# Patient Record
Sex: FEMALE | Race: WHITE | NOT HISPANIC OR LATINO
[De-identification: names, ages, dates, MRNs, and addresses within clinical notes are randomized per-mention and may not be internally consistent; named-entity substitution may affect disease eponyms.]

---

## 2017-04-27 ENCOUNTER — APPOINTMENT (OUTPATIENT)
Dept: FAMILY MEDICINE | Facility: CLINIC | Age: 62
End: 2017-04-27

## 2017-04-27 VITALS
BODY MASS INDEX: 31.82 KG/M2 | HEART RATE: 81 BPM | SYSTOLIC BLOOD PRESSURE: 96 MMHG | WEIGHT: 191 LBS | DIASTOLIC BLOOD PRESSURE: 65 MMHG | HEIGHT: 65 IN | TEMPERATURE: 97.2 F

## 2017-04-29 LAB
ALBUMIN SERPL ELPH-MCNC: 4.2 G/DL
ALP BLD-CCNC: 96 U/L
ALT SERPL-CCNC: 20 U/L
ANION GAP SERPL CALC-SCNC: 12 MMOL/L
AST SERPL-CCNC: 16 U/L
BASOPHILS # BLD AUTO: 0.06 K/UL
BASOPHILS NFR BLD AUTO: 0.7 %
BILIRUB SERPL-MCNC: 0.3 MG/DL
BUN SERPL-MCNC: 18 MG/DL
CALCIUM SERPL-MCNC: 9.5 MG/DL
CHLORIDE SERPL-SCNC: 104 MMOL/L
CO2 SERPL-SCNC: 23 MMOL/L
CREAT SERPL-MCNC: 0.88 MG/DL
EOSINOPHIL # BLD AUTO: 0.21 K/UL
EOSINOPHIL NFR BLD AUTO: 2.4 %
GLUCOSE SERPL-MCNC: 101 MG/DL
HCT VFR BLD CALC: 43.4 %
HGB BLD-MCNC: 13.7 G/DL
IMM GRANULOCYTES NFR BLD AUTO: 0.2 %
LYMPHOCYTES # BLD AUTO: 2.26 K/UL
LYMPHOCYTES NFR BLD AUTO: 26.2 %
MAN DIFF?: NORMAL
MCHC RBC-ENTMCNC: 30.9 PG
MCHC RBC-ENTMCNC: 31.6 GM/DL
MCV RBC AUTO: 98 FL
MONOCYTES # BLD AUTO: 0.61 K/UL
MONOCYTES NFR BLD AUTO: 7.1 %
NEUTROPHILS # BLD AUTO: 5.47 K/UL
NEUTROPHILS NFR BLD AUTO: 63.4 %
PLATELET # BLD AUTO: 302 K/UL
POTASSIUM SERPL-SCNC: 4.2 MMOL/L
PROT SERPL-MCNC: 6.5 G/DL
RBC # BLD: 4.43 M/UL
RBC # FLD: 13 %
SODIUM SERPL-SCNC: 139 MMOL/L
TSH SERPL-ACNC: 6.84 UIU/ML
WBC # FLD AUTO: 8.63 K/UL
WRIGHT STN STL: NORMAL

## 2017-05-04 LAB
BACTERIA STL CULT: NORMAL
C DIFF TOX GENS STL QL NAA+PROBE: NORMAL
CDIFF BY PCR: DETECTED
G LAMBLIA AG STL QL: NORMAL
H PYLORI AG STL QL: NOT DETECTED

## 2017-06-19 ENCOUNTER — RX RENEWAL (OUTPATIENT)
Age: 62
End: 2017-06-19

## 2017-07-03 ENCOUNTER — EMERGENCY (EMERGENCY)
Facility: HOSPITAL | Age: 62
LOS: 1 days | Discharge: HOME | End: 2017-07-03
Attending: EMERGENCY MEDICINE
Payer: COMMERCIAL

## 2017-07-03 ENCOUNTER — APPOINTMENT (OUTPATIENT)
Dept: FAMILY MEDICINE | Facility: CLINIC | Age: 62
End: 2017-07-03

## 2017-07-03 VITALS
BODY MASS INDEX: 31.82 KG/M2 | HEART RATE: 78 BPM | WEIGHT: 191 LBS | SYSTOLIC BLOOD PRESSURE: 105 MMHG | HEIGHT: 65 IN | DIASTOLIC BLOOD PRESSURE: 56 MMHG | OXYGEN SATURATION: 91 %

## 2017-07-03 VITALS
RESPIRATION RATE: 20 BRPM | TEMPERATURE: 99 F | WEIGHT: 190.04 LBS | DIASTOLIC BLOOD PRESSURE: 99 MMHG | OXYGEN SATURATION: 99 % | SYSTOLIC BLOOD PRESSURE: 140 MMHG | HEART RATE: 78 BPM

## 2017-07-03 PROCEDURE — 72100 X-RAY EXAM L-S SPINE 2/3 VWS: CPT | Mod: 26

## 2017-07-03 PROCEDURE — 73030 X-RAY EXAM OF SHOULDER: CPT

## 2017-07-03 PROCEDURE — 99284 EMERGENCY DEPT VISIT MOD MDM: CPT

## 2017-07-03 PROCEDURE — 73030 X-RAY EXAM OF SHOULDER: CPT | Mod: 26,RT

## 2017-07-03 PROCEDURE — 72125 CT NECK SPINE W/O DYE: CPT | Mod: 26

## 2017-07-03 PROCEDURE — 99284 EMERGENCY DEPT VISIT MOD MDM: CPT | Mod: 25

## 2017-07-03 PROCEDURE — 72125 CT NECK SPINE W/O DYE: CPT

## 2017-07-03 PROCEDURE — 70450 CT HEAD/BRAIN W/O DYE: CPT | Mod: 26

## 2017-07-03 PROCEDURE — 72100 X-RAY EXAM L-S SPINE 2/3 VWS: CPT

## 2017-07-03 PROCEDURE — 70450 CT HEAD/BRAIN W/O DYE: CPT

## 2017-07-03 RX ORDER — ONDANSETRON 8 MG/1
4 TABLET, FILM COATED ORAL ONCE
Qty: 0 | Refills: 0 | Status: COMPLETED | OUTPATIENT
Start: 2017-07-03 | End: 2017-07-03

## 2017-07-03 RX ADMIN — ONDANSETRON 4 MILLIGRAM(S): 8 TABLET, FILM COATED ORAL at 18:50

## 2017-07-03 NOTE — ED STATDOCS - PMH
ADHD (attention deficit hyperactivity disorder)    HLD (hyperlipidemia)    Hypothyroid    Myocardial infarct  2012  Rheumatoid arthritis    Stented coronary artery  2012

## 2017-07-03 NOTE — ED STATDOCS - MEDICAL DECISION MAKING DETAILS
63 y/o F with post concussive syndrome, will get CT to r/o intracranial bleed, give pain control, XR of R shoulder and XR of lumbar spine
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
## 2017-07-03 NOTE — ED STATDOCS - ATTENDING CONTRIBUTION TO CARE
Attending Contribution to Care: I (Rina Carr D.O.) performed the initial face to face bedside interview with this patient regarding history of present illness, review of symptoms and past medical, social and family history.  I completed an independent physical examination.  I was the initial provider who evaluated this patient.  The history, review of symptoms and examination was documented by the scribe in my presence and I attest to the accuracy of the documentation.  I have signed out the follow up of any pending tests (i.e. labs, radiological studies) to the PA.  I have discussed the patient’s plan of care and disposition with the PA.
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
## 2017-07-12 RX ORDER — TOPIRAMATE 25 MG/1
25 TABLET, FILM COATED ORAL
Qty: 60 | Refills: 0 | Status: ACTIVE | COMMUNITY
Start: 2017-03-20

## 2017-10-05 ENCOUNTER — APPOINTMENT (OUTPATIENT)
Dept: FAMILY MEDICINE | Facility: CLINIC | Age: 62
End: 2017-10-05
Payer: COMMERCIAL

## 2017-10-05 ENCOUNTER — RESULT CHARGE (OUTPATIENT)
Age: 62
End: 2017-10-05

## 2017-10-05 VITALS
RESPIRATION RATE: 15 BRPM | BODY MASS INDEX: 30.82 KG/M2 | WEIGHT: 185 LBS | OXYGEN SATURATION: 98 % | SYSTOLIC BLOOD PRESSURE: 92 MMHG | HEIGHT: 65 IN | DIASTOLIC BLOOD PRESSURE: 56 MMHG | HEART RATE: 71 BPM

## 2017-10-05 DIAGNOSIS — R51 HEADACHE: ICD-10-CM

## 2017-10-05 PROCEDURE — 99214 OFFICE O/P EST MOD 30 MIN: CPT

## 2017-10-09 LAB
BILIRUB UR QL STRIP: NEGATIVE
CLARITY UR: CLEAR
COLLECTION METHOD: NORMAL
GLUCOSE UR-MCNC: NEGATIVE
HCG UR QL: 0.2 EU/DL
HGB UR QL STRIP.AUTO: NEGATIVE
KETONES UR-MCNC: NEGATIVE
LEUKOCYTE ESTERASE UR QL STRIP: NEGATIVE
NITRITE UR QL STRIP: NEGATIVE
PH UR STRIP: 6
PROT UR STRIP-MCNC: NEGATIVE
SP GR UR STRIP: 1.01

## 2017-10-10 PROBLEM — R51 HEADACHE: Status: ACTIVE | Noted: 2017-07-03

## 2018-02-26 ENCOUNTER — NON-APPOINTMENT (OUTPATIENT)
Age: 63
End: 2018-02-26

## 2018-02-26 ENCOUNTER — APPOINTMENT (OUTPATIENT)
Dept: FAMILY MEDICINE | Facility: CLINIC | Age: 63
End: 2018-02-26
Payer: COMMERCIAL

## 2018-02-26 VITALS
TEMPERATURE: 98 F | SYSTOLIC BLOOD PRESSURE: 140 MMHG | DIASTOLIC BLOOD PRESSURE: 70 MMHG | HEIGHT: 65 IN | OXYGEN SATURATION: 98 % | RESPIRATION RATE: 12 BRPM | BODY MASS INDEX: 30.82 KG/M2 | HEART RATE: 96 BPM | WEIGHT: 185 LBS

## 2018-02-26 PROCEDURE — 93000 ELECTROCARDIOGRAM COMPLETE: CPT

## 2018-02-26 PROCEDURE — 99215 OFFICE O/P EST HI 40 MIN: CPT | Mod: 25

## 2018-02-26 RX ORDER — PREGABALIN 100 MG/1
100 CAPSULE ORAL
Qty: 60 | Refills: 0 | Status: DISCONTINUED | COMMUNITY
Start: 2016-11-15 | End: 2018-02-26

## 2018-07-18 ENCOUNTER — MEDICATION RENEWAL (OUTPATIENT)
Age: 63
End: 2018-07-18

## 2018-10-08 ENCOUNTER — APPOINTMENT (OUTPATIENT)
Dept: FAMILY MEDICINE | Facility: CLINIC | Age: 63
End: 2018-10-08

## 2018-10-08 ENCOUNTER — APPOINTMENT (OUTPATIENT)
Dept: FAMILY MEDICINE | Facility: CLINIC | Age: 63
End: 2018-10-08
Payer: MEDICARE

## 2018-10-08 VITALS
HEART RATE: 106 BPM | HEIGHT: 65 IN | SYSTOLIC BLOOD PRESSURE: 140 MMHG | WEIGHT: 183 LBS | BODY MASS INDEX: 30.49 KG/M2 | DIASTOLIC BLOOD PRESSURE: 82 MMHG | OXYGEN SATURATION: 98 %

## 2018-10-08 PROCEDURE — 86580 TB INTRADERMAL TEST: CPT

## 2019-03-07 ENCOUNTER — APPOINTMENT (OUTPATIENT)
Dept: FAMILY MEDICINE | Facility: CLINIC | Age: 64
End: 2019-03-07

## 2019-03-08 ENCOUNTER — APPOINTMENT (OUTPATIENT)
Dept: FAMILY MEDICINE | Facility: CLINIC | Age: 64
End: 2019-03-08
Payer: MEDICARE

## 2019-03-08 VITALS
RESPIRATION RATE: 16 BRPM | TEMPERATURE: 98.3 F | BODY MASS INDEX: 31.32 KG/M2 | SYSTOLIC BLOOD PRESSURE: 108 MMHG | OXYGEN SATURATION: 98 % | HEIGHT: 65 IN | DIASTOLIC BLOOD PRESSURE: 64 MMHG | WEIGHT: 188 LBS | HEART RATE: 82 BPM

## 2019-03-08 DIAGNOSIS — Z01.818 ENCOUNTER FOR OTHER PREPROCEDURAL EXAMINATION: ICD-10-CM

## 2019-03-08 DIAGNOSIS — F43.21 ADJUSTMENT DISORDER WITH DEPRESSED MOOD: ICD-10-CM

## 2019-03-08 DIAGNOSIS — Z11.59 ENCOUNTER FOR SCREENING FOR OTHER VIRAL DISEASES: ICD-10-CM

## 2019-03-08 DIAGNOSIS — Z11.1 ENCOUNTER FOR SCREENING FOR RESPIRATORY TUBERCULOSIS: ICD-10-CM

## 2019-03-08 DIAGNOSIS — Z63.4 DISAPPEARANCE AND DEATH OF FAMILY MEMBER: ICD-10-CM

## 2019-03-08 DIAGNOSIS — Z87.898 PERSONAL HISTORY OF OTHER SPECIFIED CONDITIONS: ICD-10-CM

## 2019-03-08 DIAGNOSIS — K59.1 FUNCTIONAL DIARRHEA: ICD-10-CM

## 2019-03-08 DIAGNOSIS — Z87.2 PERSONAL HISTORY OF DISEASES OF THE SKIN AND SUBCUTANEOUS TISSUE: ICD-10-CM

## 2019-03-08 DIAGNOSIS — Z87.820 PERSONAL HISTORY OF TRAUMATIC BRAIN INJURY: ICD-10-CM

## 2019-03-08 DIAGNOSIS — Z23 ENCOUNTER FOR IMMUNIZATION: ICD-10-CM
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
## 2019-03-08 DIAGNOSIS — Z78.9 OTHER SPECIFIED HEALTH STATUS: ICD-10-CM

## 2019-03-08 DIAGNOSIS — M25.511 PAIN IN RIGHT SHOULDER: ICD-10-CM

## 2019-03-08 PROCEDURE — G0009: CPT

## 2019-03-08 PROCEDURE — 82044 UR ALBUMIN SEMIQUANTITATIVE: CPT | Mod: QW

## 2019-03-08 PROCEDURE — 81003 URINALYSIS AUTO W/O SCOPE: CPT | Mod: QW

## 2019-03-08 PROCEDURE — 99213 OFFICE O/P EST LOW 20 MIN: CPT | Mod: 25

## 2019-03-08 PROCEDURE — 99397 PER PM REEVAL EST PAT 65+ YR: CPT | Mod: 25

## 2019-03-08 PROCEDURE — 90670 PCV13 VACCINE IM: CPT

## 2019-03-08 SDOH — SOCIAL STABILITY - SOCIAL INSECURITY: DISSAPEARANCE AND DEATH OF FAMILY MEMBER: Z63.4

## 2019-03-11 LAB
ALBUMIN SERPL ELPH-MCNC: 4.4 G/DL
ALBUMIN: 10
ALP BLD-CCNC: 101 U/L
ALT SERPL-CCNC: 23 U/L
ANION GAP SERPL CALC-SCNC: 15 MMOL/L
AST SERPL-CCNC: 25 U/L
BASOPHILS # BLD AUTO: 0.07 K/UL
BASOPHILS NFR BLD AUTO: 1.1 %
BILIRUB SERPL-MCNC: 0.2 MG/DL
BILIRUB UR QL STRIP: NORMAL
BUN SERPL-MCNC: 20 MG/DL
CALCIUM SERPL-MCNC: 9.9 MG/DL
CHLORIDE SERPL-SCNC: 104 MMOL/L
CHOLEST SERPL-MCNC: 195 MG/DL
CHOLEST/HDLC SERPL: 3.6 RATIO
CLARITY UR: CLEAR
CO2 SERPL-SCNC: 21 MMOL/L
COLLECTION METHOD: NORMAL
CREAT SERPL-MCNC: 0.79 MG/DL
CREATININE: 50
EOSINOPHIL # BLD AUTO: 0.16 K/UL
EOSINOPHIL NFR BLD AUTO: 2.4 %
GLUCOSE SERPL-MCNC: 94 MG/DL
GLUCOSE UR-MCNC: NORMAL
HBA1C MFR BLD HPLC: 5.9 %
HCG UR QL: 0.2 EU/DL
HCT VFR BLD CALC: 39.6 %
HDLC SERPL-MCNC: 54 MG/DL
HGB BLD-MCNC: 12.8 G/DL
HGB UR QL STRIP.AUTO: NORMAL
IMM GRANULOCYTES NFR BLD AUTO: 0.3 %
KETONES UR-MCNC: NORMAL
LDLC SERPL CALC-MCNC: 96 MG/DL
LEUKOCYTE ESTERASE UR QL STRIP: NORMAL
LYMPHOCYTES # BLD AUTO: 2 K/UL
LYMPHOCYTES NFR BLD AUTO: 30.1 %
MAN DIFF?: NORMAL
MCHC RBC-ENTMCNC: 31.9 PG
MCHC RBC-ENTMCNC: 32.3 GM/DL
MCV RBC AUTO: 98.8 FL
MICROALBUMIN/CREAT UR TEST STR-RTO: <30
MONOCYTES # BLD AUTO: 0.57 K/UL
MONOCYTES NFR BLD AUTO: 8.6 %
NEUTROPHILS # BLD AUTO: 3.82 K/UL
NEUTROPHILS NFR BLD AUTO: 57.5 %
NITRITE UR QL STRIP: NORMAL
PH UR STRIP: 7
PLATELET # BLD AUTO: 284 K/UL
POTASSIUM SERPL-SCNC: 4.3 MMOL/L
PROT SERPL-MCNC: 7.1 G/DL
PROT UR STRIP-MCNC: NORMAL
RBC # BLD: 4.01 M/UL
RBC # FLD: 12.1 %
SODIUM SERPL-SCNC: 140 MMOL/L
SP GR UR STRIP: 1.01
TRIGL SERPL-MCNC: 226 MG/DL
TSH SERPL-ACNC: 0.41 UIU/ML
WBC # FLD AUTO: 6.64 K/UL

## 2019-03-13 PROBLEM — Z87.820 HISTORY OF CONCUSSION: Status: RESOLVED | Noted: 2017-07-03 | Resolved: 2019-03-13

## 2019-03-13 PROBLEM — M25.511 RIGHT SHOULDER PAIN: Status: RESOLVED | Noted: 2018-02-26 | Resolved: 2019-03-13

## 2019-03-13 PROBLEM — K59.1 FUNCTIONAL DIARRHEA: Status: RESOLVED | Noted: 2017-04-27 | Resolved: 2019-03-13

## 2019-03-13 PROBLEM — Z87.898 HISTORY OF DYSURIA: Status: RESOLVED | Noted: 2017-10-05 | Resolved: 2019-03-13
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
## 2019-03-13 PROBLEM — Z11.1 SCREENING FOR TUBERCULOSIS: Status: RESOLVED | Noted: 2017-10-05 | Resolved: 2019-03-13

## 2019-03-13 PROBLEM — Z11.59 SCREENING FOR MEASLES: Status: RESOLVED | Noted: 2017-10-05 | Resolved: 2019-03-13

## 2019-03-13 PROBLEM — Z78.9 UNKNOWN VARICELLA VACCINATION STATUS: Status: RESOLVED | Noted: 2017-10-05 | Resolved: 2019-03-13

## 2019-03-13 PROBLEM — Z01.818 PREOP EXAMINATION: Status: RESOLVED | Noted: 2018-02-26 | Resolved: 2019-03-13

## 2019-03-13 PROBLEM — Z11.59 SCREENING FOR RUBELLA: Status: RESOLVED | Noted: 2017-10-05 | Resolved: 2019-03-13

## 2019-03-13 PROBLEM — F43.21 GRIEF REACTION: Status: RESOLVED | Noted: 2018-02-26 | Resolved: 2019-03-13

## 2019-03-13 NOTE — HISTORY OF PRESENT ILLNESS
[FreeTextEntry1] : CPE \par Last CPE was October 2017 IN [place] \par Follows with specialists Neurology ( MS, vertigo, ADD,  Headaches) and cardiology ( CAD)  and ORTHO for MSK complaints. \par \par Last seen for MCL 2/26/18 for RIGHT SHOULDER Arthroscopy . Did well after.  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  Enjoys it !

## 2019-03-13 NOTE — PHYSICAL EXAM
[General Appearance - Alert] : alert [General Appearance - In No Acute Distress] : in no acute distress [Outer Ear] : the ears and nose were normal in appearance [Oropharynx] : the oropharynx was normal [Neck Appearance] : the appearance of the neck was normal [Neck Cervical Mass (___cm)] : no neck mass was observed [Jugular Venous Distention Increased] : there was no jugular-venous distention [Thyroid Diffuse Enlargement] : the thyroid was not enlarged [Thyroid Nodule] : there were no palpable thyroid nodules [Auscultation Breath Sounds / Voice Sounds] : lungs were clear to auscultation bilaterally [Heart Rate And Rhythm] : heart rate was normal and rhythm regular [Heart Sounds] : normal S1 and S2 [Heart Sounds Gallop] : no gallops [Murmurs] : no murmurs [Heart Sounds Pericardial Friction Rub] : no pericardial rub [Bowel Sounds] : normal bowel sounds [Abdomen Soft] : soft [Abdomen Tenderness] : non-tender [] : no hepato-splenomegaly [Abdomen Mass (___ Cm)] : no abdominal mass palpated [No Acute Distress] : no acute distress [Normal Sclera/Conjunctiva] : normal sclera/conjunctiva [PERRL] : pupils equal round and reactive to light [Normal TMs] : both tympanic membranes were normal [Supple] : supple [Thyroid Normal, No Nodules] : the thyroid was normal and there were no nodules present [No Respiratory Distress] : no respiratory distress  [Clear to Auscultation] : lungs were clear to auscultation bilaterally [Normal Rate] : normal rate  [Regular Rhythm] : with a regular rhythm [No Edema] : there was no peripheral edema [Soft] : abdomen soft [Non Tender] : non-tender [No Masses] : no abdominal mass palpated [Normal Supraclavicular Nodes] : no supraclavicular lymphadenopathy [Normal Posterior Cervical Nodes] : no posterior cervical lymphadenopathy [Normal Anterior Cervical Nodes] : no anterior cervical lymphadenopathy [No Spinal Tenderness] : no spinal tenderness [No Joint Swelling] : no joint swelling [No Rash] : no rash [Deep Tendon Reflexes (DTR)] : deep tendon reflexes were 2+ and symmetric [Speech Grossly Normal] : speech grossly normal [Alert and Oriented x3] : oriented to person, place, and time [de-identified] : calm  well groomed  engaging  [de-identified] : MO   [de-identified] : obese  [de-identified] : warm and dry  [de-identified] : no tremors

## 2019-03-13 NOTE — HEALTH RISK ASSESSMENT
[No falls in past year] : Patient reported no falls in the past year [1] : 2) Feeling down, depressed, or hopeless for several days (1) [Good] : ~his/her~  mood as  good [Graduate School] : graduate school [Feels Safe at Home] : Feels safe at home [Fully functional (bathing, dressing, toileting, transferring, walking, feeding)] : Fully functional (bathing, dressing, toileting, transferring, walking, feeding) [Fully functional (using the telephone, shopping, preparing meals, housekeeping, doing laundry, using] : Fully functional and needs no help or supervision to perform IADLs (using the telephone, shopping, preparing meals, housekeeping, doing laundry, using transportation, managing medications and managing finances) [Patient reported mammogram was normal] : Patient reported mammogram was normal [] : No [de-identified] : DENIES [de-identified] : when I can I walk  [de-identified] : "could be better "  [FreeTextEntry1] :  passed recently  [[id number]] : 2 [MammogramDate] : 2012  [MammogramComments] : Bi Rads 2  [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

## 2019-03-13 NOTE — ASSESSMENT
[FreeTextEntry1] : Complicated 64 year old WF with multiple chronic medical conditions and specialists .\par \par See HPI and PLAN\par \par Lab req given for fasting lipid panel.  Will advise.\par \par Best wishes offered! \par \par Fu in 4 months.

## 2019-03-13 NOTE — COUNSELING
[Weight management counseling provided] : Weight management [Healthy eating counseling provided] : healthy eating [Fall prevention counseling provided] : fall prevention  [Target Wt Loss Goal ___] : Target weight loss goal [unfilled] lbs [Adequate lighting] : Adequate lighting [No throw rugs] : No throw rugs

## 2019-03-26 ENCOUNTER — MEDICATION RENEWAL (OUTPATIENT)
Age: 64
End: 2019-03-26

## 2019-05-31 ENCOUNTER — APPOINTMENT (OUTPATIENT)
Dept: RHEUMATOLOGY | Facility: CLINIC | Age: 64
End: 2019-05-31
Payer: MEDICARE

## 2019-05-31 VITALS
SYSTOLIC BLOOD PRESSURE: 120 MMHG | HEIGHT: 65 IN | HEART RATE: 94 BPM | OXYGEN SATURATION: 99 % | DIASTOLIC BLOOD PRESSURE: 80 MMHG | BODY MASS INDEX: 31.32 KG/M2 | WEIGHT: 188 LBS

## 2019-05-31 DIAGNOSIS — M17.10 UNILATERAL PRIMARY OSTEOARTHRITIS, UNSPECIFIED KNEE: ICD-10-CM

## 2019-05-31 PROCEDURE — 96372 THER/PROPH/DIAG INJ SC/IM: CPT

## 2019-05-31 PROCEDURE — 99204 OFFICE O/P NEW MOD 45 MIN: CPT | Mod: 25

## 2019-05-31 RX ORDER — METHYLPRED ACET/NACL,ISO-OS/PF 80 MG/ML
80 VIAL (ML) INJECTION
Qty: 1 | Refills: 0 | Status: COMPLETED | OUTPATIENT
Start: 2019-05-31

## 2019-05-31 RX ADMIN — METHYLPREDNISOLONE ACETATE MG/ML: 80 INJECTION, SUSPENSION INTRA-ARTICULAR; INTRALESIONAL; INTRAMUSCULAR; SOFT TISSUE at 00:00

## 2019-05-31 NOTE — PROCEDURE
[Today's Date:] : Date: [unfilled] [Patient] : the patient [Risks] : risks [Benefits] : benefits [Alternatives] : alternatives [Consent Obtained] : written consent was obtained prior to the procedure and is detailed in the patient's record [Therapeutic] : therapeutic [#1 Site: ______] : #1 site identified in the [unfilled] [Ethyl Chloride] : ethyl chloride [Betadine] : betadine solution [Alcohol] : alcohol [Chlorhexidine] : chlorhexidine [25 gauge 1.5 inch] : A 25 gauge 1.5 inch needle was used [Depomedrol ___ mg] : Depomedrol [unfilled] mg [Tolerated Well] : the patient tolerated the procedure well [No Complications] : there were no complications [Instructions Given] : handouts/patient instructions were given to patient [Patient Instructed to Call] : patient was instructed to call if redness at site, a decrease in range of motion or an increase in pain is noted after procedure.

## 2019-05-31 NOTE — REVIEW OF SYSTEMS
[Feeling Tired] : feeling tired [Arthralgias] : arthralgias [Joint Pain] : joint pain [Negative] : Heme/Lymph

## 2019-05-31 NOTE — ASSESSMENT
[FreeTextEntry1] : 64-year-old  female presents for further management of rheumatoid arthritis.\par \par Rheumatoid arthritis-\par -she is well known to me for several years\par -She was diagnosed prior to seeing me\par -She is seronegative nonerosive\par -She has failed multiple medications in the past including Diamox due to elevated liver enzymes. She has failed TNF inhibitors due to lack of efficacy. She has also tried and failed orencia.\par -The last medication she was using was actemra injection and they worked well for her\par -She would like to resume them\par Risks and benefits were d/w patient including but not limited to immunosuppression, reactivation of TB, lymphoma, malignancies, lupus like symptoms, exacerbation of psoriasis, and infections.\par - to hold meds while on abx\par -Labs to be done today\par -She is to have baseline x-rays\par \par Polyarthritis-\par -intramuscular Depo-Medrol 80 mg x1 today\par -To use NSAIDs on a p.r.n. basis\par \par Osteoarthritis-\par -status post bilateral knee replacements\par \par Carpal tunnel syndrome-\par -to use wrist splints\par \par Followup 2 months. She is aware to call if her symptoms worsen

## 2019-05-31 NOTE — PHYSICAL EXAM
[General Appearance - Alert] : alert [General Appearance - Well Nourished] : well nourished [General Appearance - Well Developed] : well developed [Sclera] : the sclera and conjunctiva were normal [Oropharynx] : the oropharynx was normal [Neck Appearance] : the appearance of the neck was normal [Respiration, Rhythm And Depth] : normal respiratory rhythm and effort [Auscultation Breath Sounds / Voice Sounds] : lungs were clear to auscultation bilaterally [Heart Sounds] : normal S1 and S2 [Full Pulse] : the pedal pulses are present [Edema] : there was no peripheral edema [Abdomen Tenderness] : non-tender [Cervical Lymph Nodes Enlarged Anterior Bilaterally] : anterior cervical [Supraclavicular Lymph Nodes Enlarged Bilaterally] : supraclavicular [No Spinal Tenderness] : no spinal tenderness [Abnormal Walk] : normal gait [Nail Clubbing] : no clubbing  or cyanosis of the fingernails [Motor Tone] : muscle strength and tone were normal [FreeTextEntry1] : FROM neck, shoulders, elbows, wrists, hands, hips, knees, ankles and feet, including the small joints of the hands and feet without any evidence of inflammatory arthritis b/l TKR, hands with POm with swelling, POM shoulders, ROM 90 degrees,  [] : no rash [Deep Tendon Reflexes (DTR)] : deep tendon reflexes were 2+ and symmetric [Motor Exam] : the motor exam was normal [Oriented To Time, Place, And Person] : oriented to person, place, and time [Impaired Insight] : insight and judgment were intact [Affect] : the affect was normal

## 2019-05-31 NOTE — HISTORY OF PRESENT ILLNESS
[FreeTextEntry1] : 64-year-old  female well known to me for several years returns for further management of rheumatoid arthritis. She was diagnosed with seronegative rheumatoid arthritis prior to seeing me. She was under my care for several years and tried several medications with minimal relief. She was last seen Dr. Sanchez. She was on subcutaneous injection of actemra\par She last used the injections in October, she states that she did well with them. She ran out of them and stopped using them. She was on methotrexate but it caused elevated liver enzymes. Prior to this she has used Enbrel, Remicade, orencia, Humira with minimal relief. She thinks that she has tried other infusions as well.\par \par She has had bilateral knee replacements. She has had shoulder surgery as well.\par \par She has a good appetite and denies weight loss. She denies fevers or chills or night sweats. She rates her current pain as a 10 out of 10. Most of the pain is in her hands.\par \par She is otherwise up-to-date on her age-appropriate screenings.

## 2019-06-04 LAB
25(OH)D3 SERPL-MCNC: 21.4 NG/ML
ALBUMIN SERPL ELPH-MCNC: 4.8 G/DL
ALP BLD-CCNC: 118 U/L
ALT SERPL-CCNC: 14 U/L
ANION GAP SERPL CALC-SCNC: 14 MMOL/L
AST SERPL-CCNC: 18 U/L
BASOPHILS # BLD AUTO: 0.07 K/UL
BASOPHILS NFR BLD AUTO: 0.9 %
BILIRUB SERPL-MCNC: 0.3 MG/DL
BUN SERPL-MCNC: 22 MG/DL
CALCIUM SERPL-MCNC: 10 MG/DL
CHLORIDE SERPL-SCNC: 107 MMOL/L
CO2 SERPL-SCNC: 22 MMOL/L
CREAT SERPL-MCNC: 0.78 MG/DL
CRP SERPL-MCNC: 0.53 MG/DL
ENA SS-A AB SER IA-ACNC: <0.2 AL
ENA SS-B AB SER IA-ACNC: <0.2 AL
EOSINOPHIL # BLD AUTO: 0.22 K/UL
EOSINOPHIL NFR BLD AUTO: 2.9 %
ERYTHROCYTE [SEDIMENTATION RATE] IN BLOOD BY WESTERGREN METHOD: 40 MM/HR
GLUCOSE SERPL-MCNC: 101 MG/DL
HAV IGM SER QL: NONREACTIVE
HBV CORE IGM SER QL: NONREACTIVE
HBV SURFACE AG SER QL: NONREACTIVE
HCT VFR BLD CALC: 47 %
HCV AB SER QL: NONREACTIVE
HCV S/CO RATIO: 0.05 S/CO
HGB BLD-MCNC: 14.7 G/DL
IMM GRANULOCYTES NFR BLD AUTO: 0.4 %
LYMPHOCYTES # BLD AUTO: 1.62 K/UL
LYMPHOCYTES NFR BLD AUTO: 21.3 %
MAN DIFF?: NORMAL
MCHC RBC-ENTMCNC: 31 PG
MCHC RBC-ENTMCNC: 31.3 GM/DL
MCV RBC AUTO: 99.2 FL
MONOCYTES # BLD AUTO: 0.57 K/UL
MONOCYTES NFR BLD AUTO: 7.5 %
NEUTROPHILS # BLD AUTO: 5.1 K/UL
NEUTROPHILS NFR BLD AUTO: 67 %
PLATELET # BLD AUTO: 358 K/UL
POTASSIUM SERPL-SCNC: 4.2 MMOL/L
PROT SERPL-MCNC: 7.6 G/DL
RBC # BLD: 4.74 M/UL
RBC # FLD: 12.3 %
SODIUM SERPL-SCNC: 143 MMOL/L
WBC # FLD AUTO: 7.61 K/UL

## 2019-06-05 LAB
M TB IFN-G BLD-IMP: NEGATIVE
QUANTIFERON TB PLUS MITOGEN MINUS NIL: 4.81 IU/ML
QUANTIFERON TB PLUS NIL: 0.01 IU/ML
QUANTIFERON TB PLUS TB1 MINUS NIL: 0.04 IU/ML
QUANTIFERON TB PLUS TB2 MINUS NIL: 0.01 IU/ML

## 2019-06-06 LAB
ANA PAT FLD IF-IMP: ABNORMAL
ANA SER IF-ACNC: ABNORMAL
CCP AB SER IA-ACNC: <8 UNITS
RF+CCP IGG SER-IMP: NEGATIVE

## 2019-06-18 ENCOUNTER — APPOINTMENT (OUTPATIENT)
Dept: FAMILY MEDICINE | Facility: CLINIC | Age: 64
End: 2019-06-18

## 2019-06-20 ENCOUNTER — APPOINTMENT (OUTPATIENT)
Dept: FAMILY MEDICINE | Facility: CLINIC | Age: 64
End: 2019-06-20
Payer: MEDICARE

## 2019-06-20 VITALS
BODY MASS INDEX: 30.99 KG/M2 | SYSTOLIC BLOOD PRESSURE: 154 MMHG | HEIGHT: 65 IN | HEART RATE: 113 BPM | DIASTOLIC BLOOD PRESSURE: 80 MMHG | WEIGHT: 186 LBS | OXYGEN SATURATION: 95 %

## 2019-06-20 DIAGNOSIS — N63.0 UNSPECIFIED LUMP IN UNSPECIFIED BREAST: ICD-10-CM

## 2019-06-20 PROCEDURE — 99214 OFFICE O/P EST MOD 30 MIN: CPT

## 2019-06-20 RX ORDER — ZOLPIDEM TARTRATE 12.5 MG/1
12.5 TABLET, EXTENDED RELEASE ORAL
Qty: 30 | Refills: 0 | Status: DISCONTINUED | COMMUNITY
Start: 2017-02-24 | End: 2019-06-20

## 2019-06-24 ENCOUNTER — APPOINTMENT (OUTPATIENT)
Dept: RHEUMATOLOGY | Facility: CLINIC | Age: 64
End: 2019-06-24
Payer: MEDICARE

## 2019-06-27 PROBLEM — N63.0 LUMP OR MASS IN BREAST: Status: ACTIVE | Noted: 2019-06-18

## 2019-06-27 NOTE — PHYSICAL EXAM
[Well Developed] : well developed [Normal Sclera/Conjunctiva] : normal sclera/conjunctiva [Supple] : supple [No Respiratory Distress] : no respiratory distress  [Clear to Auscultation] : lungs were clear to auscultation bilaterally [Regular Rhythm] : with a regular rhythm [Normal Rate] : normal rate  [No Spinal Tenderness] : no spinal tenderness [No Rash] : no rash [No Focal Deficits] : no focal deficits [Speech Grossly Normal] : speech grossly normal [Alert and Oriented x3] : oriented to person, place, and time [de-identified] : ++ anxiety  [de-identified] : a bit fibrous ; no discrete palpable lesion on exam    CHRONIC NT lump between breast is raise and pink in color.  [de-identified] : MO  [de-identified] : a bit antalgic

## 2019-06-27 NOTE — ASSESSMENT
[FreeTextEntry1] : \par Mammo and US reviewed for no suggestion of malignancy .  Area of concern described as inflamed derma structure and advised possible abscess or or cyst. \par Lucrecia is not complaining of pain or fever. \par In setting of loose stool and polypharmacy , will defer ABXS for now.\par Referred to Breast surgeon. \par \par +/- Genetic testing discussed. \par \par She agrees with plan.

## 2019-06-27 NOTE — REVIEW OF SYSTEMS
[Fatigue] : fatigue [Night Sweats] : night sweats [Joint Pain] : joint pain [Joint Stiffness] : joint stiffness [Negative] : Respiratory [Fever] : no fever [Suicidal] : not suicidal [FreeTextEntry2] : chronic [FreeTextEntry9] : chronic [de-identified] : as in hpi  [de-identified] : as in hpi

## 2019-06-27 NOTE — HISTORY OF PRESENT ILLNESS
[FreeTextEntry1] : FU on recent Mammo and Breast US \par She has declined  surveillance in recent years.\par She felt lump in RIGHT  axillae region two days ago. \par Sister diagnosed with breast cancer recently and has one sister with past history of same [de-identified] : Complains of 4 months of loose stools  no recent abx.\par Has Fu with GI planned   Needs referral   Needs colonoscopy \par \par follows  with neurology regularly for mood issues, LBP, migraines, insomnia.\par follows  with rheum. for RA

## 2019-07-01 ENCOUNTER — APPOINTMENT (OUTPATIENT)
Dept: RHEUMATOLOGY | Facility: CLINIC | Age: 64
End: 2019-07-01
Payer: MEDICARE

## 2019-07-01 VITALS
HEIGHT: 65 IN | DIASTOLIC BLOOD PRESSURE: 79 MMHG | WEIGHT: 186 LBS | BODY MASS INDEX: 30.99 KG/M2 | HEART RATE: 105 BPM | SYSTOLIC BLOOD PRESSURE: 128 MMHG | OXYGEN SATURATION: 98 %

## 2019-07-01 PROCEDURE — 96372 THER/PROPH/DIAG INJ SC/IM: CPT

## 2019-07-01 RX ORDER — METHYLPRED ACET/NACL,ISO-OS/PF 80 MG/ML
80 VIAL (ML) INJECTION
Qty: 1 | Refills: 0 | Status: COMPLETED | OUTPATIENT
Start: 2019-07-01

## 2019-07-01 RX ADMIN — METHYLPREDNISOLONE ACETATE MG/ML: 80 INJECTION, SUSPENSION INTRA-ARTICULAR; INTRALESIONAL; INTRAMUSCULAR; SOFT TISSUE at 00:00

## 2019-07-01 NOTE — PROCEDURE
[Today's Date:] : Date: [unfilled] [Patient] : the patient [Risks] : risks [Benefits] : benefits [Alternatives] : alternatives [Consent Obtained] : written consent was obtained prior to the procedure and is detailed in the patient's record [Therapeutic] : therapeutic [#1 Site: ______] : #1 site identified in the [unfilled] [Ethyl Chloride] : ethyl chloride [Betadine] : betadine solution [Alcohol] : alcohol [25 gauge 1.5 inch] : A 25 gauge 1.5 inch needle was used [Depomedrol ___ mg] : Depomedrol [unfilled] mg

## 2019-07-01 NOTE — ASSESSMENT
[FreeTextEntry1] : 64-year-old  female presents for further management of rheumatoid arthritis.\par \par Rheumatoid arthritis-\par -she is well known to me for several years\par -She was diagnosed prior to seeing me\par -She is seronegative nonerosive\par -She has failed multiple medications in the past including Diamox due to elevated liver enzymes. She has failed TNF inhibitors due to lack of efficacy. She has also tried and failed orencia.\par -The last medication she was using was actemra injection and they worked well for her\par -She would like to resume them\par Risks and benefits were d/w patient including but not limited to immunosuppression, reactivation of TB, lymphoma, malignancies, lupus like symptoms, exacerbation of psoriasis, and infections.\par \par Polyarthritis-\par -intramuscular Depo-Medrol 80 mg x1 today\par -To use NSAIDs on a p.r.n. basis\par \par Followup 2 months. She is aware to call if her symptoms worsen

## 2019-07-02 ENCOUNTER — OTHER (OUTPATIENT)
Age: 64
End: 2019-07-02

## 2019-07-09 RX ORDER — ERGOCALCIFEROL 1.25 MG/1
1.25 MG CAPSULE, LIQUID FILLED ORAL
Qty: 4 | Refills: 0 | Status: DISCONTINUED | COMMUNITY
Start: 2016-10-14 | End: 2019-07-09

## 2019-07-19 LAB
BACTERIA STL CULT: NORMAL
C DIFF TOX GENS STL QL NAA+PROBE: NORMAL
CDIFF BY PCR: NOT DETECTED
G LAMBLIA AG STL QL: NORMAL
H PYLORI AG STL QL: NOT DETECTED
WRIGHT STN STL: POSITIVE

## 2019-07-26 ENCOUNTER — RESULT REVIEW (OUTPATIENT)
Age: 64
End: 2019-07-26

## 2019-07-26 ENCOUNTER — OTHER (OUTPATIENT)
Age: 64
End: 2019-07-26

## 2019-07-26 ENCOUNTER — APPOINTMENT (OUTPATIENT)
Dept: DERMATOLOGY | Facility: CLINIC | Age: 64
End: 2019-07-26
Payer: MEDICARE

## 2019-07-26 PROCEDURE — 11102 TANGNTL BX SKIN SINGLE LES: CPT | Mod: 59

## 2019-07-26 PROCEDURE — 99203 OFFICE O/P NEW LOW 30 MIN: CPT | Mod: 25

## 2019-07-26 PROCEDURE — 11301 SHAVE SKIN LESION 0.6-1.0 CM: CPT

## 2019-07-26 PROCEDURE — 67810 INCAL BX EYELID SKN LID MRGN: CPT | Mod: 59

## 2019-08-02 ENCOUNTER — APPOINTMENT (OUTPATIENT)
Dept: RHEUMATOLOGY | Facility: CLINIC | Age: 64
End: 2019-08-02

## 2019-08-06 ENCOUNTER — APPOINTMENT (OUTPATIENT)
Dept: MRI IMAGING | Facility: CLINIC | Age: 64
End: 2019-08-06

## 2019-08-06 ENCOUNTER — OUTPATIENT (OUTPATIENT)
Dept: OUTPATIENT SERVICES | Facility: HOSPITAL | Age: 64
LOS: 1 days | End: 2019-08-06
Payer: MEDICARE

## 2019-08-06 DIAGNOSIS — Z00.8 ENCOUNTER FOR OTHER GENERAL EXAMINATION: ICD-10-CM

## 2019-08-06 PROCEDURE — 72141 MRI NECK SPINE W/O DYE: CPT | Mod: 26

## 2019-08-13 ENCOUNTER — APPOINTMENT (OUTPATIENT)
Dept: SURGERY | Facility: CLINIC | Age: 64
End: 2019-08-13

## 2019-08-20 ENCOUNTER — APPOINTMENT (OUTPATIENT)
Dept: GASTROENTEROLOGY | Facility: CLINIC | Age: 64
End: 2019-08-20
Payer: MEDICARE

## 2019-08-20 VITALS
SYSTOLIC BLOOD PRESSURE: 118 MMHG | WEIGHT: 189 LBS | OXYGEN SATURATION: 98 % | DIASTOLIC BLOOD PRESSURE: 70 MMHG | HEART RATE: 82 BPM | HEIGHT: 65 IN | RESPIRATION RATE: 16 BRPM | BODY MASS INDEX: 31.49 KG/M2

## 2019-08-20 PROCEDURE — 99205 OFFICE O/P NEW HI 60 MIN: CPT

## 2019-08-20 NOTE — PHYSICAL EXAM
[General Appearance - In No Acute Distress] : in no acute distress [General Appearance - Alert] : alert [PERRL With Normal Accommodation] : pupils were equal in size, round, and reactive to light [Sclera] : the sclera and conjunctiva were normal [Extraocular Movements] : extraocular movements were intact [Outer Ear] : the ears and nose were normal in appearance [Oropharynx] : the oropharynx was normal [Neck Appearance] : the appearance of the neck was normal [Thyroid Diffuse Enlargement] : the thyroid was not enlarged [Jugular Venous Distention Increased] : there was no jugular-venous distention [Neck Cervical Mass (___cm)] : no neck mass was observed [Thyroid Nodule] : there were no palpable thyroid nodules [Auscultation Breath Sounds / Voice Sounds] : lungs were clear to auscultation bilaterally [Heart Sounds] : normal S1 and S2 [Heart Rate And Rhythm] : heart rate was normal and rhythm regular [Heart Sounds Gallop] : no gallops [Murmurs] : no murmurs [Edema] : there was no peripheral edema [Heart Sounds Pericardial Friction Rub] : no pericardial rub [Bowel Sounds] : normal bowel sounds [Abdomen Soft] : soft [Abdomen Tenderness] : non-tender [Abdomen Mass (___ Cm)] : no abdominal mass palpated [] : no hepato-splenomegaly [Cervical Lymph Nodes Enlarged Posterior Bilaterally] : posterior cervical [Supraclavicular Lymph Nodes Enlarged Bilaterally] : supraclavicular [Cervical Lymph Nodes Enlarged Anterior Bilaterally] : anterior cervical [Nail Clubbing] : no clubbing  or cyanosis of the fingernails [Skin Color & Pigmentation] : normal skin color and pigmentation [No Focal Deficits] : no focal deficits [Oriented To Time, Place, And Person] : oriented to person, place, and time [Skin Turgor] : normal skin turgor [Impaired Insight] : insight and judgment were intact [Affect] : the affect was normal

## 2019-08-20 NOTE — ASSESSMENT
[FreeTextEntry1] : Chronic diarrhea: Likely polypharmacy related.  Will send stool studies and schedule colonoscopy.  Had negative C. diff in July.\par \par GERD, regurgitation: Will schedule EGD for further evaluation.

## 2019-08-20 NOTE — HISTORY OF PRESENT ILLNESS
[de-identified] : Patient presents 40 minutes late for evaluation of chronic diarrhea.  Reports 6 months of diarrheal stools, about 4-5 episodes per day, having nocturnal symptoms 2-3 times per week.  No abdominal cramping or rectal bleeding.  No changes in weight or appetite.  Last colonoscopy was 5 years ago.  Also complains of GERD with food regurgitation despite taking pantoprazole daily.  For neither symptom can she discern any exacerbating or alleviating factors.

## 2019-08-20 NOTE — CONSULT LETTER
[Dear  ___] : Dear  [unfilled], [Consult Letter:] : I had the pleasure of evaluating your patient, [unfilled]. [Please see my note below.] : Please see my note below. [Consult Closing:] : Thank you very much for allowing me to participate in the care of this patient.  If you have any questions, please do not hesitate to contact me. [FreeTextEntry3] : Very truly yours,\par \par RONNY Whitmore MD\par [facility]\par Gastroenterology at [place]\par [address], [address]\[place] [address]\par Tel [phone]\par Fax [phone]

## 2019-08-22 LAB
TTG IGA SER IA-ACNC: <1.2 U/ML
TTG IGA SER-ACNC: NEGATIVE

## 2019-08-26 LAB — GI PCR PANEL, STOOL: NORMAL

## 2019-08-27 ENCOUNTER — RX RENEWAL (OUTPATIENT)
Age: 64
End: 2019-08-27

## 2019-08-27 LAB — CALPROTECTIN FECAL: <16 UG/G

## 2019-08-30 ENCOUNTER — RX RENEWAL (OUTPATIENT)
Age: 64
End: 2019-08-30

## 2019-08-30 ENCOUNTER — OTHER (OUTPATIENT)
Age: 64
End: 2019-08-30

## 2019-09-03 ENCOUNTER — OTHER (OUTPATIENT)
Age: 64
End: 2019-09-03

## 2019-09-10 ENCOUNTER — APPOINTMENT (OUTPATIENT)
Age: 64
End: 2019-09-10
Payer: MEDICARE

## 2019-09-10 PROCEDURE — 99204 OFFICE O/P NEW MOD 45 MIN: CPT

## 2019-09-10 NOTE — HISTORY OF PRESENT ILLNESS
[FreeTextEntry1] : 09/10/2019: The patient is a 64-year-old right-hand-dominant female presents to the office today with paresthesias to bilateral hands for greater than 20 years. She also complains of numbness, tingling, pain, and difficulty sleeping because of her symptoms. She states that her symptoms are worse on the left than the right. She has tried wrist braces and cortisone injections. Wrist bracing at night keeps her symptoms from becoming worse. Cortisone injections were not successful. She had an EMG in 2014 which showed bilateral severe carpal tunnel syndrome. She had a recent EMG this July that showed the same. She is here for surgical options.

## 2019-09-10 NOTE — PHYSICAL EXAM
[Normal] : Oriented to person, place, and time, insight and judgement were intact and the affect was normal [de-identified] : Physical exam shows the patient to be alert and oriented x3, capable of ambulation. The patient is well-developed and well-nourished in no apparent respiratory distress. Majority of the skin is intact bilaterally in the upper extremities without lymphadenopathy at the elbows.\par \par There is a negative Spurling test. There is no sensitivity or Tinel's over the axilla bilaterally in the area of the brachial plexus.\par \par The elbows have a symmetric and full range of motion with no pain upon forced flexion or extension bilaterally. There is no tenderness over the medial or lateral epicondyles bilaterally. The biceps and triceps are intact bilaterally with 5 over 5 strength. There is no joint effusion or instability of the medial and lateral collateral ligament complex bilaterally. There is no sensitivity of the median ulnar or radial nerves with provocative tests bilaterally.\par \par The wrists have a symmetric range of motion bilaterally. There is no tenderness over the scaphoid, scapholunate or lunotriquetral ligaments bilaterally. There is a negative  test bilaterally. There is negative tenderness over the radial ulnar joint or TFCC and no evidence of instability bilaterally. No tenderness over the pisotriquetral or hamate hook or CMC joint bilaterally. There is 5 over 5 strength of the wrists bilaterally.\par \par There is a negative Finkelstein test bilaterally and no tenderness over the A1 pulleys. There is no tenderness or instability of the MP PIP or DIP joints in the digits bilaterally with no evidence of digital malrotation.\par \par There is no sensitivity or masses around the ulnar nerve at the level of the wrist bilaterally.\par \par \par There is 2+ pulses over the radial arteries bilaterally with good capillary refill.\par \par There is a positive Tinel's and a positive Phalen's test at 10 seconds bilaterally. There is also mild thenar atrophy which is worse on the left than the right but good opposition is present. The remaining intrinsic musculature has good strength bilaterally.\par \par Sensation is diminished in the median nerve distribution on the left side with 2 point discrimination diminished on the left and greater than 8 mm. Ulnar nerve sensation is grossly intact\par

## 2019-09-10 NOTE — CONSULT LETTER
[Consult Letter:] : I had the pleasure of evaluating your patient, [unfilled]. [Please see my note below.] : Please see my note below. [Consult Closing:] : Thank you very much for allowing me to participate in the care of this patient.  If you have any questions, please do not hesitate to contact me. [Sincerely,] : Sincerely, [FreeTextEntry3] : Dr. Mady Neville\par Orthopaedic Surgery\par Hand & Upper Extremity.\par

## 2019-09-10 NOTE — ASSESSMENT
[FreeTextEntry1] : Patient with bilateral carpal tunnel which is severe in nature. The nature of this condition was described at length with the patient and she verbalized understanding. Conservative versus surgical intervention was described at length with the patient including all risks and benefits as well as recovery time. The patient verbalized understanding and wishes to proceed with a left carpal tunnel release. She will be booked for the next available surgical date.

## 2019-09-11 LAB
CRYPTOSP AG SPEC QL: NORMAL
DEPRECATED O AND P PREP STL: NORMAL

## 2019-09-20 ENCOUNTER — APPOINTMENT (OUTPATIENT)
Age: 64
End: 2019-09-20
Payer: MEDICARE

## 2019-09-20 DIAGNOSIS — G56.03 CARPAL TUNNEL SYNDROM,BILATERAL UPPER LIMBS: ICD-10-CM

## 2019-09-20 PROCEDURE — 96372 THER/PROPH/DIAG INJ SC/IM: CPT | Mod: 59

## 2019-09-20 PROCEDURE — 99213 OFFICE O/P EST LOW 20 MIN: CPT | Mod: 25

## 2019-09-20 NOTE — PHYSICAL EXAM
[Normal] : Oriented to person, place, and time, insight and judgement were intact and the affect was normal [de-identified] : Physical exam shows the patient to be alert and oriented x3, capable of ambulation. The patient is well-developed and well-nourished in no apparent respiratory distress. Majority of the skin is intact bilaterally in the upper extremities without lymphadenopathy at the elbows.\par \par There is a negative Spurling test. There is no sensitivity or Tinel's over the axilla bilaterally in the area of the brachial plexus.\par \par The elbows have a symmetric and full range of motion with no pain upon forced flexion or extension bilaterally. There is no tenderness over the medial or lateral epicondyles bilaterally. The biceps and triceps are intact bilaterally with 5 over 5 strength. There is no joint effusion or instability of the medial and lateral collateral ligament complex bilaterally. There is no sensitivity of the median ulnar or radial nerves with provocative tests bilaterally.\par \par The wrists have a symmetric range of motion bilaterally. There is no tenderness over the scaphoid, scapholunate or lunotriquetral ligaments bilaterally. There is a negative  test bilaterally. There is negative tenderness over the radial ulnar joint or TFCC and no evidence of instability bilaterally. No tenderness over the pisotriquetral or hamate hook or CMC joint bilaterally. There is 5 over 5 strength of the wrists bilaterally.\par \par There is a negative Finkelstein test bilaterally and no tenderness over the A1 pulleys. There is no tenderness or instability of the MP PIP or DIP joints in the digits bilaterally with no evidence of digital malrotation.\par \par There is no sensitivity or masses around the ulnar nerve at the level of the wrist bilaterally.\par \par \par There is 2+ pulses over the radial arteries bilaterally with good capillary refill.\par \par There is a positive Tinel's and a positive Phalen's test at 10 seconds bilaterally. There is also mild thenar atrophy which is worse on the left than the right but good opposition is present. The remaining intrinsic musculature has good strength bilaterally.\par \par Sensation is diminished in the median nerve distribution on the left side with 2 point discrimination diminished on the left and greater than 8 mm. Ulnar nerve sensation is grossly intact\par

## 2019-09-20 NOTE — PROCEDURE
[FreeTextEntry1] : intramuscular ketorolac injection:\par Skin prep with alcohol, 30 mg/1 cc of ketorolac injected to the left deltoid muscle with a 25-gauge needle, sterile technique utilized, Band-Aid applied

## 2019-09-20 NOTE — REVIEW OF SYSTEMS
[Joint Pain] : joint pain [Negative] : Allergic/Immunologic [FreeTextEntry9] : Bilateral hand paresthesias

## 2019-09-20 NOTE — ASSESSMENT
[FreeTextEntry1] : the patient is a 64-year-old female with an acute exacerbation of bilateral carpal tunnel syndrome. She received an intramuscular ketorolac injection today which she tolerated well.  injection will be followed by an oral course of ketorolac.  She will continue activity as tolerated and will follow up for her carpal tunnel surgery.

## 2019-09-20 NOTE — HISTORY OF PRESENT ILLNESS
[FreeTextEntry1] : the patient is a 64-year-old female with a history of bilateral carpal tunnel syndrome as well as rheumatoid arthritis. She is currently booked for a left carpal tunnel release in October. Over the past several days she has had an exacerbation of her right-sided symptoms including increasing burning paresthesias and swelling in the hand. She denies recent trauma or inciting event.

## 2019-09-27 ENCOUNTER — RX RENEWAL (OUTPATIENT)
Age: 64
End: 2019-09-27

## 2019-10-07 ENCOUNTER — RX RENEWAL (OUTPATIENT)
Age: 64
End: 2019-10-07

## 2019-10-09 RX ORDER — SODIUM CHLORIDE 9 MG/ML
1000 INJECTION, SOLUTION INTRAVENOUS
Refills: 0 | Status: DISCONTINUED | OUTPATIENT
Start: 2019-10-10 | End: 2019-10-10

## 2019-10-09 RX ORDER — OXYCODONE HYDROCHLORIDE 5 MG/1
10 TABLET ORAL ONCE
Refills: 0 | Status: DISCONTINUED | OUTPATIENT
Start: 2019-10-10 | End: 2019-10-10

## 2019-10-09 RX ORDER — ONDANSETRON 8 MG/1
4 TABLET, FILM COATED ORAL ONCE
Refills: 0 | Status: DISCONTINUED | OUTPATIENT
Start: 2019-10-10 | End: 2019-10-10

## 2019-10-09 RX ORDER — FENTANYL CITRATE 50 UG/ML
50 INJECTION INTRAVENOUS
Refills: 0 | Status: DISCONTINUED | OUTPATIENT
Start: 2019-10-10 | End: 2019-10-10

## 2019-10-09 NOTE — ASU PATIENT PROFILE, ADULT - PSH
H/O cardiac catheterization    H/O laminectomy    H/O spinal fusion    H/O total knee replacement, right    S/p bilateral carpal tunnel release    S/P left knee arthroscopy

## 2019-10-10 ENCOUNTER — OUTPATIENT (OUTPATIENT)
Dept: INPATIENT UNIT | Facility: HOSPITAL | Age: 64
LOS: 1 days | Discharge: HOME | End: 2019-10-10
Payer: MEDICARE

## 2019-10-10 ENCOUNTER — APPOINTMENT (OUTPATIENT)
Dept: ORTHOPEDIC SURGERY | Facility: HOSPITAL | Age: 64
End: 2019-10-10

## 2019-10-10 VITALS
RESPIRATION RATE: 16 BRPM | HEIGHT: 65 IN | SYSTOLIC BLOOD PRESSURE: 101 MMHG | WEIGHT: 184.97 LBS | TEMPERATURE: 98 F | OXYGEN SATURATION: 97 % | HEART RATE: 73 BPM | DIASTOLIC BLOOD PRESSURE: 63 MMHG

## 2019-10-10 VITALS
OXYGEN SATURATION: 97 % | RESPIRATION RATE: 15 BRPM | TEMPERATURE: 98 F | HEART RATE: 83 BPM | SYSTOLIC BLOOD PRESSURE: 86 MMHG | DIASTOLIC BLOOD PRESSURE: 50 MMHG

## 2019-10-10 DIAGNOSIS — G56.02 CARPAL TUNNEL SYNDROME, LEFT UPPER LIMB: ICD-10-CM

## 2019-10-10 DIAGNOSIS — M15.9 POLYOSTEOARTHRITIS, UNSPECIFIED: ICD-10-CM

## 2019-10-10 DIAGNOSIS — G43.009 MIGRAINE WITHOUT AURA, NOT INTRACTABLE, WITHOUT STATUS MIGRAINOSUS: ICD-10-CM

## 2019-10-10 DIAGNOSIS — I25.2 OLD MYOCARDIAL INFARCTION: ICD-10-CM

## 2019-10-10 DIAGNOSIS — Z98.890 OTHER SPECIFIED POSTPROCEDURAL STATES: Chronic | ICD-10-CM

## 2019-10-10 DIAGNOSIS — Z96.651 PRESENCE OF RIGHT ARTIFICIAL KNEE JOINT: Chronic | ICD-10-CM

## 2019-10-10 DIAGNOSIS — E66.9 OBESITY, UNSPECIFIED: ICD-10-CM

## 2019-10-10 DIAGNOSIS — G47.00 INSOMNIA, UNSPECIFIED: ICD-10-CM

## 2019-10-10 DIAGNOSIS — G56.03 CARPAL TUNNEL SYNDROME, BILATERAL UPPER LIMBS: ICD-10-CM

## 2019-10-10 DIAGNOSIS — I25.10 ATHEROSCLEROTIC HEART DISEASE OF NATIVE CORONARY ARTERY WITHOUT ANGINA PECTORIS: ICD-10-CM

## 2019-10-10 DIAGNOSIS — I65.23 OCCLUSION AND STENOSIS OF BILATERAL CAROTID ARTERIES: ICD-10-CM

## 2019-10-10 DIAGNOSIS — Z95.5 PRESENCE OF CORONARY ANGIOPLASTY IMPLANT AND GRAFT: ICD-10-CM

## 2019-10-10 DIAGNOSIS — Z79.82 LONG TERM (CURRENT) USE OF ASPIRIN: ICD-10-CM

## 2019-10-10 DIAGNOSIS — Z88.1 ALLERGY STATUS TO OTHER ANTIBIOTIC AGENTS STATUS: ICD-10-CM

## 2019-10-10 DIAGNOSIS — Z88.8 ALLERGY STATUS TO OTHER DRUGS, MEDICAMENTS AND BIOLOGICAL SUBSTANCES STATUS: ICD-10-CM

## 2019-10-10 DIAGNOSIS — E78.5 HYPERLIPIDEMIA, UNSPECIFIED: ICD-10-CM

## 2019-10-10 DIAGNOSIS — K21.9 GASTRO-ESOPHAGEAL REFLUX DISEASE WITHOUT ESOPHAGITIS: ICD-10-CM

## 2019-10-10 DIAGNOSIS — F32.9 MAJOR DEPRESSIVE DISORDER, SINGLE EPISODE, UNSPECIFIED: ICD-10-CM

## 2019-10-10 DIAGNOSIS — M06.9 RHEUMATOID ARTHRITIS, UNSPECIFIED: ICD-10-CM

## 2019-10-10 DIAGNOSIS — G47.33 OBSTRUCTIVE SLEEP APNEA (ADULT) (PEDIATRIC): ICD-10-CM

## 2019-10-10 DIAGNOSIS — E03.9 HYPOTHYROIDISM, UNSPECIFIED: ICD-10-CM

## 2019-10-10 DIAGNOSIS — Z88.0 ALLERGY STATUS TO PENICILLIN: ICD-10-CM

## 2019-10-10 DIAGNOSIS — Z98.1 ARTHRODESIS STATUS: Chronic | ICD-10-CM

## 2019-10-10 DIAGNOSIS — F90.9 ATTENTION-DEFICIT HYPERACTIVITY DISORDER, UNSPECIFIED TYPE: ICD-10-CM

## 2019-10-10 PROCEDURE — 64721 CARPAL TUNNEL SURGERY: CPT | Mod: LT

## 2019-10-10 RX ORDER — SODIUM CHLORIDE 9 MG/ML
1000 INJECTION, SOLUTION INTRAVENOUS
Refills: 0 | Status: DISCONTINUED | OUTPATIENT
Start: 2019-10-10 | End: 2019-10-10

## 2019-10-10 RX ORDER — OXYCODONE HYDROCHLORIDE 5 MG/1
10 TABLET ORAL ONCE
Refills: 0 | Status: DISCONTINUED | OUTPATIENT
Start: 2019-10-10 | End: 2019-10-10

## 2019-10-10 RX ADMIN — SODIUM CHLORIDE 75 MILLILITER(S): 9 INJECTION, SOLUTION INTRAVENOUS at 13:45

## 2019-10-10 RX ADMIN — OXYCODONE HYDROCHLORIDE 10 MILLIGRAM(S): 5 TABLET ORAL at 11:37

## 2019-10-10 RX ADMIN — OXYCODONE HYDROCHLORIDE 10 MILLIGRAM(S): 5 TABLET ORAL at 11:38

## 2019-10-10 NOTE — ASU DISCHARGE PLAN (ADULT/PEDIATRIC) - NURSING INSTRUCTIONS
CALL the DOCTOR:    -Fever greater than  101F  - Signs  of infection such as : increase pain,swelling,redness,or a bad  smell coming from the wound.      If you have  not urinated 8 hours after surgery or have any difficulty urinating.     A responsible adult should be with you for the rest of the day and night for your safety and to help you if you needed.    Review attached Written Discharge Instruction Fact sheet.

## 2019-10-10 NOTE — ASU DISCHARGE PLAN (ADULT/PEDIATRIC) - CARE PROVIDER_API CALL
Mady Neville)  [facility]  [address] [place] [address]  Phone: [phone]  Fax: [phone]  Follow Up Time:

## 2019-10-10 NOTE — BRIEF OPERATIVE NOTE - TYPE OF ANESTHESIA
We try to complete routine lab tests for physical exams and the monitoring of chronic medical problems before visits so they are available for review at your office visit.    Results ordered at a visit will be available online within a day of your visit.  If you prefer to receive results by mail, you can expect results in about 2 weeks.    I will call you with significantly abnormal results.    I may ask you to book a follow up visit to review some results with me in the office.      May I ask, if you receive a survey in the mail about our visit today, please take the time to complete and return it.  It will help me and my staff continue to improve our performance and provide you with excellent care.       General

## 2019-10-10 NOTE — ASU DISCHARGE PLAN (ADULT/PEDIATRIC) - ASU DC SPECIAL INSTRUCTIONSFT
1. Keep bandage on for 5 days. Then you can remove and get it wet. Otherwise cover hand with plastic bag for showering.    2. If you have a splint on, keep it on until you see Dr. Neville in the office. Do not get the splint wet at all.    3. Elevate hand as much as possible and wiggle fingers as much as you can, as often as you think of it (wiggle 10 times every commercial  if you are watching TV, or reading a book after every 5 pages read). The exception is if you have a splint you will not be able to wiggle the affected digit. Try to wiggle the free ones though.    4. Walk plenty, no sitting around.    5. See Dr. Neville in the office in about 7-10 days. Call to schedule. You will have you wound checked then, any sutures will be removed, and your splint (if you have one on) will be changed.

## 2019-10-10 NOTE — ASU DISCHARGE PLAN (ADULT/PEDIATRIC) - CALL YOUR DOCTOR IF YOU HAVE ANY OF THE FOLLOWING:
Numbness, tingling, color or temperature change to extremity/Bleeding that does not stop/Swelling that gets worse/Pain not relieved by Medications/Wound/Surgical Site with redness, or foul smelling discharge or pus

## 2019-10-18 ENCOUNTER — APPOINTMENT (OUTPATIENT)
Dept: RHEUMATOLOGY | Facility: CLINIC | Age: 64
End: 2019-10-18
Payer: MEDICARE

## 2019-10-18 VITALS
HEIGHT: 65 IN | OXYGEN SATURATION: 98 % | HEART RATE: 89 BPM | SYSTOLIC BLOOD PRESSURE: 138 MMHG | WEIGHT: 194 LBS | BODY MASS INDEX: 32.32 KG/M2 | DIASTOLIC BLOOD PRESSURE: 79 MMHG

## 2019-10-18 DIAGNOSIS — M19.012 PRIMARY OSTEOARTHRITIS, LEFT SHOULDER: ICD-10-CM

## 2019-10-18 DIAGNOSIS — G56.02 CARPAL TUNNEL SYNDROME, LEFT UPPER LIMB: ICD-10-CM

## 2019-10-18 PROCEDURE — 99214 OFFICE O/P EST MOD 30 MIN: CPT | Mod: 25

## 2019-10-18 PROCEDURE — 36415 COLL VENOUS BLD VENIPUNCTURE: CPT

## 2019-10-18 PROCEDURE — 20610 DRAIN/INJ JOINT/BURSA W/O US: CPT

## 2019-10-18 RX ORDER — METHYLPRED ACET/NACL,ISO-OS/PF 80 MG/ML
80 VIAL (ML) INJECTION
Qty: 1 | Refills: 0 | Status: COMPLETED | OUTPATIENT
Start: 2019-10-18

## 2019-10-18 RX ORDER — ZOLPIDEM TARTRATE 10 MG/1
10 TABLET ORAL
Qty: 30 | Refills: 3 | Status: ACTIVE | COMMUNITY
Start: 2019-05-31 | End: 1900-01-01

## 2019-10-18 RX ADMIN — METHYLPREDNISOLONE ACETATE MG/ML: 80 INJECTION, SUSPENSION INTRA-ARTICULAR; INTRALESIONAL; INTRAMUSCULAR; SOFT TISSUE at 00:00

## 2019-10-18 NOTE — PROCEDURE
[Today's Date:] : Date: [unfilled] [Risks] : risks [Patient] : the patient [Benefits] : benefits [Therapeutic] : therapeutic [Consent Obtained] : written consent was obtained prior to the procedure and is detailed in the patient's record [Alternatives] : alternatives [Ethyl Chloride] : ethyl chloride [#1 Site: ______] : #1 site identified in the [unfilled] [Alcohol] : alcohol [Betadine] : betadine solution [Chlorhexidine] : chlorhexidine [Depomedrol ___ mg] : Depomedrol [unfilled] mg [25 gauge 1.5 inch] : A 25 gauge 1.5 inch needle was used [Instructions Given] : handouts/patient instructions were given to patient [Tolerated Well] : the patient tolerated the procedure well [No Complications] : there were no complications [Patient Instructed to Call] : patient was instructed to call if redness at site, a decrease in range of motion or an increase in pain is noted after procedure.

## 2019-10-22 LAB
25(OH)D3 SERPL-MCNC: 36.2 NG/ML
ALBUMIN SERPL ELPH-MCNC: 4.6 G/DL
ALP BLD-CCNC: 91 U/L
ALT SERPL-CCNC: 18 U/L
ANION GAP SERPL CALC-SCNC: 13 MMOL/L
AST SERPL-CCNC: 19 U/L
BASOPHILS # BLD AUTO: 0.06 K/UL
BASOPHILS NFR BLD AUTO: 1.1 %
BILIRUB SERPL-MCNC: 0.2 MG/DL
BUN SERPL-MCNC: 19 MG/DL
CALCIUM SERPL-MCNC: 9.4 MG/DL
CHLORIDE SERPL-SCNC: 107 MMOL/L
CO2 SERPL-SCNC: 21 MMOL/L
CREAT SERPL-MCNC: 0.77 MG/DL
CRP SERPL-MCNC: <0.1 MG/DL
EOSINOPHIL # BLD AUTO: 0.23 K/UL
EOSINOPHIL NFR BLD AUTO: 4.4 %
ERYTHROCYTE [SEDIMENTATION RATE] IN BLOOD BY WESTERGREN METHOD: 7 MM/HR
GLUCOSE SERPL-MCNC: 103 MG/DL
HCT VFR BLD CALC: 44.6 %
HGB BLD-MCNC: 14.1 G/DL
IMM GRANULOCYTES NFR BLD AUTO: 0.4 %
LYMPHOCYTES # BLD AUTO: 1.57 K/UL
LYMPHOCYTES NFR BLD AUTO: 29.9 %
MAN DIFF?: NORMAL
MCHC RBC-ENTMCNC: 31.6 GM/DL
MCHC RBC-ENTMCNC: 31.8 PG
MCV RBC AUTO: 100.7 FL
MONOCYTES # BLD AUTO: 0.47 K/UL
MONOCYTES NFR BLD AUTO: 9 %
NEUTROPHILS # BLD AUTO: 2.9 K/UL
NEUTROPHILS NFR BLD AUTO: 55.2 %
PLATELET # BLD AUTO: 228 K/UL
POTASSIUM SERPL-SCNC: 4.4 MMOL/L
PROT SERPL-MCNC: 6.6 G/DL
RBC # BLD: 4.43 M/UL
RBC # FLD: 13 %
SODIUM SERPL-SCNC: 141 MMOL/L
WBC # FLD AUTO: 5.25 K/UL

## 2019-10-25 ENCOUNTER — APPOINTMENT (OUTPATIENT)
Dept: ORTHOPEDIC SURGERY | Facility: CLINIC | Age: 64
End: 2019-10-25
Payer: MEDICARE

## 2019-10-25 PROCEDURE — 99024 POSTOP FOLLOW-UP VISIT: CPT

## 2019-10-25 NOTE — HISTORY OF PRESENT ILLNESS
[de-identified] : DOS: 10/10/2019\par Procedure: Left Carpal Tunnel Release [de-identified] : 10/25/2019: The patient returns to the office 2 weeks after a left carpal tunnel release. Overall she is doing well with improvement in her preoperative symptoms. Her only complaint is severe pruritus at the incision site. She denies any fevers or chills. She has been working on range of motion of the wrist and digits and is pleased with her progress. [de-identified] : Left hand exam\par \par Skin is intact with a well healing incision over the volar aspect of the hand base. There are no signs of infection. Patient with full flexion and extension of the wrist and excellent range of motion of all digits. There is no swelling. Sensation is grossly intact and capillary refill is brisk. [de-identified] : Patient is 2 weeks removed from a left carpal tunnel release. [de-identified] : Overall the patient is healing well. She will work on range of motion exercises and keep the incision clean and dry at all times. She may return to activities as tolerated. She is interested in having her right carpal tunnel surgically released in the near future. She will call back up to schedule that surgery.

## 2019-11-06 ENCOUNTER — MEDICATION RENEWAL (OUTPATIENT)
Age: 64
End: 2019-11-06

## 2019-11-13 ENCOUNTER — RX RENEWAL (OUTPATIENT)
Age: 64
End: 2019-11-13

## 2019-11-14 ENCOUNTER — APPOINTMENT (OUTPATIENT)
Dept: GASTROENTEROLOGY | Facility: GI CENTER | Age: 64
End: 2019-11-14
Payer: MEDICARE

## 2019-11-14 ENCOUNTER — OUTPATIENT (OUTPATIENT)
Dept: OUTPATIENT SERVICES | Facility: HOSPITAL | Age: 64
LOS: 1 days | End: 2019-11-14
Payer: MEDICARE

## 2019-11-14 ENCOUNTER — RESULT REVIEW (OUTPATIENT)
Age: 64
End: 2019-11-14

## 2019-11-14 DIAGNOSIS — K52.9 NONINFECTIVE GASTROENTERITIS AND COLITIS, UNSPECIFIED: ICD-10-CM

## 2019-11-14 DIAGNOSIS — Z98.1 ARTHRODESIS STATUS: Chronic | ICD-10-CM

## 2019-11-14 DIAGNOSIS — Z98.890 OTHER SPECIFIED POSTPROCEDURAL STATES: Chronic | ICD-10-CM

## 2019-11-14 DIAGNOSIS — Z96.651 PRESENCE OF RIGHT ARTIFICIAL KNEE JOINT: Chronic | ICD-10-CM

## 2019-11-14 DIAGNOSIS — D12.3 BENIGN NEOPLASM OF TRANSVERSE COLON: ICD-10-CM

## 2019-11-14 DIAGNOSIS — Z12.11 ENCOUNTER FOR SCREENING FOR MALIGNANT NEOPLASM OF COLON: ICD-10-CM

## 2019-11-14 DIAGNOSIS — K62.89 OTHER SPECIFIED DISEASES OF ANUS AND RECTUM: ICD-10-CM

## 2019-11-14 DIAGNOSIS — K64.4 RESIDUAL HEMORRHOIDAL SKIN TAGS: ICD-10-CM

## 2019-11-14 PROCEDURE — 45385 COLONOSCOPY W/LESION REMOVAL: CPT

## 2019-11-14 PROCEDURE — 45385 COLONOSCOPY W/LESION REMOVAL: CPT | Mod: PT

## 2019-11-14 PROCEDURE — 88305 TISSUE EXAM BY PATHOLOGIST: CPT | Mod: 26

## 2019-11-14 PROCEDURE — 88305 TISSUE EXAM BY PATHOLOGIST: CPT

## 2019-11-14 PROCEDURE — 45380 COLONOSCOPY AND BIOPSY: CPT | Mod: 59

## 2019-11-14 PROCEDURE — 45380 COLONOSCOPY AND BIOPSY: CPT | Mod: PT,XS

## 2019-11-14 RX ORDER — POLYETHYLENE GLYOCOL 3350, SODIUM CHLORIDE, SODIUM BICARBONATE AND POTASSIUM CHLORIDE 420; 11.2; 5.72; 1.48 G/4L; G/4L; G/4L; G/4L
420 POWDER, FOR SOLUTION NASOGASTRIC; ORAL
Qty: 1 | Refills: 0 | Status: COMPLETED | COMMUNITY
Start: 2019-08-20 | End: 2019-11-14

## 2019-11-14 NOTE — PROCEDURE
[With Biopsy] : with biopsy [With Snare Polypectomy] : snare polypectomy [Chronic Diarrhea] : chronic diarrhea [Procedure Explained] : The procedure was explained [Allergies Reviewed] : allergies reviewed. [Risks] : Risks [Alternatives] : alternatives [Bleeding] : bleeding risk [Benefits] : benefits [Infection] : risk of infection [Patient] : the patient [Consent Obtained] : written consent was obtained prior to the procedure and is detailed in the patient's record [Bowel Prep Kit] : the patient took the appropriate bowel preparation kit as directed [Automated Blood Pressure Cuff] : automated blood pressure cuff [Cardiac Monitor] : cardiac monitor [Approved Diet Followed] : the patient avoided solid foods and adhered to the approved diet list for 24 hours prior to the procedure [Pulse Oximeter] : pulse oximeter [3] : 3 [Propofol ___ mg IV] : Propofol [unfilled] ~Umg intravenously [Sedation Clearance] : the patient was cleared for moderate sedation [Prep Qualtiy: ___] : Prep Quality:  [unfilled] [Withdrawal Time: ___] : Withdrawal Time:  [unfilled] [Performed By: ___] : Performed by:  BURKE [Left Lateral Decubitus] : The patient was positioned in the left lateral decubitus position [External Hemorrhoids] : external hemorrhoids [Cecum (Landmarks)] : and guided to the cecum which was identified by the anatomic landmarks of the appendiceal orifice and ileocecal valve [Terminal Ileum via Ileocecal Valve] : and the terminal ileum was examined by entering the ileocecal valve [Minimal Difficulty] : with minimal difficulty [Multiple Passes Needed] : after multiple passes [Insufflated] : insufflated [Retroflex View] : a retroflex view of the rectum was performed [Polyps] : polyps [Cold Snare Polypectomy] : cold snare polypectomy [Normal] : Normal [Hemorrhoids] : hemorrhoids [Biopsy] : biopsy [Tolerated Well] : the patient tolerated the procedure well [Sent to Pathology] : was sent to pathology for analysis [Vital Signs Stable] : the vital signs were stable [No Complications] : There were no complications [Patient Rotated Into Alternating Positions] : the patient was not rotated [Abnormal Rectum] : a normal rectum

## 2019-11-14 NOTE — PROCEDURE
[With Biopsy] : with biopsy [With Snare Polypectomy] : snare polypectomy [Chronic Diarrhea] : chronic diarrhea [Procedure Explained] : The procedure was explained [Risks] : Risks [Allergies Reviewed] : allergies reviewed. [Bleeding] : bleeding risk [Alternatives] : alternatives [Benefits] : benefits [Patient] : the patient [Infection] : risk of infection [Consent Obtained] : written consent was obtained prior to the procedure and is detailed in the patient's record [Bowel Prep Kit] : the patient took the appropriate bowel preparation kit as directed [Automated Blood Pressure Cuff] : automated blood pressure cuff [Approved Diet Followed] : the patient avoided solid foods and adhered to the approved diet list for 24 hours prior to the procedure [Cardiac Monitor] : cardiac monitor [Pulse Oximeter] : pulse oximeter [Propofol ___ mg IV] : Propofol [unfilled] ~Umg intravenously [3] : 3 [Sedation Clearance] : the patient was cleared for moderate sedation [Prep Qualtiy: ___] : Prep Quality:  [unfilled] [Withdrawal Time: ___] : Withdrawal Time:  [unfilled] [Performed By: ___] : Performed by:  BURKE [Left Lateral Decubitus] : The patient was positioned in the left lateral decubitus position [External Hemorrhoids] : external hemorrhoids [Cecum (Landmarks)] : and guided to the cecum which was identified by the anatomic landmarks of the appendiceal orifice and ileocecal valve [Terminal Ileum via Ileocecal Valve] : and the terminal ileum was examined by entering the ileocecal valve [Minimal Difficulty] : with minimal difficulty [Multiple Passes Needed] : after multiple passes [Insufflated] : insufflated [Retroflex View] : a retroflex view of the rectum was performed [Polyps] : polyps [Cold Snare Polypectomy] : cold snare polypectomy [Normal] : Normal [Hemorrhoids] : hemorrhoids [Biopsy] : biopsy [Tolerated Well] : the patient tolerated the procedure well [Sent to Pathology] : was sent to pathology for analysis [Vital Signs Stable] : the vital signs were stable [No Complications] : There were no complications [Patient Rotated Into Alternating Positions] : the patient was not rotated [Abnormal Rectum] : a normal rectum

## 2019-11-14 NOTE — PHYSICAL EXAM
[General Appearance - Alert] : alert [General Appearance - In No Acute Distress] : in no acute distress [Sclera] : the sclera and conjunctiva were normal [PERRL With Normal Accommodation] : pupils were equal in size, round, and reactive to light [Extraocular Movements] : extraocular movements were intact [Outer Ear] : the ears and nose were normal in appearance [Oropharynx] : the oropharynx was normal [Neck Appearance] : the appearance of the neck was normal [Neck Cervical Mass (___cm)] : no neck mass was observed [Thyroid Diffuse Enlargement] : the thyroid was not enlarged [Thyroid Nodule] : there were no palpable thyroid nodules [Jugular Venous Distention Increased] : there was no jugular-venous distention [Auscultation Breath Sounds / Voice Sounds] : lungs were clear to auscultation bilaterally [Heart Rate And Rhythm] : heart rate was normal and rhythm regular [Heart Sounds Gallop] : no gallops [Heart Sounds] : normal S1 and S2 [Murmurs] : no murmurs [Heart Sounds Pericardial Friction Rub] : no pericardial rub [Edema] : there was no peripheral edema [Abdomen Soft] : soft [Bowel Sounds] : normal bowel sounds [] : no hepato-splenomegaly [Abdomen Tenderness] : non-tender [Abdomen Mass (___ Cm)] : no abdominal mass palpated [Cervical Lymph Nodes Enlarged Posterior Bilaterally] : posterior cervical [Cervical Lymph Nodes Enlarged Anterior Bilaterally] : anterior cervical [Skin Color & Pigmentation] : normal skin color and pigmentation [Nail Clubbing] : no clubbing  or cyanosis of the fingernails [Supraclavicular Lymph Nodes Enlarged Bilaterally] : supraclavicular [Skin Turgor] : normal skin turgor [No Focal Deficits] : no focal deficits [Oriented To Time, Place, And Person] : oriented to person, place, and time [Impaired Insight] : insight and judgment were intact [Affect] : the affect was normal

## 2019-11-14 NOTE — PHYSICAL EXAM
[General Appearance - Alert] : alert [Sclera] : the sclera and conjunctiva were normal [General Appearance - In No Acute Distress] : in no acute distress [PERRL With Normal Accommodation] : pupils were equal in size, round, and reactive to light [Extraocular Movements] : extraocular movements were intact [Oropharynx] : the oropharynx was normal [Outer Ear] : the ears and nose were normal in appearance [Neck Cervical Mass (___cm)] : no neck mass was observed [Neck Appearance] : the appearance of the neck was normal [Jugular Venous Distention Increased] : there was no jugular-venous distention [Thyroid Diffuse Enlargement] : the thyroid was not enlarged [Thyroid Nodule] : there were no palpable thyroid nodules [Auscultation Breath Sounds / Voice Sounds] : lungs were clear to auscultation bilaterally [Heart Rate And Rhythm] : heart rate was normal and rhythm regular [Heart Sounds Gallop] : no gallops [Heart Sounds] : normal S1 and S2 [Murmurs] : no murmurs [Heart Sounds Pericardial Friction Rub] : no pericardial rub [Edema] : there was no peripheral edema [Bowel Sounds] : normal bowel sounds [Abdomen Soft] : soft [Abdomen Tenderness] : non-tender [] : no hepato-splenomegaly [Abdomen Mass (___ Cm)] : no abdominal mass palpated [Cervical Lymph Nodes Enlarged Posterior Bilaterally] : posterior cervical [Cervical Lymph Nodes Enlarged Anterior Bilaterally] : anterior cervical [Skin Color & Pigmentation] : normal skin color and pigmentation [Supraclavicular Lymph Nodes Enlarged Bilaterally] : supraclavicular [Nail Clubbing] : no clubbing  or cyanosis of the fingernails [No Focal Deficits] : no focal deficits [Skin Turgor] : normal skin turgor [Oriented To Time, Place, And Person] : oriented to person, place, and time [Impaired Insight] : insight and judgment were intact [Affect] : the affect was normal

## 2019-11-14 NOTE — ASSESSMENT
[FreeTextEntry1] : This is a 64-year-old woman presented for a diagnostic colonoscopy for history of chronic diarrhea.  The exam was notable for external hemorrhoids and digital rectal examination and on retroflex view with hypertrophied anal papillae.  There was a 4 mm sessile polyp in the transverse colon that was resected via cold snare polypectomy.  The terminal ileum was grossly normal in appearance.  Random biopsies were taken from all sections of the colon to rule out microscopic colitis.\par \par Followup pathology\par Repeat colonoscopy in five years if polyp is adenomatous, otherwise 10 years

## 2019-11-18 LAB — SURGICAL PATHOLOGY STUDY: SIGNIFICANT CHANGE UP

## 2019-11-20 PROBLEM — R11.10 REGURGITATION OF FOOD: Status: ACTIVE | Noted: 2019-08-20

## 2019-11-20 PROBLEM — K21.9 CHRONIC GERD: Status: ACTIVE | Noted: 2019-08-20

## 2019-11-20 NOTE — PHYSICAL EXAM
[General Appearance - Alert] : alert [General Appearance - In No Acute Distress] : in no acute distress [PERRL With Normal Accommodation] : pupils were equal in size, round, and reactive to light [Sclera] : the sclera and conjunctiva were normal [Extraocular Movements] : extraocular movements were intact [Oropharynx] : the oropharynx was normal [Outer Ear] : the ears and nose were normal in appearance [Neck Appearance] : the appearance of the neck was normal [Neck Cervical Mass (___cm)] : no neck mass was observed [Thyroid Diffuse Enlargement] : the thyroid was not enlarged [Thyroid Nodule] : there were no palpable thyroid nodules [Jugular Venous Distention Increased] : there was no jugular-venous distention [Auscultation Breath Sounds / Voice Sounds] : lungs were clear to auscultation bilaterally [Heart Sounds] : normal S1 and S2 [Heart Rate And Rhythm] : heart rate was normal and rhythm regular [Heart Sounds Gallop] : no gallops [Murmurs] : no murmurs [Edema] : there was no peripheral edema [Heart Sounds Pericardial Friction Rub] : no pericardial rub [Abdomen Tenderness] : non-tender [Bowel Sounds] : normal bowel sounds [Abdomen Soft] : soft [Abdomen Mass (___ Cm)] : no abdominal mass palpated [] : no hepato-splenomegaly [Cervical Lymph Nodes Enlarged Posterior Bilaterally] : posterior cervical [Nail Clubbing] : no clubbing  or cyanosis of the fingernails [Cervical Lymph Nodes Enlarged Anterior Bilaterally] : anterior cervical [Supraclavicular Lymph Nodes Enlarged Bilaterally] : supraclavicular [Skin Turgor] : normal skin turgor [No Focal Deficits] : no focal deficits [Skin Color & Pigmentation] : normal skin color and pigmentation [Affect] : the affect was normal [Impaired Insight] : insight and judgment were intact [Oriented To Time, Place, And Person] : oriented to person, place, and time

## 2019-11-20 NOTE — PHYSICAL EXAM
[General Appearance - Alert] : alert [Sclera] : the sclera and conjunctiva were normal [General Appearance - In No Acute Distress] : in no acute distress [PERRL With Normal Accommodation] : pupils were equal in size, round, and reactive to light [Extraocular Movements] : extraocular movements were intact [Outer Ear] : the ears and nose were normal in appearance [Oropharynx] : the oropharynx was normal [Neck Cervical Mass (___cm)] : no neck mass was observed [Neck Appearance] : the appearance of the neck was normal [Thyroid Nodule] : there were no palpable thyroid nodules [Thyroid Diffuse Enlargement] : the thyroid was not enlarged [Jugular Venous Distention Increased] : there was no jugular-venous distention [Auscultation Breath Sounds / Voice Sounds] : lungs were clear to auscultation bilaterally [Heart Rate And Rhythm] : heart rate was normal and rhythm regular [Heart Sounds] : normal S1 and S2 [Heart Sounds Gallop] : no gallops [Murmurs] : no murmurs [Edema] : there was no peripheral edema [Heart Sounds Pericardial Friction Rub] : no pericardial rub [Bowel Sounds] : normal bowel sounds [Abdomen Soft] : soft [Abdomen Tenderness] : non-tender [] : no hepato-splenomegaly [Cervical Lymph Nodes Enlarged Posterior Bilaterally] : posterior cervical [Abdomen Mass (___ Cm)] : no abdominal mass palpated [Nail Clubbing] : no clubbing  or cyanosis of the fingernails [Supraclavicular Lymph Nodes Enlarged Bilaterally] : supraclavicular [Cervical Lymph Nodes Enlarged Anterior Bilaterally] : anterior cervical [No Focal Deficits] : no focal deficits [Skin Turgor] : normal skin turgor [Skin Color & Pigmentation] : normal skin color and pigmentation [Impaired Insight] : insight and judgment were intact [Oriented To Time, Place, And Person] : oriented to person, place, and time [Affect] : the affect was normal

## 2019-11-21 ENCOUNTER — APPOINTMENT (OUTPATIENT)
Dept: GASTROENTEROLOGY | Facility: GI CENTER | Age: 64
End: 2019-11-21
Payer: MEDICARE

## 2019-11-21 ENCOUNTER — RESULT REVIEW (OUTPATIENT)
Age: 64
End: 2019-11-21

## 2019-11-21 ENCOUNTER — OUTPATIENT (OUTPATIENT)
Dept: OUTPATIENT SERVICES | Facility: HOSPITAL | Age: 64
LOS: 1 days | End: 2019-11-21
Payer: MEDICARE

## 2019-11-21 DIAGNOSIS — K21.9 GASTRO-ESOPHAGEAL REFLUX DISEASE W/OUT ESOPHAGITIS: ICD-10-CM

## 2019-11-21 DIAGNOSIS — D12.6 BENIGN NEOPLASM OF COLON, UNSPECIFIED: ICD-10-CM

## 2019-11-21 DIAGNOSIS — K22.9 DISEASE OF ESOPHAGUS, UNSPECIFIED: ICD-10-CM

## 2019-11-21 DIAGNOSIS — K31.7 POLYP OF STOMACH AND DUODENUM: ICD-10-CM

## 2019-11-21 DIAGNOSIS — Z98.890 OTHER SPECIFIED POSTPROCEDURAL STATES: Chronic | ICD-10-CM

## 2019-11-21 DIAGNOSIS — K44.9 DIAPHRAGMATIC HERNIA W/OUT OBSTRUCTION OR GANGRENE: ICD-10-CM

## 2019-11-21 DIAGNOSIS — Z98.1 ARTHRODESIS STATUS: Chronic | ICD-10-CM

## 2019-11-21 DIAGNOSIS — K21.9 GASTRO-ESOPHAGEAL REFLUX DISEASE WITHOUT ESOPHAGITIS: ICD-10-CM

## 2019-11-21 DIAGNOSIS — R11.10 VOMITING, UNSPECIFIED: ICD-10-CM

## 2019-11-21 DIAGNOSIS — K29.70 GASTRITIS, UNSPECIFIED, W/OUT BLEEDING: ICD-10-CM

## 2019-11-21 DIAGNOSIS — Z96.651 PRESENCE OF RIGHT ARTIFICIAL KNEE JOINT: Chronic | ICD-10-CM

## 2019-11-21 PROCEDURE — 88342 IMHCHEM/IMCYTCHM 1ST ANTB: CPT

## 2019-11-21 PROCEDURE — 88342 IMHCHEM/IMCYTCHM 1ST ANTB: CPT | Mod: 26

## 2019-11-21 PROCEDURE — 88305 TISSUE EXAM BY PATHOLOGIST: CPT | Mod: 26

## 2019-11-21 PROCEDURE — 43239 EGD BIOPSY SINGLE/MULTIPLE: CPT

## 2019-11-21 PROCEDURE — 88305 TISSUE EXAM BY PATHOLOGIST: CPT

## 2019-11-21 NOTE — PROCEDURE
[With Biopsy] : with biopsy [GERD] : GERD [Nausea] : nausea [Procedure Explained] : The procedure was explained [Allergies Reviewed] : allergies reviewed. [Risks] : Risks [Benefits] : benefits [Alternatives] : alternatives [Patient] : the patient [Automated Blood Pressure Cuff] : automated blood pressure cuff [Cardiac Monitor] : cardiac monitor [Pulse Oximeter] : pulse oximeter [3] : 3 [Sedation Clearance] : the patient was cleared for moderate sedation [Performed By: ___] : Performed by:  BURKE [Versed ___ mg IV] : Versed [unfilled] ~Umg intravenously [Propofol ___ mg IV] : Propofol [unfilled] ~Umg intravenously [Hiatal Hernia] : hiatal hernia [Polyps] : polyps [Erythema] : erythema [Normal] : Normal [Sent to Pathology] : was sent to pathology for analysis [Tolerated Well] : the patient tolerated the procedure well [de-identified] : Olympus [id number]  [de-identified] : ketamine 20 mg IV [de-identified] : Irregular Z-line

## 2019-11-21 NOTE — PROCEDURE
[With Biopsy] : with biopsy [GERD] : GERD [Nausea] : nausea [Procedure Explained] : The procedure was explained [Allergies Reviewed] : allergies reviewed. [Risks] : Risks [Benefits] : benefits [Alternatives] : alternatives [Patient] : the patient [Automated Blood Pressure Cuff] : automated blood pressure cuff [Cardiac Monitor] : cardiac monitor [Pulse Oximeter] : pulse oximeter [3] : 3 [Sedation Clearance] : the patient was cleared for moderate sedation [Performed By: ___] : Performed by:  BURKE [Versed ___ mg IV] : Versed [unfilled] ~Umg intravenously [Propofol ___ mg IV] : Propofol [unfilled] ~Umg intravenously [Hiatal Hernia] : hiatal hernia [Polyps] : polyps [Erythema] : erythema [Normal] : Normal [Sent to Pathology] : was sent to pathology for analysis [Tolerated Well] : the patient tolerated the procedure well [de-identified] : Olympus [id number]  [de-identified] : ketamine 20 mg IV [de-identified] : Irregular Z-line

## 2019-11-21 NOTE — ASSESSMENT
[FreeTextEntry1] : There have been no significant changes in the patient's medical history since my initial evaluation.  The patient is medically optimized to undergo scheduled procedure(s).

## 2019-11-25 ENCOUNTER — MEDICATION RENEWAL (OUTPATIENT)
Age: 64
End: 2019-11-25

## 2019-11-26 LAB — SURGICAL PATHOLOGY STUDY: SIGNIFICANT CHANGE UP

## 2019-12-04 ENCOUNTER — APPOINTMENT (OUTPATIENT)
Dept: GASTROENTEROLOGY | Facility: CLINIC | Age: 64
End: 2019-12-04
Payer: MEDICARE

## 2019-12-04 VITALS
RESPIRATION RATE: 16 BRPM | HEIGHT: 65 IN | HEART RATE: 88 BPM | BODY MASS INDEX: 32.82 KG/M2 | WEIGHT: 197 LBS | OXYGEN SATURATION: 99 % | SYSTOLIC BLOOD PRESSURE: 128 MMHG | DIASTOLIC BLOOD PRESSURE: 80 MMHG

## 2019-12-04 PROCEDURE — 99214 OFFICE O/P EST MOD 30 MIN: CPT

## 2019-12-04 NOTE — ASSESSMENT
[FreeTextEntry1] : Will complete workup for chronic diarrhea as documented below - to rule out neuroendocrine tumor.  Advised using a fiber supplement, as she is currently resistant to taking any more medications.  She will follow up with me in 6 weeks.

## 2019-12-04 NOTE — PHYSICAL EXAM
[General Appearance - Alert] : alert [General Appearance - In No Acute Distress] : in no acute distress [Sclera] : the sclera and conjunctiva were normal [PERRL With Normal Accommodation] : pupils were equal in size, round, and reactive to light [Extraocular Movements] : extraocular movements were intact [Outer Ear] : the ears and nose were normal in appearance [Oropharynx] : the oropharynx was normal [Neck Appearance] : the appearance of the neck was normal [Jugular Venous Distention Increased] : there was no jugular-venous distention [Neck Cervical Mass (___cm)] : no neck mass was observed [Thyroid Diffuse Enlargement] : the thyroid was not enlarged [Thyroid Nodule] : there were no palpable thyroid nodules [Auscultation Breath Sounds / Voice Sounds] : lungs were clear to auscultation bilaterally [Heart Rate And Rhythm] : heart rate was normal and rhythm regular [Heart Sounds] : normal S1 and S2 [Heart Sounds Gallop] : no gallops [Murmurs] : no murmurs [Heart Sounds Pericardial Friction Rub] : no pericardial rub [Edema] : there was no peripheral edema [Bowel Sounds] : normal bowel sounds [Abdomen Soft] : soft [Abdomen Tenderness] : non-tender [] : no hepato-splenomegaly [Abdomen Mass (___ Cm)] : no abdominal mass palpated [Cervical Lymph Nodes Enlarged Posterior Bilaterally] : posterior cervical [Supraclavicular Lymph Nodes Enlarged Bilaterally] : supraclavicular [Cervical Lymph Nodes Enlarged Anterior Bilaterally] : anterior cervical [Nail Clubbing] : no clubbing  or cyanosis of the fingernails [Skin Turgor] : normal skin turgor [Skin Color & Pigmentation] : normal skin color and pigmentation [Oriented To Time, Place, And Person] : oriented to person, place, and time [No Focal Deficits] : no focal deficits [Affect] : the affect was normal [Impaired Insight] : insight and judgment were intact

## 2019-12-04 NOTE — HISTORY OF PRESENT ILLNESS
[de-identified] : Patient continues to experience diarrhea, about 2-3 episodes daily.  Workup, including colonoscopy with random biopsy has thus far been unrevealing.  No weight loss.  No rectal bleeding.  No significant abdominal discomfort.  Takes loperamide as needed, but it makes her feel "awful" after taking it.  Bought a fiber supplement, which she has yet to try.

## 2019-12-05 ENCOUNTER — OUTPATIENT (OUTPATIENT)
Dept: INPATIENT UNIT | Facility: HOSPITAL | Age: 64
LOS: 1 days | Discharge: HOME | End: 2019-12-05
Payer: MEDICARE

## 2019-12-05 ENCOUNTER — APPOINTMENT (OUTPATIENT)
Dept: ORTHOPEDIC SURGERY | Facility: HOSPITAL | Age: 64
End: 2019-12-05

## 2019-12-05 VITALS
TEMPERATURE: 97 F | SYSTOLIC BLOOD PRESSURE: 107 MMHG | OXYGEN SATURATION: 99 % | HEART RATE: 85 BPM | RESPIRATION RATE: 16 BRPM | DIASTOLIC BLOOD PRESSURE: 69 MMHG

## 2019-12-05 VITALS
HEART RATE: 80 BPM | RESPIRATION RATE: 16 BRPM | SYSTOLIC BLOOD PRESSURE: 123 MMHG | WEIGHT: 198.42 LBS | TEMPERATURE: 98 F | OXYGEN SATURATION: 98 % | HEIGHT: 65 IN | DIASTOLIC BLOOD PRESSURE: 72 MMHG

## 2019-12-05 DIAGNOSIS — Z98.890 OTHER SPECIFIED POSTPROCEDURAL STATES: Chronic | ICD-10-CM

## 2019-12-05 DIAGNOSIS — G56.03 CARPAL TUNNEL SYNDROME, BILATERAL UPPER LIMBS: ICD-10-CM

## 2019-12-05 DIAGNOSIS — Z98.1 ARTHRODESIS STATUS: Chronic | ICD-10-CM

## 2019-12-05 DIAGNOSIS — Z96.651 PRESENCE OF RIGHT ARTIFICIAL KNEE JOINT: Chronic | ICD-10-CM

## 2019-12-05 LAB
ALBUMIN SERPL ELPH-MCNC: 4.3 G/DL
ALP BLD-CCNC: 91 U/L
ALT SERPL-CCNC: 26 U/L
ANION GAP SERPL CALC-SCNC: 11 MMOL/L
AST SERPL-CCNC: 23 U/L
BASOPHILS # BLD AUTO: 0.04 K/UL
BASOPHILS NFR BLD AUTO: 1 %
BILIRUB SERPL-MCNC: 0.2 MG/DL
BUN SERPL-MCNC: 21 MG/DL
CALCIT SERPL-MCNC: <1 PG/ML
CALCIUM SERPL-MCNC: 9.3 MG/DL
CHLORIDE SERPL-SCNC: 106 MMOL/L
CO2 SERPL-SCNC: 24 MMOL/L
CREAT SERPL-MCNC: 0.76 MG/DL
EOSINOPHIL # BLD AUTO: 0.19 K/UL
EOSINOPHIL NFR BLD AUTO: 4.6 %
GLUCOSE SERPL-MCNC: 100 MG/DL
HCT VFR BLD CALC: 41 %
HGB BLD-MCNC: 12.9 G/DL
IMM GRANULOCYTES NFR BLD AUTO: 0.5 %
LYMPHOCYTES # BLD AUTO: 1.35 K/UL
LYMPHOCYTES NFR BLD AUTO: 32.5 %
MAN DIFF?: NORMAL
MCHC RBC-ENTMCNC: 31.2 PG
MCHC RBC-ENTMCNC: 31.5 GM/DL
MCV RBC AUTO: 99.3 FL
MONOCYTES # BLD AUTO: 0.39 K/UL
MONOCYTES NFR BLD AUTO: 9.4 %
NEUTROPHILS # BLD AUTO: 2.16 K/UL
NEUTROPHILS NFR BLD AUTO: 52 %
PLATELET # BLD AUTO: 209 K/UL
POTASSIUM SERPL-SCNC: 4.4 MMOL/L
PROT SERPL-MCNC: 6 G/DL
RBC # BLD: 4.13 M/UL
RBC # FLD: 12.8 %
SODIUM SERPL-SCNC: 140 MMOL/L
WBC # FLD AUTO: 4.15 K/UL

## 2019-12-05 PROCEDURE — 64721 CARPAL TUNNEL SURGERY: CPT | Mod: 79,RT

## 2019-12-05 RX ORDER — APREPITANT 80 MG/1
40 CAPSULE ORAL ONCE
Refills: 0 | Status: COMPLETED | OUTPATIENT
Start: 2019-12-05 | End: 2019-12-05

## 2019-12-05 RX ORDER — TOPIRAMATE 25 MG
1 TABLET ORAL
Qty: 0 | Refills: 0 | DISCHARGE

## 2019-12-05 RX ORDER — ACETAMINOPHEN 500 MG
975 TABLET ORAL ONCE
Refills: 0 | Status: COMPLETED | OUTPATIENT
Start: 2019-12-05 | End: 2019-12-05

## 2019-12-05 RX ORDER — FAMOTIDINE 10 MG/ML
20 INJECTION INTRAVENOUS ONCE
Refills: 0 | Status: COMPLETED | OUTPATIENT
Start: 2019-12-05 | End: 2019-12-05

## 2019-12-05 RX ADMIN — APREPITANT 40 MILLIGRAM(S): 80 CAPSULE ORAL at 13:39

## 2019-12-05 RX ADMIN — Medication 975 MILLIGRAM(S): at 13:39

## 2019-12-05 RX ADMIN — FAMOTIDINE 20 MILLIGRAM(S): 10 INJECTION INTRAVENOUS at 13:39

## 2019-12-05 NOTE — ASU DISCHARGE PLAN (ADULT/PEDIATRIC) - CARE PROVIDER_API CALL
Mady Neville)  [facility]  [address] [place] [address]  Phone: [phone]  Fax: [phone]  Follow Up Time:

## 2019-12-05 NOTE — ASU PATIENT PROFILE, ADULT - PMH
ADHD (attention deficit hyperactivity disorder)    HLD (hyperlipidemia)    Hypothyroid    Migraine    Myocardial infarct  2012  Rheumatoid arthritis    Stented coronary artery  2012

## 2019-12-05 NOTE — ASU DISCHARGE PLAN (ADULT/PEDIATRIC) - CALL YOUR DOCTOR IF YOU HAVE ANY OF THE FOLLOWING:
Wound/Surgical Site with redness, or foul smelling discharge or pus/Pain not relieved by Medications/Bleeding that does not stop/Swelling that gets worse/Numbness, tingling, color or temperature change to extremity/Fever greater than (need to indicate Fahrenheit or Celsius)

## 2019-12-06 ENCOUNTER — OTHER (OUTPATIENT)
Age: 64
End: 2019-12-06

## 2019-12-09 DIAGNOSIS — Z88.8 ALLERGY STATUS TO OTHER DRUGS, MEDICAMENTS AND BIOLOGICAL SUBSTANCES: ICD-10-CM

## 2019-12-09 DIAGNOSIS — E66.9 OBESITY, UNSPECIFIED: ICD-10-CM

## 2019-12-09 DIAGNOSIS — Z79.82 LONG TERM (CURRENT) USE OF ASPIRIN: ICD-10-CM

## 2019-12-09 DIAGNOSIS — Z88.0 ALLERGY STATUS TO PENICILLIN: ICD-10-CM

## 2019-12-09 DIAGNOSIS — M06.9 RHEUMATOID ARTHRITIS, UNSPECIFIED: ICD-10-CM

## 2019-12-09 DIAGNOSIS — G47.33 OBSTRUCTIVE SLEEP APNEA (ADULT) (PEDIATRIC): ICD-10-CM

## 2019-12-09 DIAGNOSIS — F90.9 ATTENTION-DEFICIT HYPERACTIVITY DISORDER, UNSPECIFIED TYPE: ICD-10-CM

## 2019-12-09 DIAGNOSIS — I25.10 ATHEROSCLEROTIC HEART DISEASE OF NATIVE CORONARY ARTERY WITHOUT ANGINA PECTORIS: ICD-10-CM

## 2019-12-09 DIAGNOSIS — G43.009 MIGRAINE WITHOUT AURA, NOT INTRACTABLE, WITHOUT STATUS MIGRAINOSUS: ICD-10-CM

## 2019-12-09 DIAGNOSIS — Z79.891 LONG TERM (CURRENT) USE OF OPIATE ANALGESIC: ICD-10-CM

## 2019-12-09 DIAGNOSIS — Z88.1 ALLERGY STATUS TO OTHER ANTIBIOTIC AGENTS STATUS: ICD-10-CM

## 2019-12-09 DIAGNOSIS — E03.9 HYPOTHYROIDISM, UNSPECIFIED: ICD-10-CM

## 2019-12-09 DIAGNOSIS — K21.9 GASTRO-ESOPHAGEAL REFLUX DISEASE WITHOUT ESOPHAGITIS: ICD-10-CM

## 2019-12-09 DIAGNOSIS — Z87.891 PERSONAL HISTORY OF NICOTINE DEPENDENCE: ICD-10-CM

## 2019-12-09 DIAGNOSIS — I10 ESSENTIAL (PRIMARY) HYPERTENSION: ICD-10-CM

## 2019-12-09 DIAGNOSIS — F32.9 MAJOR DEPRESSIVE DISORDER, SINGLE EPISODE, UNSPECIFIED: ICD-10-CM

## 2019-12-09 DIAGNOSIS — G56.01 CARPAL TUNNEL SYNDROME, RIGHT UPPER LIMB: ICD-10-CM

## 2019-12-09 DIAGNOSIS — Z96.653 PRESENCE OF ARTIFICIAL KNEE JOINT, BILATERAL: ICD-10-CM

## 2019-12-09 DIAGNOSIS — I25.2 OLD MYOCARDIAL INFARCTION: ICD-10-CM

## 2019-12-09 DIAGNOSIS — E78.00 PURE HYPERCHOLESTEROLEMIA, UNSPECIFIED: ICD-10-CM

## 2019-12-09 DIAGNOSIS — E11.9 TYPE 2 DIABETES MELLITUS WITHOUT COMPLICATIONS: ICD-10-CM

## 2019-12-13 ENCOUNTER — APPOINTMENT (OUTPATIENT)
Dept: ORTHOPEDIC SURGERY | Facility: CLINIC | Age: 64
End: 2019-12-13
Payer: MEDICARE

## 2019-12-13 DIAGNOSIS — M24.812 OTHER SPECIFIC JOINT DERANGEMENTS OF LEFT SHOULDER, NOT ELSEWHERE CLASSIFIED: ICD-10-CM

## 2019-12-13 PROCEDURE — 73030 X-RAY EXAM OF SHOULDER: CPT | Mod: LT

## 2019-12-13 PROCEDURE — 99024 POSTOP FOLLOW-UP VISIT: CPT

## 2019-12-13 NOTE — HISTORY OF PRESENT ILLNESS
[de-identified] : DOS: 12/05/2019\par Procedure: Right Carpal Tunnel Release [de-identified] : 12/13/2019: Patient returns to the office today for her first postop visit from a right carpal tunnel release. Overall her symptoms from preop have improved but she still has a shocking type sensation that is very intermittent in nature. She states that she has been working on range of motion exercises. She denies any fevers or chills. She also has a new complaint of left shoulder pain. Left shoulder pain has been going on for many months. It is sharp in nature and located deep inside the shoulder. It is worse with overhead movements and better with rest. She has not tried any over-the-counter medications to alleviate her symptoms. [de-identified] : Skin is intact with a well healing incision over the volar aspect of the wrist. There are no signs of infection. Range of motion of the wrist and digits is intact. Sensation is grossly intact and capillary refill is brisk.\par \par Left Shoulder Exam\par \par Inspection: No malalignment, No defects\par Skin: No masses, No lesions\par Neck: Negative Spurlings, full ROM without pain\par ROM: LEFT Active FF to 170°, abduction to 90°, ER to 40°, IR to belt line \par Painful arc ROM: Overhead\par Tenderness: No bicipital tenderness, no tenderness to the greater tuberosity/RTC insertion, no anterior shoulder/lesser tuberosity tenderness\par Strength: 5/5 ER, 5/5 IR in adduction, 5/5 supraspinatus testing\par AC Joint: No ttp, no pain with cross arm testing\par Biceps: Speed negative, Yergusons negative\par Impingement test: Positive Wolfe\par Stability: Negative apprehension, negative anterior/posterior load and shift\par Vasc: 2+ radial pulse\par Neuro: AIN, PIN, Ulnar nerve in tact to motor\par Sensation: In tact to light touch throughout\par \par  [de-identified] : 3 x-ray views of the left shoulder were obtained. [de-identified] : s/p Right Carpal Tunnel Release [de-identified] : Patient is feeling well after a right carpal tunnel release. She is currently 8 days postop. She will continue with light activity through the weekend and begin weaning herself into activity as tolerated at the beginning of next week. She will keep the wound clean and dry at all times. In regards to her left shoulder pain I recommend a short course of an anti-inflammatory. I will send the patient for an MRI to rule out rotator cuff tendinopathy. I also recommend an ultrasound-guided entry articular injection of cortisone. The patient will follow back up in the office after the MRI to review the results. The patient is in agreement with this plan and appreciative of her care.

## 2019-12-16 LAB
CGA SERPL-MCNC: 980 NG/ML
GASTRIN SERPL-MCNC: 270 PG/ML
VIP SERPL-MCNC: <50 PG/ML

## 2019-12-17 PROBLEM — G43.909 MIGRAINE, UNSPECIFIED, NOT INTRACTABLE, WITHOUT STATUS MIGRAINOSUS: Chronic | Status: ACTIVE | Noted: 2019-12-05

## 2019-12-18 LAB — SOMATOSTAT PLAS-MCNC: 29 PG/ML

## 2019-12-23 ENCOUNTER — FORM ENCOUNTER (OUTPATIENT)
Age: 64
End: 2019-12-23

## 2019-12-24 ENCOUNTER — APPOINTMENT (OUTPATIENT)
Dept: MRI IMAGING | Facility: CLINIC | Age: 64
End: 2019-12-24
Payer: MEDICARE

## 2019-12-24 ENCOUNTER — OUTPATIENT (OUTPATIENT)
Dept: OUTPATIENT SERVICES | Facility: HOSPITAL | Age: 64
LOS: 1 days | End: 2019-12-24

## 2019-12-24 DIAGNOSIS — Z98.890 OTHER SPECIFIED POSTPROCEDURAL STATES: Chronic | ICD-10-CM

## 2019-12-24 DIAGNOSIS — K52.9 NONINFECTIVE GASTROENTERITIS AND COLITIS, UNSPECIFIED: ICD-10-CM

## 2019-12-24 DIAGNOSIS — Z96.651 PRESENCE OF RIGHT ARTIFICIAL KNEE JOINT: Chronic | ICD-10-CM

## 2019-12-24 DIAGNOSIS — Z98.1 ARTHRODESIS STATUS: Chronic | ICD-10-CM

## 2019-12-24 PROCEDURE — 73221 MRI JOINT UPR EXTREM W/O DYE: CPT | Mod: 26,LT

## 2019-12-24 PROCEDURE — 74183 MRI ABD W/O CNTR FLWD CNTR: CPT | Mod: 26

## 2019-12-26 ENCOUNTER — CLINICAL ADVICE (OUTPATIENT)
Age: 64
End: 2019-12-26

## 2019-12-29 ENCOUNTER — FORM ENCOUNTER (OUTPATIENT)
Age: 64
End: 2019-12-29

## 2019-12-30 ENCOUNTER — APPOINTMENT (OUTPATIENT)
Dept: ULTRASOUND IMAGING | Facility: CLINIC | Age: 64
End: 2019-12-30
Payer: MEDICARE

## 2019-12-30 ENCOUNTER — OUTPATIENT (OUTPATIENT)
Dept: OUTPATIENT SERVICES | Facility: HOSPITAL | Age: 64
LOS: 1 days | End: 2019-12-30

## 2019-12-30 DIAGNOSIS — Z98.890 OTHER SPECIFIED POSTPROCEDURAL STATES: Chronic | ICD-10-CM

## 2019-12-30 DIAGNOSIS — Z96.651 PRESENCE OF RIGHT ARTIFICIAL KNEE JOINT: Chronic | ICD-10-CM

## 2019-12-30 DIAGNOSIS — Z98.1 ARTHRODESIS STATUS: Chronic | ICD-10-CM

## 2019-12-30 DIAGNOSIS — M24.812 OTHER SPECIFIC JOINT DERANGEMENTS OF LEFT SHOULDER, NOT ELSEWHERE CLASSIFIED: ICD-10-CM

## 2019-12-30 PROCEDURE — 20611 DRAIN/INJ JOINT/BURSA W/US: CPT | Mod: LT

## 2019-12-31 ENCOUNTER — CLINICAL ADVICE (OUTPATIENT)
Age: 64
End: 2019-12-31

## 2020-01-28 ENCOUNTER — APPOINTMENT (OUTPATIENT)
Dept: ORTHOPEDIC SURGERY | Facility: CLINIC | Age: 65
End: 2020-01-28

## 2020-02-07 ENCOUNTER — APPOINTMENT (OUTPATIENT)
Dept: GASTROENTEROLOGY | Facility: CLINIC | Age: 65
End: 2020-02-07
Payer: MEDICARE

## 2020-02-07 ENCOUNTER — APPOINTMENT (OUTPATIENT)
Dept: ORTHOPEDIC SURGERY | Facility: CLINIC | Age: 65
End: 2020-02-07
Payer: MEDICARE

## 2020-02-07 VITALS
HEART RATE: 78 BPM | DIASTOLIC BLOOD PRESSURE: 57 MMHG | SYSTOLIC BLOOD PRESSURE: 103 MMHG | WEIGHT: 197 LBS | HEIGHT: 65 IN | BODY MASS INDEX: 32.82 KG/M2

## 2020-02-07 VITALS
SYSTOLIC BLOOD PRESSURE: 107 MMHG | DIASTOLIC BLOOD PRESSURE: 63 MMHG | HEIGHT: 65 IN | HEART RATE: 77 BPM | BODY MASS INDEX: 32.82 KG/M2 | WEIGHT: 197 LBS

## 2020-02-07 DIAGNOSIS — E16.4 INCREASED SECRETION OF GASTRIN: ICD-10-CM

## 2020-02-07 DIAGNOSIS — Z98.890 OTHER SPECIFIED POSTPROCEDURAL STATES: ICD-10-CM

## 2020-02-07 PROCEDURE — 99024 POSTOP FOLLOW-UP VISIT: CPT

## 2020-02-07 PROCEDURE — 99213 OFFICE O/P EST LOW 20 MIN: CPT

## 2020-02-07 NOTE — HISTORY OF PRESENT ILLNESS
[de-identified] : Patient returns for follow up for chronic diarrhea.  Has been taking loperamide daily and will have 3-5 loose BMs daily if she does not take it.  Has not given fiber supplement much of a chance.  Denies any associated abdominal pain and her weight has been stable.  Workup has largely been unremarkable.

## 2020-02-07 NOTE — PHYSICAL EXAM
[General Appearance - Alert] : alert [Sclera] : the sclera and conjunctiva were normal [General Appearance - In No Acute Distress] : in no acute distress [PERRL With Normal Accommodation] : pupils were equal in size, round, and reactive to light [Extraocular Movements] : extraocular movements were intact [Outer Ear] : the ears and nose were normal in appearance [Oropharynx] : the oropharynx was normal [Neck Appearance] : the appearance of the neck was normal [Neck Cervical Mass (___cm)] : no neck mass was observed [Jugular Venous Distention Increased] : there was no jugular-venous distention [Thyroid Diffuse Enlargement] : the thyroid was not enlarged [Thyroid Nodule] : there were no palpable thyroid nodules [Auscultation Breath Sounds / Voice Sounds] : lungs were clear to auscultation bilaterally [Heart Rate And Rhythm] : heart rate was normal and rhythm regular [Heart Sounds] : normal S1 and S2 [Heart Sounds Gallop] : no gallops [Murmurs] : no murmurs [Heart Sounds Pericardial Friction Rub] : no pericardial rub [Edema] : there was no peripheral edema [Bowel Sounds] : normal bowel sounds [Abdomen Soft] : soft [Abdomen Tenderness] : non-tender [] : no hepato-splenomegaly [Abdomen Mass (___ Cm)] : no abdominal mass palpated [Cervical Lymph Nodes Enlarged Anterior Bilaterally] : anterior cervical [Cervical Lymph Nodes Enlarged Posterior Bilaterally] : posterior cervical [Supraclavicular Lymph Nodes Enlarged Bilaterally] : supraclavicular [Nail Clubbing] : no clubbing  or cyanosis of the fingernails [Skin Color & Pigmentation] : normal skin color and pigmentation [Skin Turgor] : normal skin turgor [No Focal Deficits] : no focal deficits [Oriented To Time, Place, And Person] : oriented to person, place, and time [Impaired Insight] : insight and judgment were intact [Affect] : the affect was normal

## 2020-02-07 NOTE — ASSESSMENT
[FreeTextEntry1] : Ongoing chronic diarrhea or unknown etiology: Reviewed labs and MRI with patient, which have thus far been unremarkable.  Colonoscopy was unrevealing.  Still awaiting SIBO test (patient has not yet done) and fecal elastase and 24h urine 5-HIAA.  Advised continued use of loperamide and the addition of a nonfermenting fiber supplement.  I will call her with the results of the outstanding tests.

## 2020-02-10 ENCOUNTER — APPOINTMENT (OUTPATIENT)
Dept: RHEUMATOLOGY | Facility: CLINIC | Age: 65
End: 2020-02-10

## 2020-02-10 PROBLEM — Z98.890 S/P CARPAL TUNNEL RELEASE: Status: ACTIVE | Noted: 2019-10-25

## 2020-02-10 LAB
5OH-INDOLEACETATE 24H UR-MRATE: 5.4 MG/24 H
5OH-INDOLEACETATE UR-MCNC: 0.99 G/24 H
PANCREATIC ELASTASE, FECAL: 326
SPECIMEN VOL 24H UR: 2475 ML

## 2020-02-10 NOTE — HISTORY OF PRESENT ILLNESS
[Healed] : healed [___ Weeks Post Op] : [unfilled] weeks post op [Erythema] : erythematous [Swelling] : not swollen [de-identified] : a 65-year-old female 2 months status post right carpal tunnel release. She continues to have radiating pain in the right upper extremity as well as paresthesias in the entire hand. [Vascular Intact] : ~T peripheral vascular exam normal [de-identified] : 65-year-old female with right upper extremity pain and paresthesias following right carpal tunnel release. The etiology of her symptoms is unclear, I recommendsteroid taper as well as a course of hand therapy.She will followup in 3-4 weeks for reevaluation.

## 2020-03-02 ENCOUNTER — RX RENEWAL (OUTPATIENT)
Age: 65
End: 2020-03-02

## 2020-03-03 DIAGNOSIS — F32.9 MAJOR DEPRESSIVE DISORDER, SINGLE EPISODE, UNSPECIFIED: ICD-10-CM

## 2020-03-03 DIAGNOSIS — I25.10 ATHEROSCLEROTIC HEART DISEASE OF NATIVE CORONARY ARTERY W/OUT ANGINA PECTORIS: ICD-10-CM

## 2020-03-19 ENCOUNTER — APPOINTMENT (OUTPATIENT)
Dept: FAMILY MEDICINE | Facility: CLINIC | Age: 65
End: 2020-03-19

## 2020-03-27 ENCOUNTER — APPOINTMENT (OUTPATIENT)
Dept: RHEUMATOLOGY | Facility: CLINIC | Age: 65
End: 2020-03-27
Payer: MEDICARE

## 2020-03-27 DIAGNOSIS — R79.89 OTHER SPECIFIED ABNORMAL FINDINGS OF BLOOD CHEMISTRY: ICD-10-CM

## 2020-03-27 PROCEDURE — G2012 BRIEF CHECK IN BY MD/QHP: CPT

## 2020-03-27 RX ORDER — PREDNISONE 10 MG/1
10 TABLET ORAL
Qty: 20 | Refills: 0 | Status: DISCONTINUED | COMMUNITY
Start: 2020-02-07 | End: 2020-03-27

## 2020-03-27 RX ORDER — MELOXICAM 15 MG/1
15 TABLET ORAL
Qty: 15 | Refills: 0 | Status: DISCONTINUED | COMMUNITY
Start: 2019-12-13 | End: 2020-03-27

## 2020-03-27 RX ORDER — PREDNISONE 5 MG/1
5 TABLET ORAL
Qty: 60 | Refills: 0 | Status: DISCONTINUED | COMMUNITY
Start: 2019-10-18 | End: 2020-03-27

## 2020-03-27 RX ORDER — KETOROLAC TROMETHAMINE 10 MG/1
10 TABLET, FILM COATED ORAL 3 TIMES DAILY
Qty: 15 | Refills: 0 | Status: DISCONTINUED | COMMUNITY
Start: 2019-09-20 | End: 2020-03-27

## 2020-03-27 NOTE — HISTORY OF PRESENT ILLNESS
[FreeTextEntry1] : 64-year-old  female well known to me for several years returns for further management of rheumatoid arthritis. She was diagnosed with seronegative rheumatoid arthritis prior to seeing me. She was under my care for several years and tried several medications with minimal relief. She was last seen Dr. Sanchez. She was on subcutaneous injection of actemra\par She was on methotrexate but it caused elevated liver enzymes. Prior to this she has used Enbrel, Remicade, orencia, Humira with minimal relief. She thinks that she has tried other infusions as well.\par \par She has had bilateral knee replacements. She has had shoulder surgery as well.\par \par She has a good appetite and denies weight loss. She denies fevers or chills or night sweats. She rates her current pain as a 7 out of 10. Most of the pain is in her hands. She c/o pain in her left shoulder. She is s/p CTS left side, and is also seeing GI for ch diarrhea\par \par She is otherwise up-to-date on her age-appropriate screenings.

## 2020-03-27 NOTE — PHYSICAL EXAM
[General Appearance - Alert] : alert [General Appearance - Well Nourished] : well nourished [General Appearance - Well Developed] : well developed [Oropharynx] : the oropharynx was normal [Sclera] : the sclera and conjunctiva were normal [Neck Appearance] : the appearance of the neck was normal [Auscultation Breath Sounds / Voice Sounds] : lungs were clear to auscultation bilaterally [Respiration, Rhythm And Depth] : normal respiratory rhythm and effort [Heart Sounds] : normal S1 and S2 [Full Pulse] : the pedal pulses are present [Edema] : there was no peripheral edema [Cervical Lymph Nodes Enlarged Anterior Bilaterally] : anterior cervical [Abdomen Tenderness] : non-tender [No Spinal Tenderness] : no spinal tenderness [Supraclavicular Lymph Nodes Enlarged Bilaterally] : supraclavicular [Motor Tone] : muscle strength and tone were normal [Abnormal Walk] : normal gait [Nail Clubbing] : no clubbing  or cyanosis of the fingernails [] : no rash [Deep Tendon Reflexes (DTR)] : deep tendon reflexes were 2+ and symmetric [Oriented To Time, Place, And Person] : oriented to person, place, and time [Motor Exam] : the motor exam was normal [Impaired Insight] : insight and judgment were intact [Affect] : the affect was normal [FreeTextEntry1] : FROM neck, shoulders, elbows, wrists, hands, hips, knees, ankles and feet, including the small joints of the hands and feet without any evidence of inflammatory arthritis b/l TKR, hands with POm with swelling, POM shoulders, ROM 90 degrees, left shoulder ROM under 45 degrees

## 2020-03-27 NOTE — ASSESSMENT
[FreeTextEntry1] : 64-year-old  female presents for further management of rheumatoid arthritis.\par \par Rheumatoid arthritis-\par -she is well known to me for several years\par -She was diagnosed prior to seeing me\par -She is seronegative nonerosive\par -She has failed multiple medications in the past including DMARDS due to elevated liver enzymes. She has failed TNF inhibitors due to lack of efficacy. She has also tried and failed orencia.\par -The last medication she was using was actemra injection and they worked well for her\par -to continue with weekly dose\par -Labs to be done today\par - to hold meds while on abx\par \par Left shoulder osteoarthritis-\par _ I aspirated and injected the left shoulder with depomedrol 80 mg x 1\par - if pain persists will obtain MRI\par \par Osteoarthritis-\par -status post bilateral knee replacements\par \par Carpal tunnel syndrome-\par -to use wrist splints\par - s/p surgery left side\par \par Followup 4 months. She is aware to call if her symptoms worsen

## 2020-04-05 ENCOUNTER — RX RENEWAL (OUTPATIENT)
Age: 65
End: 2020-04-05

## 2020-04-24 ENCOUNTER — APPOINTMENT (OUTPATIENT)
Dept: ORTHOPEDIC SURGERY | Facility: CLINIC | Age: 65
End: 2020-04-24

## 2020-06-02 DIAGNOSIS — R59.0 LOCALIZED ENLARGED LYMPH NODES: ICD-10-CM

## 2020-06-13 ENCOUNTER — OUTPATIENT (OUTPATIENT)
Dept: OUTPATIENT SERVICES | Facility: HOSPITAL | Age: 65
LOS: 1 days | End: 2020-06-13

## 2020-06-13 ENCOUNTER — APPOINTMENT (OUTPATIENT)
Dept: MRI IMAGING | Facility: CLINIC | Age: 65
End: 2020-06-13
Payer: MEDICARE

## 2020-06-13 ENCOUNTER — RESULT REVIEW (OUTPATIENT)
Age: 65
End: 2020-06-13

## 2020-06-13 DIAGNOSIS — Z98.1 ARTHRODESIS STATUS: Chronic | ICD-10-CM

## 2020-06-13 DIAGNOSIS — Z98.890 OTHER SPECIFIED POSTPROCEDURAL STATES: Chronic | ICD-10-CM

## 2020-06-13 DIAGNOSIS — R59.0 LOCALIZED ENLARGED LYMPH NODES: ICD-10-CM

## 2020-06-13 DIAGNOSIS — Z96.651 PRESENCE OF RIGHT ARTIFICIAL KNEE JOINT: Chronic | ICD-10-CM

## 2020-06-13 PROCEDURE — 74182 MRI ABDOMEN W/CONTRAST: CPT | Mod: 26

## 2020-06-26 ENCOUNTER — APPOINTMENT (OUTPATIENT)
Dept: GASTROENTEROLOGY | Facility: CLINIC | Age: 65
End: 2020-06-26
Payer: MEDICARE

## 2020-06-26 VITALS
BODY MASS INDEX: 32.95 KG/M2 | SYSTOLIC BLOOD PRESSURE: 128 MMHG | TEMPERATURE: 96.1 F | DIASTOLIC BLOOD PRESSURE: 80 MMHG | WEIGHT: 198 LBS

## 2020-06-26 DIAGNOSIS — K58.0 IRRITABLE BOWEL SYNDROME WITH DIARRHEA: ICD-10-CM

## 2020-06-26 DIAGNOSIS — K76.0 FATTY (CHANGE OF) LIVER, NOT ELSEWHERE CLASSIFIED: ICD-10-CM

## 2020-06-26 DIAGNOSIS — K52.9 NONINFECTIVE GASTROENTERITIS AND COLITIS, UNSPECIFIED: ICD-10-CM

## 2020-06-26 DIAGNOSIS — Z78.9 OTHER SPECIFIED HEALTH STATUS: ICD-10-CM

## 2020-06-26 PROCEDURE — 99214 OFFICE O/P EST MOD 30 MIN: CPT

## 2020-06-26 NOTE — PHYSICAL EXAM
[General Appearance - Alert] : alert [Sclera] : the sclera and conjunctiva were normal [PERRL With Normal Accommodation] : pupils were equal in size, round, and reactive to light [General Appearance - In No Acute Distress] : in no acute distress [Extraocular Movements] : extraocular movements were intact [Neck Appearance] : the appearance of the neck was normal [Outer Ear] : the ears and nose were normal in appearance [Hearing Threshold Finger Rub Not [place]] : hearing was normal [Respiration, Rhythm And Depth] : normal respiratory rhythm and effort [Edema] : there was no peripheral edema [Nail Clubbing] : no clubbing  or cyanosis of the fingernails [No Focal Deficits] : no focal deficits [Skin Color & Pigmentation] : normal skin color and pigmentation [Skin Turgor] : normal skin turgor [Affect] : the affect was normal [Impaired Insight] : insight and judgment were intact [Oriented To Time, Place, And Person] : oriented to person, place, and time

## 2020-06-26 NOTE — HISTORY OF PRESENT ILLNESS
[de-identified] : Patient continuing to have 8-10 diarrheal stools daily if she does not take loperamide.  Tried fiber but it made her gassy.  Drinking 2-3 glasses wine/night.

## 2020-06-26 NOTE — ASSESSMENT
[FreeTextEntry1] : IBS-D: Trial of rifaximin, loperamide as needed.  Advised eliminating alcohol consumption due to likely NAFLD.

## 2020-08-03 ENCOUNTER — RX RENEWAL (OUTPATIENT)
Age: 65
End: 2020-08-03

## 2020-08-10 ENCOUNTER — APPOINTMENT (OUTPATIENT)
Dept: FAMILY MEDICINE | Facility: CLINIC | Age: 65
End: 2020-08-10

## 2020-08-10 NOTE — ASU DISCHARGE PLAN (ADULT/PEDIATRIC) - B. DRINK ALCOHOL, BEER, OR WINE
Message noted. Thanks.
Physician on call answering page. Patient in [place] and states that his tramadol and and alprazolam prescriptions were stolen by . He is requesting early refill of these medications. Prescription sent to [email or web address].
Statement Selected

## 2020-08-12 LAB
25(OH)D3 SERPL-MCNC: 46.9 NG/ML
ALBUMIN SERPL ELPH-MCNC: 4.8 G/DL
ALP BLD-CCNC: 116 U/L
ALT SERPL-CCNC: 37 U/L
ANION GAP SERPL CALC-SCNC: 12 MMOL/L
AST SERPL-CCNC: 33 U/L
BASOPHILS # BLD AUTO: 0.08 K/UL
BASOPHILS NFR BLD AUTO: 1.8 %
BILIRUB SERPL-MCNC: 0.4 MG/DL
BUN SERPL-MCNC: 22 MG/DL
CALCIUM SERPL-MCNC: 9.4 MG/DL
CHLORIDE SERPL-SCNC: 108 MMOL/L
CO2 SERPL-SCNC: 24 MMOL/L
CREAT SERPL-MCNC: 1.03 MG/DL
CRP SERPL-MCNC: <0.1 MG/DL
EOSINOPHIL # BLD AUTO: 0.24 K/UL
EOSINOPHIL NFR BLD AUTO: 5.5 %
ERYTHROCYTE [SEDIMENTATION RATE] IN BLOOD BY WESTERGREN METHOD: 8 MM/HR
GLUCOSE SERPL-MCNC: 130 MG/DL
HAV IGM SER QL: NONREACTIVE
HBV CORE IGM SER QL: NONREACTIVE
HBV SURFACE AG SER QL: NONREACTIVE
HCT VFR BLD CALC: 44.1 %
HCV AB SER QL: NONREACTIVE
HCV S/CO RATIO: 0.06 S/CO
HGB BLD-MCNC: 14.1 G/DL
IMM GRANULOCYTES NFR BLD AUTO: 0.2 %
LYMPHOCYTES # BLD AUTO: 1.73 K/UL
LYMPHOCYTES NFR BLD AUTO: 39.9 %
MAN DIFF?: NORMAL
MCHC RBC-ENTMCNC: 32 GM/DL
MCHC RBC-ENTMCNC: 32.3 PG
MCV RBC AUTO: 101.1 FL
MONOCYTES # BLD AUTO: 0.35 K/UL
MONOCYTES NFR BLD AUTO: 8.1 %
NEUTROPHILS # BLD AUTO: 1.93 K/UL
NEUTROPHILS NFR BLD AUTO: 44.5 %
PLATELET # BLD AUTO: 204 K/UL
POTASSIUM SERPL-SCNC: 4.2 MMOL/L
PROT SERPL-MCNC: 6.5 G/DL
RBC # BLD: 4.36 M/UL
RBC # FLD: 12.5 %
SODIUM SERPL-SCNC: 143 MMOL/L
WBC # FLD AUTO: 4.34 K/UL

## 2020-08-13 LAB
M TB IFN-G BLD-IMP: NEGATIVE
QUANTIFERON TB PLUS MITOGEN MINUS NIL: >10 IU/ML
QUANTIFERON TB PLUS NIL: 0.07 IU/ML
QUANTIFERON TB PLUS TB1 MINUS NIL: 0.04 IU/ML
QUANTIFERON TB PLUS TB2 MINUS NIL: 0.07 IU/ML

## 2020-08-17 NOTE — ASU PATIENT PROFILE, ADULT - BLOOD AVOIDANCE/RESTRICTIONS, PROFILE
PLEASE CONTINUE TAKING ALL PRESCRIPTION MEDICATIONS UP TO THE DAY OF SURGERY UNLESS OTHERWISE DIRECTED BELOW. DISCONTINUE all vitamins and supplements 7 days prior to surgery. DISCONTINUE Non-Steriodal Anti-Inflammatory (NSAIDS) such as Advil and Aleve 5 days prior to surgery. Home Medications to take  the day of surgery    Metoprolol (Lopressor)             Home Medications   to Hold   Follow surgeon's instructions for aspirin 325 mg        Comments    Bring home log of BP checks with you to hospital on day of surgery. *Visitor policy of 1 visitor per patient discussed. Please do not bring home medications with you on the day of surgery unless otherwise directed by your nurse. If you are instructed to bring home medications, please give them to your nurse as they will be administered by the nursing staff. If you have any questions, please call  [facility] [phone]. A copy of this note was provided to the patient for reference. none

## 2020-08-18 LAB
ALBUMIN SERPL ELPH-MCNC: 4.7 G/DL
ALP BLD-CCNC: 114 U/L
ALT SERPL-CCNC: 38 U/L
ANION GAP SERPL CALC-SCNC: 11 MMOL/L
APPEARANCE: CLEAR
AST SERPL-CCNC: 35 U/L
BASOPHILS # BLD AUTO: 0.06 K/UL
BASOPHILS NFR BLD AUTO: 1.3 %
BILIRUB SERPL-MCNC: 0.4 MG/DL
BILIRUBIN URINE: NEGATIVE
BLOOD URINE: NEGATIVE
BUN SERPL-MCNC: 21 MG/DL
CALCIUM SERPL-MCNC: 9.6 MG/DL
CHLORIDE SERPL-SCNC: 109 MMOL/L
CHOLEST SERPL-MCNC: 224 MG/DL
CHOLEST/HDLC SERPL: 4.2 RATIO
CO2 SERPL-SCNC: 24 MMOL/L
COLOR: NORMAL
CREAT SERPL-MCNC: 0.87 MG/DL
EOSINOPHIL # BLD AUTO: 0.23 K/UL
EOSINOPHIL NFR BLD AUTO: 5.2 %
ESTIMATED AVERAGE GLUCOSE: 123 MG/DL
GLUCOSE QUALITATIVE U: NEGATIVE
GLUCOSE SERPL-MCNC: 131 MG/DL
HBA1C MFR BLD HPLC: 5.9 %
HCT VFR BLD CALC: 44.4 %
HDLC SERPL-MCNC: 54 MG/DL
HGB BLD-MCNC: 14.1 G/DL
IMM GRANULOCYTES NFR BLD AUTO: 0.2 %
KETONES URINE: NEGATIVE
LDLC SERPL CALC-MCNC: 112 MG/DL
LEUKOCYTE ESTERASE URINE: NEGATIVE
LYMPHOCYTES # BLD AUTO: 1.87 K/UL
LYMPHOCYTES NFR BLD AUTO: 42 %
MAN DIFF?: NORMAL
MCHC RBC-ENTMCNC: 31.8 GM/DL
MCHC RBC-ENTMCNC: 32 PG
MCV RBC AUTO: 100.9 FL
MONOCYTES # BLD AUTO: 0.38 K/UL
MONOCYTES NFR BLD AUTO: 8.5 %
NEUTROPHILS # BLD AUTO: 1.9 K/UL
NEUTROPHILS NFR BLD AUTO: 42.8 %
NITRITE URINE: NEGATIVE
PH URINE: 5.5
PLATELET # BLD AUTO: 210 K/UL
POTASSIUM SERPL-SCNC: 4.2 MMOL/L
PROT SERPL-MCNC: 6.6 G/DL
PROTEIN URINE: NEGATIVE
RBC # BLD: 4.4 M/UL
RBC # FLD: 12.5 %
SODIUM SERPL-SCNC: 144 MMOL/L
SPECIFIC GRAVITY URINE: 1.02
TRIGL SERPL-MCNC: 293 MG/DL
TSH SERPL-ACNC: 2.38 UIU/ML
UROBILINOGEN URINE: NORMAL
WBC # FLD AUTO: 4.45 K/UL

## 2020-09-03 ENCOUNTER — APPOINTMENT (OUTPATIENT)
Dept: FAMILY MEDICINE | Facility: CLINIC | Age: 65
End: 2020-09-03
Payer: MEDICARE

## 2020-09-03 VITALS
TEMPERATURE: 97.9 F | HEIGHT: 65 IN | HEART RATE: 102 BPM | WEIGHT: 196 LBS | DIASTOLIC BLOOD PRESSURE: 70 MMHG | BODY MASS INDEX: 32.65 KG/M2 | SYSTOLIC BLOOD PRESSURE: 110 MMHG

## 2020-09-03 DIAGNOSIS — Z23 ENCOUNTER FOR IMMUNIZATION: ICD-10-CM

## 2020-09-03 PROCEDURE — 99214 OFFICE O/P EST MOD 30 MIN: CPT

## 2020-09-08 RX ORDER — OXYCODONE 10 MG/1
10 TABLET ORAL
Refills: 0 | Status: ACTIVE | COMMUNITY
Start: 2020-09-08

## 2020-09-08 RX ORDER — RIFAXIMIN 550 MG/1
550 TABLET ORAL
Qty: 42 | Refills: 0 | Status: DISCONTINUED | COMMUNITY
Start: 2020-06-26 | End: 2020-09-08

## 2020-09-08 NOTE — REVIEW OF SYSTEMS
[Fatigue] : fatigue [Night Sweats] : night sweats [Joint Pain] : joint pain [Joint Stiffness] : joint stiffness [Negative] : ENT [Fever] : no fever [FreeTextEntry2] : chronic [Suicidal] : not suicidal [de-identified] : as in hpi  [FreeTextEntry9] : chronic [de-identified] : as in hpi

## 2020-09-08 NOTE — ASSESSMENT
[FreeTextEntry1] : DW Patient: \par \par \par \par IN review: June 2019 \par Mammo and US reviewed for no suggestion of malignancy .  Area of concern described as inflamed derma structure and advised possible abscess or or cyst. \par Lucrecia is not complaining of pain or fever. \par In setting of loose stool and polypharmacy , will defer ABXS for now.\par Referred to Breast surgeon. \par \par +/- Genetic testing discussed. \par \par She agrees with plan.

## 2020-09-08 NOTE — PHYSICAL EXAM
[Well Developed] : well developed [Normal Sclera/Conjunctiva] : normal sclera/conjunctiva [Supple] : supple [No Respiratory Distress] : no respiratory distress  [Clear to Auscultation] : lungs were clear to auscultation bilaterally [Normal Rate] : normal rate  [Regular Rhythm] : with a regular rhythm [No Spinal Tenderness] : no spinal tenderness [No Rash] : no rash [Speech Grossly Normal] : speech grossly normal [No Focal Deficits] : no focal deficits [Alert and Oriented x3] : oriented to person, place, and time [de-identified] : ++ anxiety  [de-identified] : a bit fibrous ; no discrete palpable lesion on exam    CHRONIC NT lump between breast is raise and pink in color.  [de-identified] : MO  [de-identified] : a bit antalgic

## 2020-09-08 NOTE — HISTORY OF PRESENT ILLNESS
[FreeTextEntry1] : FU at my request; last seen in JUNE 20129 and encounter reviewed.\par Continues regular FU with NEurology for CPS of  neck and back /chronic headaches and ADD.\par Rheumatology for RA and takes Actemra.\par GI for chronic diarrhea with no clear etiology. Endorses that  Xifaxan helped "some". Frequency decreased to 2-3 daily. \par \par MAMMO AND US  JUNE 2019 :  no suspicious  findings. \par \par RECENT LABS 8/11/20 reviewed and unremarkable. \par \par Denies any recent ER visits or hospitalizations. \par \par Remained isolated during COVID ( lives alone)  Family supportive and some local.  [de-identified] : \par In review: June 2019 \par FU on recent Mammo and Breast US \par She has declined  surveillance in recent years.\par She felt lump in RIGHT  axillae region two days ago. \par Sister diagnosed with breast cancer recently and has one sister with past history of same\par \par Complains of 4 months of loose stools  no recent abx.\par Has Fu with GI planned   Needs referral   Needs colonoscopy \par \par follows  with neurology regularly for mood issues, LBP, migraines, insomnia.\par follows  with rheum. for RA

## 2020-09-16 ENCOUNTER — APPOINTMENT (OUTPATIENT)
Dept: RHEUMATOLOGY | Facility: CLINIC | Age: 65
End: 2020-09-16
Payer: MEDICARE

## 2020-09-16 VITALS
TEMPERATURE: 97.9 F | BODY MASS INDEX: 32.45 KG/M2 | SYSTOLIC BLOOD PRESSURE: 102 MMHG | WEIGHT: 195 LBS | OXYGEN SATURATION: 95 % | DIASTOLIC BLOOD PRESSURE: 68 MMHG | HEART RATE: 82 BPM

## 2020-09-16 PROCEDURE — 96372 THER/PROPH/DIAG INJ SC/IM: CPT

## 2020-09-16 PROCEDURE — 99214 OFFICE O/P EST MOD 30 MIN: CPT | Mod: 25

## 2020-09-16 NOTE — HISTORY OF PRESENT ILLNESS
[FreeTextEntry1] : 65-year-old  female well known to me for several years returns for further management of rheumatoid arthritis. She was diagnosed with seronegative rheumatoid arthritis prior to seeing me. She was under my care for several years and tried several medications with minimal relief. She was last seen Dr. Sanchez. She was on subcutaneous injection of actemra\par She was on methotrexate but it caused elevated liver enzymes. Prior to this she has used Enbrel, Remicade, orencia, Humira with minimal relief. She thinks that she has tried other infusions as well.\par \par She has had bilateral knee replacements. She has had shoulder surgery as well. She has had CTS surgery \par \par She has a good appetite and denies weight loss. She denies fevers or chills or night sweats. She rates her current pain as a 5 out of 10. Most of the pain is in her hands. She c/o pain in her left shoulder. She is also seeing GI for ch diarrhea\par \par She is otherwise up-to-date on her age-appropriate screenings.

## 2020-09-16 NOTE — ASSESSMENT
[FreeTextEntry1] : 64-year-old  female presents for further management of rheumatoid arthritis.\par \par Rheumatoid arthritis-\par -she is well known to me for several years\par -She was diagnosed prior to seeing me\par -She is seronegative nonerosive\par -She has failed multiple medications in the past including DMARDS due to elevated liver enzymes. She has failed TNF inhibitors due to lack of efficacy. She has also tried and failed orencia.\par -The last medication she was using was actemra injection and they worked well for her\par -to continue with weekly dose\par -Labs to be done today\par - to hold meds while on abx\par -Intramuscular Depo-Medrol 80 mg x1 today\par -At this time there is no indication to change medication\par \par Osteoarthritis-\par -status post bilateral knee replacements\par \par Carpal tunnel syndrome-\par improved with surgery\par \par Immunosuppression-\par -She is aware to hold medication while on antibiotics\par -Recommend flu shot\par \par Followup 6 months. She is aware to call if her symptoms worsen

## 2020-09-16 NOTE — PHYSICAL EXAM
[General Appearance - Alert] : alert [General Appearance - Well Nourished] : well nourished [General Appearance - Well Developed] : well developed [Sclera] : the sclera and conjunctiva were normal [Oropharynx] : the oropharynx was normal [Neck Appearance] : the appearance of the neck was normal [Respiration, Rhythm And Depth] : normal respiratory rhythm and effort [Auscultation Breath Sounds / Voice Sounds] : lungs were clear to auscultation bilaterally [Heart Sounds] : normal S1 and S2 [Full Pulse] : the pedal pulses are present [Edema] : there was no peripheral edema [Abdomen Tenderness] : non-tender [Cervical Lymph Nodes Enlarged Anterior Bilaterally] : anterior cervical [Supraclavicular Lymph Nodes Enlarged Bilaterally] : supraclavicular [No Spinal Tenderness] : no spinal tenderness [Abnormal Walk] : normal gait [Nail Clubbing] : no clubbing  or cyanosis of the fingernails [Motor Tone] : muscle strength and tone were normal [FreeTextEntry1] : FROM neck, shoulders, elbows, wrists, hands, hips, knees, ankles and feet, including the small joints of the hands and feet without any evidence of inflammatory arthritis b/l TKR, hands with POm with swelling, POM shoulders, ROM 90 degrees, left shoulder ROM under 45 degrees [] : no rash [Deep Tendon Reflexes (DTR)] : deep tendon reflexes were 2+ and symmetric [Motor Exam] : the motor exam was normal [Oriented To Time, Place, And Person] : oriented to person, place, and time [Impaired Insight] : insight and judgment were intact [Affect] : the affect was normal

## 2020-10-12 ENCOUNTER — APPOINTMENT (OUTPATIENT)
Dept: FAMILY MEDICINE | Facility: CLINIC | Age: 65
End: 2020-10-12
Payer: MEDICARE

## 2020-10-12 VITALS
BODY MASS INDEX: 32.99 KG/M2 | HEIGHT: 65 IN | SYSTOLIC BLOOD PRESSURE: 100 MMHG | TEMPERATURE: 97.3 F | HEART RATE: 80 BPM | WEIGHT: 198 LBS | DIASTOLIC BLOOD PRESSURE: 72 MMHG

## 2020-10-12 DIAGNOSIS — R53.83 OTHER FATIGUE: ICD-10-CM

## 2020-10-12 PROCEDURE — G0008: CPT

## 2020-10-12 PROCEDURE — 96372 THER/PROPH/DIAG INJ SC/IM: CPT

## 2020-10-12 PROCEDURE — 99214 OFFICE O/P EST MOD 30 MIN: CPT | Mod: 25

## 2020-10-12 PROCEDURE — 90686 IIV4 VACC NO PRSV 0.5 ML IM: CPT

## 2020-10-12 RX ORDER — CYANOCOBALAMIN 1000 UG/ML
1000 INJECTION INTRAMUSCULAR; SUBCUTANEOUS
Qty: 0 | Refills: 0 | Status: COMPLETED | OUTPATIENT
Start: 2020-10-12

## 2020-10-12 RX ADMIN — CYANOCOBALAMIN 0 MCG/ML: 1000 INJECTION, SOLUTION INTRAMUSCULAR at 00:00

## 2020-10-13 ENCOUNTER — TRANSCRIPTION ENCOUNTER (OUTPATIENT)
Age: 65
End: 2020-10-13

## 2020-10-15 PROBLEM — R53.83 FATIGUE, UNSPECIFIED TYPE: Status: ACTIVE | Noted: 2020-10-12

## 2020-10-15 NOTE — PHYSICAL EXAM
[Well Developed] : well developed [Normal Sclera/Conjunctiva] : normal sclera/conjunctiva [Supple] : supple [No Respiratory Distress] : no respiratory distress  [Clear to Auscultation] : lungs were clear to auscultation bilaterally [Normal Rate] : normal rate  [No Spinal Tenderness] : no spinal tenderness [Regular Rhythm] : with a regular rhythm [Speech Grossly Normal] : speech grossly normal [No Rash] : no rash [No Focal Deficits] : no focal deficits [Alert and Oriented x3] : oriented to person, place, and time [de-identified] : ++ anxiety  [de-identified] : a bit fibrous ; no discrete palpable lesion on exam    CHRONIC NT lump between breast is raise and pink in color.  [de-identified] : MO  [de-identified] : a bit antalgic

## 2020-10-15 NOTE — ASSESSMENT
[FreeTextEntry1] : DW Patient:  FU with Pulmonogy in consideration of sleep study as cause of tiredness. \par B 12 given \par Flu vac given/ \par \par IN review: June 2019 \par Mammo and US reviewed for no suggestion of malignancy .  Area of concern described as inflamed derma structure and advised possible abscess or or cyst. \par Lucrecia is not complaining of pain or fever. \par In setting of loose stool and polypharmacy , will defer ABXS for now.\par Referred to Breast surgeon. \par \par +/- Genetic testing discussed. \par \par She agrees with plan.

## 2020-10-15 NOTE — REVIEW OF SYSTEMS
[Fatigue] : fatigue [Night Sweats] : night sweats [Joint Stiffness] : joint stiffness [Joint Pain] : joint pain [Negative] : Respiratory [Fever] : no fever [Suicidal] : not suicidal [FreeTextEntry2] : chronic [de-identified] : as in hpi  [FreeTextEntry9] : chronic [de-identified] : as in hpi

## 2020-10-15 NOTE — HISTORY OF PRESENT ILLNESS
[FreeTextEntry1] : pt in office for a follow up flu shot and B12 shot.  [de-identified] : CC:  Fatigue reports sleep well at night but wants to nap.\par         Months of symptoms\par         Recent labs reviewed for Nml. CBC NML CMP NM  TSH  \par         Requesting B 12 injection as it has helped in the past.\par         When queried does endorse hx of ZAHIDA in the past; no appliances         \par \par GI/bowel habits more normal since Xifaxan

## 2020-10-28 ENCOUNTER — APPOINTMENT (OUTPATIENT)
Dept: ORTHOPEDIC SURGERY | Facility: CLINIC | Age: 65
End: 2020-10-28
Payer: MEDICARE

## 2020-10-28 ENCOUNTER — APPOINTMENT (OUTPATIENT)
Dept: RHEUMATOLOGY | Facility: CLINIC | Age: 65
End: 2020-10-28
Payer: MEDICARE

## 2020-10-28 VITALS
OXYGEN SATURATION: 98 % | WEIGHT: 198 LBS | BODY MASS INDEX: 32.99 KG/M2 | SYSTOLIC BLOOD PRESSURE: 102 MMHG | DIASTOLIC BLOOD PRESSURE: 62 MMHG | HEIGHT: 65 IN | HEART RATE: 70 BPM | TEMPERATURE: 97.9 F

## 2020-10-28 DIAGNOSIS — G89.29 OTHER CHRONIC PAIN: ICD-10-CM

## 2020-10-28 PROCEDURE — 99214 OFFICE O/P EST MOD 30 MIN: CPT

## 2020-10-28 PROCEDURE — 99072 ADDL SUPL MATRL&STAF TM PHE: CPT

## 2020-10-28 PROCEDURE — 96372 THER/PROPH/DIAG INJ SC/IM: CPT

## 2020-10-28 PROCEDURE — 73630 X-RAY EXAM OF FOOT: CPT | Mod: 50

## 2020-10-28 NOTE — PHYSICAL EXAM
[de-identified] : General: Alert and oriented x3. In no acute distress. Pleasant in nature with a normal affect. No apparent respiratory distress.\par \par Bilateral Foot Exam\par Skin: +medial posterior incision from previous tarsal tunnel operation. Clean, dry, intact\par Inspection: No obvious malalignment, no masses, no swelling, no effusion\par Pulses: 2+ DP/PT pulses\par ROM: FOOT Full ROM of digits, ANKLE 10 degrees of dorsiflexion, 40 degrees of plantarflexion, 10 degrees of subtalar motion.\par Painful ROM: None\par Tenderness: +medial tenderness. No tenderness over the medial malleolus, No tenderness over the lateral malleolus, no CFL/ATFL/PTFL pain, no deltoid ligament pain. No heel pain. No Achilles tenderness. No 5th metatarsal pain. No pain to the LisFranc joint. No ttp over the posterior tibial tendon.\par Stability: Negative anterior/posterior drawer.\par Strength: 5/5 ADD/ABD/TA/GS/EHL/FHL/EDL\par Neuro: Sensation in tact to light touch throughout\par Additional tests: Negative Mortons test, negative tarsal tunnel tinels, negative single heel rise. \par \par BL Standing Exam\par +pes planus [de-identified] : 3V of the bilateral feet were ordered obtained and reviewed by me today, 10/28/2020 , revealed: Hallux valgus, hallux rigidus.

## 2020-10-28 NOTE — DISCUSSION/SUMMARY
[de-identified] : Today I had a lengthy discussion with the patient regarding their bilateral foot pain. I have addressed all the patient's concerns surrounding the pathology of their condition. I recommend that the patient utilize meloxicam with food once per day as instructed. A prescription for the meloxicam was ordered for the patient in the office today. I recommend that the patient utilize a short CAM boot. The patient was fitted with a CAM boot in the office today. The patient was educated about the boot wear pattern and utilization, as well as the timeframe to come out of the boot. She was also given full instructions for using the boot. At this time I would like to obtain advanced imaging of the patient's right foot and right ankle.  An MRI was ordered so I can find out more about the etiology of the patient's condition. The patient should follow up with the office after obtaining the MRI. The patient understood and verbally agreed to the treatment plan. All of their questions were answered and they were satisfied with the visit. The patient should call the office if they have any questions or experience worsening symptoms.

## 2020-10-28 NOTE — HISTORY OF PRESENT ILLNESS
[FreeTextEntry1] : 65 year old female presenting with bilateral foot pain. The right foot is noted to be worse compared to the left. The patient’s pain is noted to be a 6-8/10. The patient's pain began 5 years ago. The patient notes she had recent pain while stretching when walking her dog. The patient notes she was diagnosed with torn cartilage on the bottom of her feet by a podiatrist. The patient describes their pain as radiating up the ankle. The patient notes she had her sesamoid removed on the left foot. The patient sees a rheumatologist for RA. The patient notes she had previous tarsal tunnel surgery on the bilateral feet. She is currently taking prednisone with no improvement. She notes she rarely takes oxycodone. No other complaints at this time.

## 2020-10-28 NOTE — CONSULT LETTER
[Dear  ___] : Dear  [unfilled], [Consult Letter:] : I had the pleasure of evaluating your patient, [unfilled]. [Please see my note below.] : Please see my note below. [Consult Closing:] : Thank you very much for allowing me to participate in the care of this patient.  If you have any questions, please do not hesitate to contact me. [Sincerely,] : Sincerely, [FreeTextEntry3] : Juan Jose Brambila, DO\par Foot and Ankle Surgery\par

## 2020-10-28 NOTE — ADDENDUM
[FreeTextEntry1] : I, Aj Olmstead, acted solely as a scribe for Dr. Juan Jose Brambila on this date 10/28/2020 .\par All medical record entries made by the Scribe were at my, Dr. Juan Jose Brambila, direction and personally dictated by me on 10/28/2020 . I have reviewed the chart and agree that the record accurately reflects my personal performance of the history, physical exam, assessment and plan. I have also personally directed, reviewed, and agreed with the chart.

## 2020-10-29 NOTE — ASSESSMENT
[FreeTextEntry1] : 64-year-old  female presents for further management of rheumatoid arthritis.\par \par Rheumatoid arthritis-\par -she is well known to me for several years\par -She was diagnosed prior to seeing me\par -She is seronegative nonerosive\par -She has failed multiple medications in the past including DMARDS due to elevated liver enzymes. She has failed TNF inhibitors due to lack of efficacy. She has also tried and failed orencia.\par -The last medication she was using was actemra injection and they worked well for her\par -to continue with weekly dose\par - to hold meds while on abx\par -Intramuscular Depo-Medrol 80 mg x1 today\par -At this time there is no indication to change medication\par

## 2020-11-01 ENCOUNTER — RESULT REVIEW (OUTPATIENT)
Age: 65
End: 2020-11-01

## 2020-11-01 ENCOUNTER — APPOINTMENT (OUTPATIENT)
Dept: MRI IMAGING | Facility: CLINIC | Age: 65
End: 2020-11-01
Payer: MEDICARE

## 2020-11-01 ENCOUNTER — OUTPATIENT (OUTPATIENT)
Dept: OUTPATIENT SERVICES | Facility: HOSPITAL | Age: 65
LOS: 1 days | End: 2020-11-01
Payer: MEDICARE

## 2020-11-01 DIAGNOSIS — Z98.890 OTHER SPECIFIED POSTPROCEDURAL STATES: Chronic | ICD-10-CM

## 2020-11-01 DIAGNOSIS — Z96.651 PRESENCE OF RIGHT ARTIFICIAL KNEE JOINT: Chronic | ICD-10-CM

## 2020-11-01 DIAGNOSIS — Z98.1 ARTHRODESIS STATUS: Chronic | ICD-10-CM

## 2020-11-01 DIAGNOSIS — M25.571 PAIN IN RIGHT ANKLE AND JOINTS OF RIGHT FOOT: ICD-10-CM

## 2020-11-01 PROCEDURE — 73718 MRI LOWER EXTREMITY W/O DYE: CPT

## 2020-11-01 PROCEDURE — 73721 MRI JNT OF LWR EXTRE W/O DYE: CPT

## 2020-11-01 PROCEDURE — 73721 MRI JNT OF LWR EXTRE W/O DYE: CPT | Mod: 26,RT

## 2020-11-01 PROCEDURE — 73718 MRI LOWER EXTREMITY W/O DYE: CPT | Mod: 26,RT

## 2020-11-02 ENCOUNTER — APPOINTMENT (OUTPATIENT)
Dept: FAMILY MEDICINE | Facility: CLINIC | Age: 65
End: 2020-11-02
Payer: MEDICARE

## 2020-11-02 VITALS
SYSTOLIC BLOOD PRESSURE: 122 MMHG | BODY MASS INDEX: 32.65 KG/M2 | DIASTOLIC BLOOD PRESSURE: 78 MMHG | HEIGHT: 65 IN | WEIGHT: 196 LBS | HEART RATE: 72 BPM

## 2020-11-02 PROCEDURE — 99072 ADDL SUPL MATRL&STAF TM PHE: CPT

## 2020-11-02 PROCEDURE — 96372 THER/PROPH/DIAG INJ SC/IM: CPT

## 2020-11-02 PROCEDURE — 99213 OFFICE O/P EST LOW 20 MIN: CPT | Mod: 25

## 2020-11-02 RX ORDER — CYANOCOBALAMIN 1000 UG/ML
1000 INJECTION INTRAMUSCULAR; SUBCUTANEOUS
Qty: 0 | Refills: 0 | Status: COMPLETED | OUTPATIENT
Start: 2020-11-02

## 2020-11-02 RX ADMIN — CYANOCOBALAMIN 0 MCG/ML: 1000 INJECTION, SOLUTION INTRAMUSCULAR at 00:00

## 2020-11-04 ENCOUNTER — APPOINTMENT (OUTPATIENT)
Dept: ORTHOPEDIC SURGERY | Facility: CLINIC | Age: 65
End: 2020-11-04
Payer: MEDICARE

## 2020-11-04 PROCEDURE — 99072 ADDL SUPL MATRL&STAF TM PHE: CPT

## 2020-11-04 PROCEDURE — 99214 OFFICE O/P EST MOD 30 MIN: CPT

## 2020-11-04 NOTE — ADDENDUM
[FreeTextEntry1] : I, Aj Olmstead, acted solely as a scribe for Dr. Juan Jose Brambila on this date 11/04/2020 .\par All medical record entries made by the Scribe were at my, Dr. Juan Jose Brambila, direction and personally dictated by me on 11/04/2020 . I have reviewed the chart and agree that the record accurately reflects my personal performance of the history, physical exam, assessment and plan. I have also personally directed, reviewed, and agreed with the chart.

## 2020-11-04 NOTE — DISCUSSION/SUMMARY
[de-identified] : Today I had a lengthy discussion with the patient regarding their right foot pain. I have addressed all the patient's concerns surrounding the pathology of their condition. The MRI results were reviewed with the patient today. I advised the patient to utilize ice and heat for the bilateral feet. At this time I would like to obtain an EMG so I can find out more about the etiology of the patient's condition. The EMG was ordered for the patient today. I also recommend the patient see a physiatrist for further evaluation. The patient was referred to Dr. Morales. I would like to see the patient back in the office after she sees Dr. Morales to reassess their condition. The patient can follow up via telehealth as well. The patient understood and verbally agreed to the treatment plan. All of their questions were answered and they were satisfied with the visit. The patient should call the office if they have any questions or experience worsening symptoms.

## 2020-11-04 NOTE — HISTORY OF PRESENT ILLNESS
[FreeTextEntry1] : 65 year old female presenting with right foot pain. The patient’s pain is noted to be a 5/10. The pain and swelling are noted to be the same compared to the previous visit. The patient presents wearing a CAM boot. The patient notes a burning pain that radiates up the leg, and c/o achy pain in the arch of the foot. She presents today to review her MRI results. She is currently taking Mobic, ibuprofen, and oxycodone. No other complaints at this time.

## 2020-11-04 NOTE — PHYSICAL EXAM
[de-identified] : General: Alert and oriented x3. In no acute distress. Pleasant in nature with a normal affect. No apparent respiratory distress.\par \par Bilateral Foot Exam\par Skin: +medial posterior incision from previous tarsal tunnel operation. Clean, dry, intact\par Inspection: No obvious malalignment, no masses, no swelling, no effusion\par Pulses: 2+ DP/PT pulses\par ROM: FOOT Full ROM of digits, ANKLE 10 degrees of dorsiflexion, 40 degrees of plantarflexion, 10 degrees of subtalar motion.\par Painful ROM: None\par Tenderness: +medial tenderness. No tenderness over the medial malleolus, No tenderness over the lateral malleolus, no CFL/ATFL/PTFL pain, no deltoid ligament pain. No heel pain. No Achilles tenderness. No 5th metatarsal pain. No pain to the LisFranc joint. No ttp over the posterior tibial tendon.\par Stability: Negative anterior/posterior drawer.\par Strength: 5/5 ADD/ABD/TA/GS/EHL/FHL/EDL\par Neuro: Sensation in tact to light touch throughout\par Additional tests: Negative Mortons test, negative tarsal tunnel tinels, negative single heel rise. \par \par BL Standing Exam\par +pes planus [de-identified] : An MRI was obtained of the right foot and right ankle from an outside facility on 11/1/2020 and reviewed by me today 11/04/2020  in the office. Revealed: ANKLE:\par \par Evidence of prior tarsal tunnel release. Fat planes about the posterior tibialis nerve appear preserved without evidence of mass effect or tethering. Nonspecific increased intrinsic signal within the posterior tibialis nerve at and distal to the region of the tarsal tunnel release. No associated muscle signal abnormality. Correlate for underlying neuropathy.\par \par Mild tenosynovitis involving the medial flexor and peroneal tendons.\par \par FOREFOOT:\par \par Moderate hallux valgus with severe first metatarsophalangeal and hallux sesamoid joint arthrosis. Moderate arthrosis at the second toe distal interphalangeal joint and second tarsometatarsal articulation.

## 2020-11-07 NOTE — HISTORY OF PRESENT ILLNESS
[FreeTextEntry1] : pt in office for a follow up flu shot and B12 shot.  [de-identified] : In review: Oct. 12:\par \par CC:  Fatigue reports sleep well at night but wants to nap.\par         Months of symptoms\par         Recent labs reviewed for Nml. CBC NML CMP NM  TSH  \par         Requesting B 12 injection as it has helped in the past.\par         When queried does endorse hx of ZAHIDA in the past; no appliances         \par \par GI/bowel habits more normal since Xifaxan

## 2020-11-07 NOTE — PHYSICAL EXAM
[Well Developed] : well developed [Normal Sclera/Conjunctiva] : normal sclera/conjunctiva [Supple] : supple [No Respiratory Distress] : no respiratory distress  [Clear to Auscultation] : lungs were clear to auscultation bilaterally [Normal Rate] : normal rate  [Regular Rhythm] : with a regular rhythm [No Spinal Tenderness] : no spinal tenderness [No Rash] : no rash [No Focal Deficits] : no focal deficits [Speech Grossly Normal] : speech grossly normal [Alert and Oriented x3] : oriented to person, place, and time [de-identified] : ++ anxiety  [de-identified] : MO  [de-identified] : a bit fibrous ; no discrete palpable lesion on exam    CHRONIC NT lump between breast is raise and pink in color.  [de-identified] : a bit antalgic

## 2020-11-07 NOTE — ASSESSMENT
[FreeTextEntry1] : advised B 12 weekly x 4  then bi weekly x 2 and then monthly.\par \par Given today. \par \par In review: OCt. 12 \par DW Patient:  FU with Pulmonogy in consideration of sleep study as cause of tiredness. \par B 12 given \par Flu vac given/ \par \par IN review: June 2019 \par Mammo and US reviewed for no suggestion of malignancy .  Area of concern described as inflamed derma structure and advised possible abscess or or cyst. \par Lucrecia is not complaining of pain or fever. \par In setting of loose stool and polypharmacy , will defer ABXS for now.\par Referred to Breast surgeon. \par \par +/- Genetic testing discussed. \par \par She agrees with plan.

## 2020-11-07 NOTE — REVIEW OF SYSTEMS
[Fever] : no fever [Fatigue] : fatigue [Night Sweats] : night sweats [Joint Pain] : joint pain [Joint Stiffness] : joint stiffness [Suicidal] : not suicidal [Negative] : Respiratory [FreeTextEntry2] : chronic [FreeTextEntry9] : chronic [de-identified] : as in hpi  [de-identified] : as in hpi

## 2020-11-09 ENCOUNTER — APPOINTMENT (OUTPATIENT)
Dept: FAMILY MEDICINE | Facility: CLINIC | Age: 65
End: 2020-11-09

## 2020-11-09 ENCOUNTER — APPOINTMENT (OUTPATIENT)
Dept: FAMILY MEDICINE | Facility: CLINIC | Age: 65
End: 2020-11-09
Payer: MEDICARE

## 2020-11-09 PROCEDURE — 96372 THER/PROPH/DIAG INJ SC/IM: CPT

## 2020-11-09 PROCEDURE — 99072 ADDL SUPL MATRL&STAF TM PHE: CPT

## 2020-11-09 RX ORDER — CYANOCOBALAMIN 1000 UG/ML
1000 INJECTION INTRAMUSCULAR; SUBCUTANEOUS
Qty: 0 | Refills: 0 | Status: COMPLETED | OUTPATIENT
Start: 2020-11-09

## 2020-11-09 RX ADMIN — CYANOCOBALAMIN 0 MCG/ML: 1000 INJECTION, SOLUTION INTRAMUSCULAR at 00:00

## 2020-11-13 ENCOUNTER — TRANSCRIPTION ENCOUNTER (OUTPATIENT)
Age: 65
End: 2020-11-13

## 2020-11-16 ENCOUNTER — APPOINTMENT (OUTPATIENT)
Dept: FAMILY MEDICINE | Facility: CLINIC | Age: 65
End: 2020-11-16
Payer: MEDICARE

## 2020-11-16 VITALS
WEIGHT: 197 LBS | TEMPERATURE: 97.5 F | HEIGHT: 65 IN | BODY MASS INDEX: 32.82 KG/M2 | HEART RATE: 92 BPM | DIASTOLIC BLOOD PRESSURE: 90 MMHG | SYSTOLIC BLOOD PRESSURE: 110 MMHG

## 2020-11-16 DIAGNOSIS — R30.0 DYSURIA: ICD-10-CM

## 2020-11-16 LAB
BILIRUB UR QL STRIP: ABNORMAL
GLUCOSE UR-MCNC: NEGATIVE
HCG UR QL: 0.2 EU/DL
HGB UR QL STRIP.AUTO: ABNORMAL
KETONES UR-MCNC: NEGATIVE
LEUKOCYTE ESTERASE UR QL STRIP: ABNORMAL
NITRITE UR QL STRIP: NEGATIVE
PH UR STRIP: 5.5
PROT UR STRIP-MCNC: ABNORMAL
SP GR UR STRIP: 1.03

## 2020-11-16 PROCEDURE — 81003 URINALYSIS AUTO W/O SCOPE: CPT | Mod: QW

## 2020-11-16 PROCEDURE — 99214 OFFICE O/P EST MOD 30 MIN: CPT | Mod: 25

## 2020-11-17 ENCOUNTER — APPOINTMENT (OUTPATIENT)
Dept: FAMILY MEDICINE | Facility: CLINIC | Age: 65
End: 2020-11-17
Payer: MEDICARE

## 2020-11-17 ENCOUNTER — MED ADMIN CHARGE (OUTPATIENT)
Age: 65
End: 2020-11-17

## 2020-11-17 PROCEDURE — 96372 THER/PROPH/DIAG INJ SC/IM: CPT

## 2020-11-17 RX ORDER — CYANOCOBALAMIN 1000 UG/ML
1000 INJECTION INTRAMUSCULAR; SUBCUTANEOUS
Qty: 0 | Refills: 0 | Status: COMPLETED | OUTPATIENT
Start: 2020-11-17

## 2020-11-17 RX ORDER — OXYCODONE HYDROCHLORIDE 30 MG/1
TABLET ORAL
Refills: 0 | Status: DISCONTINUED | COMMUNITY
End: 2020-11-17

## 2020-11-17 RX ADMIN — CYANOCOBALAMINE 0 MCG/ML: 1000 INJECTION INTRAMUSCULAR; SUBCUTANEOUS at 00:00

## 2020-11-17 RX ADMIN — CYANOCOBALAMIN 0 MCG/ML: 1000 INJECTION, SOLUTION INTRAMUSCULAR at 00:00

## 2020-11-18 NOTE — REVIEW OF SYSTEMS
[Diarrhea] : diarrhea [Anxiety] : anxiety [Negative] : Respiratory [FreeTextEntry7] : 2-3 per day  chronic

## 2020-11-18 NOTE — PHYSICAL EXAM
[No Acute Distress] : no acute distress [Normal Sclera/Conjunctiva] : normal sclera/conjunctiva [No JVD] : no jugular venous distention [No Respiratory Distress] : no respiratory distress  [No Accessory Muscle Use] : no accessory muscle use [Regular Rhythm] : with a regular rhythm [Soft] : abdomen soft [Non Tender] : non-tender [Non-distended] : non-distended [No CVA Tenderness] : no CVA  tenderness [Alert and Oriented x3] : oriented to person, place, and time

## 2020-11-18 NOTE — HISTORY OF PRESENT ILLNESS
[FreeTextEntry8] : pt in office to check blood in urine. \par \par Reports she had first noted blood 3 days go associated with some urinary hesitancy and pelvic pressure.\par Increased fluids over with "pinkish urine now"\par No flank pain  No fever.

## 2020-11-30 ENCOUNTER — APPOINTMENT (OUTPATIENT)
Dept: FAMILY MEDICINE | Facility: CLINIC | Age: 65
End: 2020-11-30

## 2020-11-30 ENCOUNTER — MED ADMIN CHARGE (OUTPATIENT)
Age: 65
End: 2020-11-30

## 2020-12-01 ENCOUNTER — APPOINTMENT (OUTPATIENT)
Dept: FAMILY MEDICINE | Facility: CLINIC | Age: 65
End: 2020-12-01
Payer: MEDICARE

## 2020-12-01 PROCEDURE — 99072 ADDL SUPL MATRL&STAF TM PHE: CPT

## 2020-12-01 PROCEDURE — 96372 THER/PROPH/DIAG INJ SC/IM: CPT

## 2020-12-01 RX ORDER — CYANOCOBALAMIN 1000 UG/ML
1000 INJECTION INTRAMUSCULAR; SUBCUTANEOUS
Qty: 0 | Refills: 0 | Status: COMPLETED | OUTPATIENT
Start: 2020-11-30

## 2020-12-02 RX ORDER — CYANOCOBALAMIN 1000 UG/ML
1000 INJECTION INTRAMUSCULAR; SUBCUTANEOUS
Qty: 0 | Refills: 0 | Status: COMPLETED | OUTPATIENT
Start: 2020-12-01

## 2020-12-03 ENCOUNTER — APPOINTMENT (OUTPATIENT)
Dept: ORTHOPEDIC SURGERY | Facility: CLINIC | Age: 65
End: 2020-12-03

## 2020-12-03 ENCOUNTER — APPOINTMENT (OUTPATIENT)
Dept: PHYSICAL MEDICINE AND REHAB | Facility: CLINIC | Age: 65
End: 2020-12-03

## 2020-12-07 ENCOUNTER — APPOINTMENT (OUTPATIENT)
Dept: ORTHOPEDIC SURGERY | Facility: CLINIC | Age: 65
End: 2020-12-07
Payer: MEDICARE

## 2020-12-07 PROCEDURE — 99072 ADDL SUPL MATRL&STAF TM PHE: CPT

## 2020-12-07 PROCEDURE — 99214 OFFICE O/P EST MOD 30 MIN: CPT

## 2020-12-07 NOTE — DISCUSSION/SUMMARY
[de-identified] : Today I had a lengthy discussion with the patient regarding their right foot pain. I have addressed all the patient's concerns surrounding the pathology of their condition. The EMG results were reviewed with the patient today. A discussion was had about utilizing custom orthotics. A discussion was had about possible surgical intervention, but do not recommend surgery at this time. I recommend the patient undergo a course of physical therapy for the right foot and ankle 2-3 times a week for a total of 6-8 weeks. A prescription was given for the physical therapy today. At this time I would like to obtain advanced imaging of the patient's right ankle.  An Ultrasound was ordered so I can find out more about the etiology of the patient's condition. The patient should follow up with the office after obtaining the ultrasound, and can follow up via phone call. The patient understood and verbally agreed to the treatment plan. All of their questions were answered and they were satisfied with the visit. The patient should call the office if they have any questions or experience worsening symptoms.

## 2020-12-07 NOTE — PHYSICAL EXAM
[de-identified] : General: Alert and oriented x3. In no acute distress. Pleasant in nature with a normal affect. No apparent respiratory distress.\par \par Bilateral Foot Exam\par Skin: +medial posterior incision from previous tarsal tunnel operation. Clean, dry, intact\par Inspection: +plantar fibroma. No obvious malalignment, no swelling, no effusion\par Pulses: 2+ DP/PT pulses\par ROM: FOOT Full ROM of digits, ANKLE 10 degrees of dorsiflexion, 40 degrees of plantarflexion, 10 degrees of subtalar motion.\par Painful ROM: None\par Tenderness: +medial tenderness. No tenderness over the medial malleolus, No tenderness over the lateral malleolus, no CFL/ATFL/PTFL pain, no deltoid ligament pain. No heel pain. No Achilles tenderness. No 5th metatarsal pain. No pain to the LisFranc joint. +ttp over the posterior tibial tendon.\par Stability: Negative anterior/posterior drawer.\par Strength: 5/5 ADD/ABD/TA/GS/EHL/FHL/EDL\par Neuro: Sensation in tact to light touch throughout\par Additional tests: Negative Mortons test, negative tarsal tunnel tinels, negative single heel rise. \par \par BL Standing Exam\par +pes planus  [de-identified] : An EMG was obtained from an outside facility on 12/2/2020 and reviewed by me today 12/07/2020  in the office. 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 Also H reflex studies are unresponsive. No EMG changes noted, clinical correlation is advised.

## 2020-12-07 NOTE — HISTORY OF PRESENT ILLNESS
[FreeTextEntry1] : 65 year old female presenting with right foot pain. The patient notes having pain in the arch of the foot and medial aspect of the foot that radiates up the leg. She presents today to review her EMG results. She is currently taking Mobic. She has been utilizing Voltaren gel with no relief. She notes she has utilized orthotics in the past. The patient is on medication for rheumatoid arthritis as well. No other complaints at this time.

## 2020-12-07 NOTE — ADDENDUM
[FreeTextEntry1] : I, Aj Olmstead, acted solely as a scribe for Dr. Juan Jose Brambila on this date 12/07/2020 .\par All medical record entries made by the Scribe were at my, Dr. Juan Jose Brambila, direction and personally dictated by me on 12/07/2020 . I have reviewed the chart and agree that the record accurately reflects my personal performance of the history, physical exam, assessment and plan. I have also personally directed, reviewed, and agreed with the chart.

## 2020-12-28 ENCOUNTER — APPOINTMENT (OUTPATIENT)
Dept: FAMILY MEDICINE | Facility: CLINIC | Age: 65
End: 2020-12-28
Payer: MEDICARE

## 2020-12-28 PROCEDURE — 99072 ADDL SUPL MATRL&STAF TM PHE: CPT

## 2020-12-28 PROCEDURE — 96372 THER/PROPH/DIAG INJ SC/IM: CPT

## 2020-12-29 ENCOUNTER — OUTPATIENT (OUTPATIENT)
Dept: OUTPATIENT SERVICES | Facility: HOSPITAL | Age: 65
LOS: 1 days | End: 2020-12-29

## 2020-12-29 ENCOUNTER — APPOINTMENT (OUTPATIENT)
Dept: ULTRASOUND IMAGING | Facility: CLINIC | Age: 65
End: 2020-12-29
Payer: MEDICARE

## 2020-12-29 DIAGNOSIS — Z98.890 OTHER SPECIFIED POSTPROCEDURAL STATES: Chronic | ICD-10-CM

## 2020-12-29 DIAGNOSIS — Z96.651 PRESENCE OF RIGHT ARTIFICIAL KNEE JOINT: Chronic | ICD-10-CM

## 2020-12-29 DIAGNOSIS — Z98.1 ARTHRODESIS STATUS: Chronic | ICD-10-CM

## 2020-12-29 DIAGNOSIS — M25.571 PAIN IN RIGHT ANKLE AND JOINTS OF RIGHT FOOT: ICD-10-CM

## 2020-12-29 PROCEDURE — 76882 US LMTD JT/FCL EVL NVASC XTR: CPT | Mod: 26,RT

## 2020-12-31 RX ORDER — CYANOCOBALAMIN 1000 UG/ML
1000 INJECTION INTRAMUSCULAR; SUBCUTANEOUS
Qty: 0 | Refills: 0 | Status: COMPLETED | OUTPATIENT
Start: 2020-12-31

## 2021-01-22 ENCOUNTER — APPOINTMENT (OUTPATIENT)
Dept: ORTHOPEDIC SURGERY | Facility: CLINIC | Age: 66
End: 2021-01-22
Payer: MEDICARE

## 2021-01-22 PROCEDURE — 99214 OFFICE O/P EST MOD 30 MIN: CPT

## 2021-01-22 PROCEDURE — 99072 ADDL SUPL MATRL&STAF TM PHE: CPT

## 2021-01-22 NOTE — HISTORY OF PRESENT ILLNESS
[FreeTextEntry1] : 66 year old female presenting with right foot/ankle pain. The patient notes the pain is the same compared to the previous visit. She presents today to review her ultrasound results. The patient notes she is in constant pain. The patient notes she previously utilized orthotics.  Patient has rheumatoid arthritis. The patient notes she has history of surgery on the left foot and had her sesamoid removed. No other complaints at this time.

## 2021-01-22 NOTE — ADDENDUM
[FreeTextEntry1] : I, Aj Olmstead, acted solely as a scribe for Dr. Juan Jose Brambila on this date 01/22/2021 .\par All medical record entries made by the Scribe were at my, Dr. Juan Jose Brambila, direction and personally dictated by me on 01/22/2021 . I have reviewed the chart and agree that the record accurately reflects my personal performance of the history, physical exam, assessment and plan. I have also personally directed, reviewed, and agreed with the chart.

## 2021-01-22 NOTE — DISCUSSION/SUMMARY
[de-identified] : Today I had a lengthy discussion with the patient regarding their bilateral foot pain. I have addressed all the patient's concerns surrounding the pathology of their condition. I recommend the patient undergo a course of physical therapy for the bilateral feet 2-3 times a week for a total of 6-8 weeks. A prescription was given for the physical therapy today. A discussion was had about utilizing custom orthotics. The patient was educated about custom orthotics in the office today. The ultrasound results were reviewed with the patient today. A discussion was had about a possible US guided IR cortisone injection for the right tarsal tunnel. The patient would like to move forward with the procedure. The injection was ordered for the patient in the office today. I advised the patient to call the office in about 1 week after the injection to see how she is doing. The patient understood and verbally agreed to the treatment plan. All of their questions were answered and they were satisfied with the visit. The patient should call the office if they have any questions or experience worsening symptoms.

## 2021-01-22 NOTE — PHYSICAL EXAM
[de-identified] : General: Alert and oriented x3. In no acute distress. Pleasant in nature with a normal affect. No apparent respiratory distress.\par \par Bilateral Foot Exam\par Skin: +medial posterior incision from previous tarsal tunnel operation. Clean, dry, intact\par Inspection: +plantar fibroma. No obvious malalignment, no swelling, no effusion\par Pulses: 2+ DP/PT pulses\par ROM: FOOT Full ROM of digits, ANKLE 10 degrees of dorsiflexion, 40 degrees of plantarflexion, 10 degrees of subtalar motion.\par Painful ROM: None\par Tenderness: +medial tenderness. No tenderness over the medial malleolus, No tenderness over the lateral malleolus, no CFL/ATFL/PTFL pain, no deltoid ligament pain. No heel pain. No Achilles tenderness. No 5th metatarsal pain. No pain to the LisFranc joint. +ttp over the posterior tibial tendon.\par Stability: Negative anterior/posterior drawer.\par Strength: 5/5 ADD/ABD/TA/GS/EHL/FHL/EDL\par Neuro: Sensation in tact to light touch throughout\par Additional tests: Negative Mortons test, negative tarsal tunnel tinels, negative single heel rise. \par \par BL Standing Exam\par +pes planus  [de-identified] : An Ultrasound was obtained of the right ankle from [facility] on 12/29/2020 and reviewed by me today 01/22/2021  in the office. Revealed: \par 1. No evidence for acute inflammatory arthropathy.\par 2. No focal or dynamic tendon abnormality is identified.

## 2021-01-25 ENCOUNTER — APPOINTMENT (OUTPATIENT)
Dept: FAMILY MEDICINE | Facility: CLINIC | Age: 66
End: 2021-01-25
Payer: MEDICARE

## 2021-01-25 PROCEDURE — 96372 THER/PROPH/DIAG INJ SC/IM: CPT

## 2021-01-25 PROCEDURE — 99072 ADDL SUPL MATRL&STAF TM PHE: CPT

## 2021-01-25 RX ORDER — CYANOCOBALAMIN 1000 UG/ML
1000 INJECTION INTRAMUSCULAR; SUBCUTANEOUS
Qty: 0 | Refills: 0 | Status: COMPLETED | OUTPATIENT
Start: 2021-01-25

## 2021-01-25 RX ADMIN — CYANOCOBALAMIN 0 MCG/ML: 1000 INJECTION, SOLUTION INTRAMUSCULAR at 00:00

## 2021-02-10 ENCOUNTER — OUTPATIENT (OUTPATIENT)
Dept: OUTPATIENT SERVICES | Facility: HOSPITAL | Age: 66
LOS: 1 days | End: 2021-02-10
Payer: MEDICARE

## 2021-02-10 ENCOUNTER — APPOINTMENT (OUTPATIENT)
Dept: ULTRASOUND IMAGING | Facility: CLINIC | Age: 66
End: 2021-02-10
Payer: MEDICARE

## 2021-02-10 ENCOUNTER — RESULT REVIEW (OUTPATIENT)
Age: 66
End: 2021-02-10

## 2021-02-10 DIAGNOSIS — M06.9 RHEUMATOID ARTHRITIS, UNSPECIFIED: ICD-10-CM

## 2021-02-10 DIAGNOSIS — M79.672 PAIN IN LEFT FOOT: ICD-10-CM

## 2021-02-10 DIAGNOSIS — Z96.651 PRESENCE OF RIGHT ARTIFICIAL KNEE JOINT: Chronic | ICD-10-CM

## 2021-02-10 DIAGNOSIS — Z98.890 OTHER SPECIFIED POSTPROCEDURAL STATES: Chronic | ICD-10-CM

## 2021-02-10 DIAGNOSIS — Z98.1 ARTHRODESIS STATUS: Chronic | ICD-10-CM

## 2021-02-10 DIAGNOSIS — M25.571 PAIN IN RIGHT ANKLE AND JOINTS OF RIGHT FOOT: ICD-10-CM

## 2021-02-10 DIAGNOSIS — M79.671 PAIN IN RIGHT FOOT: ICD-10-CM

## 2021-02-10 PROCEDURE — 20611 DRAIN/INJ JOINT/BURSA W/US: CPT

## 2021-02-10 PROCEDURE — 20611 DRAIN/INJ JOINT/BURSA W/US: CPT | Mod: RT

## 2021-03-11 ENCOUNTER — NON-APPOINTMENT (OUTPATIENT)
Age: 66
End: 2021-03-11

## 2021-03-11 ENCOUNTER — APPOINTMENT (OUTPATIENT)
Dept: FAMILY MEDICINE | Facility: CLINIC | Age: 66
End: 2021-03-11
Payer: MEDICARE

## 2021-03-11 PROCEDURE — 99072 ADDL SUPL MATRL&STAF TM PHE: CPT

## 2021-03-11 PROCEDURE — 96372 THER/PROPH/DIAG INJ SC/IM: CPT

## 2021-03-11 RX ADMIN — CYANOCOBALAMIN 0 MCG/ML: 1000 INJECTION, SOLUTION INTRAMUSCULAR at 00:00

## 2021-03-17 ENCOUNTER — APPOINTMENT (OUTPATIENT)
Dept: RHEUMATOLOGY | Facility: CLINIC | Age: 66
End: 2021-03-17
Payer: MEDICARE

## 2021-03-17 VITALS
OXYGEN SATURATION: 99 % | WEIGHT: 188 LBS | BODY MASS INDEX: 31.29 KG/M2 | HEART RATE: 97 BPM | SYSTOLIC BLOOD PRESSURE: 122 MMHG | DIASTOLIC BLOOD PRESSURE: 70 MMHG | TEMPERATURE: 97.9 F

## 2021-03-17 DIAGNOSIS — Z79.899 OTHER LONG TERM (CURRENT) DRUG THERAPY: ICD-10-CM

## 2021-03-17 DIAGNOSIS — G62.9 POLYNEUROPATHY, UNSPECIFIED: ICD-10-CM

## 2021-03-17 DIAGNOSIS — G89.29 PAIN IN UNSPECIFIED FOOT: ICD-10-CM

## 2021-03-17 DIAGNOSIS — M79.673 PAIN IN UNSPECIFIED FOOT: ICD-10-CM

## 2021-03-17 PROCEDURE — 36415 COLL VENOUS BLD VENIPUNCTURE: CPT

## 2021-03-17 PROCEDURE — 99214 OFFICE O/P EST MOD 30 MIN: CPT | Mod: 25

## 2021-03-17 PROCEDURE — 99072 ADDL SUPL MATRL&STAF TM PHE: CPT

## 2021-03-17 RX ORDER — NITROFURANTOIN MACROCRYSTALS 100 MG/1
100 CAPSULE ORAL
Qty: 10 | Refills: 0 | Status: DISCONTINUED | COMMUNITY
Start: 2020-11-16 | End: 2021-03-17

## 2021-03-17 RX ORDER — ERGOCALCIFEROL 1.25 MG/1
1.25 MG CAPSULE, LIQUID FILLED ORAL
Qty: 12 | Refills: 2 | Status: ACTIVE | COMMUNITY
Start: 2020-03-27 | End: 1900-01-01

## 2021-03-17 NOTE — PHYSICAL EXAM
[General Appearance - Alert] : alert [General Appearance - Well Nourished] : well nourished [General Appearance - Well Developed] : well developed [Sclera] : the sclera and conjunctiva were normal [Oropharynx] : the oropharynx was normal [Neck Appearance] : the appearance of the neck was normal [Respiration, Rhythm And Depth] : normal respiratory rhythm and effort [Auscultation Breath Sounds / Voice Sounds] : lungs were clear to auscultation bilaterally [Heart Sounds] : normal S1 and S2 [Full Pulse] : the pedal pulses are present [Edema] : there was no peripheral edema [Abdomen Tenderness] : non-tender [Cervical Lymph Nodes Enlarged Anterior Bilaterally] : anterior cervical [Supraclavicular Lymph Nodes Enlarged Bilaterally] : supraclavicular [No Spinal Tenderness] : no spinal tenderness [Abnormal Walk] : normal gait [Nail Clubbing] : no clubbing  or cyanosis of the fingernails [Motor Tone] : muscle strength and tone were normal [FreeTextEntry1] : FROM neck, shoulders, elbows, wrists, hands, hips, knees, ankles and feet, including the small joints of the hands and feet without any evidence of inflammatory arthritis b/l TKR, hands with POM, POM shoulders, ROM 90 degrees, left shoulder ROM under 45 degrees, No active inflammation noted over the MTPs, right foot is flat, diffuse pain on exam. [] : no rash [Deep Tendon Reflexes (DTR)] : deep tendon reflexes were 2+ and symmetric [Motor Exam] : the motor exam was normal [Oriented To Time, Place, And Person] : oriented to person, place, and time [Impaired Insight] : insight and judgment were intact [Affect] : the affect was normal

## 2021-03-17 NOTE — ASSESSMENT
[FreeTextEntry1] : 64-year-old  female presents for further management of rheumatoid arthritis.\par \par Rheumatoid arthritis-\par -she is well known to me for several years\par -She was diagnosed prior to seeing me\par -She is seronegative nonerosive\par -She has failed multiple medications in the past including DMARDS due to elevated liver enzymes. She has failed TNF inhibitors due to lack of efficacy. She has also tried and failed orencia.\par -The last medication she was using was actemra injection and they worked well for her\par -to continue with weekly dose\par -Labs to be done today\par - to hold meds while on abx\par -She is concerned about whether medication should be switched, I reviewed the MRI does not show active inflammation, clinically there is no evidence of active inflammation we'll repeat labs today. Will obtain Sjogren's serologies as well as she complains of neuropathy.\par \par bilateral foot pain-\par She is seeing orthopedics and has had an EMG, she sees Dr. Lewis for neurology, the EMG was done at another location, to discuss with them whether the findings are significant, I feel that the symptoms may be a combination of the neuropathy and osteoarthritis, to try a higher dose of gabapentin increase by 100 mg in the morning. If she continues to be symptomatic we can consider switching to Lyrica.\par \par Osteoarthritis-\par -status post bilateral knee replacements\par \par Carpal tunnel syndrome-\par improved with surgery\par \par Immunosuppression-\par -She is aware to hold medication while on antibiotics\par -\par \par Followup 6 weeks. She is aware to call if her symptoms worsen

## 2021-03-18 LAB
ALBUMIN SERPL ELPH-MCNC: 4.5 G/DL
ALP BLD-CCNC: 134 U/L
ALT SERPL-CCNC: 35 U/L
ANION GAP SERPL CALC-SCNC: 12 MMOL/L
AST SERPL-CCNC: 34 U/L
BASOPHILS # BLD AUTO: 0.05 K/UL
BASOPHILS NFR BLD AUTO: 1.1 %
BILIRUB SERPL-MCNC: 0.3 MG/DL
BUN SERPL-MCNC: 17 MG/DL
CALCIUM SERPL-MCNC: 9.8 MG/DL
CHLORIDE SERPL-SCNC: 106 MMOL/L
CO2 SERPL-SCNC: 23 MMOL/L
CREAT SERPL-MCNC: 0.97 MG/DL
CRP SERPL-MCNC: <3 MG/L
EOSINOPHIL # BLD AUTO: 0.18 K/UL
EOSINOPHIL NFR BLD AUTO: 3.9 %
ERYTHROCYTE [SEDIMENTATION RATE] IN BLOOD BY WESTERGREN METHOD: 5 MM/HR
GLUCOSE SERPL-MCNC: 109 MG/DL
HCT VFR BLD CALC: 44.7 %
HGB BLD-MCNC: 13.9 G/DL
IMM GRANULOCYTES NFR BLD AUTO: 0.4 %
LYMPHOCYTES # BLD AUTO: 1.65 K/UL
LYMPHOCYTES NFR BLD AUTO: 36.1 %
MAN DIFF?: NORMAL
MCHC RBC-ENTMCNC: 31.1 GM/DL
MCHC RBC-ENTMCNC: 32.3 PG
MCV RBC AUTO: 103.7 FL
MONOCYTES # BLD AUTO: 0.38 K/UL
MONOCYTES NFR BLD AUTO: 8.3 %
NEUTROPHILS # BLD AUTO: 2.29 K/UL
NEUTROPHILS NFR BLD AUTO: 50.2 %
PLATELET # BLD AUTO: 205 K/UL
POTASSIUM SERPL-SCNC: 4.3 MMOL/L
PROT SERPL-MCNC: 6.8 G/DL
RBC # BLD: 4.31 M/UL
RBC # FLD: 12.1 %
RHEUMATOID FACT SER QL: <10 IU/ML
SODIUM SERPL-SCNC: 141 MMOL/L
WBC # FLD AUTO: 4.57 K/UL

## 2021-03-19 LAB
ALBUMIN MFR SERPL ELPH: 67.3 %
ALBUMIN SERPL-MCNC: 4.6 G/DL
ALBUMIN/GLOB SERPL: 2.1 RATIO
ALPHA1 GLOB MFR SERPL ELPH: 3.8 %
ALPHA1 GLOB SERPL ELPH-MCNC: 0.3 G/DL
ALPHA2 GLOB MFR SERPL ELPH: 8.5 %
ALPHA2 GLOB SERPL ELPH-MCNC: 0.6 G/DL
B-GLOBULIN MFR SERPL ELPH: 10.5 %
B-GLOBULIN SERPL ELPH-MCNC: 0.7 G/DL
CCP AB SER IA-ACNC: <8 UNITS
DEPRECATED KAPPA LC FREE/LAMBDA SER: 0.78 RATIO
ENA SS-A AB SER IA-ACNC: <0.2 AL
ENA SS-B AB SER IA-ACNC: <0.2 AL
GAMMA GLOB FLD ELPH-MCNC: 0.7 G/DL
GAMMA GLOB MFR SERPL ELPH: 9.9 %
IGA SER QL IEP: 159 MG/DL
IGG SER QL IEP: 679 MG/DL
IGM SER QL IEP: 143 MG/DL
INTERPRETATION SERPL IEP-IMP: NORMAL
KAPPA LC CSF-MCNC: 1.86 MG/DL
KAPPA LC SERPL-MCNC: 1.45 MG/DL
M PROTEIN SPEC IFE-MCNC: NORMAL
PROT SERPL-MCNC: 6.8 G/DL
RF+CCP IGG SER-IMP: NEGATIVE

## 2021-03-19 RX ORDER — CYANOCOBALAMIN 1000 UG/ML
1000 INJECTION INTRAMUSCULAR; SUBCUTANEOUS
Qty: 0 | Refills: 0 | Status: COMPLETED | OUTPATIENT
Start: 2021-03-11

## 2021-03-22 LAB
ANA PAT FLD IF-IMP: ABNORMAL
ANA SER IF-ACNC: ABNORMAL

## 2021-04-01 ENCOUNTER — APPOINTMENT (OUTPATIENT)
Dept: FAMILY MEDICINE | Facility: CLINIC | Age: 66
End: 2021-04-01

## 2021-05-11 ENCOUNTER — TRANSCRIPTION ENCOUNTER (OUTPATIENT)
Age: 66
End: 2021-05-11

## 2021-05-24 ENCOUNTER — APPOINTMENT (OUTPATIENT)
Dept: ORTHOPEDIC SURGERY | Facility: CLINIC | Age: 66
End: 2021-05-24
Payer: MEDICARE

## 2021-05-24 DIAGNOSIS — M79.671 PAIN IN RIGHT FOOT: ICD-10-CM

## 2021-05-24 DIAGNOSIS — M25.571 PAIN IN RIGHT ANKLE AND JOINTS OF RIGHT FOOT: ICD-10-CM

## 2021-05-24 DIAGNOSIS — G57.51 TARSAL TUNNEL SYNDROME, RIGHT LOWER LIMB: ICD-10-CM

## 2021-05-24 DIAGNOSIS — M79.672 PAIN IN LEFT FOOT: ICD-10-CM

## 2021-05-24 DIAGNOSIS — G57.52 TARSAL TUNNEL SYNDROME, LEFT LOWER LIMB: ICD-10-CM

## 2021-05-24 PROCEDURE — 99072 ADDL SUPL MATRL&STAF TM PHE: CPT

## 2021-05-24 PROCEDURE — 99213 OFFICE O/P EST LOW 20 MIN: CPT

## 2021-05-24 NOTE — ADDENDUM
[FreeTextEntry1] : I, Sage Bryant, acted solely as a scribe for Dr. Juan Jose Brambila on this date 05/24/2021  .\par  \par All medical record entries made by the Scribe were at my, Dr. Juan Jose Brambila, direction and personally dictated by me on 05/24/2021 . I have reviewed the chart and agree that the record accurately reflects my personal performance of the history, physical exam, assessment and plan. I have also personally directed, reviewed, and agreed with the chart.

## 2021-05-24 NOTE — PHYSICAL EXAM
[de-identified] : General: Alert and oriented x3. In no acute distress. Pleasant in nature with a normal affect. No apparent respiratory distress.\par \par Bilateral Foot Exam\par Skin: +medial posterior incision from previous tarsal tunnel operation. Clean, dry, intact\par Inspection: +plantar fibroma. No obvious malalignment, no swelling, no effusion\par Pulses: 2+ DP/PT pulses\par ROM: FOOT Full ROM of digits, ANKLE 10 degrees of dorsiflexion, 40 degrees of plantarflexion, 10 degrees of subtalar motion.\par Painful ROM: None\par Tenderness: +medial tenderness. No tenderness over the medial malleolus, No tenderness over the lateral malleolus, no CFL/ATFL/PTFL pain, no deltoid ligament pain. No heel pain. No Achilles tenderness. No 5th metatarsal pain. No pain to the LisFranc joint. +ttp over the posterior tibial tendon.\par Stability: Negative anterior/posterior drawer.\par Strength: 5/5 ADD/ABD/TA/GS/EHL/FHL/EDL\par Neuro: Sensation in tact to light touch throughout\par Additional tests: Negative Mortons test,  positive tarsal tunnel tinels, negative single heel rise. \par \par BL Standing Exam\par +pes planus  [de-identified] : An Ultrasound was obtained of the right ankle from [facility] on 12/29/2020 and reviewed by me today 01/22/2021  in the office. Revealed: \par 1. No evidence for acute inflammatory arthropathy.\par 2. No focal or dynamic tendon abnormality is identified.

## 2021-05-24 NOTE — HISTORY OF PRESENT ILLNESS
[FreeTextEntry1] : 5/24/2021: GOOD BILL is a 66 year old female who presents for follow-up evaluation of bilateral foot pain. She states the right was worse but now the left is just as bad. The therapist has been working on both feet. She localizes the constant pain on the bottom of her feet. She also reports of occasional numbness in her feet. She reports trying a tarsal tunnel injection with minimal relief. She has been going to her neurologist for her back. She has been receiving spine injections, Gabapentin from pain management for her back. She has been utilizing orthotics.\par \par PSHx tarsal tunnel bilateral feet. \par \par 1/22/2021: 66 year old female presenting with right foot/ankle pain. The patient notes the pain is the same compared to the previous visit. She presents today to review her ultrasound results. The patient notes she is in constant pain. The patient notes she previously utilized orthotics.  Patient has rheumatoid arthritis. The patient notes she has history of surgery on the left foot and had her sesamoid removed. No other complaints at this time.

## 2021-05-24 NOTE — DISCUSSION/SUMMARY
[de-identified] : Today I had a lengthy discussion with the patient regarding their bilateral foot pain, numbness. I have addressed all the patient's concerns surrounding the pathology of their condition. Discussed with patient non-operative and operative treatment. \par \par I recommended the patient followup with her neurologist for further evaluation and treatment plan regarding her numbness. I recommend that the patient utilize ice, heat. They can also elevate their feet above the level of the heart. \par \par I would like to see the patient back in the office in 1 month to reassess their condition. The patient understood and verbally agreed to the treatment plan. All of their questions were answered and they were satisfied with the visit. The patient should call the office if they have any questions or experience worsening symptoms.

## 2021-05-28 NOTE — ED STATDOCS - [facility] ED TIMING
FYI: Received a call back from Dr. Jay's office and they state that a referral from [facility] is needed prior to scheduling an appt.   gradual onset

## 2021-07-02 ENCOUNTER — APPOINTMENT (OUTPATIENT)
Dept: RHEUMATOLOGY | Facility: CLINIC | Age: 66
End: 2021-07-02
Payer: MEDICARE

## 2021-07-02 ENCOUNTER — LABORATORY RESULT (OUTPATIENT)
Age: 66
End: 2021-07-02

## 2021-07-02 DIAGNOSIS — R19.8 OTHER SPECIFIED SYMPTOMS AND SIGNS INVOLVING THE DIGESTIVE SYSTEM AND ABDOMEN: ICD-10-CM

## 2021-07-02 PROCEDURE — 99214 OFFICE O/P EST MOD 30 MIN: CPT

## 2021-07-02 PROCEDURE — 99072 ADDL SUPL MATRL&STAF TM PHE: CPT

## 2021-07-02 RX ORDER — PANTOPRAZOLE 40 MG/1
40 TABLET, DELAYED RELEASE ORAL
Qty: 90 | Refills: 1 | Status: DISCONTINUED | COMMUNITY
Start: 2019-06-06 | End: 2021-07-02

## 2021-07-02 RX ORDER — TOCILIZUMAB 180 MG/ML
162 INJECTION, SOLUTION SUBCUTANEOUS
Qty: 12 | Refills: 1 | Status: DISCONTINUED | COMMUNITY
Start: 2019-05-31 | End: 2021-07-02

## 2021-07-02 RX ORDER — ERGOCALCIFEROL 1.25 MG/1
1.25 MG CAPSULE, LIQUID FILLED ORAL
Qty: 4 | Refills: 6 | Status: DISCONTINUED | COMMUNITY
Start: 2019-06-06 | End: 2021-07-02

## 2021-07-02 NOTE — DATA REVIEWED
[FreeTextEntry1] : spep normal \par cbc with mcv 103\par cmp normal\par ro/la negative \par casey 1:80

## 2021-07-02 NOTE — ASSESSMENT
[FreeTextEntry1] : 65 yo woman with seronegative non-erosive RA and one year of severe foot burning b/l and more recently hand tingling.  OF note patient with significant fibromyalgia like pains which is unclear if related to underlying neurologic process\par \par --patient pending neuro eval next tuesday. to consider eval for CIDP \par --patient also with chronic diarrhe seen by GI. had normal egd/c-scope with no evidence of inflammation.  she was dx with IBS.  will check celiac antibodies\par --cont actemra for now \par --cont gabapentin 300mg BID\par --check some peripheral neuropathy labs ( b12/hep/hiv rpr etc ) \par

## 2021-07-02 NOTE — PHYSICAL EXAM
[General Appearance - Well Nourished] : well nourished [General Appearance - Well Developed] : well developed [Sclera] : the sclera and conjunctiva were normal [Hearing Threshold Finger Rub Not [place]] : hearing was normal [Nail Clubbing] : no clubbing  or cyanosis of the fingernails [Motor Tone] : muscle strength and tone were normal [] : no rash [Skin Lesions] : no skin lesions [Affect] : the affect was normal [Mood] : the mood was normal [FreeTextEntry1] : knee with scars, nontender or no effusion appreciated, ankles FROM, toes with pain over the MTPS b/l, hi p FROM.  patient with mnay fibromyalgia trigger pains.  she is overall hypersensitive everywhere.  hands with no gross synovitis but tender everywhere

## 2021-07-19 LAB
25(OH)D3 SERPL-MCNC: 57.6 NG/ML
ACE BLD-CCNC: 46 U/L
ALBUMIN SERPL ELPH-MCNC: 4.9 G/DL
ALP BLD-CCNC: 163 U/L
ALT SERPL-CCNC: 27 U/L
ANION GAP SERPL CALC-SCNC: 12 MMOL/L
AST SERPL-CCNC: 29 U/L
BASOPHILS # BLD AUTO: 0.08 K/UL
BASOPHILS NFR BLD AUTO: 1.1 %
BILIRUB SERPL-MCNC: 0.3 MG/DL
BUN SERPL-MCNC: 20 MG/DL
CALCIUM SERPL-MCNC: 10 MG/DL
CHLORIDE SERPL-SCNC: 107 MMOL/L
CO2 SERPL-SCNC: 21 MMOL/L
CREAT SERPL-MCNC: 1.04 MG/DL
CRP SERPL-MCNC: 4 MG/L
ENDOMYSIUM IGA SER QL: NEGATIVE
ENDOMYSIUM IGA TITR SER: NORMAL
EOSINOPHIL # BLD AUTO: 0.31 K/UL
EOSINOPHIL NFR BLD AUTO: 4.5 %
ERYTHROCYTE [SEDIMENTATION RATE] IN BLOOD BY WESTERGREN METHOD: 39 MM/HR
ESTIMATED AVERAGE GLUCOSE: 131 MG/DL
GLIADIN IGA SER QL: 8.8 UNITS
GLIADIN IGG SER QL: <5 UNITS
GLIADIN PEPTIDE IGA SER-ACNC: NEGATIVE
GLIADIN PEPTIDE IGG SER-ACNC: NEGATIVE
GLUCOSE SERPL-MCNC: 117 MG/DL
HBA1C MFR BLD HPLC: 6.2 %
HBV CORE IGG+IGM SER QL: NONREACTIVE
HBV SURFACE AG SER QL: NONREACTIVE
HCT VFR BLD CALC: 45.8 %
HCV AB SER QL: NONREACTIVE
HCV S/CO RATIO: 0.08 S/CO
HGB BLD-MCNC: 14.7 G/DL
HIV1+2 AB SPEC QL IA.RAPID: NONREACTIVE
IGA SER QL IEP: 185 MG/DL
IMM GRANULOCYTES NFR BLD AUTO: 0.1 %
LYMPHOCYTES # BLD AUTO: 1.71 K/UL
LYMPHOCYTES NFR BLD AUTO: 24.6 %
MAN DIFF?: NORMAL
MCHC RBC-ENTMCNC: 31.5 PG
MCHC RBC-ENTMCNC: 32.1 GM/DL
MCV RBC AUTO: 98.3 FL
MONOCYTES # BLD AUTO: 0.59 K/UL
MONOCYTES NFR BLD AUTO: 8.5 %
MPO AB + PR3 PNL SER: NORMAL
NEUTROPHILS # BLD AUTO: 4.26 K/UL
NEUTROPHILS NFR BLD AUTO: 61.2 %
PLATELET # BLD AUTO: 295 K/UL
POTASSIUM SERPL-SCNC: 4.3 MMOL/L
PROT SERPL-MCNC: 7.3 G/DL
RBC # BLD: 4.66 M/UL
RBC # FLD: 12.6 %
SODIUM SERPL-SCNC: 140 MMOL/L
T PALLIDUM AB SER QL IA: NEGATIVE
TSH SERPL-ACNC: 0.56 UIU/ML
TTG IGA SER IA-ACNC: <1.2 U/ML
TTG IGA SER-ACNC: NEGATIVE
TTG IGG SER IA-ACNC: <1.2 U/ML
TTG IGG SER IA-ACNC: NEGATIVE
VIT B12 SERPL-MCNC: 540 PG/ML
VIT B6 SERPL-MCNC: 5.4 UG/L
WBC # FLD AUTO: 6.96 K/UL

## 2021-07-29 ENCOUNTER — TRANSCRIPTION ENCOUNTER (OUTPATIENT)
Age: 66
End: 2021-07-29

## 2021-07-29 ENCOUNTER — APPOINTMENT (OUTPATIENT)
Dept: MRI IMAGING | Facility: CLINIC | Age: 66
End: 2021-07-29
Payer: MEDICARE

## 2021-08-03 ENCOUNTER — APPOINTMENT (OUTPATIENT)
Dept: MRI IMAGING | Facility: CLINIC | Age: 66
End: 2021-08-03

## 2021-08-03 ENCOUNTER — OUTPATIENT (OUTPATIENT)
Dept: OUTPATIENT SERVICES | Facility: HOSPITAL | Age: 66
LOS: 1 days | End: 2021-08-03

## 2021-08-03 DIAGNOSIS — Z98.890 OTHER SPECIFIED POSTPROCEDURAL STATES: Chronic | ICD-10-CM

## 2021-08-03 DIAGNOSIS — Z00.00 ENCOUNTER FOR GENERAL ADULT MEDICAL EXAMINATION WITHOUT ABNORMAL FINDINGS: ICD-10-CM

## 2021-08-03 DIAGNOSIS — Z98.1 ARTHRODESIS STATUS: Chronic | ICD-10-CM

## 2021-08-03 DIAGNOSIS — Z96.651 PRESENCE OF RIGHT ARTIFICIAL KNEE JOINT: Chronic | ICD-10-CM

## 2021-08-03 PROCEDURE — 72141 MRI NECK SPINE W/O DYE: CPT | Mod: 26

## 2021-08-03 PROCEDURE — 72148 MRI LUMBAR SPINE W/O DYE: CPT | Mod: 26

## 2021-09-02 ENCOUNTER — RX RENEWAL (OUTPATIENT)
Age: 66
End: 2021-09-02

## 2021-10-18 ENCOUNTER — APPOINTMENT (OUTPATIENT)
Dept: RHEUMATOLOGY | Facility: CLINIC | Age: 66
End: 2021-10-18
Payer: MEDICARE

## 2021-10-18 VITALS
OXYGEN SATURATION: 95 % | HEART RATE: 101 BPM | TEMPERATURE: 98 F | WEIGHT: 188 LBS | DIASTOLIC BLOOD PRESSURE: 60 MMHG | RESPIRATION RATE: 17 BRPM | SYSTOLIC BLOOD PRESSURE: 120 MMHG | BODY MASS INDEX: 31.32 KG/M2 | HEIGHT: 65 IN

## 2021-10-18 PROCEDURE — 99214 OFFICE O/P EST MOD 30 MIN: CPT

## 2021-10-18 RX ORDER — GABAPENTIN 100 MG/1
100 CAPSULE ORAL
Qty: 90 | Refills: 3 | Status: DISCONTINUED | COMMUNITY
Start: 2021-03-17 | End: 2021-10-18

## 2021-10-18 RX ORDER — ZOSTER VACCINE RECOMBINANT, ADJUVANTED 50 MCG/0.5
50 KIT INTRAMUSCULAR
Qty: 1 | Refills: 1 | Status: DISCONTINUED | COMMUNITY
Start: 2020-09-03 | End: 2021-10-18

## 2021-11-16 ENCOUNTER — RX RENEWAL (OUTPATIENT)
Age: 66
End: 2021-11-16

## 2021-12-06 ENCOUNTER — APPOINTMENT (OUTPATIENT)
Dept: FAMILY MEDICINE | Facility: CLINIC | Age: 66
End: 2021-12-06
Payer: MEDICARE

## 2021-12-06 VITALS
HEIGHT: 65 IN | SYSTOLIC BLOOD PRESSURE: 140 MMHG | WEIGHT: 203 LBS | DIASTOLIC BLOOD PRESSURE: 80 MMHG | HEART RATE: 104 BPM | BODY MASS INDEX: 33.82 KG/M2

## 2021-12-06 DIAGNOSIS — Z12.31 ENCOUNTER FOR SCREENING MAMMOGRAM FOR MALIGNANT NEOPLASM OF BREAST: ICD-10-CM

## 2021-12-06 DIAGNOSIS — E53.8 DEFICIENCY OF OTHER SPECIFIED B GROUP VITAMINS: ICD-10-CM

## 2021-12-06 PROCEDURE — 96372 THER/PROPH/DIAG INJ SC/IM: CPT

## 2021-12-06 PROCEDURE — 99214 OFFICE O/P EST MOD 30 MIN: CPT | Mod: 25

## 2021-12-06 RX ORDER — CYANOCOBALAMIN 1000 UG/ML
1000 INJECTION INTRAMUSCULAR; SUBCUTANEOUS
Qty: 0 | Refills: 0 | Status: COMPLETED | OUTPATIENT
Start: 2021-12-06

## 2021-12-06 RX ORDER — MELOXICAM 7.5 MG/1
7.5 TABLET ORAL
Qty: 90 | Refills: 1 | Status: ACTIVE | COMMUNITY
Start: 2020-10-28 | End: 1900-01-01

## 2021-12-06 RX ADMIN — CYANOCOBALAMINE 1 MCG/ML: 1000 INJECTION INTRAMUSCULAR; SUBCUTANEOUS at 00:00

## 2021-12-08 RX ADMIN — TOCILIZUMAB 480 MG/20ML: 20 INJECTION, SOLUTION, CONCENTRATE INTRAVENOUS at 00:00

## 2021-12-11 NOTE — PHYSICAL EXAM
[No Acute Distress] : no acute distress [Normal Sclera/Conjunctiva] : normal sclera/conjunctiva [No JVD] : no jugular venous distention [No Respiratory Distress] : no respiratory distress  [No Focal Deficits] : no focal deficits [Alert and Oriented x3] : oriented to person, place, and time [Regular Rhythm] : with a regular rhythm [Normal S1, S2] : normal S1 and S2 [de-identified] : calm and engaging

## 2021-12-11 NOTE — HISTORY OF PRESENT ILLNESS
[FreeTextEntry1] : Follow up\par Last seen in November 2020 for acute blood in urine, encounter reviewed.\par Treated for UTI and she failed to FU\par Pt has followed with [facility]UM, VI[facility] since last encounter.\par  [de-identified] : GOOD BILL is a 66 year old F who presents today for a follow up regarding breast exams.  Pt's sister was recently diagnosed with breast cancer, pt is seeking a mammogram as a result. \par  Pt states that her mother did not have breast cancer, this is her third sister that has been diagnosed.  \par Pt is 1 of 7 daughters in her family.\par Her last MAMMO was June 2019 ; [facility] )  US recommended; [facility] 3  Failed to FU after DERM consult\par \par Otherwise has been well and denies any recent ER visits/hospitalizations/ MVA's or MSK injuries  or FALLS.\par Endorses compliance with medications.\par \par Advised  appt.  for CPE/HM review.\par

## 2021-12-11 NOTE — ASSESSMENT
[FreeTextEntry1] : Follow up for 66 year old WF with PMH as stated in HPI / active list. \par \par Management : \par \par See HPI and Plan.\par \par Follow up in January 2022 for blood work and further evaluation.\par \par Mammogram referral provided.\par \par B12 administered today.   B12 JULY 2021  540  Hx of deficiency. \par \par Best wishes offered! \par \par \par Time Based Billing:   I have spent 30  minutes of time for this encounter.\par  Greater than 50 % of the face to face encounter time was spent on review of chart and consultations as well as  medication reconciliation,  counseling and/or coordination of care.                                                    .\par

## 2021-12-11 NOTE — REVIEW OF SYSTEMS
[Negative] : Musculoskeletal [Fever] : no fever [Chills] : no chills [Night Sweats] : no night sweats [FreeTextEntry2] : calm and engaging  [FreeTextEntry3] : eyeglasses

## 2021-12-11 NOTE — ADDENDUM
[FreeTextEntry1] : Medical record entries made by the scribe today, were at my direction and personally dictated to them by me, Dr. Vale Gibbons on 12/06/2021.  I have reviewed the chart and agree that the record accurately reflects my personal performance of the history, physical exam, assessment and plan.\par \par Kartik SILVERIO acting as scribe for Dr. Vale Gibbons MD on 12/06/2021 at 4:51 PM.\par

## 2022-01-27 ENCOUNTER — APPOINTMENT (OUTPATIENT)
Dept: RHEUMATOLOGY | Facility: CLINIC | Age: 67
End: 2022-01-27

## 2022-01-31 NOTE — HISTORY OF PRESENT ILLNESS
[FreeTextEntry1] : 67 yo woman with history of depression, HLD, MI,migraines, chronic GERD and seronegative nonerosive RA previously followed by DR Haddad.   \par \par patient has tried multiple agents in the past \par MTX caused elevated LFTs\par enbrel, remicade, humira and Orencia gave minimal relief \par \par b/l knee replacement\par shoulder surgery\par CTS surgery \par \par patient currently on actemra and has been on this for the past 2-3 years.  per patient report has felt better on actemra.  however patient has chronic pain.  she has b/l foot burning for now 1 year.  unclear etiology.  she was seen by ortho who requested emg which showed peroneal neuropathy b/l.  she was started on gabapentin with minimal pain relief.  she had US guided  tarsal tunnel injection with no pain relief.  she has one ankle steroid injection with no relieve. she tried PT with no pain relief.  MRI did not suggest active synovitis but does show OA. patient states that she has burning sensation of the b/l feet and worse when she stands and walks.  feels like it is shooting up the leg.  she also complain of more recent hand tingling worse with certain positions.  not wearing night time splints.

## 2022-02-10 ENCOUNTER — APPOINTMENT (OUTPATIENT)
Dept: FAMILY MEDICINE | Facility: CLINIC | Age: 67
End: 2022-02-10

## 2022-03-11 ENCOUNTER — OUTPATIENT (OUTPATIENT)
Dept: OUTPATIENT SERVICES | Facility: HOSPITAL | Age: 67
LOS: 1 days | End: 2022-03-11
Payer: MEDICARE

## 2022-03-11 DIAGNOSIS — Z98.890 OTHER SPECIFIED POSTPROCEDURAL STATES: Chronic | ICD-10-CM

## 2022-03-11 DIAGNOSIS — Z96.651 PRESENCE OF RIGHT ARTIFICIAL KNEE JOINT: Chronic | ICD-10-CM

## 2022-03-11 DIAGNOSIS — Z00.8 ENCOUNTER FOR OTHER GENERAL EXAMINATION: ICD-10-CM

## 2022-03-11 DIAGNOSIS — Z98.1 ARTHRODESIS STATUS: Chronic | ICD-10-CM

## 2022-03-11 PROCEDURE — 72220 X-RAY EXAM SACRUM TAILBONE: CPT | Mod: 26

## 2022-03-26 ENCOUNTER — APPOINTMENT (OUTPATIENT)
Dept: MAMMOGRAPHY | Facility: CLINIC | Age: 67
End: 2022-03-26

## 2022-03-28 ENCOUNTER — APPOINTMENT (OUTPATIENT)
Dept: FAMILY MEDICINE | Facility: CLINIC | Age: 67
End: 2022-03-28
Payer: MEDICARE

## 2022-03-28 VITALS — SYSTOLIC BLOOD PRESSURE: 130 MMHG | DIASTOLIC BLOOD PRESSURE: 80 MMHG

## 2022-03-28 VITALS
RESPIRATION RATE: 16 BRPM | DIASTOLIC BLOOD PRESSURE: 80 MMHG | TEMPERATURE: 97.9 F | WEIGHT: 201 LBS | HEIGHT: 65 IN | BODY MASS INDEX: 33.49 KG/M2 | OXYGEN SATURATION: 97 % | SYSTOLIC BLOOD PRESSURE: 132 MMHG | HEART RATE: 85 BPM

## 2022-03-28 DIAGNOSIS — Z23 ENCOUNTER FOR IMMUNIZATION: ICD-10-CM

## 2022-03-28 DIAGNOSIS — B37.2 CANDIDIASIS OF SKIN AND NAIL: ICD-10-CM

## 2022-03-28 PROCEDURE — 99214 OFFICE O/P EST MOD 30 MIN: CPT | Mod: 25

## 2022-03-28 PROCEDURE — G0009: CPT

## 2022-03-28 PROCEDURE — 90732 PPSV23 VACC 2 YRS+ SUBQ/IM: CPT

## 2022-03-28 PROCEDURE — G0439: CPT

## 2022-03-28 PROCEDURE — G0438: CPT

## 2022-03-28 RX ORDER — TOCILIZUMAB 180 MG/ML
162 INJECTION, SOLUTION SUBCUTANEOUS
Qty: 4 | Refills: 5 | Status: DISCONTINUED | COMMUNITY
Start: 2021-10-18 | End: 2022-03-28

## 2022-03-28 RX ORDER — TOCILIZUMAB 180 MG/ML
162 INJECTION, SOLUTION SUBCUTANEOUS
Qty: 2 | Refills: 5 | Status: DISCONTINUED | COMMUNITY
Start: 2021-07-02 | End: 2022-03-28

## 2022-03-28 RX ADMIN — CYANOCOBALAMINE 0 MCG/ML: 1000 INJECTION INTRAMUSCULAR; SUBCUTANEOUS at 00:00

## 2022-03-28 NOTE — PLAN
[FreeTextEntry1] : In review March 2019:\par Complicated 64 year old WF with multiple chronic medical conditions and specialists .\par \par See HPI and PLAN\par \par Lab req given for fasting lipid panel.  Will advise.\par \par Best wishes offered! \par \par Fu in 4 months.

## 2022-03-30 ENCOUNTER — MED ADMIN CHARGE (OUTPATIENT)
Age: 67
End: 2022-03-30

## 2022-03-30 RX ORDER — CYANOCOBALAMIN 1000 UG/ML
1000 INJECTION INTRAMUSCULAR; SUBCUTANEOUS
Qty: 0 | Refills: 0 | Status: COMPLETED | OUTPATIENT
Start: 2022-03-28

## 2022-04-01 LAB — HBA1C MFR BLD HPLC: 6.4

## 2022-04-12 LAB
ALBUMIN SERPL ELPH-MCNC: 5 G/DL
ALP BLD-CCNC: 121 U/L
ALT SERPL-CCNC: 17 U/L
ANION GAP SERPL CALC-SCNC: 15 MMOL/L
AST SERPL-CCNC: 15 U/L
BASOPHILS # BLD AUTO: 0.08 K/UL
BASOPHILS NFR BLD AUTO: 1 %
BILIRUB SERPL-MCNC: 0.2 MG/DL
BUN SERPL-MCNC: 19 MG/DL
CALCIUM SERPL-MCNC: 10.1 MG/DL
CHLORIDE SERPL-SCNC: 105 MMOL/L
CO2 SERPL-SCNC: 22 MMOL/L
CREAT SERPL-MCNC: 0.86 MG/DL
CRP SERPL-MCNC: <3 MG/L
EGFR: 74 ML/MIN/1.73M2
EOSINOPHIL # BLD AUTO: 0.1 K/UL
EOSINOPHIL NFR BLD AUTO: 1.3 %
ERYTHROCYTE [SEDIMENTATION RATE] IN BLOOD BY WESTERGREN METHOD: 50 MM/HR
GLUCOSE SERPL-MCNC: 129 MG/DL
HCT VFR BLD CALC: 46.1 %
HGB BLD-MCNC: 14.5 G/DL
IMM GRANULOCYTES NFR BLD AUTO: 0.4 %
LYMPHOCYTES # BLD AUTO: 2.06 K/UL
LYMPHOCYTES NFR BLD AUTO: 26.5 %
MAN DIFF?: NORMAL
MCHC RBC-ENTMCNC: 31.5 GM/DL
MCHC RBC-ENTMCNC: 31.8 PG
MCV RBC AUTO: 101.1 FL
MONOCYTES # BLD AUTO: 0.53 K/UL
MONOCYTES NFR BLD AUTO: 6.8 %
NEUTROPHILS # BLD AUTO: 4.98 K/UL
NEUTROPHILS NFR BLD AUTO: 64 %
PLATELET # BLD AUTO: 347 K/UL
POTASSIUM SERPL-SCNC: 4.6 MMOL/L
PROT SERPL-MCNC: 7.4 G/DL
RBC # BLD: 4.56 M/UL
RBC # FLD: 13.5 %
SODIUM SERPL-SCNC: 142 MMOL/L
WBC # FLD AUTO: 7.78 K/UL

## 2022-04-13 NOTE — PHYSICAL EXAM
[No Acute Distress] : no acute distress [Normal Sclera/Conjunctiva] : normal sclera/conjunctiva [PERRL] : pupils equal round and reactive to light [Normal TMs] : both tympanic membranes were normal [Supple] : supple [Thyroid Normal, No Nodules] : the thyroid was normal and there were no nodules present [No Respiratory Distress] : no respiratory distress  [Clear to Auscultation] : lungs were clear to auscultation bilaterally [Normal Rate] : normal rate  [Regular Rhythm] : with a regular rhythm [No Edema] : there was no peripheral edema [Soft] : abdomen soft [Non Tender] : non-tender [No Masses] : no abdominal mass palpated [Normal Supraclavicular Nodes] : no supraclavicular lymphadenopathy [Normal Posterior Cervical Nodes] : no posterior cervical lymphadenopathy [Normal Anterior Cervical Nodes] : no anterior cervical lymphadenopathy [No Spinal Tenderness] : no spinal tenderness [No Joint Swelling] : no joint swelling [No Rash] : no rash [Deep Tendon Reflexes (DTR)] : deep tendon reflexes were 2+ and symmetric [Speech Grossly Normal] : speech grossly normal [Alert and Oriented x3] : oriented to person, place, and time [de-identified] : calm  well groomed  engaging  [de-identified] : OMM,    scratch test b/l intact  [de-identified] : obese  [de-identified] : Slight tremor present  [de-identified] : candidiasis present under breasts  [de-identified] : Negative pronator drift

## 2022-04-13 NOTE — HISTORY OF PRESENT ILLNESS
[FreeTextEntry1] : MCAWV\par Last seen in December 2021 for FU breast exams, last CPE March 2019, encounters reviewed.  [de-identified] : GOOD BILL is a 67 year old F who presents today for a MCAWV.  \par \par Pt has no NEW specific complaint; Multiple stable Chronic Medical conditions. ; Follows  with specialists .\par \par   Pt states she had a MSK injury, injured coccyx after a fall, occurred approximately 3 weeks ago, pain still present, no fx found on Xrays.  Pt states coccyx pain and b/l feet pain are her worst pains as of this moment, states that the pain has woken her from sleep. \par overall improving.  \par PM&R, not part of [facility], "I really like them."  Injections directly into the coccyx, states that they have helped.  Neuropathic pain in feet b/l for 3-5 weeks, have made it difficult to ambulate.  \par \par No longer follows with ENDO, GI, URO.  Follows with CARD, NEURO, RHEUM regularly.  Has not followed with DERM "in a long time." \par \par Grandchildren 6 and 8 years old, live in [place] with parents, patient [place].  \par \par Pt states she recently had dental surgery, tooth and gum lengthening, extraction.  Pt states that it is still difficult to open her mouth completely.  \par \par Ex- has been  for approximately 2 years now, pt states that she is still processing his passing, has contemplated seeking bereavement counseling to cope.  \par \par Walks dog regularly for exercise, states that she does not engage in any other exercises.  \par \par Pt states she is fully vaccinated against COVID-19 via Pfizer. \par

## 2022-04-13 NOTE — ASSESSMENT
[FreeTextEntry1] : MCAWV for 67 year old WF with PMH as stated in HPI / active list. \par \par Management : \par \par See HPI and Plan.\par \par Labs completed on 3/25/22 reviewed in office today.\par \par Mammogram results to be reviewed and requested. \par \par Nystatin script provided, advised to wash and clean folds and use white cotton handkerchiefs or washcloths to offset load regarding candidiasis, apply medication once a day, follow up as needed. \par \par Shingles injection to be administered at pharmacy at least 4 weeks from today, script provided, follow up as needed. \par \par PNA-23 injection administered in office today.\par \par \par Best wishes offered!

## 2022-04-13 NOTE — ADDENDUM
[FreeTextEntry1] : Medical record entries made by the scribe today, were at my direction and personally dictated to them by me, Dr. Vale Gibbons on 03/28/2022.  I have reviewed the chart and agree that the record accurately reflects my personal performance of the history, physical exam, assessment and plan.\par \par Kartik SILVERIO acting as scribe for Dr. Vale Gibbons MD on 03/28/2022 at 1:11 PM.\par

## 2022-04-13 NOTE — HEALTH RISK ASSESSMENT
[Good] : ~his/her~  mood as  good [No falls in past year] : Patient reported no falls in the past year [1] : 2) Feeling down, depressed, or hopeless for several days (1) [Patient reported colonoscopy was abnormal] : Patient reported colonoscopy was abnormal [Graduate School] : graduate school [Feels Safe at Home] : Feels safe at home [Fully functional (bathing, dressing, toileting, transferring, walking, feeding)] : Fully functional (bathing, dressing, toileting, transferring, walking, feeding) [Fully functional (using the telephone, shopping, preparing meals, housekeeping, doing laundry, using] : Fully functional and needs no help or supervision to perform IADLs (using the telephone, shopping, preparing meals, housekeeping, doing laundry, using transportation, managing medications and managing finances) [Patient reported mammogram was normal] : Patient reported mammogram was normal [] :  [# Of Children ___] : has [unfilled] children [Alone] : lives alone [Retired] : retired [Reports normal functional visual acuity (ie: able to read med bottle)] : Reports normal functional visual acuity [Smoke Detector] : smoke detector [Carbon Monoxide Detector] : carbon monoxide detector [Seat Belt] :  uses seat belt [Sunscreen] : uses sunscreen [FreeTextEntry1] : coccyx and feet pain  [de-identified] : DENIES [de-identified] : RHEUM     CARD      NEURO [de-identified] : when I can I walk  [de-identified] : "could be better "  [[id number]] : 2 [Reports changes in hearing] : Reports no changes in hearing [Reports changes in vision] : Reports no changes in vision [Guns at Home] : no guns at home [Travel to Developing Areas] : does not  travel to developing areas [MammogramDate] : 3/25/22 [MammogramComments] : stated normal, will request [ColonoscopyDate] : 11/19 [ColonoscopyComments] : pre-malignant lesions  [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

## 2022-05-12 ENCOUNTER — APPOINTMENT (OUTPATIENT)
Dept: FAMILY MEDICINE | Facility: CLINIC | Age: 67
End: 2022-05-12
Payer: MEDICARE

## 2022-05-12 LAB
CHOLEST SERPL-MCNC: 228
HDLC SERPL-MCNC: 70
LDLC SERPL CALC-MCNC: 110
TRIGL SERPL-MCNC: 238

## 2022-05-12 PROCEDURE — 96372 THER/PROPH/DIAG INJ SC/IM: CPT

## 2022-05-12 RX ORDER — CYANOCOBALAMIN 1000 UG/ML
1000 INJECTION INTRAMUSCULAR; SUBCUTANEOUS
Qty: 0 | Refills: 0 | Status: COMPLETED | OUTPATIENT
Start: 2022-05-11

## 2022-05-13 ENCOUNTER — APPOINTMENT (OUTPATIENT)
Dept: RHEUMATOLOGY | Facility: CLINIC | Age: 67
End: 2022-05-13

## 2022-05-23 ENCOUNTER — APPOINTMENT (OUTPATIENT)
Dept: RHEUMATOLOGY | Facility: CLINIC | Age: 67
End: 2022-05-23
Payer: MEDICARE

## 2022-05-23 VITALS
DIASTOLIC BLOOD PRESSURE: 76 MMHG | WEIGHT: 200 LBS | RESPIRATION RATE: 17 BRPM | HEIGHT: 65 IN | BODY MASS INDEX: 33.32 KG/M2 | TEMPERATURE: 98 F | OXYGEN SATURATION: 97 % | SYSTOLIC BLOOD PRESSURE: 110 MMHG | HEART RATE: 91 BPM

## 2022-05-23 PROCEDURE — 36415 COLL VENOUS BLD VENIPUNCTURE: CPT

## 2022-05-23 PROCEDURE — 99214 OFFICE O/P EST MOD 30 MIN: CPT | Mod: 25

## 2022-05-23 NOTE — PHYSICAL EXAM
[General Appearance - Well Nourished] : well nourished [General Appearance - Well Developed] : well developed [Sclera] : the sclera and conjunctiva were normal [Hearing Threshold Finger Rub Not [place]] : hearing was normal [Nail Clubbing] : no clubbing  or cyanosis of the fingernails [Motor Tone] : muscle strength and tone were normal [FreeTextEntry1] : pain over the mcps with mild swelling , shoulder pain on ROM, positive impingment sign bilateral [] : no rash [Skin Lesions] : no skin lesions [Affect] : the affect was normal [Mood] : the mood was normal

## 2022-05-23 NOTE — HISTORY OF PRESENT ILLNESS
[FreeTextEntry1] : 66 yo woman with history of depression, HLD, MI,migraines, chronic GERD and seronegative nonerosive RA previously followed by DR Haddad.   \par \par patient has tried multiple agents in the past \par MTX caused elevated LFTs\par enbrel, remicade, humira and Orencia gave minimal relief \par \par b/l knee replacement\par shoulder surgery\par CTS surgery \par \par Patient has been off her Actemra for multiple months.  Her co pay is too high and there is no patient assistance due to Actemra shortage.  Patient also has been depressed and has not reached out to get a different medication.  Patient complains of hand pain with swelling.    patient also has chronic pain.  she is seeing pain management who gave her epidural injections and relieved some of the foot burning that she was experiencing.  she was seen by ortho who requested emg which showed peroneal neuropathy b/l.  states that gabapentin gives her pain relief.  morning stiffness for 1 hours\par \par

## 2022-05-23 NOTE — ASSESSMENT
[FreeTextEntry1] : patient with seronegative non-erosive RA previously followed by DR Haddad presents off actemra for multiple months.  patient complains of joint pain, swelling and her ESR is high \par \par --patient is to restart actemra IV ( SQ has a very high co-pay) \par --screening ordered today \par --cont gabapentin\par --to start seeing psych and consider switching to cymbalta ( on Effexor ) \par --update vaccines

## 2022-06-01 RX ORDER — TOCILIZUMAB 20 MG/ML
400 INJECTION, SOLUTION, CONCENTRATE INTRAVENOUS
Qty: 2 | Refills: 5 | Status: DISCONTINUED | COMMUNITY
Start: 2022-05-23 | End: 2022-06-01

## 2022-06-08 LAB
HBV CORE IGG+IGM SER QL: NONREACTIVE
HBV SURFACE AB SER QL: NONREACTIVE
HBV SURFACE AG SER QL: NONREACTIVE
HCV AB SER QL: NONREACTIVE
HCV S/CO RATIO: 0.07 S/CO
HIV1+2 AB SPEC QL IA.RAPID: NONREACTIVE

## 2022-06-13 ENCOUNTER — APPOINTMENT (OUTPATIENT)
Dept: FAMILY MEDICINE | Facility: CLINIC | Age: 67
End: 2022-06-13
Payer: MEDICARE

## 2022-06-13 PROCEDURE — 96372 THER/PROPH/DIAG INJ SC/IM: CPT

## 2022-06-13 RX ORDER — CYANOCOBALAMIN 1000 UG/ML
1000 INJECTION INTRAMUSCULAR; SUBCUTANEOUS
Qty: 0 | Refills: 0 | Status: COMPLETED | OUTPATIENT
Start: 2022-06-13

## 2022-06-13 RX ADMIN — CYANOCOBALAMINE 1 MCG/ML: 1000 INJECTION INTRAMUSCULAR; SUBCUTANEOUS at 00:00

## 2022-06-24 ENCOUNTER — APPOINTMENT (OUTPATIENT)
Dept: RHEUMATOLOGY | Facility: CLINIC | Age: 67
End: 2022-06-24

## 2022-06-24 VITALS
SYSTOLIC BLOOD PRESSURE: 130 MMHG | HEART RATE: 110 BPM | HEIGHT: 65 IN | RESPIRATION RATE: 17 BRPM | OXYGEN SATURATION: 97 % | DIASTOLIC BLOOD PRESSURE: 80 MMHG | TEMPERATURE: 97.7 F

## 2022-06-24 VITALS — WEIGHT: 199 LBS | BODY MASS INDEX: 33.12 KG/M2

## 2022-06-24 PROCEDURE — 99214 OFFICE O/P EST MOD 30 MIN: CPT

## 2022-06-24 RX ORDER — TOCILIZUMAB 180 MG/ML
162 INJECTION, SOLUTION SUBCUTANEOUS
Qty: 2 | Refills: 5 | Status: DISCONTINUED | COMMUNITY
Start: 2022-06-03 | End: 2022-06-24

## 2022-06-24 RX ORDER — ZOSTER VACCINE RECOMBINANT, ADJUVANTED 50 MCG/0.5
50 KIT INTRAMUSCULAR
Qty: 1 | Refills: 1 | Status: DISCONTINUED | COMMUNITY
Start: 2022-03-28 | End: 2022-06-24

## 2022-06-24 NOTE — ASSESSMENT
[FreeTextEntry1] : 65 yo woman with depression , seronegative non-erosive RA and one year of severe foot burning b/l and more recently hand tingling.  OF note patient with significant fibromyalgia like pains which is unclear if related to underlying neurologic process\par \par \par --cont actemra for now \par --cont gabapentin 600mg BID\par --unable to add much for her fibromyalgia due to use of effexor and amitriptyline.  Ideal would switch to cymbalta  \par --patient pending neuro eval\par --patient also with chronic diarrhea seen by GI. had normal egd/c-scope with no evidence of inflammation.  she was dx with IBS.  \par

## 2022-06-24 NOTE — HISTORY OF PRESENT ILLNESS
[FreeTextEntry1] : 66 yo woman with history of depression, HLD, MI ,migraines, chronic GERD and seronegative nonerosive RA previously followed by DR Haddad.   \par \par patient has tried multiple agents in the past \par MTX caused elevated LFTs\par enbrel, remicade, humira and Orencia gave minimal relief \par \par b/l knee replacement\par shoulder surgery\par CTS surgery \par \par today: patient was restarted on actemra through patient assistance.  Patient has given herself 2 injections but seem minimal improvement.  she denies any joint swelling .  she complains of pain everywhere.  she is followed by pain management and she had epidurals to the SI [facility] nt with some improvement in back pain.  patient is also on gabapentin 600 BID states that she can not tolerate going up any more.  this did improve her pain when she started this.  she is also on Effexor and amitriptyline given to her by psych.   \par \par \par .  she was seen by ortho who requested emg which showed peroneal neuropathy b/l.  states that gabapentin gives her pain relief.  \par

## 2022-06-24 NOTE — PHYSICAL EXAM
[General Appearance - Well Nourished] : well nourished [General Appearance - Well Developed] : well developed [Sclera] : the sclera and conjunctiva were normal [Hearing Threshold Finger Rub Not [place]] : hearing was normal [Nail Clubbing] : no clubbing  or cyanosis of the fingernails [Musculoskeletal - Swelling] : no joint swelling seen [Motor Tone] : muscle strength and tone were normal [FreeTextEntry1] : she is very tender everywhere.  no clear synovitis.  exam limited by pain.  patient does not want to be squeezed or touched to much to severe pain.  she has pain over some joint but more in the soft tissue.  she has many trigger points  [] : no rash [Skin Lesions] : no skin lesions [Affect] : the affect was normal [Mood] : the mood was normal

## 2022-07-14 ENCOUNTER — NON-APPOINTMENT (OUTPATIENT)
Age: 67
End: 2022-07-14

## 2022-07-14 ENCOUNTER — APPOINTMENT (OUTPATIENT)
Dept: FAMILY MEDICINE | Facility: CLINIC | Age: 67
End: 2022-07-14

## 2022-07-14 PROCEDURE — 96372 THER/PROPH/DIAG INJ SC/IM: CPT

## 2022-07-14 RX ORDER — CYANOCOBALAMIN 1000 UG/ML
1000 INJECTION INTRAMUSCULAR; SUBCUTANEOUS
Qty: 0 | Refills: 0 | Status: COMPLETED | OUTPATIENT
Start: 2022-07-14

## 2022-07-14 RX ADMIN — CYANOCOBALAMINE 0 MCG/ML: 1000 INJECTION INTRAMUSCULAR; SUBCUTANEOUS at 00:00

## 2022-08-08 ENCOUNTER — APPOINTMENT (OUTPATIENT)
Dept: FAMILY MEDICINE | Facility: CLINIC | Age: 67
End: 2022-08-08

## 2022-08-08 DIAGNOSIS — E78.5 HYPERLIPIDEMIA, UNSPECIFIED: ICD-10-CM

## 2022-08-08 PROCEDURE — 96372 THER/PROPH/DIAG INJ SC/IM: CPT

## 2022-08-08 RX ORDER — CYANOCOBALAMIN 1000 UG/ML
1000 INJECTION INTRAMUSCULAR; SUBCUTANEOUS
Qty: 0 | Refills: 0 | Status: COMPLETED | OUTPATIENT
Start: 2022-08-08

## 2022-08-08 RX ADMIN — CYANOCOBALAMINE 1 MCG/ML: 1000 INJECTION INTRAMUSCULAR; SUBCUTANEOUS at 00:00

## 2022-08-11 ENCOUNTER — LABORATORY RESULT (OUTPATIENT)
Age: 67
End: 2022-08-11

## 2022-08-12 ENCOUNTER — APPOINTMENT (OUTPATIENT)
Dept: RHEUMATOLOGY | Facility: CLINIC | Age: 67
End: 2022-08-12

## 2022-08-12 VITALS
HEART RATE: 77 BPM | TEMPERATURE: 97.7 F | OXYGEN SATURATION: 98 % | SYSTOLIC BLOOD PRESSURE: 110 MMHG | DIASTOLIC BLOOD PRESSURE: 69 MMHG

## 2022-08-12 LAB
ALBUMIN SERPL ELPH-MCNC: 4.4 G/DL
ALP BLD-CCNC: 97 U/L
ALT SERPL-CCNC: 31 U/L
ANION GAP SERPL CALC-SCNC: 13 MMOL/L
AST SERPL-CCNC: 37 U/L
BASOPHILS # BLD AUTO: 0.07 K/UL
BASOPHILS NFR BLD AUTO: 0.9 %
BILIRUB SERPL-MCNC: 0.5 MG/DL
BUN SERPL-MCNC: 16 MG/DL
CALCIUM SERPL-MCNC: 9.3 MG/DL
CHLORIDE SERPL-SCNC: 103 MMOL/L
CO2 SERPL-SCNC: 25 MMOL/L
CREAT SERPL-MCNC: 1.29 MG/DL
CRP SERPL-MCNC: <3 MG/L
EGFR: 45 ML/MIN/1.73M2
EOSINOPHIL # BLD AUTO: 0.23 K/UL
EOSINOPHIL NFR BLD AUTO: 3.1 %
ERYTHROCYTE [SEDIMENTATION RATE] IN BLOOD BY WESTERGREN METHOD: 3 MM/HR
GLUCOSE SERPL-MCNC: 222 MG/DL
HCT VFR BLD CALC: 41.4 %
HGB BLD-MCNC: 13.5 G/DL
IMM GRANULOCYTES NFR BLD AUTO: 0.3 %
LYMPHOCYTES # BLD AUTO: 1.57 K/UL
LYMPHOCYTES NFR BLD AUTO: 21.1 %
MAN DIFF?: NORMAL
MCHC RBC-ENTMCNC: 32.6 GM/DL
MCHC RBC-ENTMCNC: 33.1 PG
MCV RBC AUTO: 101.5 FL
MONOCYTES # BLD AUTO: 0.41 K/UL
MONOCYTES NFR BLD AUTO: 5.5 %
NEUTROPHILS # BLD AUTO: 5.13 K/UL
NEUTROPHILS NFR BLD AUTO: 69.1 %
PLATELET # BLD AUTO: 190 K/UL
POTASSIUM SERPL-SCNC: 4 MMOL/L
PROT SERPL-MCNC: 6.6 G/DL
RBC # BLD: 4.08 M/UL
RBC # FLD: 13.2 %
SODIUM SERPL-SCNC: 141 MMOL/L
WBC # FLD AUTO: 7.43 K/UL

## 2022-08-12 PROCEDURE — 99214 OFFICE O/P EST MOD 30 MIN: CPT

## 2022-08-12 NOTE — HISTORY OF PRESENT ILLNESS
[FreeTextEntry1] : 66 yo woman with history of depression, HLD, MI ,migraines, chronic GERD and seronegative nonerosive RA previously followed by DR Haddad.   \par \par patient has tried multiple agents in the past \par MTX caused elevated LFTs\par enbrel, remicade, humira and Orencia gave minimal relief \par \par b/l knee replacement\par shoulder surgery\par CTS surgery \par \par today: patient was restarted on actemra through patient assistance.  she feels better but still has pain in her hands and wrist. she is also followed by pain management;  will be getting epidural for back pain.  Dr Reed Su.  she is also on gabapentin, amitriptyline and Effexor.   she denies any joint swelling . \par \par  she was seen by ortho who requested emg which showed peroneal neuropathy b/l.  states that gabapentin gives her pain relief.  \par

## 2022-08-12 NOTE — ASSESSMENT
[FreeTextEntry1] : 67 yo woman with depression , seronegative non-erosive RA and significant fibromyalgia.  patient better on actemra but still with some wrist and hand pain despite 3 months actemra EOW.  \par \par \par --will increase of actemra to weekly\par --cont gabapentin 600mg BID\par --unable to add much for her fibromyalgia due to use of effexor and amitriptyline.  Ideal would switch to cymbalta or savella \par --patient encouraged to start ezequiel chi for fibro management\par --patient also with chronic diarrhea seen by GI. had normal egd/c-scope with no evidence of inflammation.  she was dx with IBS.  \par --cont pain management f/u \par

## 2022-08-12 NOTE — PHYSICAL EXAM
[General Appearance - Well Nourished] : well nourished [General Appearance - Well Developed] : well developed [Sclera] : the sclera and conjunctiva were normal [Hearing Threshold Finger Rub Not [place]] : hearing was normal [Nail Clubbing] : no clubbing  or cyanosis of the fingernails [Musculoskeletal - Swelling] : no joint swelling seen [Motor Tone] : muscle strength and tone were normal [FreeTextEntry1] : she is sigificantly less tender today.  no clear synovitis.  pain arian right wrist more than left.  she has  trigger points but much improved   [] : no rash [Skin Lesions] : no skin lesions [Affect] : the affect was normal [Mood] : the mood was normal

## 2022-08-15 ENCOUNTER — APPOINTMENT (OUTPATIENT)
Dept: FAMILY MEDICINE | Facility: CLINIC | Age: 67
End: 2022-08-15

## 2022-08-15 ENCOUNTER — NON-APPOINTMENT (OUTPATIENT)
Age: 67
End: 2022-08-15

## 2022-08-15 VITALS
BODY MASS INDEX: 34.32 KG/M2 | OXYGEN SATURATION: 99 % | WEIGHT: 206 LBS | SYSTOLIC BLOOD PRESSURE: 132 MMHG | HEIGHT: 65 IN | HEART RATE: 95 BPM | DIASTOLIC BLOOD PRESSURE: 84 MMHG

## 2022-08-15 DIAGNOSIS — E66.9 OBESITY, UNSPECIFIED: ICD-10-CM

## 2022-08-15 DIAGNOSIS — E03.9 HYPOTHYROIDISM, UNSPECIFIED: ICD-10-CM

## 2022-08-15 DIAGNOSIS — G47.33 OBSTRUCTIVE SLEEP APNEA (ADULT) (PEDIATRIC): ICD-10-CM

## 2022-08-15 PROCEDURE — 99214 OFFICE O/P EST MOD 30 MIN: CPT

## 2022-08-20 NOTE — HISTORY OF PRESENT ILLNESS
[FreeTextEntry1] : Last seen in office March 2022 for MCAWV, encounter reviewed. \par \par Presents for FUV on chronic medical conditions of: obesity, Hypothyroidism and "exhausted" all the time .\par \par Recent consults reviewed and noted for:\par Rheum (RA) [de-identified] : In recent weeks [facility] endorses:\par  \par Labs completed on 8/8/22 with Rheum Dr. Blancas:\par TSH 4.33\par A 1 C 6.4%\par Vitamin D WNL\par Cholesterol 163      HLD 38   LDL 52\par 10 year ASCVD risk optimal \par Otherwise unremarkable \par \par Pt states she received a B12 injection last week.  \par \par Pt states that she has been abnormally tired in recent weeks, stated that she has had the need to take naps which is not typical for her.  Pt states that she has ZAHIDA does not use any remedies, last had a sleep study "a long time ago."  Pt states that she cannot "use a mask to sleep in."\par \par [facility] Sleep Study Questionnaire \par \par S  snoring      snore loudly ( loud enough to be heard through close doors)  YES\par T  tired           often feel tired, fatigued, sleepy during the day   YES\par O observed    has anyone observed you choking or gasping for breath during sleep    NO\par P pressure     ?HTN   NO \par \par B    BMI             > 25    YES \par A    Age > 50            YES \par N    Neck size   MALES > 17 inches shirt collar\par                          Females   > 16 inches                16.5"\par \par FINAL SCORE = 5\par \par Score  3 or 4  Moderate risk ZAHIDA \par  \par > 4 High risk \par \par \par Pt states that she was following a keto diet "pretty strictly," has since stopped.  In regards to losing weight, "I'm really lost."  Pt states that she desires to lose weight.

## 2022-08-20 NOTE — ADDENDUM
[FreeTextEntry1] : Medical record entries made by the scribe today, were at my direction and personally dictated to them by me, Dr. Vale Gibbons on 08/15/2022.  I have reviewed the chart and agree that the record accurately reflects my personal performance of the history, physical exam, assessment and plan.\par \par Kartik SILVERIO acting as scribe for Dr. Vale Gibbons MD on 08/15/2022 at 2:31 PM.\par

## 2022-08-20 NOTE — ASSESSMENT
[FreeTextEntry1] : FUV for 67 year old WF with PMH as stated in HPI / active list. \par \par Management : \par \par See HPI and Plan.\par \par Advised sleep study, referral provided to Pulmonology, questionnaire completed in HPI.  \par \par Advised portion control, reduce caloric intake to approximately 1300 a day, will discuss further dietary alterations at later date.  \par \par Metformin regimen to be initiated;  some weight loss benefit as well. \par \par Continue current medication regimens. \par \par Will add B12 to next set of labs.  \par \par FU in 1-2 months \par \par Best wishes offered!

## 2022-08-25 LAB
25(OH)D3 SERPL-MCNC: 37.1 NG/ML
ALBUMIN SERPL ELPH-MCNC: 4.6 G/DL
ALP BLD-CCNC: 95 U/L
ALT SERPL-CCNC: 34 U/L
ANION GAP SERPL CALC-SCNC: 14 MMOL/L
APPEARANCE: CLEAR
AST SERPL-CCNC: 37 U/L
BASOPHILS # BLD AUTO: 0.08 K/UL
BASOPHILS NFR BLD AUTO: 1.1 %
BILIRUB SERPL-MCNC: 0.5 MG/DL
BILIRUBIN URINE: NEGATIVE
BLOOD URINE: NEGATIVE
BUN SERPL-MCNC: 16 MG/DL
CALCIUM SERPL-MCNC: 9.5 MG/DL
CHLORIDE SERPL-SCNC: 102 MMOL/L
CHOLEST SERPL-MCNC: 163 MG/DL
CO2 SERPL-SCNC: 23 MMOL/L
COLOR: YELLOW
CREAT SERPL-MCNC: 1.29 MG/DL
EGFR: 45 ML/MIN/1.73M2
EOSINOPHIL # BLD AUTO: 0.27 K/UL
EOSINOPHIL NFR BLD AUTO: 3.6 %
ESTIMATED AVERAGE GLUCOSE: 137 MG/DL
GLUCOSE QUALITATIVE U: NEGATIVE
GLUCOSE SERPL-MCNC: 223 MG/DL
HBA1C MFR BLD HPLC: 6.4 %
HCT VFR BLD CALC: 41.4 %
HDLC SERPL-MCNC: 38 MG/DL
HGB BLD-MCNC: 13.4 G/DL
IMM GRANULOCYTES NFR BLD AUTO: 0.3 %
KETONES URINE: NEGATIVE
LDLC SERPL CALC-MCNC: 52 MG/DL
LEUKOCYTE ESTERASE URINE: ABNORMAL
LYMPHOCYTES # BLD AUTO: 1.49 K/UL
LYMPHOCYTES NFR BLD AUTO: 20 %
MAN DIFF?: NORMAL
MCHC RBC-ENTMCNC: 32.4 GM/DL
MCHC RBC-ENTMCNC: 33.5 PG
MCV RBC AUTO: 103.5 FL
MONOCYTES # BLD AUTO: 0.44 K/UL
MONOCYTES NFR BLD AUTO: 5.9 %
NEUTROPHILS # BLD AUTO: 5.16 K/UL
NEUTROPHILS NFR BLD AUTO: 69.1 %
NITRITE URINE: NEGATIVE
NONHDLC SERPL-MCNC: 125 MG/DL
PH URINE: 6
PLATELET # BLD AUTO: 194 K/UL
POTASSIUM SERPL-SCNC: 4.1 MMOL/L
PROT SERPL-MCNC: 6.5 G/DL
PROTEIN URINE: ABNORMAL
RBC # BLD: 4 M/UL
RBC # FLD: 13.1 %
SODIUM SERPL-SCNC: 140 MMOL/L
SPECIFIC GRAVITY URINE: 1.01
T4 SERPL-MCNC: 5.8 UG/DL
TRIGL SERPL-MCNC: 365 MG/DL
TSH SERPL-ACNC: 4.33 UIU/ML
UROBILINOGEN URINE: NORMAL
WBC # FLD AUTO: 7.46 K/UL

## 2022-09-08 ENCOUNTER — APPOINTMENT (OUTPATIENT)
Dept: FAMILY MEDICINE | Facility: CLINIC | Age: 67
End: 2022-09-08

## 2022-10-17 ENCOUNTER — APPOINTMENT (OUTPATIENT)
Dept: FAMILY MEDICINE | Facility: CLINIC | Age: 67
End: 2022-10-17

## 2022-10-17 PROCEDURE — 96372 THER/PROPH/DIAG INJ SC/IM: CPT

## 2022-10-17 PROCEDURE — G0008: CPT

## 2022-10-17 PROCEDURE — 90662 IIV NO PRSV INCREASED AG IM: CPT

## 2022-10-17 RX ORDER — CYANOCOBALAMIN 1000 UG/ML
1000 INJECTION INTRAMUSCULAR; SUBCUTANEOUS
Qty: 0 | Refills: 0 | Status: COMPLETED | OUTPATIENT
Start: 2022-10-17

## 2022-10-17 RX ADMIN — CYANOCOBALAMINE 0 MCG/ML: 1000 INJECTION INTRAMUSCULAR; SUBCUTANEOUS at 00:00

## 2022-11-06 ENCOUNTER — RX RENEWAL (OUTPATIENT)
Age: 67
End: 2022-11-06

## 2022-12-12 NOTE — BRIEF OPERATIVE NOTE - SPECIMENS
Discharge Instructions: Eye Surgery    Julia Alas MD    1. Start taking your eyedrops as instructed when you get home.  Wait 5 minutes between drops. Use eye shield at bedtime and while sleeping for 1 week.    2. Slight pain / discomfort / foreign body sensation is normal.  Preservative-free artificial tears and tylenol can help with the discomfort.     3. If you get severe pain, headache, nausea, increased eye redness, eye swelling with discharge, fever or sudden loss of vision, call Dr. Alas's cell phone: [phone]. If you are unable to reach Dr. Alas on her cell phone please call the office at [phone] to have the on-call doctor paged.    4. You may use your eyes for reading, TV and hobbies.    5. Resume your regular activities as soon as you feel comfortable to do so. Limit bending over. No lifting over 25 pounds and no swimming for 3 weeks.    6. You may shower and wash your hair after your post-operative appointment tomorrow. Take care not to bump your eye or get soap in your eye.    7. You may wear sunglasses in the bright light, especially outdoors, for the first week. If the eye is sensitive to light or wind, you may continue to wear sunglasses at your discretion.    8. You may resume driving when you feel your vision is good enough.    9. Remember to bring your eye medications with you to all your post-operative appointments.     10.  In most cases, you will be measured for new glasses 4-6 weeks after surgery. Although some people may not need glasses after surgery, most patients need glasses of some sort to obtain their best vision.    
None

## 2022-12-16 ENCOUNTER — APPOINTMENT (OUTPATIENT)
Dept: RHEUMATOLOGY | Facility: CLINIC | Age: 67
End: 2022-12-16

## 2022-12-25 ENCOUNTER — NON-APPOINTMENT (OUTPATIENT)
Age: 67
End: 2022-12-25

## 2023-01-06 ENCOUNTER — APPOINTMENT (OUTPATIENT)
Dept: RHEUMATOLOGY | Facility: CLINIC | Age: 68
End: 2023-01-06

## 2023-02-09 ENCOUNTER — APPOINTMENT (OUTPATIENT)
Dept: ORTHOPEDIC SURGERY | Facility: CLINIC | Age: 68
End: 2023-02-09
Payer: MEDICARE

## 2023-02-09 VITALS — HEIGHT: 65 IN | WEIGHT: 200 LBS | BODY MASS INDEX: 33.32 KG/M2

## 2023-02-09 DIAGNOSIS — Z96.652 PRESENCE OF LEFT ARTIFICIAL KNEE JOINT: ICD-10-CM

## 2023-02-09 PROCEDURE — 73564 X-RAY EXAM KNEE 4 OR MORE: CPT | Mod: LT

## 2023-02-09 PROCEDURE — 99203 OFFICE O/P NEW LOW 30 MIN: CPT

## 2023-02-09 NOTE — ASSESSMENT
[FreeTextEntry1] : 68F p/w painful L TKA s/p fall\par \par f/u CT r/o occult fx\par NSAIDs/tylenol for pain\par return after imaging

## 2023-02-09 NOTE — PHYSICAL EXAM
[NL (0)] : extension 0 degrees [5___] : hamstring 5[unfilled]/5 [] : ambulation with cane [Left] : left knee [There are no fractures, subluxations or dislocations. No significant abnormalities are seen] : There are no fractures, subluxations or dislocations. No significant abnormalities are seen [No loss of surgical correlation. Bony alignment acceptable. Hardware in appropriate position] : No loss of surgical correlation. Bony alignment acceptable. Hardware in appropriate position [TWNoteComboBox7] : flexion 120 degrees

## 2023-02-09 NOTE — HISTORY OF PRESENT ILLNESS
[Sudden] : sudden [6] : 6 [1] : 2 [Burning] : burning [Dull/Aching] : dull/aching [Radiating] : radiating [Sharp] : sharp [Shooting] : shooting [Stabbing] : stabbing [Throbbing] : throbbing [Tightness] : tightness [Tingling] : tingling [Ice] : ice [] : yes [de-identified] : 68F p/w L knee pain. She fell onto her knees 5 days ago while walking her dog, pain swelling and brusing since then. TKA done in 2009 by Dr Doty. Some knee pain prior to this fall. [FreeTextEntry1] : Left knee  [FreeTextEntry3] : 5 days ago  [FreeTextEntry5] : pt states she tripped on her dog and fell.  [FreeTextEntry7] : left ankle  [FreeTextEntry9] : ibuprofen, oxycodone  [de-identified] : activity  [de-identified] : Dr. Doty  [de-identified] : knee replacements left knee was done 2013, right knee 2009

## 2023-02-16 ENCOUNTER — APPOINTMENT (OUTPATIENT)
Dept: ORTHOPEDIC SURGERY | Facility: CLINIC | Age: 68
End: 2023-02-16

## 2023-05-22 ENCOUNTER — APPOINTMENT (OUTPATIENT)
Dept: FAMILY MEDICINE | Facility: CLINIC | Age: 68
End: 2023-05-22
Payer: MEDICARE

## 2023-05-22 PROCEDURE — 96372 THER/PROPH/DIAG INJ SC/IM: CPT

## 2023-05-22 RX ORDER — CYANOCOBALAMIN 1000 UG/ML
1000 INJECTION INTRAMUSCULAR; SUBCUTANEOUS
Qty: 0 | Refills: 0 | Status: COMPLETED | OUTPATIENT
Start: 2023-05-22

## 2023-06-22 ENCOUNTER — APPOINTMENT (OUTPATIENT)
Dept: FAMILY MEDICINE | Facility: CLINIC | Age: 68
End: 2023-06-22

## 2023-08-08 ENCOUNTER — RX RENEWAL (OUTPATIENT)
Age: 68
End: 2023-08-08

## 2023-08-09 ENCOUNTER — RX RENEWAL (OUTPATIENT)
Age: 68
End: 2023-08-09

## 2023-08-31 ENCOUNTER — OUTPATIENT (OUTPATIENT)
Dept: OUTPATIENT SERVICES | Facility: HOSPITAL | Age: 68
LOS: 1 days | End: 2023-08-31
Payer: MEDICARE

## 2023-08-31 ENCOUNTER — APPOINTMENT (OUTPATIENT)
Dept: MRI IMAGING | Facility: CLINIC | Age: 68
End: 2023-08-31
Payer: MEDICARE

## 2023-08-31 DIAGNOSIS — Z98.890 OTHER SPECIFIED POSTPROCEDURAL STATES: Chronic | ICD-10-CM

## 2023-08-31 DIAGNOSIS — Z96.651 PRESENCE OF RIGHT ARTIFICIAL KNEE JOINT: Chronic | ICD-10-CM

## 2023-08-31 DIAGNOSIS — Z98.1 ARTHRODESIS STATUS: Chronic | ICD-10-CM

## 2023-08-31 DIAGNOSIS — Z00.8 ENCOUNTER FOR OTHER GENERAL EXAMINATION: ICD-10-CM

## 2023-08-31 PROCEDURE — 73221 MRI JOINT UPR EXTREM W/O DYE: CPT

## 2023-08-31 PROCEDURE — 73221 MRI JOINT UPR EXTREM W/O DYE: CPT | Mod: 26,LT

## 2023-09-08 ENCOUNTER — APPOINTMENT (OUTPATIENT)
Dept: RHEUMATOLOGY | Facility: CLINIC | Age: 68
End: 2023-09-08
Payer: MEDICARE

## 2023-09-08 VITALS
WEIGHT: 205 LBS | RESPIRATION RATE: 17 BRPM | SYSTOLIC BLOOD PRESSURE: 120 MMHG | HEART RATE: 106 BPM | TEMPERATURE: 98 F | BODY MASS INDEX: 34.16 KG/M2 | DIASTOLIC BLOOD PRESSURE: 80 MMHG | OXYGEN SATURATION: 98 % | HEIGHT: 65 IN

## 2023-09-08 PROCEDURE — 99214 OFFICE O/P EST MOD 30 MIN: CPT | Mod: 25

## 2023-09-08 PROCEDURE — 96401 CHEMO ANTI-NEOPL SQ/IM: CPT

## 2023-09-08 RX ORDER — NYSTATIN AND TRIAMCINOLONE ACETONIDE 100000; 1 MG/G; MG/G
100000-0.1 CREAM TOPICAL TWICE DAILY
Qty: 1 | Refills: 2 | Status: DISCONTINUED | COMMUNITY
Start: 2022-03-28 | End: 2023-09-08

## 2023-09-08 RX ORDER — NYSTATIN 100000 1/G
100000 POWDER TOPICAL 3 TIMES DAILY
Qty: 2 | Refills: 2 | Status: DISCONTINUED | COMMUNITY
Start: 2022-03-28 | End: 2023-09-08

## 2023-09-08 RX ORDER — METHYLPRED ACET/NACL,ISO-OS/PF 40 MG/ML
40 VIAL (ML) INJECTION
Qty: 1 | Refills: 0 | Status: COMPLETED | OUTPATIENT
Start: 2023-09-08

## 2023-09-08 RX ADMIN — METHYLPREDNISOLONE ACETATE 0 MG/ML: 40 INJECTION, SUSPENSION INTRA-ARTICULAR; INTRALESIONAL; INTRAMUSCULAR; SOFT TISSUE at 00:00

## 2023-09-08 NOTE — PROCEDURE
[Today's Date:] : Date: [unfilled] [Therapeutic] : therapeutic [25 gauge 5/8  inch] : A 25 gauge 5/8 inch needle was used [Depomedrol ___ mg] : Depomedrol [unfilled] mg [Tolerated Well] : the patient tolerated the procedure well [No Complications] : there were no complications [Instructions Given] : handouts/patient instructions were given to patient [Patient Instructed to Call] : patient was instructed to call if redness at site, a decrease in range of motion or an increase in pain is noted after procedure. [de-identified] : left deltoid

## 2023-09-08 NOTE — PHYSICAL EXAM
[General Appearance - Well Nourished] : well nourished [General Appearance - Well Developed] : well developed [Sclera] : the sclera and conjunctiva were normal [Hearing Threshold Finger Rub Not [place]] : hearing was normal [Nail Clubbing] : no clubbing  or cyanosis of the fingernails [Musculoskeletal - Swelling] : no joint swelling seen [Motor Tone] : muscle strength and tone were normal [FreeTextEntry1] : gross synovitis of the mcps bilaeral , multiple pips, bilateral wrist, lateral epicondial tenderness, unable to move shoulders, left ankle swelling,  [] : no rash [Skin Lesions] : no skin lesions [Affect] : the affect was normal [Mood] : the mood was normal

## 2023-09-08 NOTE — ASSESSMENT
[FreeTextEntry1] : 69 yo woman with depression, seronegative non-erosive RA and significant fibromyalgia. comes in a flare after stopping RA treatment 1 year ago  --medrol 40mg IM today  --start prednisone 20mg daily for 4 weeks followed by 15mg afor 7 days, 10mg for 7 days, 5mg for 7 days --do labs and screeniung today  --after confirming normal screening will place order for acemra  --cont gabapentin 600mg BID --unable to add much for her fibromyalgia due to use of effexor and amitriptyline.  Ideal would switch to cymbalta or savella  --patient encouraged to start ezequiel chi for fibro management --patient also with chronic diarrhea seen by GI. had normal egd/c-scope with no evidence of inflammation.  she was dx with IBS.   --cont pain management f/u

## 2023-09-08 NOTE — HISTORY OF PRESENT ILLNESS
[FreeTextEntry1] : 68 yo woman with history of depression, HLD, MI ,migraines, chronic GERD and seronegative nonerosive RA previously followed by DR Haddad.     patient has tried multiple agents in the past  MTX caused elevated LFTs enbrel, remicade, humira and Orencia gave minimal relief   b/l knee replacement shoulder surgery CTS surgery   today: patient was lost to follow up. she has been Actemra since aug 2022.  she states that she has been followed by pain management who has been giving her injections and a cocktail of gabapentin/flexeril/oxy.  she aslo takes effexor and amitriptyline.    she comes in severe pain.  she has pain every where.  she can not move her shoulder and pain management did left shoulder MRI which shows tendinopathy, effusion and inflammation.  she comes in with swelling involving the mcps, wrist, pips, elbows, left ankle.      she was seen by ortho who requested emg which showed peroneal neuropathy b/l.  states that gabapentin gives her pain relief.

## 2023-09-27 ENCOUNTER — APPOINTMENT (OUTPATIENT)
Dept: RHEUMATOLOGY | Facility: CLINIC | Age: 68
End: 2023-09-27

## 2023-10-12 LAB
ALBUMIN SERPL ELPH-MCNC: 3.9 G/DL
ALP BLD-CCNC: 113 U/L
ALT SERPL-CCNC: 34 U/L
ANION GAP SERPL CALC-SCNC: 13 MMOL/L
AST SERPL-CCNC: 34 U/L
BILIRUB SERPL-MCNC: 0.2 MG/DL
BUN SERPL-MCNC: 14 MG/DL
CALCIUM SERPL-MCNC: 9.3 MG/DL
CHLORIDE SERPL-SCNC: 99 MMOL/L
CO2 SERPL-SCNC: 24 MMOL/L
CREAT SERPL-MCNC: 0.64 MG/DL
CRP SERPL-MCNC: 76 MG/L
EGFR: 96 ML/MIN/1.73M2
ERYTHROCYTE [SEDIMENTATION RATE] IN BLOOD BY WESTERGREN METHOD: 120 MM/HR
GLUCOSE SERPL-MCNC: 276 MG/DL
HBV CORE IGG+IGM SER QL: NONREACTIVE
HBV SURFACE AB SER QL: NONREACTIVE
HBV SURFACE AG SER QL: NONREACTIVE
HCT VFR BLD CALC: 41.8 %
HCV AB SER QL: NONREACTIVE
HCV S/CO RATIO: 0.04 S/CO
HGB BLD-MCNC: 12.6 G/DL
HIV1+2 AB SPEC QL IA.RAPID: NONREACTIVE
LDH SERPL-CCNC: 286 U/L
M TB IFN-G BLD-IMP: ABNORMAL
MCHC RBC-ENTMCNC: 30.1 GM/DL
MCHC RBC-ENTMCNC: 31 PG
MCV RBC AUTO: 102.7 FL
PLATELET # BLD AUTO: 440 K/UL
POTASSIUM SERPL-SCNC: 5.5 MMOL/L
PROT SERPL-MCNC: 6.6 G/DL
QUANTIFERON TB PLUS MITOGEN MINUS NIL: 0.03 IU/ML
QUANTIFERON TB PLUS NIL: 0.01 IU/ML
QUANTIFERON TB PLUS TB1 MINUS NIL: 0 IU/ML
QUANTIFERON TB PLUS TB2 MINUS NIL: 0 IU/ML
RBC # BLD: 4.07 M/UL
RBC # FLD: 13.1 %
SODIUM SERPL-SCNC: 136 MMOL/L
WBC # FLD AUTO: 13.13 K/UL

## 2023-10-23 ENCOUNTER — APPOINTMENT (OUTPATIENT)
Dept: RHEUMATOLOGY | Facility: CLINIC | Age: 68
End: 2023-10-23
Payer: MEDICARE

## 2023-10-23 ENCOUNTER — LABORATORY RESULT (OUTPATIENT)
Age: 68
End: 2023-10-23

## 2023-10-23 VITALS
OXYGEN SATURATION: 90 % | DIASTOLIC BLOOD PRESSURE: 82 MMHG | HEART RATE: 121 BPM | TEMPERATURE: 98.2 F | SYSTOLIC BLOOD PRESSURE: 135 MMHG

## 2023-10-23 PROCEDURE — 99214 OFFICE O/P EST MOD 30 MIN: CPT

## 2023-10-24 RX ORDER — MELOXICAM 15 MG/1
15 TABLET ORAL
Qty: 30 | Refills: 1 | Status: ACTIVE | COMMUNITY
Start: 2023-10-23 | End: 1900-01-01

## 2023-10-26 DIAGNOSIS — N39.0 URINARY TRACT INFECTION, SITE NOT SPECIFIED: ICD-10-CM

## 2023-10-26 LAB
APPEARANCE: ABNORMAL
BILIRUBIN URINE: ABNORMAL
BLOOD URINE: NEGATIVE
COLOR: ABNORMAL
GLUCOSE QUALITATIVE U: 100 MG/DL
KETONES URINE: NEGATIVE MG/DL
LEUKOCYTE ESTERASE URINE: ABNORMAL
NITRITE URINE: POSITIVE
PH URINE: 5.5
PROTEIN URINE: 30 MG/DL
SPECIFIC GRAVITY URINE: 1.02
UROBILINOGEN URINE: 1 MG/DL

## 2023-10-30 LAB
BACTERIA UR CULT: NORMAL
ESTIMATED AVERAGE GLUCOSE: 180 MG/DL
HBA1C MFR BLD HPLC: 7.9 %

## 2023-11-06 LAB
M TB IFN-G BLD-IMP: ABNORMAL
QUANTIFERON TB PLUS MITOGEN MINUS NIL: 0.16 IU/ML
QUANTIFERON TB PLUS NIL: 0.02 IU/ML
QUANTIFERON TB PLUS TB1 MINUS NIL: 0 IU/ML
QUANTIFERON TB PLUS TB2 MINUS NIL: -0.01 IU/ML

## 2023-11-15 ENCOUNTER — APPOINTMENT (OUTPATIENT)
Dept: ORTHOPEDIC SURGERY | Facility: CLINIC | Age: 68
End: 2023-11-15
Payer: MEDICARE

## 2023-11-15 VITALS — BODY MASS INDEX: 34.16 KG/M2 | WEIGHT: 205 LBS | HEIGHT: 65 IN

## 2023-11-15 DIAGNOSIS — M77.8 OTHER ENTHESOPATHIES, NOT ELSEWHERE CLASSIFIED: ICD-10-CM

## 2023-11-15 PROCEDURE — J3490M: CUSTOM

## 2023-11-15 PROCEDURE — 20611 DRAIN/INJ JOINT/BURSA W/US: CPT | Mod: LT

## 2023-12-08 ENCOUNTER — APPOINTMENT (OUTPATIENT)
Dept: RHEUMATOLOGY | Facility: CLINIC | Age: 68
End: 2023-12-08
Payer: MEDICARE

## 2023-12-08 ENCOUNTER — OUTPATIENT (OUTPATIENT)
Dept: OUTPATIENT SERVICES | Facility: HOSPITAL | Age: 68
LOS: 1 days | End: 2023-12-08
Payer: MEDICARE

## 2023-12-08 VITALS
DIASTOLIC BLOOD PRESSURE: 80 MMHG | TEMPERATURE: 97.4 F | SYSTOLIC BLOOD PRESSURE: 125 MMHG | OXYGEN SATURATION: 94 % | HEART RATE: 104 BPM

## 2023-12-08 DIAGNOSIS — Z98.890 OTHER SPECIFIED POSTPROCEDURAL STATES: Chronic | ICD-10-CM

## 2023-12-08 DIAGNOSIS — R76.12 NONSPECIFIC REACTION TO CELL MEDIATED IMMUNITY MEASUREMENT OF GAMMA INTERFERON ANTIGEN RESPONSE WITHOUT ACTIVE TUBERCULOSIS: ICD-10-CM

## 2023-12-08 DIAGNOSIS — L98.9 DISORDER OF THE SKIN AND SUBCUTANEOUS TISSUE, UNSPECIFIED: ICD-10-CM

## 2023-12-08 DIAGNOSIS — Z96.651 PRESENCE OF RIGHT ARTIFICIAL KNEE JOINT: Chronic | ICD-10-CM

## 2023-12-08 PROCEDURE — 99214 OFFICE O/P EST MOD 30 MIN: CPT

## 2023-12-08 PROCEDURE — 71046 X-RAY EXAM CHEST 2 VIEWS: CPT | Mod: 26

## 2023-12-11 ENCOUNTER — APPOINTMENT (OUTPATIENT)
Dept: DERMATOLOGY | Facility: CLINIC | Age: 68
End: 2023-12-11
Payer: MEDICARE

## 2023-12-11 PROCEDURE — 99204 OFFICE O/P NEW MOD 45 MIN: CPT

## 2023-12-12 RX ORDER — SULFAMETHOXAZOLE AND TRIMETHOPRIM 800; 160 MG/1; MG/1
800-160 TABLET ORAL TWICE DAILY
Qty: 6 | Refills: 0 | Status: DISCONTINUED | COMMUNITY
Start: 2023-10-26 | End: 2023-12-12

## 2023-12-18 ENCOUNTER — LABORATORY RESULT (OUTPATIENT)
Age: 68
End: 2023-12-18

## 2023-12-18 DIAGNOSIS — R31.9 HEMATURIA, UNSPECIFIED: ICD-10-CM

## 2023-12-18 DIAGNOSIS — R76.12 NONSPECIFIC REACTION TO CELL MEDIATED IMMUNITY MEASUREMENT OF GAMMA INTERFERON ANTIGEN RESPONSE W/OUT ACTIVE TUBERCULOSIS: ICD-10-CM

## 2023-12-18 DIAGNOSIS — N39.0 URINARY TRACT INFECTION, SITE NOT SPECIFIED: ICD-10-CM

## 2023-12-18 LAB
M TB IFN-G BLD-IMP: ABNORMAL
QUANTIFERON TB PLUS MITOGEN MINUS NIL: 0.33 IU/ML
QUANTIFERON TB PLUS NIL: 0.02 IU/ML
QUANTIFERON TB PLUS TB1 MINUS NIL: 0 IU/ML
QUANTIFERON TB PLUS TB2 MINUS NIL: 0 IU/ML

## 2023-12-31 PROBLEM — Z23 NEED FOR VACCINATION WITH 13-POLYVALENT PNEUMOCOCCAL CONJUGATE VACCINE: Status: ACTIVE | Noted: 2019-03-08

## 2024-01-05 ENCOUNTER — APPOINTMENT (OUTPATIENT)
Dept: DERMATOLOGY | Facility: CLINIC | Age: 69
End: 2024-01-05
Payer: MEDICARE

## 2024-01-05 PROCEDURE — 99214 OFFICE O/P EST MOD 30 MIN: CPT

## 2024-01-09 ENCOUNTER — APPOINTMENT (OUTPATIENT)
Dept: RHEUMATOLOGY | Facility: CLINIC | Age: 69
End: 2024-01-09

## 2024-01-10 ENCOUNTER — APPOINTMENT (OUTPATIENT)
Dept: ORTHOPEDIC SURGERY | Facility: CLINIC | Age: 69
End: 2024-01-10

## 2024-01-17 LAB
APPEARANCE: CLEAR
BACTERIA UR CULT: ABNORMAL
BILIRUBIN URINE: NEGATIVE
BLOOD URINE: ABNORMAL
COLOR: YELLOW
GLUCOSE QUALITATIVE U: NEGATIVE MG/DL
KETONES URINE: NEGATIVE MG/DL
LEUKOCYTE ESTERASE URINE: ABNORMAL
NITRITE URINE: POSITIVE
PH URINE: 6.5
PROTEIN URINE: 100 MG/DL
SPECIFIC GRAVITY URINE: 1.01
UROBILINOGEN URINE: 0.2 MG/DL

## 2024-01-29 ENCOUNTER — APPOINTMENT (OUTPATIENT)
Dept: FAMILY MEDICINE | Facility: CLINIC | Age: 69
End: 2024-01-29

## 2024-02-04 ENCOUNTER — APPOINTMENT (OUTPATIENT)
Dept: ULTRASOUND IMAGING | Facility: CLINIC | Age: 69
End: 2024-02-04
Payer: MEDICARE

## 2024-02-04 ENCOUNTER — OUTPATIENT (OUTPATIENT)
Dept: OUTPATIENT SERVICES | Facility: HOSPITAL | Age: 69
LOS: 1 days | End: 2024-02-04
Payer: MEDICARE

## 2024-02-04 DIAGNOSIS — Z00.8 ENCOUNTER FOR OTHER GENERAL EXAMINATION: ICD-10-CM

## 2024-02-04 DIAGNOSIS — Z98.1 ARTHRODESIS STATUS: Chronic | ICD-10-CM

## 2024-02-04 DIAGNOSIS — Z98.890 OTHER SPECIFIED POSTPROCEDURAL STATES: Chronic | ICD-10-CM

## 2024-02-04 DIAGNOSIS — Z96.651 PRESENCE OF RIGHT ARTIFICIAL KNEE JOINT: Chronic | ICD-10-CM

## 2024-02-04 PROCEDURE — 76770 US EXAM ABDO BACK WALL COMP: CPT | Mod: 26

## 2024-02-04 PROCEDURE — 76770 US EXAM ABDO BACK WALL COMP: CPT

## 2024-02-05 ENCOUNTER — APPOINTMENT (OUTPATIENT)
Dept: RHEUMATOLOGY | Facility: CLINIC | Age: 69
End: 2024-02-05
Payer: MEDICARE

## 2024-02-05 VITALS
HEART RATE: 100 BPM | TEMPERATURE: 98 F | DIASTOLIC BLOOD PRESSURE: 82 MMHG | SYSTOLIC BLOOD PRESSURE: 130 MMHG | OXYGEN SATURATION: 97 %

## 2024-02-05 DIAGNOSIS — R76.12 NONSPECIFIC REACTION TO CELL MEDIATED IMMUNITY MEASUREMENT OF GAMMA INTERFERON ANTIGEN RESPONSE W/OUT ACTIVE TUBERCULOSIS: ICD-10-CM

## 2024-02-05 PROCEDURE — 99214 OFFICE O/P EST MOD 30 MIN: CPT

## 2024-02-05 NOTE — HISTORY OF PRESENT ILLNESS
[FreeTextEntry1] : 68 yo woman with history of depression, DM, HLD, MI, migraines, chronic GERD and seronegative nonerosive RA previously followed by DR Haddad.     patient has tried multiple agents in the past  MTX caused elevated LFTs enbrel, remicade, humira and Orencia gave minimal relief   b/l knee replacement shoulder surgery CTS surgery   today: Patient has recurrent UTIs and now under the care of urology.  she is on Bactrim.  She has not started Actemra due to UTIs.  she is awaiting renal US.  she is currently on medrol 16mg EOQ with pain relief.   she still has mild swelling over the mcps but much improved. -- her insurance has a 1500 copay for SQ Actemra.  PAP will pay for her actemra infusions.  --patient has an indetermine QuantiFERON x2 while on steroids, normal CXR, asymptomatic   --patient is also on amitriptyline 25 and gabapentin 200 qhs with some improvement in her fibromyalgia   --we tried SSZ and she developed a rash over the chest --she simultaneously also developed scalp lesions.?? vesicles with tingling burning sensation.  these lesions started about 2 weeks ago and have improved.  Only one lesion is  to palpation.     she was seen by ortho who requested emg which showed peroneal neuropathy b/l.  states that gabapentin gives her pain relief.

## 2024-02-05 NOTE — ASSESSMENT
[FreeTextEntry1] : 68 yo woman with obesity, DM , HLD, migraines, depression, GERD and seronegative RA.  Her depression seems to be getting in the way of her follow up.  she was lost to follow up and RA had worsen.  she s set up to start Actemra however she has been having issues with recurrenmt UTIs and is seeing urology.  Patient has an indetermine quantiferon most likly du eto being on steroids.   --patient is to get T spot test at [facility]  --hoping to give Actemra with in the next 2 weeksWEEK to taper medrol to 3 tabgs daily for 2 weeks followed by 2 tabs daily  --DM newly dx started on metformin  --no SSZ given rash reaction

## 2024-02-05 NOTE — PHYSICAL EXAM
[General Appearance - Well Nourished] : well nourished [General Appearance - Well Developed] : well developed [Sclera] : the sclera and conjunctiva were normal [Hearing Threshold Finger Rub Not [place]] : hearing was normal [Nail Clubbing] : no clubbing  or cyanosis of the fingernails [Motor Tone] : muscle strength and tone were normal [FreeTextEntry1] : right side of scalp with lesions, one large one with ulcer [Affect] : the affect was normal [Mood] : the mood was normal

## 2024-03-04 ENCOUNTER — APPOINTMENT (OUTPATIENT)
Dept: RHEUMATOLOGY | Facility: CLINIC | Age: 69
End: 2024-03-04

## 2024-03-18 ENCOUNTER — APPOINTMENT (OUTPATIENT)
Dept: FAMILY MEDICINE | Facility: CLINIC | Age: 69
End: 2024-03-18
Payer: MEDICARE

## 2024-03-18 VITALS
HEART RATE: 108 BPM | HEIGHT: 65 IN | WEIGHT: 210 LBS | BODY MASS INDEX: 34.99 KG/M2 | OXYGEN SATURATION: 97 % | SYSTOLIC BLOOD PRESSURE: 144 MMHG | DIASTOLIC BLOOD PRESSURE: 96 MMHG

## 2024-03-18 DIAGNOSIS — E11.9 TYPE 2 DIABETES MELLITUS W/OUT COMPLICATIONS: ICD-10-CM

## 2024-03-18 DIAGNOSIS — L30.9 DERMATITIS, UNSPECIFIED: ICD-10-CM

## 2024-03-18 PROCEDURE — 99214 OFFICE O/P EST MOD 30 MIN: CPT

## 2024-03-18 RX ORDER — METFORMIN ER 500 MG 500 MG/1
500 TABLET ORAL
Qty: 180 | Refills: 0 | Status: ACTIVE | COMMUNITY
Start: 2022-08-15 | End: 1900-01-01

## 2024-03-18 RX ORDER — CLOBETASOL PROPIONATE 0.5 MG/ML
0.05 SOLUTION TOPICAL TWICE DAILY
Qty: 1 | Refills: 0 | Status: ACTIVE | COMMUNITY
Start: 2024-03-18 | End: 1900-01-01

## 2024-03-19 RX ORDER — CEPHALEXIN 500 MG/1
500 TABLET ORAL
Qty: 28 | Refills: 0 | Status: DISCONTINUED | COMMUNITY
Start: 2023-12-18 | End: 2024-03-19

## 2024-03-19 RX ORDER — PREDNISONE 5 MG/1
5 TABLET ORAL
Qty: 120 | Refills: 1 | Status: DISCONTINUED | COMMUNITY
Start: 2023-09-08 | End: 2024-03-19

## 2024-03-19 RX ORDER — METHYLPREDNISOLONE 4 MG/1
4 TABLET ORAL
Qty: 60 | Refills: 0 | Status: DISCONTINUED | COMMUNITY
Start: 2024-03-01 | End: 2024-03-19

## 2024-03-19 RX ORDER — METHYLPREDNISOLONE 4 MG/1
4 TABLET ORAL
Qty: 60 | Refills: 0 | Status: DISCONTINUED | COMMUNITY
Start: 2023-10-27 | End: 2024-03-19

## 2024-03-19 RX ORDER — METHYLPREDNISOLONE 4 MG/1
4 TABLET ORAL
Qty: 120 | Refills: 1 | Status: DISCONTINUED | COMMUNITY
Start: 2023-12-08 | End: 2024-03-19

## 2024-03-19 NOTE — PHYSICAL EXAM
[Supple] : supple [Pedal Pulses Present] : the pedal pulses are present [No Edema] : there was no peripheral edema [Normal] : no joint swelling and grossly normal strength and tone [Coordination Grossly Intact] : coordination grossly intact [No Rash] : no rash [No Focal Deficits] : no focal deficits [Normal Gait] : normal gait [Normal Affect] : the affect was normal [Normal Insight/Judgement] : insight and judgment were intact

## 2024-03-19 NOTE — HISTORY OF PRESENT ILLNESS
[de-identified] : Patient is following up on DM. Patient states she was in prediabetic range for a long time and was placed on 500mg of Metformin for that. She had recently labs in March with Rheumatologist, and it shows that she now has Diabetes. She is here to discuss treatment. Of note, she has had a bad RA flair up for months and has been on oral steroids for many months and this may have contributed to the elevated sugars.  We discussed increasing her metformin to 1000 mg a day, to be titrated up upon follow up with PCP.  Patient is requesting refill for clobetasol for scalp dermatitis.  [FreeTextEntry1] : Medication Renewal

## 2024-03-25 ENCOUNTER — APPOINTMENT (OUTPATIENT)
Dept: FAMILY MEDICINE | Facility: CLINIC | Age: 69
End: 2024-03-25

## 2024-03-25 ENCOUNTER — APPOINTMENT (OUTPATIENT)
Dept: RHEUMATOLOGY | Facility: CLINIC | Age: 69
End: 2024-03-25

## 2024-04-29 NOTE — H&P PST ADULT - NSICDXPASTMEDICALHX_GEN_ALL_CORE_FT
PAST MEDICAL HISTORY:  ADHD (attention deficit hyperactivity disorder)     HLD (hyperlipidemia)     Hypothyroid     Migraine     Myocardial infarct 2012    Rheumatoid arthritis     Stented coronary artery 2012

## 2024-04-29 NOTE — H&P PST ADULT - HISTORY OF PRESENT ILLNESS
HPI: This is a 69 year old female with a PMHx of CAD, HTN, HLD, NSTEMI in 2011 s/p ELIZABETH to LAD. Patient then underwent a repeat C 08/2014 which demonstrated non-obstructive CAD. Patient's NST from 5/2021 showed normal myocardial perfusion imaging with no evidence of pharmacologic-induced myocardial ischemia or infarct, EF 73%. Patient's carotid ultrasound showed bilateral mild to moderate plaque without significant stenosis. Patient's recent NST now shows new moderate sized, moderate severity, apical, fixed defect consistent with territory typical of mLAD which was not present previously.     Symptoms:        Angina (Class):        Ischemic Symptoms:     Heart Failure:        Systolic/Diastolic/Combined: n/a       NYHA Class (within 2 weeks):     Assessment of LVEF (Must be within 6 months):       EF: 60-65%       Assessed by: ECHO        Date: 12/19/2024    Prior Cardiac Interventions:       PCI's (Date, Stents, Vessels): ELIZABETH to LAD 2011       CABG (Date, Grafts): n/a    Noninvasive Testing:   Stress Test: Date: 12/19/2023       Protocol: Regadenoson       Duration of Exercise: n/a       Symptoms: chest pain did not occur       EKG Changes: baseline EKG: normal sinus rhythm. ST response was normal and EKG was normal during Regadenoson       DTS:        Myocardial Imaging: Moderate sized, moderate severity, apical, fixed defect consistent with infarction in the territory typical of mLAD.        Risk Assessment (Low, Medium, High):     Echo (Date, Findings): 12/19/2023  1. This is a technically difficult study with suboptimal views  2. Overall left ventricular systolic function ir normal with, an EF between 60-65%  3. The diastolic filling pattern indicates impaired relaxation  4. There is mild aortic valve stenosis without stenosis  5. There is trace mitral regurgitation  6. Trace tricuspid regurgitation is present  7. There is small, generalized pericardial effusion present  8. Compared with prior study, changes were noted. The small pericardial effusion is a new finding. There is also a percardial fat pad.   9. No SBE ppx required     Carotid Duplex: 9/1/2022  Conclusions: In the right carotid system, there is mild to moderate atherosclerotic plaque visualized without hemodynamically significant stenosis  In the left carotid system, there is mild atherosclerotic plaque visualized without hemodynamically significant stenosis     Antianginal Therapies:        Beta Blockers:         Calcium Channel Blockers:        Long Acting Nitrates:        Ranexa:       Associated Risk Factors:        Cerebrovascular Disease: N/A       Chronic Lung Disease: N/A       Peripheral Arterial Disease: N/A       Chronic Kidney Disease (if yes, what is GFR): N/A       Uncontrolled Diabetes (if yes, what is HgbA1C or FBS): N/A       Poorly Controlled Hypertension (if yes, what is SBP): N/A       Morbid Obesity (if yes, what is BMI): N/A       History of Recent Ventricular Arrhythmia: N/A       Inability to Ambulate Safely: N/A       Need for Therapeutic Anticoagulation: N/A       Antiplatelet or Contrast Allergy: N/A     HPI: This is a 69 year old female with a PMHx of CAD, HTN, HLD, NSTEMI in 2011 s/p ELIZABETH to LAD. Patient then underwent a repeat C 08/2014 which demonstrated non-obstructive CAD. Patient's NST from 5/2021 showed normal myocardial perfusion imaging with no evidence of pharmacologic-induced myocardial ischemia or infarct, EF 73%. Patient's carotid ultrasound showed bilateral mild to moderate plaque without significant stenosis. Patient's recent NST now shows new moderate sized, moderate severity, apical, fixed defect consistent with territory typical of mLAD which was not present previously.     Symptoms:        Angina (Class):        Ischemic Symptoms:     Heart Failure:        Systolic/Diastolic/Combined: n/a       NYHA Class (within 2 weeks):     Assessment of LVEF (Must be within 6 months):       EF: 60-65%       Assessed by: ECHO        Date: 12/19/2024    Prior Cardiac Interventions:       PCI's (Date, Stents, Vessels): ELIZABETH to LAD 2011       CABG (Date, Grafts): n/a    Noninvasive Testing:   Stress Test: Date: 12/19/2023       Protocol: Regadenoson       Duration of Exercise: n/a       Symptoms: chest pain did not occur       EKG Changes: baseline EKG: normal sinus rhythm. ST response was normal and EKG was normal during Regadenoson       DTS:        Myocardial Imaging: Moderate sized, moderate severity, apical, fixed defect consistent with infarction in the territory typical of mLAD.        Risk Assessment (Low, Medium, High):     Echo (Date, Findings): 12/19/2023  1. This is a technically difficult study with suboptimal views  2. Overall left ventricular systolic function ir normal with, an EF between 60-65%  3. The diastolic filling pattern indicates impaired relaxation  4. There is mild aortic valve stenosis without stenosis  5. There is trace mitral regurgitation  6. Trace tricuspid regurgitation is present  7. There is small, generalized pericardial effusion present  8. Compared with prior study, changes were noted. The small pericardial effusion is a new finding. There is also a percardial fat pad.   9. No SBE ppx required     Carotid Duplex: 9/1/2022  Conclusions: In the right carotid system, there is mild to moderate atherosclerotic plaque visualized without hemodynamically significant stenosis  In the left carotid system, there is mild atherosclerotic plaque visualized without hemodynamically significant stenosis     Antianginal Therapies:        Beta Blockers:         Calcium Channel Blockers:        Long Acting Nitrates:        Ranexa:       Associated Risk Factors:        Cerebrovascular Disease: N/A       Chronic Lung Disease: N/A       Peripheral Arterial Disease: N/A       Chronic Kidney Disease (if yes, what is GFR): N/A       Uncontrolled Diabetes (if yes, what is HgbA1C or FBS): N/A       Poorly Controlled Hypertension (if yes, what is SBP): N/A       Morbid Obesity (if yes, what is BMI): N/A       History of Recent Ventricular Arrhythmia: N/A       Inability to Ambulate Safely: N/A       Need for Therapeutic Anticoagulation: N/A       Antiplatelet or Contrast Allergy: N/A    ROS: as stated above, otherwise negative    PHYSICAL EXAM:  Constitutional: A & O x 3, NAD  HEENT:  Normal oral mucosa, PERRL, EOMI	  Cardiovascular: S1 S2, No murmurs, No JVD  Respiratory: Lungs clear to auscultation	  Gastrointestinal:  Soft, Non-tender, + BS	  Skin: No rashes or cyanosis  Neurologic: No deficit appreciated  Extremities: Normal range of motion, no edema  Vascular: distal pulses +    HPI: This is a 69 year old female with a PMHx of CAD, HTN, HLD, NSTEMI in 2011 s/p ELIZABETH to LAD. Patient then underwent a repeat C 08/2014 which demonstrated non-obstructive CAD. Patient's NST from 5/2021 showed normal myocardial perfusion imaging with no evidence of pharmacologic-induced myocardial ischemia or infarct, EF 73%. Patient's carotid ultrasound showed bilateral mild to moderate plaque without significant stenosis. Patient's recent NST now shows new moderate sized, moderate severity, apical, fixed defect consistent with territory typical of mLAD which was not present previously.     Symptoms:        Angina (Class):        Ischemic Symptoms:     Heart Failure:        Systolic/Diastolic/Combined: n/a       NYHA Class (within 2 weeks):     Assessment of LVEF (Must be within 6 months):       EF: 60-65%       Assessed by: ECHO        Date: 12/19/2024    Prior Cardiac Interventions:       PCI's (Date, Stents, Vessels): ELIZABETH to LAD 2011       CABG (Date, Grafts): n/a    Noninvasive Testing:   Stress Test: Date: 12/19/2023       Protocol: Regadenoson       Duration of Exercise: n/a       Symptoms: chest pain did not occur       EKG Changes: baseline EKG: normal sinus rhythm. ST response was normal and EKG was normal during Regadenoson       DTS:        Myocardial Imaging: Moderate sized, moderate severity, apical, fixed defect consistent with infarction in the territory typical of mLAD.        Risk Assessment (Low, Medium, High):     Echo (Date, Findings): 12/19/2023  1. This is a technically difficult study with suboptimal views  2. Overall left ventricular systolic function ir normal with, an EF between 60-65%  3. The diastolic filling pattern indicates impaired relaxation  4. There is mild aortic valve stenosis without stenosis  5. There is trace mitral regurgitation  6. Trace tricuspid regurgitation is present  7. There is small, generalized pericardial effusion present  8. Compared with prior study, changes were noted. The small pericardial effusion is a new finding. There is also a percardial fat pad.   9. No SBE ppx required     Carotid Duplex: 9/1/2022  Conclusions: In the right carotid system, there is mild to moderate atherosclerotic plaque visualized without hemodynamically significant stenosis  In the left carotid system, there is mild atherosclerotic plaque visualized without hemodynamically significant stenosis     Antianginal Therapies:        Beta Blockers:         Calcium Channel Blockers:        Long Acting Nitrates:        Ranexa:       Associated Risk Factors:        Cerebrovascular Disease: N/A       Chronic Lung Disease: N/A       Peripheral Arterial Disease: N/A       Chronic Kidney Disease (if yes, what is GFR): N/A       Uncontrolled Diabetes (if yes, what is HgbA1C or FBS): N/A       Poorly Controlled Hypertension (if yes, what is SBP): N/A       Morbid Obesity (if yes, what is BMI): N/A       History of Recent Ventricular Arrhythmia: N/A       Inability to Ambulate Safely: N/A       Need for Therapeutic Anticoagulation: N/A       Antiplatelet or Contrast Allergy: N/A    ROS: as stated above, otherwise negative    PHYSICAL EXAM:  Constitutional: A & O x 3, NAD  HEENT:  Normal oral mucosa, PERRL, EOMI	  Cardiovascular: S1 S2, No murmurs, No JVD  Respiratory: Lungs clear to auscultation	  Gastrointestinal:  Soft, Non-tender, + BS	  Skin: No rashes or cyanosis  Neurologic: No deficit appreciated  Extremities: Normal range of motion, + edema  Vascular: distal pulses +    HPI: This is a 69 year old female with a PMHx of CAD, HTN, HLD, NSTEMI in 2011 s/p ELIZABETH to LAD. Patient then underwent a repeat C 08/2014 which demonstrated non-obstructive CAD. Patient's NST from 5/2021 showed normal myocardial perfusion imaging with no evidence of pharmacologic-induced myocardial ischemia or infarct, EF 73%. Patient's carotid ultrasound showed bilateral mild to moderate plaque without significant stenosis. Patient's recent NST now shows new moderate sized, moderate severity, apical, fixed defect consistent with territory typical of mLAD which was not present previously.     Symptoms:        Angina (Class):        Ischemic Symptoms:     Heart Failure:        Systolic/Diastolic/Combined: n/a       NYHA Class (within 2 weeks):     Assessment of LVEF (Must be within 6 months):       EF: 60-65%       Assessed by: ECHO        Date: 12/19/2024    Prior Cardiac Interventions:       PCI's (Date, Stents, Vessels): ELIZABETH to LAD 2011       CABG (Date, Grafts): n/a    Noninvasive Testing:   Stress Test: Date: 12/19/2023       Protocol: Regadenoson       Duration of Exercise: n/a       Symptoms: chest pain did not occur       EKG Changes: baseline EKG: normal sinus rhythm. ST response was normal and EKG was normal during Regadenoson       DTS:        Myocardial Imaging: Moderate sized, moderate severity, apical, fixed defect consistent with infarction in the territory typical of mLAD.        Risk Assessment (Low, Medium, High):     Echo (Date, Findings): 12/19/2023  1. This is a technically difficult study with suboptimal views  2. Overall left ventricular systolic function ir normal with, an EF between 60-65%  3. The diastolic filling pattern indicates impaired relaxation  4. There is mild aortic valve stenosis without stenosis  5. There is trace mitral regurgitation  6. Trace tricuspid regurgitation is present  7. There is small, generalized pericardial effusion present  8. Compared with prior study, changes were noted. The small pericardial effusion is a new finding. There is also a percardial fat pad.   9. No SBE ppx required     Carotid Duplex: 9/1/2022  Conclusions: In the right carotid system, there is mild to moderate atherosclerotic plaque visualized without hemodynamically significant stenosis  In the left carotid system, there is mild atherosclerotic plaque visualized without hemodynamically significant stenosis     Antianginal Therapies:        Beta Blockers:  Metoprolol       Calcium Channel Blockers:        Long Acting Nitrates:        Ranexa:       Associated Risk Factors:        Cerebrovascular Disease: N/A       Chronic Lung Disease: N/A       Peripheral Arterial Disease: N/A       Chronic Kidney Disease (if yes, what is GFR): N/A       Uncontrolled Diabetes (if yes, what is HgbA1C or FBS): N/A       Poorly Controlled Hypertension (if yes, what is SBP): N/A       Morbid Obesity (if yes, what is BMI): N/A       History of Recent Ventricular Arrhythmia: N/A       Inability to Ambulate Safely: N/A       Need for Therapeutic Anticoagulation: N/A       Antiplatelet or Contrast Allergy: N/A    ROS: as stated above, otherwise negative    PHYSICAL EXAM:  Constitutional: A & O x 3, NAD  HEENT:  Normal oral mucosa, PERRL, EOMI	  Cardiovascular: S1 S2, No murmurs, No JVD  Respiratory: Lungs clear to auscultation	  Gastrointestinal:  Soft, Non-tender, + BS	  Skin: No rashes or cyanosis  Neurologic: No deficit appreciated  Extremities: Normal range of motion, + edema  Vascular: distal pulses +

## 2024-04-29 NOTE — H&P PST ADULT - ASSESSMENT
Impression: This is a 69 year old male with a PMHx of a ELIZABETH to the LAD with a new NST demonstrating ew moderate sized, moderate severity, apical, fixed defect consistent with territory typical of mLAD which was not present previously.    Risk Assessments:  ASA:  Mallampati:  GFR:   Cr:  BRA:    Plan:  -plan for LHC via RRA/RFA  -patient seen and examined  -confirmed appropriate NPO duration  -ECG and Labs reviewed  -Aspirin 81mg po pre-cath  -procedure discussed with patient; risks and benefits explained, questions answered  -consent obtained by attending IC Impression: This is a 69 year old male with a PMHx of a ELIZABETH to the LAD with a new NST demonstrating new moderate sized, moderate severity, apical, fixed defect consistent with territory typical of mLAD which was not present previously.    Risk Assessments:  ASA: 3  Mallampati:  GFR:   Cr:  BRA:    Plan:  -plan for LHC via RRA/RFA  -patient seen and examined  -confirmed appropriate NPO duration  -ECG and Labs reviewed  -Aspirin 81mg po pre-cath  -procedure discussed with patient; risks and benefits explained, questions answered  -consent obtained by attending IC Impression: This is a 69 year old male with a PMHx of a ELIZABETH to the LAD with a new NST demonstrating new moderate sized, moderate severity, apical, fixed defect consistent with territory typical of mLAD which was not present previously.    Risk Assessments:  ASA: 3  Mallampati: 2  GFR:   Cr:  BRA:    Plan:  -plan for LHC via RRA/RFA  -patient seen and examined  -confirmed appropriate NPO duration  -ECG and Labs reviewed  -Aspirin 81mg po pre-cath  -procedure discussed with patient; risks and benefits explained, questions answered  -consent obtained by attending IC Impression: This is a 69 year old male with a PMHx of a ELIZABETH to the LAD with a new NST demonstrating new moderate sized, moderate severity, apical, fixed defect consistent with territory typical of mLAD which was not present previously.    Risk Assessments:  ASA: 3  Mallampati: 2  GFR: 75  Cr: 0.84  BRA: 1.1%    Plan:  -plan for LHC via RRA/RFA  -patient seen and examined  -confirmed appropriate NPO duration  -ECG and Labs reviewed  -Aspirin 81mg po pre-cath  -procedure discussed with patient; risks and benefits explained, questions answered  -consent obtained by attending IC

## 2024-04-29 NOTE — H&P PST ADULT - NSICDXPASTSURGICALHX_GEN_ALL_CORE_FT
PAST SURGICAL HISTORY:  H/O cardiac catheterization     H/O laminectomy     H/O spinal fusion     H/O total knee replacement, right     S/p bilateral carpal tunnel release     S/P left knee arthroscopy

## 2024-04-30 ENCOUNTER — TRANSCRIPTION ENCOUNTER (OUTPATIENT)
Age: 69
End: 2024-04-30

## 2024-04-30 ENCOUNTER — INPATIENT (INPATIENT)
Facility: HOSPITAL | Age: 69
LOS: 0 days | Discharge: HOME | DRG: 316 | End: 2024-05-01
Attending: INTERNAL MEDICINE | Admitting: INTERNAL MEDICINE
Payer: MEDICARE

## 2024-04-30 VITALS
TEMPERATURE: 98 F | RESPIRATION RATE: 16 BRPM | OXYGEN SATURATION: 96 % | SYSTOLIC BLOOD PRESSURE: 150 MMHG | DIASTOLIC BLOOD PRESSURE: 98 MMHG | HEART RATE: 110 BPM

## 2024-04-30 DIAGNOSIS — Z98.890 OTHER SPECIFIED POSTPROCEDURAL STATES: Chronic | ICD-10-CM

## 2024-04-30 DIAGNOSIS — Z98.1 ARTHRODESIS STATUS: Chronic | ICD-10-CM

## 2024-04-30 DIAGNOSIS — R94.39 ABNORMAL RESULT OF OTHER CARDIOVASCULAR FUNCTION STUDY: ICD-10-CM

## 2024-04-30 DIAGNOSIS — Z96.651 PRESENCE OF RIGHT ARTIFICIAL KNEE JOINT: Chronic | ICD-10-CM

## 2024-04-30 DIAGNOSIS — I10 ESSENTIAL (PRIMARY) HYPERTENSION: ICD-10-CM

## 2024-04-30 LAB
ANION GAP SERPL CALC-SCNC: 15 MMOL/L — SIGNIFICANT CHANGE UP (ref 5–17)
BASOPHILS # BLD AUTO: 0.09 K/UL — SIGNIFICANT CHANGE UP (ref 0–0.2)
BASOPHILS NFR BLD AUTO: 0.7 % — SIGNIFICANT CHANGE UP (ref 0–2)
BUN SERPL-MCNC: 15.1 MG/DL — SIGNIFICANT CHANGE UP (ref 8–20)
CALCIUM SERPL-MCNC: 9.5 MG/DL — SIGNIFICANT CHANGE UP (ref 8.4–10.5)
CHLORIDE SERPL-SCNC: 100 MMOL/L — SIGNIFICANT CHANGE UP (ref 96–108)
CO2 SERPL-SCNC: 22 MMOL/L — SIGNIFICANT CHANGE UP (ref 22–29)
CREAT SERPL-MCNC: 0.84 MG/DL — SIGNIFICANT CHANGE UP (ref 0.5–1.3)
EGFR: 75 ML/MIN/1.73M2 — SIGNIFICANT CHANGE UP
EOSINOPHIL # BLD AUTO: 0.19 K/UL — SIGNIFICANT CHANGE UP (ref 0–0.5)
EOSINOPHIL NFR BLD AUTO: 1.5 % — SIGNIFICANT CHANGE UP (ref 0–6)
GLUCOSE SERPL-MCNC: 184 MG/DL — HIGH (ref 70–99)
HCT VFR BLD CALC: 44.5 % — SIGNIFICANT CHANGE UP (ref 34.5–45)
HGB BLD-MCNC: 14.9 G/DL — SIGNIFICANT CHANGE UP (ref 11.5–15.5)
IMM GRANULOCYTES NFR BLD AUTO: 0.4 % — SIGNIFICANT CHANGE UP (ref 0–0.9)
LYMPHOCYTES # BLD AUTO: 17.8 % — SIGNIFICANT CHANGE UP (ref 13–44)
LYMPHOCYTES # BLD AUTO: 2.31 K/UL — SIGNIFICANT CHANGE UP (ref 1–3.3)
MAGNESIUM SERPL-MCNC: 1.9 MG/DL — SIGNIFICANT CHANGE UP (ref 1.6–2.6)
MCHC RBC-ENTMCNC: 33.2 PG — SIGNIFICANT CHANGE UP (ref 27–34)
MCHC RBC-ENTMCNC: 33.5 GM/DL — SIGNIFICANT CHANGE UP (ref 32–36)
MCV RBC AUTO: 99.1 FL — SIGNIFICANT CHANGE UP (ref 80–100)
MONOCYTES # BLD AUTO: 0.73 K/UL — SIGNIFICANT CHANGE UP (ref 0–0.9)
MONOCYTES NFR BLD AUTO: 5.6 % — SIGNIFICANT CHANGE UP (ref 2–14)
NEUTROPHILS # BLD AUTO: 9.58 K/UL — HIGH (ref 1.8–7.4)
NEUTROPHILS NFR BLD AUTO: 74 % — SIGNIFICANT CHANGE UP (ref 43–77)
PLATELET # BLD AUTO: 350 K/UL — SIGNIFICANT CHANGE UP (ref 150–400)
POTASSIUM SERPL-MCNC: 3.6 MMOL/L — SIGNIFICANT CHANGE UP (ref 3.5–5.3)
POTASSIUM SERPL-SCNC: 3.6 MMOL/L — SIGNIFICANT CHANGE UP (ref 3.5–5.3)
RBC # BLD: 4.49 M/UL — SIGNIFICANT CHANGE UP (ref 3.8–5.2)
RBC # FLD: 12.8 % — SIGNIFICANT CHANGE UP (ref 10.3–14.5)
SODIUM SERPL-SCNC: 137 MMOL/L — SIGNIFICANT CHANGE UP (ref 135–145)
WBC # BLD: 12.95 K/UL — HIGH (ref 3.8–10.5)
WBC # FLD AUTO: 12.95 K/UL — HIGH (ref 3.8–10.5)

## 2024-04-30 PROCEDURE — 93010 ELECTROCARDIOGRAM REPORT: CPT

## 2024-04-30 RX ORDER — ACETAMINOPHEN 500 MG
650 TABLET ORAL ONCE
Refills: 0 | Status: COMPLETED | OUTPATIENT
Start: 2024-04-30 | End: 2024-04-30

## 2024-04-30 RX ORDER — EZETIMIBE 10 MG/1
1 TABLET ORAL
Refills: 0 | DISCHARGE

## 2024-04-30 RX ORDER — TOPIRAMATE 25 MG
2 TABLET ORAL
Qty: 0 | Refills: 0 | DISCHARGE

## 2024-04-30 RX ORDER — METOPROLOL TARTRATE 50 MG
25 TABLET ORAL ONCE
Refills: 0 | Status: COMPLETED | OUTPATIENT
Start: 2024-04-30 | End: 2024-04-30

## 2024-04-30 RX ORDER — SODIUM CHLORIDE 9 MG/ML
250 INJECTION INTRAMUSCULAR; INTRAVENOUS; SUBCUTANEOUS ONCE
Refills: 0 | Status: COMPLETED | OUTPATIENT
Start: 2024-04-30 | End: 2024-04-30

## 2024-04-30 RX ORDER — ASPIRIN/CALCIUM CARB/MAGNESIUM 324 MG
81 TABLET ORAL ONCE
Refills: 0 | Status: COMPLETED | OUTPATIENT
Start: 2024-04-30 | End: 2024-04-30

## 2024-04-30 RX ORDER — METOPROLOL TARTRATE 50 MG
1 TABLET ORAL
Qty: 0 | Refills: 0 | DISCHARGE

## 2024-04-30 RX ORDER — ROSUVASTATIN CALCIUM 5 MG/1
1 TABLET ORAL
Qty: 0 | Refills: 0 | DISCHARGE

## 2024-04-30 RX ORDER — ZOLPIDEM TARTRATE 10 MG/1
5 TABLET ORAL AT BEDTIME
Refills: 0 | Status: DISCONTINUED | OUTPATIENT
Start: 2024-04-30 | End: 2024-05-01

## 2024-04-30 RX ORDER — ZOLPIDEM TARTRATE 10 MG/1
1 TABLET ORAL
Qty: 0 | Refills: 0 | DISCHARGE

## 2024-04-30 RX ORDER — ROSUVASTATIN CALCIUM 5 MG/1
1 TABLET ORAL
Refills: 0 | DISCHARGE

## 2024-04-30 RX ORDER — OXYCODONE HYDROCHLORIDE 5 MG/1
1 TABLET ORAL
Qty: 0 | Refills: 0 | DISCHARGE

## 2024-04-30 RX ORDER — TOCILIZUMAB 20 MG/ML
162 INJECTION, SOLUTION, CONCENTRATE INTRAVENOUS
Qty: 0 | Refills: 0 | DISCHARGE

## 2024-04-30 RX ORDER — METOPROLOL TARTRATE 50 MG
25 TABLET ORAL
Refills: 0 | Status: DISCONTINUED | OUTPATIENT
Start: 2024-04-30 | End: 2024-05-01

## 2024-04-30 RX ORDER — LEVOTHYROXINE SODIUM 125 MCG
100 TABLET ORAL DAILY
Refills: 0 | Status: DISCONTINUED | OUTPATIENT
Start: 2024-04-30 | End: 2024-05-01

## 2024-04-30 RX ORDER — PANTOPRAZOLE SODIUM 20 MG/1
1 TABLET, DELAYED RELEASE ORAL
Refills: 0 | DISCHARGE

## 2024-04-30 RX ORDER — GABAPENTIN 400 MG/1
0 CAPSULE ORAL
Qty: 0 | Refills: 0 | DISCHARGE

## 2024-04-30 RX ORDER — GUANFACINE 3 MG/1
1 TABLET, EXTENDED RELEASE ORAL
Qty: 0 | Refills: 0 | DISCHARGE

## 2024-04-30 RX ORDER — ASPIRIN/CALCIUM CARB/MAGNESIUM 324 MG
1 TABLET ORAL
Qty: 0 | Refills: 0 | DISCHARGE

## 2024-04-30 RX ORDER — ATORVASTATIN CALCIUM 80 MG/1
80 TABLET, FILM COATED ORAL AT BEDTIME
Refills: 0 | Status: DISCONTINUED | OUTPATIENT
Start: 2024-04-30 | End: 2024-05-01

## 2024-04-30 RX ORDER — VENLAFAXINE HCL 75 MG
2 CAPSULE, EXT RELEASE 24 HR ORAL
Qty: 0 | Refills: 0 | DISCHARGE

## 2024-04-30 RX ORDER — CLOPIDOGREL BISULFATE 75 MG/1
1 TABLET, FILM COATED ORAL
Qty: 90 | Refills: 0
Start: 2024-04-30

## 2024-04-30 RX ORDER — PANTOPRAZOLE SODIUM 20 MG/1
40 TABLET, DELAYED RELEASE ORAL
Refills: 0 | Status: DISCONTINUED | OUTPATIENT
Start: 2024-04-30 | End: 2024-05-01

## 2024-04-30 RX ORDER — CLOPIDOGREL BISULFATE 75 MG/1
1 TABLET, FILM COATED ORAL
Qty: 30 | Refills: 0
Start: 2024-04-30

## 2024-04-30 RX ORDER — VENLAFAXINE HCL 75 MG
300 CAPSULE, EXT RELEASE 24 HR ORAL DAILY
Refills: 0 | Status: DISCONTINUED | OUTPATIENT
Start: 2024-04-30 | End: 2024-05-01

## 2024-04-30 RX ORDER — GABAPENTIN 400 MG/1
600 CAPSULE ORAL DAILY
Refills: 0 | Status: DISCONTINUED | OUTPATIENT
Start: 2024-04-30 | End: 2024-05-01

## 2024-04-30 RX ORDER — CLOPIDOGREL BISULFATE 75 MG/1
75 TABLET, FILM COATED ORAL DAILY
Refills: 0 | Status: DISCONTINUED | OUTPATIENT
Start: 2024-05-01 | End: 2024-05-01

## 2024-04-30 RX ORDER — ASPIRIN/CALCIUM CARB/MAGNESIUM 324 MG
1 TABLET ORAL
Qty: 0 | Refills: 0 | DISCHARGE
Start: 2024-04-30

## 2024-04-30 RX ORDER — LEVOTHYROXINE SODIUM 125 MCG
1 TABLET ORAL
Qty: 0 | Refills: 0 | DISCHARGE

## 2024-04-30 RX ORDER — ASPIRIN/CALCIUM CARB/MAGNESIUM 324 MG
81 TABLET ORAL DAILY
Refills: 0 | Status: DISCONTINUED | OUTPATIENT
Start: 2024-05-01 | End: 2024-05-01

## 2024-04-30 RX ORDER — NITROFURANTOIN MACROCRYSTAL 50 MG
1 CAPSULE ORAL
Refills: 0 | DISCHARGE

## 2024-04-30 RX ADMIN — GABAPENTIN 600 MILLIGRAM(S): 400 CAPSULE ORAL at 21:37

## 2024-04-30 RX ADMIN — Medication 300 MILLIGRAM(S): at 21:36

## 2024-04-30 RX ADMIN — Medication 25 MILLIGRAM(S): at 12:10

## 2024-04-30 RX ADMIN — ZOLPIDEM TARTRATE 5 MILLIGRAM(S): 10 TABLET ORAL at 21:37

## 2024-04-30 RX ADMIN — Medication 81 MILLIGRAM(S): at 12:10

## 2024-04-30 RX ADMIN — Medication 25 MILLIGRAM(S): at 17:34

## 2024-04-30 RX ADMIN — ATORVASTATIN CALCIUM 80 MILLIGRAM(S): 80 TABLET, FILM COATED ORAL at 21:37

## 2024-04-30 RX ADMIN — SODIUM CHLORIDE 250 MILLILITER(S): 9 INJECTION INTRAMUSCULAR; INTRAVENOUS; SUBCUTANEOUS at 16:43

## 2024-04-30 RX ADMIN — ZOLPIDEM TARTRATE 5 MILLIGRAM(S): 10 TABLET ORAL at 20:30

## 2024-04-30 RX ADMIN — Medication 650 MILLIGRAM(S): at 16:30

## 2024-04-30 RX ADMIN — SODIUM CHLORIDE 250 MILLILITER(S): 9 INJECTION INTRAMUSCULAR; INTRAVENOUS; SUBCUTANEOUS at 12:10

## 2024-04-30 NOTE — ASU PATIENT PROFILE, ADULT - NSICDXPASTMEDICALHX_GEN_ALL_CORE_FT
same as HCP PAST MEDICAL HISTORY:  ADHD (attention deficit hyperactivity disorder)     HLD (hyperlipidemia)     Hypothyroid     Migraine     Myocardial infarct 2012    Rheumatoid arthritis     Stented coronary artery 2012

## 2024-04-30 NOTE — DISCHARGE NOTE PROVIDER - NSDCMRMEDTOKEN_GEN_ALL_CORE_FT
Ambien 10 mg oral tablet: 1 tab(s) orally once a day (at bedtime), As Needed  aspirin 81 mg oral delayed release tablet: 1 tab(s) orally once a day  clopidogrel 75 mg oral tablet: 1 tab(s) orally once a day  Effexor  mg oral capsule, extended release: 2 cap(s) orally once a day  enalapril 2.5 mg oral tablet: 1 tab(s) orally once a day  levothyroxine 100 mcg (0.1 mg) oral tablet: 1 tab(s) orally once a day  Macrobid 100 mg oral capsule: 1 cap(s) orally once a day  metFORMIN 500 mg oral tablet: 1 tab(s) orally 2 times a day  metoprolol tartrate 25 mg oral tablet: 1 tab(s) orally 2 times a day  Neurontin 300 mg oral capsule: orally 2 times a day  oxyCODONE 10 mg oral tablet, extended release: 1 tab(s) orally every 12 hours  pantoprazole 40 mg oral delayed release tablet: 1 tab(s) orally once a day  Protonix 40 mg oral delayed release tablet: 1 tab(s) orally once a day  rosuvastatin 40 mg oral capsule: 1 cap(s) orally once a day (at bedtime)  Zetia 10 mg oral tablet: 1 tab(s) orally once a day   Ambien 10 mg oral tablet: 1 tab(s) orally once a day (at bedtime), As Needed  aspirin 81 mg oral delayed release tablet: 1 tab(s) orally once a day  clopidogrel 75 mg oral tablet: 1 tab(s) orally once a day  Effexor  mg oral capsule, extended release: 2 cap(s) orally once a day  enalapril 2.5 mg oral tablet: 1 tab(s) orally once a day  levothyroxine 100 mcg (0.1 mg) oral tablet: 1 tab(s) orally once a day  Macrobid 100 mg oral capsule: 1 cap(s) orally once a day  metFORMIN 500 mg oral tablet: 1 tab(s) orally 2 times a day hold for 48 hours  metoprolol tartrate 25 mg oral tablet: 1 tab(s) orally 2 times a day  Neurontin 300 mg oral capsule: orally 2 times a day  oxyCODONE 10 mg oral tablet, extended release: 1 tab(s) orally every 12 hours  pantoprazole 40 mg oral delayed release tablet: 1 tab(s) orally once a day  rosuvastatin 40 mg oral capsule: 1 cap(s) orally once a day (at bedtime)  Zetia 10 mg oral tablet: 1 tab(s) orally once a day

## 2024-04-30 NOTE — DISCHARGE NOTE PROVIDER - NSDCFUSCHEDAPPT_GEN_ALL_CORE_FT
[facility]  [address]  Scheduled Appointment: 05/06/2024    Kadi Cohen  [facility]  [address]  Scheduled Appointment: 06/10/2024

## 2024-04-30 NOTE — DISCHARGE NOTE PROVIDER - NSDCCPCAREPLAN_GEN_ALL_CORE_FT
PRINCIPAL DISCHARGE DIAGNOSIS  Diagnosis: CAD S/P percutaneous coronary angioplasty  Assessment and Plan of Treatment: Coronary artery disease is the buildup of plaque in the arteries that supply oxygen-rich blood to your heart. Plaque causes a narrowing or blockage that could result in a heart attack. Symptoms include chest pain or discomfort, shortness of breath, dizziness, palpitations, fatigue or reduced exercise tolerance. .Go to the ED with any acute onset of chest pain, palpitations, shortness of breath or dizziness. Do NOT miss a dose or stop taking your Aspirin and Plavix, these medications keep your stent open and prevent a heart attack. If anyone tells you to stop these medications, speak to your cardiologist immediately.Managing risk factors will help keep your stent open and prevent future blockages, risk factors may include: high blood pressure, high cholesterol, diabetes, obesity, sedentary life style and smoking.  Your diet should be low in fat, cholesterol, salt and carbohydrates, increase fruits (caution if diabetic), vegetables and whole grains/fiber rich foods. Take all your cardiac  medications as prescribed. Exercise is a very important factor in heart health. Once your post procedure restrictions have passed, you should engage in heart healthy, aerobic exercise. Be sure to have clearance from your cardiologist. Cardiac rehab programs could be extremely beneficial and your cardiologist could help set this up. Follow up with your cardiologist within 1-2 weeks after your procedure. Call your cardiologist or our unit ([phone]) with any questions or concerns that may arise.      SECONDARY DISCHARGE DIAGNOSES  Diagnosis: Hyperlipidemia  Assessment and Plan of Treatment: Your diet should be low in fat, cholesterol, salt and carbohydrates, increase fruits (caution if diabetic), vegetables and whole grains/fiber rich foods. Take your cholesterol lowering medications as prescribed. Routine follow up lipid panels

## 2024-04-30 NOTE — DISCHARGE NOTE PROVIDER - CARE PROVIDER_API CALL
Jesus Snyder  Interventional Cardiology  [address] [place] [address], [address]  [place] [address]  Phone: [phone]  Fax: [phone]  Follow Up Time:

## 2024-04-30 NOTE — DISCHARGE NOTE PROVIDER - HOSPITAL COURSE
69 year old female with a PMHx of CAD, HTN, HLD, NSTEMI in 2011 s/p ELIZABETH to LAD. Patient then underwent a repeat LHC 08/2014 which demonstrated non-obstructive CAD. Patient's NST from 5/2021 showed normal myocardial perfusion imaging with no evidence of pharmacologic-induced myocardial ischemia or infarct, EF 73%. Patient's carotid ultrasound showed bilateral mild to moderate plaque without significant stenosis. Patient's recent NST now shows new moderate sized, moderate severity, apical, fixed defect consistent with territory typical of mLAD which was not present previously.       s/p LHC via RRA with Dr. Snyder tolerated procedure well. Pt arrived to recovery in NAD and HDS, RRA access site stable, no bleed/hematoma, distal pulse +,   Intraprocedural: Versed 4 mg Fentanyl 150 ucg Plavix 300 mg  Findings: LCx disease s/p ELIZABETH Benjamin 3.0 x 34mm to LCx  Post Cath EKG: SR with no acute changes    69 year old female with a PMHx of CAD, HTN, HLD, NSTEMI in 2011 s/p ELIZABETH to LAD. Patient then underwent a repeat LHC 08/2014 which demonstrated non-obstructive CAD. Patient's NST from 5/2021 showed normal myocardial perfusion imaging with no evidence of pharmacologic-induced myocardial ischemia or infarct, EF 73%. Patient's carotid ultrasound showed bilateral mild to moderate plaque without significant stenosis. Patient's recent NST now shows new moderate sized, moderate severity, apical, fixed defect consistent with territory typical of mLAD which was not present previously.     Now POD #1 s/p PCI with ELIZABETH X1 LCx via right radial.  No complications.  DAPT

## 2024-04-30 NOTE — DISCHARGE NOTE PROVIDER - NSDCCPTREATMENT_GEN_ALL_CORE_FT
PRINCIPAL PROCEDURE  Procedure: Left heart cardiac cath  Findings and Treatment: Go to the ED with any acute onset of chest pain, palpitations, shortness of breath or dizziness.   Managing risk factors will help prevent future blockages, risk factors may include: high blood pressure, high cholesterol, obesity, sedentary life style and smoking.    Your diet should be low in fat, cholesterol, salt and carbohydrates, increase fruits (caution if diabetic), vegetables and whole grains/fiber rich foods.   Take all your cardiac  medications as prescribed.    Restricted use with no heavy lifting of affected arm for 48 hours.  No submerging the arm in water for 48 hours.  You may start showering today.  Call your doctor for any bleeding, swelling, loss of sensation in the hand or fingers, or fingers turning blue.  If heavy bleeding or large lumps form, hold pressure at the spot and come to the Emergency Room.  access  Exercise is a very important factor in heart health. Once your post procedure restrictions have passed, you should engage in heart healthy, aerobic exercise. Be sure to have clearance from your cardiologist. Cardiac rehab programs could be extremely beneficial and your cardiologist could help set this up.   Follow up with your cardiologist within 1-2 weeks after your procedure.   Call your cardiologist or our unit ([phone]) with any questions or concerns that may arise.

## 2024-04-30 NOTE — CHART NOTE - NSCHARTNOTEFT_GEN_A_CORE
Now s/p LHC via RRA with Dr. Snyder tolerated procedure well. Pt arrived to recovery in NAD and HDS, RRA access site stable, no bleed/hematoma, distal pulse +,   Intraprocedural: Versed 4 mg Fentanyl 150 ucg Plavix 300 mg  Findings: LCx disease s/p ELIZABETH [place] 3.0 x 34mm to LCx  Post Cath EKG: SR with no acute changes    Plan:  -Formal cath report pending  -Post procedure management/monitoring per protocol  -Access site precautions  -Radial compression band removal at 1630  -Bedrest with HOB 45, may ambulate after band off and site stable  -Labs and EKG in am  -NS 0.9% 250ml/hr x 1 bolus: post procedure KELECHI ppx   -Repeat ECG if any clinical indication or change on tele  -Continue current medical therapy  -Dual anti platelet therapy with aspirin/plavix  -Cont BB with Metoprolol 25 mg po BID  -Cont statin therapy with Lipitor 80 mg po qHS, Crestor 40 mg on discharge and Zetia 10 mg  -Educated regarding strict adherence with DAPT   -Educated regarding post procedure management and care  -Discussed the importance of RF modification  -Cardiac rehab info provided/referral and communication to cardiac rehab completed  -F/U outpt in 1-2 weeks with Cardiologist Dr. Snyder  -DISPO: Plan for D/C in am if remains HDS, ECG and labs in am stable and without complications

## 2024-05-01 ENCOUNTER — TRANSCRIPTION ENCOUNTER (OUTPATIENT)
Age: 69
End: 2024-05-01

## 2024-05-01 VITALS
RESPIRATION RATE: 18 BRPM | DIASTOLIC BLOOD PRESSURE: 75 MMHG | SYSTOLIC BLOOD PRESSURE: 127 MMHG | TEMPERATURE: 98 F | OXYGEN SATURATION: 95 % | HEART RATE: 75 BPM

## 2024-05-01 LAB
ANION GAP SERPL CALC-SCNC: 12 MMOL/L — SIGNIFICANT CHANGE UP (ref 5–17)
BASOPHILS # BLD AUTO: 0.07 K/UL — SIGNIFICANT CHANGE UP (ref 0–0.2)
BASOPHILS NFR BLD AUTO: 0.8 % — SIGNIFICANT CHANGE UP (ref 0–2)
BUN SERPL-MCNC: 15.6 MG/DL — SIGNIFICANT CHANGE UP (ref 8–20)
CALCIUM SERPL-MCNC: 8.9 MG/DL — SIGNIFICANT CHANGE UP (ref 8.4–10.5)
CHLORIDE SERPL-SCNC: 103 MMOL/L — SIGNIFICANT CHANGE UP (ref 96–108)
CO2 SERPL-SCNC: 27 MMOL/L — SIGNIFICANT CHANGE UP (ref 22–29)
CREAT SERPL-MCNC: 0.79 MG/DL — SIGNIFICANT CHANGE UP (ref 0.5–1.3)
EGFR: 80 ML/MIN/1.73M2 — SIGNIFICANT CHANGE UP
EOSINOPHIL # BLD AUTO: 0.17 K/UL — SIGNIFICANT CHANGE UP (ref 0–0.5)
EOSINOPHIL NFR BLD AUTO: 2 % — SIGNIFICANT CHANGE UP (ref 0–6)
GLUCOSE SERPL-MCNC: 155 MG/DL — HIGH (ref 70–99)
HCT VFR BLD CALC: 40.1 % — SIGNIFICANT CHANGE UP (ref 34.5–45)
HGB BLD-MCNC: 13.1 G/DL — SIGNIFICANT CHANGE UP (ref 11.5–15.5)
IMM GRANULOCYTES NFR BLD AUTO: 0.5 % — SIGNIFICANT CHANGE UP (ref 0–0.9)
LYMPHOCYTES # BLD AUTO: 2.14 K/UL — SIGNIFICANT CHANGE UP (ref 1–3.3)
LYMPHOCYTES # BLD AUTO: 24.7 % — SIGNIFICANT CHANGE UP (ref 13–44)
MCHC RBC-ENTMCNC: 32.7 GM/DL — SIGNIFICANT CHANGE UP (ref 32–36)
MCHC RBC-ENTMCNC: 33.2 PG — SIGNIFICANT CHANGE UP (ref 27–34)
MCV RBC AUTO: 101.8 FL — HIGH (ref 80–100)
MONOCYTES # BLD AUTO: 0.5 K/UL — SIGNIFICANT CHANGE UP (ref 0–0.9)
MONOCYTES NFR BLD AUTO: 5.8 % — SIGNIFICANT CHANGE UP (ref 2–14)
NEUTROPHILS # BLD AUTO: 5.74 K/UL — SIGNIFICANT CHANGE UP (ref 1.8–7.4)
NEUTROPHILS NFR BLD AUTO: 66.2 % — SIGNIFICANT CHANGE UP (ref 43–77)
PLATELET # BLD AUTO: 313 K/UL — SIGNIFICANT CHANGE UP (ref 150–400)
POTASSIUM SERPL-MCNC: 4 MMOL/L — SIGNIFICANT CHANGE UP (ref 3.5–5.3)
POTASSIUM SERPL-SCNC: 4 MMOL/L — SIGNIFICANT CHANGE UP (ref 3.5–5.3)
RBC # BLD: 3.94 M/UL — SIGNIFICANT CHANGE UP (ref 3.8–5.2)
RBC # FLD: 12.9 % — SIGNIFICANT CHANGE UP (ref 10.3–14.5)
SODIUM SERPL-SCNC: 142 MMOL/L — SIGNIFICANT CHANGE UP (ref 135–145)
WBC # BLD: 8.66 K/UL — SIGNIFICANT CHANGE UP (ref 3.8–10.5)
WBC # FLD AUTO: 8.66 K/UL — SIGNIFICANT CHANGE UP (ref 3.8–10.5)

## 2024-05-01 PROCEDURE — C1769: CPT

## 2024-05-01 PROCEDURE — C1725: CPT

## 2024-05-01 PROCEDURE — 36415 COLL VENOUS BLD VENIPUNCTURE: CPT

## 2024-05-01 PROCEDURE — C1874: CPT

## 2024-05-01 PROCEDURE — C1894: CPT

## 2024-05-01 PROCEDURE — 93005 ELECTROCARDIOGRAM TRACING: CPT

## 2024-05-01 PROCEDURE — 92978 ENDOLUMINL IVUS OCT C 1ST: CPT | Mod: LC

## 2024-05-01 PROCEDURE — 83735 ASSAY OF MAGNESIUM: CPT

## 2024-05-01 PROCEDURE — C1753: CPT

## 2024-05-01 PROCEDURE — 85025 COMPLETE CBC W/AUTO DIFF WBC: CPT

## 2024-05-01 PROCEDURE — C1887: CPT

## 2024-05-01 PROCEDURE — 80048 BASIC METABOLIC PNL TOTAL CA: CPT

## 2024-05-01 PROCEDURE — 93010 ELECTROCARDIOGRAM REPORT: CPT

## 2024-05-01 PROCEDURE — 93458 L HRT ARTERY/VENTRICLE ANGIO: CPT | Mod: 59

## 2024-05-01 PROCEDURE — C9600: CPT | Mod: LC

## 2024-05-01 RX ORDER — ACETAMINOPHEN 500 MG
650 TABLET ORAL ONCE
Refills: 0 | Status: COMPLETED | OUTPATIENT
Start: 2024-05-01 | End: 2024-05-01

## 2024-05-01 RX ORDER — ASPIRIN/CALCIUM CARB/MAGNESIUM 324 MG
1 TABLET ORAL
Qty: 90 | Refills: 3
Start: 2024-05-01 | End: 2025-04-25

## 2024-05-01 RX ORDER — METFORMIN HYDROCHLORIDE 850 MG/1
1 TABLET ORAL
Qty: 0 | Refills: 0 | DISCHARGE

## 2024-05-01 RX ORDER — CLOPIDOGREL BISULFATE 75 MG/1
1 TABLET, FILM COATED ORAL
Qty: 90 | Refills: 3
Start: 2024-05-01 | End: 2025-04-25

## 2024-05-01 RX ORDER — PANTOPRAZOLE SODIUM 20 MG/1
1 TABLET, DELAYED RELEASE ORAL
Qty: 0 | Refills: 0 | DISCHARGE

## 2024-05-01 RX ADMIN — Medication 25 MILLIGRAM(S): at 05:10

## 2024-05-01 RX ADMIN — PANTOPRAZOLE SODIUM 40 MILLIGRAM(S): 20 TABLET, DELAYED RELEASE ORAL at 05:09

## 2024-05-01 RX ADMIN — Medication 2.5 MILLIGRAM(S): at 05:09

## 2024-05-01 RX ADMIN — Medication 650 MILLIGRAM(S): at 10:05

## 2024-05-01 RX ADMIN — Medication 100 MICROGRAM(S): at 05:09

## 2024-05-01 RX ADMIN — Medication 650 MILLIGRAM(S): at 09:05

## 2024-05-01 RX ADMIN — CLOPIDOGREL BISULFATE 75 MILLIGRAM(S): 75 TABLET, FILM COATED ORAL at 09:05

## 2024-05-01 RX ADMIN — Medication 81 MILLIGRAM(S): at 09:05

## 2024-05-01 NOTE — PROGRESS NOTE ADULT - ASSESSMENT
POD #1 s/p PCI with ELIZABETH X1 LCx via right radial artery.  No complications    -radial precautions reviewed  -initiated DAPT-asa/plavix  -cont other meds  -f/u Dr. Snyder May 9th  -discharge to home

## 2024-05-01 NOTE — DISCHARGE NOTE NURSING/CASE MANAGEMENT/SOCIAL WORK - PATIENT PORTAL LINK FT
You can access the FollowMyHealth Patient Portal offered by [facility] by registering at the following website: [email or web address]. By joining [employer]’s FollowMyHealth portal, you will also be able to view your health information using other applications (apps) compatible with our system.

## 2024-05-01 NOTE — PROGRESS NOTE ADULT - SUBJECTIVE AND OBJECTIVE BOX
Department of Cardiology                                                                  [facility]/[place] [id number]                                                            Telephone: [phone]. Fax:[phone]                                                                             Progress Note      HPI:  69 year old female with a PMHx of CAD, HTN, HLD, NSTEMI in 2011 s/p ELIZABETH to LAD. Patient then underwent a repeat LHC 08/2014 which demonstrated non-obstructive CAD. Patient's NST from 5/2021 showed normal myocardial perfusion imaging with no evidence of pharmacologic-induced myocardial ischemia or infarct, EF 73%. Patient's carotid ultrasound showed bilateral mild to moderate plaque without significant stenosis. Patient's recent NST now shows new moderate sized, moderate severity, apical, fixed defect consistent with territory typical of mLAD which was not present previously.   Now POD #1 s/p PCI wiht ELIZABETH X1 LCX and patent LAD stent.  Denies chest pain and SOB        ROS: as stated above, otherwise negative    PHYSICAL EXAM:  Constitutional: A & O x 3  HEENT:   Normal oral mucosa, PERRL, EOMI	  Cardiovascular: Normal S1 S2, No JVD, No murmurs, No edema  Respiratory: Lungs clear to auscultation	  Gastrointestinal:  Soft, Non-tender, + BS	  Skin: No rashes, No ecchymoses, No cyanosis  Neurologic: Non-focal  Extremities: Normal range of motion, No clubbing, cyanosis or edema  Vascular: Peripheral pulses palpable 2+ bilaterally  Site check: Right radial dressing removed.  No bleeding/hematoma.  + ecchymosis.  + radial pulse    EKG: No acute changes  Tele reviewed    Vital Signs Last 24 Hrs  T(C): 36.7 (01 May 2024 08:53), Max: 36.9 (30 Apr 2024 20:28)  T(F): 98 (01 May 2024 08:53), Max: 98.4 (30 Apr 2024 20:28)  HR: 75 (01 May 2024 08:53) (75 - 110)  BP: 127/75 (01 May 2024 08:53) (102/73 - 150/98)  BP(mean): --  RR: 18 (01 May 2024 08:53) (16 - 18)  SpO2: 95% (01 May 2024 08:53) (95% - 97%)    Parameters below as of 01 May 2024 08:53  Patient On (Oxygen Delivery Method): room air    LABS                        13.1   8.66  )-----------( 313      ( 01 May 2024 05:24 )             40.1   05-01    142  |  103  |  15.6  ----------------------------<  155<H>  4.0   |  27.0  |  0.79    Ca    8.9      01 May 2024 05:24  Mg     1.9     04-30

## 2024-05-06 ENCOUNTER — APPOINTMENT (OUTPATIENT)
Dept: RHEUMATOLOGY | Facility: CLINIC | Age: 69
End: 2024-05-06
Payer: MEDICARE

## 2024-05-06 VITALS
HEART RATE: 101 BPM | OXYGEN SATURATION: 96 % | SYSTOLIC BLOOD PRESSURE: 120 MMHG | TEMPERATURE: 97.9 F | DIASTOLIC BLOOD PRESSURE: 82 MMHG | RESPIRATION RATE: 16 BRPM

## 2024-05-06 VITALS
HEART RATE: 90 BPM | SYSTOLIC BLOOD PRESSURE: 105 MMHG | OXYGEN SATURATION: 95 % | DIASTOLIC BLOOD PRESSURE: 67 MMHG | RESPIRATION RATE: 16 BRPM

## 2024-05-06 PROCEDURE — 96413 CHEMO IV INFUSION 1 HR: CPT

## 2024-05-06 RX ORDER — TOCILIZUMAB 20 MG/ML
400 INJECTION, SOLUTION, CONCENTRATE INTRAVENOUS
Qty: 1 | Refills: 0 | Status: COMPLETED | OUTPATIENT
Start: 2023-11-27

## 2024-05-06 NOTE — HISTORY OF PRESENT ILLNESS
[Yes] : Yes [8] : 8 [Denies] : Denies [No] : No [N/A] : N/A [Declined] : Declined [Informed consent documented in EHR.] : Informed consent documented in EHR. [TB] : Tuberculosis screening [Hep acute panel] : Hepatitis acute panel [Total Hep B core AB] : total Hepatitis B Core antibody [de-identified] : GENERALIZED JOINT PAIN [de-identified] : Patient's first infusion patient tolerated well and left in NAD. Patient on Actemra pen before and tolerated medication well. [Left upper extremity] : Left upper extremity [24g] : 24g [Start Time: ___] : Medication Start Time: [unfilled] [End Time: ___] : Medication End Time: [unfilled] [Medication Name: ___] : Medication Name: [unfilled] [Total Amount Administered: ___] : Total Amount Administered: [unfilled] [IV discontinued. Intact. No signs or symptoms of IV complications noted. Time: ___] : IV discontinued. Intact. No signs or symptoms of IV complications noted. Time: [unfilled] [Patient  instructed to seek medical attention with signs and symptoms of adverse effects] : Patient  instructed to seek medical attention with signs and symptoms of adverse effects [Patient left unit in no acute distress] : Patient left unit in no acute distress [Medications administered as ordered and tolerated well.] : Medications administered as ordered and tolerated well. [Outside pharmacy] : Outside pharmacy [de-identified] : 2:55pm

## 2024-05-08 RX ORDER — METHYLPREDNISOLONE 4 MG/1
4 TABLET ORAL
Qty: 60 | Refills: 1 | Status: ACTIVE | COMMUNITY
Start: 2024-05-08 | End: 1900-01-01

## 2024-05-31 RX ORDER — TOCILIZUMAB 180 MG/ML
162 INJECTION, SOLUTION SUBCUTANEOUS
Qty: 4 | Refills: 5 | Status: DISCONTINUED | COMMUNITY
Start: 2022-06-24 | End: 2024-05-31

## 2024-05-31 RX ORDER — TOCILIZUMAB 20 MG/ML
400 INJECTION, SOLUTION, CONCENTRATE INTRAVENOUS
Refills: 0 | Status: ACTIVE | OUTPATIENT
Start: 2024-05-31 | End: 1900-01-01

## 2024-05-31 RX ORDER — TOCILIZUMAB 180 MG/ML
162 INJECTION, SOLUTION SUBCUTANEOUS
Qty: 4 | Refills: 5 | Status: DISCONTINUED | COMMUNITY
Start: 2023-10-12 | End: 2024-05-31

## 2024-05-31 RX ORDER — TOCILIZUMAB 180 MG/ML
162 INJECTION, SOLUTION SUBCUTANEOUS
Qty: 2 | Refills: 5 | Status: DISCONTINUED | COMMUNITY
Start: 2022-05-23 | End: 2024-05-31

## 2024-06-03 ENCOUNTER — APPOINTMENT (OUTPATIENT)
Dept: RHEUMATOLOGY | Facility: CLINIC | Age: 69
End: 2024-06-03

## 2024-06-04 ENCOUNTER — APPOINTMENT (OUTPATIENT)
Dept: RHEUMATOLOGY | Facility: CLINIC | Age: 69
End: 2024-06-04
Payer: MEDICARE

## 2024-06-04 VITALS
RESPIRATION RATE: 15 BRPM | HEART RATE: 93 BPM | OXYGEN SATURATION: 97 % | DIASTOLIC BLOOD PRESSURE: 72 MMHG | SYSTOLIC BLOOD PRESSURE: 121 MMHG | TEMPERATURE: 97.9 F

## 2024-06-04 VITALS
RESPIRATION RATE: 16 BRPM | DIASTOLIC BLOOD PRESSURE: 76 MMHG | TEMPERATURE: 98.1 F | SYSTOLIC BLOOD PRESSURE: 112 MMHG | HEART RATE: 80 BPM | OXYGEN SATURATION: 95 %

## 2024-06-04 PROCEDURE — 96413 CHEMO IV INFUSION 1 HR: CPT

## 2024-06-04 RX ORDER — TOCILIZUMAB 20 MG/ML
400 INJECTION, SOLUTION, CONCENTRATE INTRAVENOUS
Qty: 1 | Refills: 0 | Status: COMPLETED | OUTPATIENT
Start: 2024-05-31

## 2024-06-04 NOTE — HISTORY OF PRESENT ILLNESS
[Denies] : Denies [No] : No [Yes] : Yes [Informed consent documented in EHR.] : Informed consent documented in EHR. [TB] : Tuberculosis screening [Total Hep B core AB] : total Hepatitis B Core antibody [Total Hep B core Ag] : total Hepatitis B Core antigen [Left upper extremity] : Left upper extremity [24g] : 24g [Start Time: ___] : Medication Start Time: [unfilled] [End Time: ___] : Medication End Time: [unfilled] [Medication Name: ___] : Medication Name: [unfilled] [Total Amount Administered: ___] : Total Amount Administered: [unfilled] [IV discontinued. Intact. No signs or symptoms of IV complications noted. Time: ___] : IV discontinued. Intact. No signs or symptoms of IV complications noted. Time: [unfilled] [Patient  instructed to seek medical attention with signs and symptoms of adverse effects] : Patient  instructed to seek medical attention with signs and symptoms of adverse effects [Patient left unit in no acute distress] : Patient left unit in no acute distress [Medications administered as ordered and tolerated well.] : Medications administered as ordered and tolerated well. [de-identified] : 2:37pm

## 2024-06-10 ENCOUNTER — APPOINTMENT (OUTPATIENT)
Dept: RHEUMATOLOGY | Facility: CLINIC | Age: 69
End: 2024-06-10

## 2024-06-10 LAB
ALBUMIN SERPL ELPH-MCNC: 4.8 G/DL
ALP BLD-CCNC: 99 U/L
ALT SERPL-CCNC: 44 U/L
ANION GAP SERPL CALC-SCNC: 15 MMOL/L
AST SERPL-CCNC: 36 U/L
BASOPHILS # BLD AUTO: 0.08 K/UL
BASOPHILS NFR BLD AUTO: 1 %
BILIRUB SERPL-MCNC: 0.6 MG/DL
BUN SERPL-MCNC: 16 MG/DL
CALCIUM SERPL-MCNC: 9.6 MG/DL
CHLORIDE SERPL-SCNC: 103 MMOL/L
CO2 SERPL-SCNC: 23 MMOL/L
CREAT SERPL-MCNC: 1.16 MG/DL
CRP SERPL-MCNC: 5 MG/L
EGFR: 51 ML/MIN/1.73M2
EOSINOPHIL # BLD AUTO: 0.13 K/UL
EOSINOPHIL NFR BLD AUTO: 1.6 %
ERYTHROCYTE [SEDIMENTATION RATE] IN BLOOD BY WESTERGREN METHOD: 19 MM/HR
GLUCOSE SERPL-MCNC: 138 MG/DL
HCT VFR BLD CALC: 42.3 %
HGB BLD-MCNC: 13.7 G/DL
IMM GRANULOCYTES NFR BLD AUTO: 0.5 %
LYMPHOCYTES # BLD AUTO: 2.23 K/UL
LYMPHOCYTES NFR BLD AUTO: 26.6 %
MAN DIFF?: NORMAL
MCHC RBC-ENTMCNC: 32.4 GM/DL
MCHC RBC-ENTMCNC: 32.5 PG
MCV RBC AUTO: 100.2 FL
MONOCYTES # BLD AUTO: 0.64 K/UL
MONOCYTES NFR BLD AUTO: 7.6 %
NEUTROPHILS # BLD AUTO: 5.26 K/UL
NEUTROPHILS NFR BLD AUTO: 62.7 %
PLATELET # BLD AUTO: 230 K/UL
POTASSIUM SERPL-SCNC: 3.9 MMOL/L
PROT SERPL-MCNC: 7.1 G/DL
RBC # BLD: 4.22 M/UL
RBC # FLD: 13.2 %
SODIUM SERPL-SCNC: 141 MMOL/L
WBC # FLD AUTO: 8.38 K/UL

## 2024-06-19 RX ORDER — PANTOPRAZOLE 40 MG/1
40 TABLET, DELAYED RELEASE ORAL
Qty: 90 | Refills: 0 | Status: ACTIVE | COMMUNITY
Start: 2021-07-02 | End: 1900-01-01

## 2024-06-25 RX ORDER — TOCILIZUMAB 20 MG/ML
400 INJECTION, SOLUTION, CONCENTRATE INTRAVENOUS
Qty: 1 | Refills: 0 | Status: ACTIVE | COMMUNITY
Start: 2023-10-23

## 2024-06-28 DIAGNOSIS — Z00.00 ENCOUNTER FOR GENERAL ADULT MEDICAL EXAMINATION W/OUT ABNORMAL FINDINGS: ICD-10-CM

## 2024-06-28 DIAGNOSIS — M06.9 RHEUMATOID ARTHRITIS, UNSPECIFIED: ICD-10-CM

## 2024-07-03 ENCOUNTER — APPOINTMENT (OUTPATIENT)
Dept: RHEUMATOLOGY | Facility: CLINIC | Age: 69
End: 2024-07-03
Payer: MEDICARE

## 2024-07-03 ENCOUNTER — MED ADMIN CHARGE (OUTPATIENT)
Age: 69
End: 2024-07-03

## 2024-07-03 VITALS
SYSTOLIC BLOOD PRESSURE: 115 MMHG | TEMPERATURE: 97.7 F | OXYGEN SATURATION: 97 % | HEART RATE: 95 BPM | RESPIRATION RATE: 15 BRPM | DIASTOLIC BLOOD PRESSURE: 75 MMHG

## 2024-07-03 VITALS
RESPIRATION RATE: 16 BRPM | HEART RATE: 90 BPM | SYSTOLIC BLOOD PRESSURE: 120 MMHG | DIASTOLIC BLOOD PRESSURE: 74 MMHG | OXYGEN SATURATION: 96 %

## 2024-07-03 PROCEDURE — 96413 CHEMO IV INFUSION 1 HR: CPT

## 2024-07-03 PROCEDURE — 36415 COLL VENOUS BLD VENIPUNCTURE: CPT

## 2024-07-22 ENCOUNTER — APPOINTMENT (OUTPATIENT)
Dept: FAMILY MEDICINE | Facility: CLINIC | Age: 69
End: 2024-07-22
Payer: MEDICARE

## 2024-07-22 VITALS
HEART RATE: 92 BPM | BODY MASS INDEX: 34.32 KG/M2 | WEIGHT: 206 LBS | HEIGHT: 65 IN | OXYGEN SATURATION: 97 % | DIASTOLIC BLOOD PRESSURE: 84 MMHG | SYSTOLIC BLOOD PRESSURE: 132 MMHG

## 2024-07-22 DIAGNOSIS — L30.9 DERMATITIS, UNSPECIFIED: ICD-10-CM

## 2024-07-22 DIAGNOSIS — E66.9 OBESITY, UNSPECIFIED: ICD-10-CM

## 2024-07-22 DIAGNOSIS — E11.9 TYPE 2 DIABETES MELLITUS W/OUT COMPLICATIONS: ICD-10-CM

## 2024-07-22 DIAGNOSIS — E03.9 HYPOTHYROIDISM, UNSPECIFIED: ICD-10-CM

## 2024-07-22 DIAGNOSIS — I25.10 ATHEROSCLEROTIC HEART DISEASE OF NATIVE CORONARY ARTERY W/OUT ANGINA PECTORIS: ICD-10-CM

## 2024-07-22 DIAGNOSIS — Z12.39 ENCOUNTER FOR OTHER SCREENING FOR MALIGNANT NEOPLASM OF BREAST: ICD-10-CM

## 2024-07-22 PROCEDURE — 99214 OFFICE O/P EST MOD 30 MIN: CPT

## 2024-07-22 PROCEDURE — 36415 COLL VENOUS BLD VENIPUNCTURE: CPT

## 2024-07-22 RX ORDER — CLOTRIMAZOLE AND BETAMETHASONE DIPROPIONATE 10; .5 MG/G; MG/G
1-0.05 CREAM TOPICAL TWICE DAILY
Qty: 1 | Refills: 1 | Status: ACTIVE | COMMUNITY
Start: 2024-07-22 | End: 1900-01-01

## 2024-07-23 DIAGNOSIS — R71.8 OTHER ABNORMALITY OF RED BLOOD CELLS: ICD-10-CM

## 2024-07-23 RX ORDER — AMITRIPTYLINE HYDROCHLORIDE 25 MG/1
25 TABLET, FILM COATED ORAL
Qty: 30 | Refills: 0 | Status: ACTIVE | COMMUNITY
Start: 2024-07-18

## 2024-07-23 RX ORDER — CLOPIDOGREL 75 MG/1
75 TABLET, FILM COATED ORAL
Refills: 0 | Status: ACTIVE | COMMUNITY

## 2024-07-23 NOTE — PHYSICAL EXAM
[Supple] : supple [Normal] : normal rate, regular rhythm, normal S1 and S2 and no murmur heard [Coordination Grossly Intact] : coordination grossly intact [No Focal Deficits] : no focal deficits [Normal Gait] : normal gait [Normal Affect] : the affect was normal [Normal Insight/Judgement] : insight and judgment were intact [de-identified] : bilateral axilla with maculopapular rash with erythema

## 2024-07-23 NOTE — PHYSICAL EXAM
[Supple] : supple [Normal] : normal rate, regular rhythm, normal S1 and S2 and no murmur heard [Coordination Grossly Intact] : coordination grossly intact [No Focal Deficits] : no focal deficits [Normal Gait] : normal gait [Normal Affect] : the affect was normal [Normal Insight/Judgement] : insight and judgment were intact [de-identified] : bilateral axilla with maculopapular rash with erythema

## 2024-07-23 NOTE — PLAN
[FreeTextEntry1] : Patient had recent PCI by cardiologist, will request records.  Patient notes drive from [place] is a little further than she realized and wanted a recommendation for PCP close to home. I made a few suggestions.

## 2024-07-23 NOTE — HISTORY OF PRESENT ILLNESS
[FreeTextEntry1] : Follow up medical conditions [de-identified] : Patient is here to follow up on DM and Hypothyroidism. She is also complaining of rash under armpit that she believes was triggered by stonger antiperspirant. She notes it was very itchy but slightly better now.

## 2024-07-23 NOTE — HISTORY OF PRESENT ILLNESS
[FreeTextEntry1] : Follow up medical conditions [de-identified] : Patient is here to follow up on DM and Hypothyroidism. She is also complaining of rash under armpit that she believes was triggered by stonger antiperspirant. She notes it was very itchy but slightly better now.

## 2024-07-23 NOTE — PLAN
[FreeTextEntry1] : Patient had recent PCI by cardiologist, will request records.  Patient notes drive from [place] is a little further than she realized and wanted a recommendation for PCP close to home. I made a few suggestions.

## 2024-07-24 RX ORDER — CLOBETASOL PROPIONATE 0.5 MG/ML
0.05 SOLUTION TOPICAL DAILY
Qty: 1 | Refills: 0 | Status: ACTIVE | COMMUNITY
Start: 2024-07-24 | End: 1900-01-01

## 2024-07-25 ENCOUNTER — APPOINTMENT (OUTPATIENT)
Dept: RHEUMATOLOGY | Facility: CLINIC | Age: 69
End: 2024-07-25
Payer: MEDICARE

## 2024-07-25 VITALS
SYSTOLIC BLOOD PRESSURE: 134 MMHG | HEART RATE: 63 BPM | HEIGHT: 65 IN | DIASTOLIC BLOOD PRESSURE: 82 MMHG | OXYGEN SATURATION: 98 %

## 2024-07-25 DIAGNOSIS — G89.29 PAIN IN LEFT SHOULDER: ICD-10-CM

## 2024-07-25 DIAGNOSIS — M47.812 SPONDYLOSIS W/OUT MYELOPATHY OR RADICULOPATHY, CERVICAL REGION: ICD-10-CM

## 2024-07-25 DIAGNOSIS — M06.9 RHEUMATOID ARTHRITIS, UNSPECIFIED: ICD-10-CM

## 2024-07-25 DIAGNOSIS — M25.512 PAIN IN LEFT SHOULDER: ICD-10-CM

## 2024-07-25 PROCEDURE — 20610 DRAIN/INJ JOINT/BURSA W/O US: CPT | Mod: LT

## 2024-07-25 PROCEDURE — 99214 OFFICE O/P EST MOD 30 MIN: CPT | Mod: 25

## 2024-07-25 RX ORDER — METHYLPRED ACET/NACL,ISO-OS/PF 40 MG/ML
40 VIAL (ML) INJECTION
Qty: 0 | Refills: 0 | Status: COMPLETED | OUTPATIENT
Start: 2024-07-25

## 2024-07-25 RX ADMIN — METHYLPREDNISOLONE ACETATE 0 MG/ML: 40 INJECTION, SUSPENSION INTRA-ARTICULAR; INTRALESIONAL; INTRAMUSCULAR; SOFT TISSUE at 00:00

## 2024-07-26 LAB
25(OH)D3 SERPL-MCNC: 37 NG/ML
ESTIMATED AVERAGE GLUCOSE: 151 MG/DL
HBA1C MFR BLD HPLC: 6.9 %

## 2024-07-27 RX ORDER — METHYLPRED ACET/NACL,ISO-OS/PF 40 MG/ML
40 VIAL (ML) INJECTION
Qty: 0 | Refills: 0 | Status: COMPLETED | OUTPATIENT
Start: 2024-07-27

## 2024-07-27 RX ADMIN — METHYLPREDNISOLONE ACETATE 0 MG/ML: 40 INJECTION, SUSPENSION INTRA-ARTICULAR; INTRALESIONAL; INTRAMUSCULAR; SOFT TISSUE at 00:00

## 2024-07-27 NOTE — HISTORY OF PRESENT ILLNESS
[FreeTextEntry1] : 68 yo woman with history of depression, DM, HLD, CAD ( s/p stent) ,MI, migraines, chronic GERD and seronegative nonerosive RA previously followed by DR Haddad.     patient has tried multiple agents in the past  MTX caused elevated LFTs SSZ had a rash  enbrel, REMICADE, Humira and Orencia gave minimal relief   b/l knee replacement shoulder surgery CTS surgery   today: Patient has been on Actemra 400 ( 4mg/kg) for 3 months.  she sees great improvement however she still have swelling over the mcps and wrist.  she has notice improvement in morning stiffness.  she no longer requires steroids.   --patient has an indetermined QuantiFERON x2 while on steroids, NOW QuantiFERON negative. normal CXR, asymptomatic   --patient is also on amitriptyline 25 and gabapentin 300 bid with some improvement in her fibromyalgia  --following with pain management ( Dr Lane)  --patient has pain in her feet right more than left: pain located around to he posterior tibialis with some swelling.   --she also has a left shoudler pain since yesterday.  she does not recall any trauma but thinks she made a bad move and since eher left shouder is very tendernes . difficulty with abduction and internal rotation.   --she compalins of feet burning and pain and is awaiting EMGS by pain management     she was seen by ortho who requested emg which showed peroneal neuropathy b/l.  states that gabapentin gives her pain relief.

## 2024-07-27 NOTE — PROCEDURE
[Other Date:___] : Date: [unfilled] [Patient] : the patient [Consent Obtained] : written consent was obtained prior to the procedure and is detailed in the patient's record [Therapeutic] : therapeutic [#1 Site: ______] : #1 site identified in the [unfilled] [Ethyl Chloride] : ethyl chloride [Chlorhexidine] : chlorhexidine [25 gauge 1.5 inch] : A 25 gauge 1.5 inch needle was used [___ml 1% Lidocaine] : [unfilled] ml of 1% lidocaine [Depomedrol ___ mg] : Depomedrol [unfilled] mg [Tolerated Well] : the patient tolerated the procedure well [No Complications] : there were no complications [Instructions Given] : handouts/patient instructions were given to patient [Patient Instructed to Call] : patient was instructed to call if redness at site, a decrease in range of motion or an increase in pain is noted after procedure.

## 2024-07-27 NOTE — ASSESSMENT
[FreeTextEntry1] : 68 yo woman with obesity, DM, HLD, migraines, depression, GERD and seronegative RA.  Her depression seems to be getting in the way of her follow up.  she was lost to follow up and RA had worsened.  she s set up to start Actemra and now doing much better. She was QuantiFERON indetermined and now it is negative. she has left shoulder pain with most likley rotator cuff syndrome  she also had left posterior tibilalis tendinopathy    --will increase Actemra to 800mg every once  --drug monitoring labs prior to next visit  --Xray left shoulder and cervical spine  --start PT for shoulders ----left shoulder injection today  --dexa ordered  --DM newly dx started on metformin

## 2024-07-27 NOTE — HISTORY OF PRESENT ILLNESS
[FreeTextEntry1] : 66 yo woman with history of depression, DM, HLD, CAD ( s/p stent) ,MI, migraines, chronic GERD and seronegative nonerosive RA previously followed by DR Haddad.     patient has tried multiple agents in the past  MTX caused elevated LFTs SSZ had a rash  enbrel, REMICADE, Humira and Orencia gave minimal relief   b/l knee replacement shoulder surgery CTS surgery   today: Patient has been on Actemra 400 ( 4mg/kg) for 3 months.  she sees great improvement however she still have swelling over the mcps and wrist.  she has notice improvement in morning stiffness.  she no longer requires steroids.   --patient has an indetermined QuantiFERON x2 while on steroids, NOW QuantiFERON negative. normal CXR, asymptomatic   --patient is also on amitriptyline 25 and gabapentin 300 bid with some improvement in her fibromyalgia  --following with pain management ( Dr Lane)  --patient has pain in her feet right more than left: pain located around to he posterior tibialis with some swelling.   --she also has a left shoudler pain since yesterday.  she does not recall any trauma but thinks she made a bad move and since eher left shouder is very tendernes . difficulty with abduction and internal rotation.   --she compalins of feet burning and pain and is awaiting EMGS by pain management     she was seen by ortho who requested emg which showed peroneal neuropathy b/l.  states that gabapentin gives her pain relief.

## 2024-07-27 NOTE — ASSESSMENT
[FreeTextEntry1] : 70 yo woman with obesity, DM, HLD, migraines, depression, GERD and seronegative RA.  Her depression seems to be getting in the way of her follow up.  she was lost to follow up and RA had worsened.  she s set up to start Actemra and now doing much better. She was QuantiFERON indetermined and now it is negative. she has left shoulder pain with most likley rotator cuff syndrome  she also had left posterior tibilalis tendinopathy    --will increase Actemra to 800mg every once  --drug monitoring labs prior to next visit  --Xray left shoulder and cervical spine  --start PT for shoulders ----left shoulder injection today  --dexa ordered  --DM newly dx started on metformin

## 2024-08-08 ENCOUNTER — APPOINTMENT (OUTPATIENT)
Dept: RHEUMATOLOGY | Facility: CLINIC | Age: 69
End: 2024-08-08

## 2024-08-08 ENCOUNTER — MED ADMIN CHARGE (OUTPATIENT)
Age: 69
End: 2024-08-08

## 2024-08-08 PROCEDURE — 96413 CHEMO IV INFUSION 1 HR: CPT

## 2024-08-08 RX ORDER — TOCILIZUMAB 20 MG/ML
400 INJECTION, SOLUTION, CONCENTRATE INTRAVENOUS
Qty: 1 | Refills: 0 | Status: COMPLETED | OUTPATIENT
Start: 2024-08-01

## 2024-08-30 ENCOUNTER — APPOINTMENT (OUTPATIENT)
Dept: RHEUMATOLOGY | Facility: CLINIC | Age: 69
End: 2024-08-30
Payer: MEDICARE

## 2024-08-30 VITALS
TEMPERATURE: 97.3 F | OXYGEN SATURATION: 98 % | HEART RATE: 108 BPM | HEIGHT: 65 IN | DIASTOLIC BLOOD PRESSURE: 82 MMHG | SYSTOLIC BLOOD PRESSURE: 141 MMHG

## 2024-08-30 LAB
BASOPHILS # BLD AUTO: 0.09 K/UL
BASOPHILS NFR BLD AUTO: 0.6 %
EOSINOPHIL # BLD AUTO: 0.07 K/UL
EOSINOPHIL NFR BLD AUTO: 0.5 %
HCT VFR BLD CALC: 48.2 %
HGB BLD-MCNC: 15.4 G/DL
IMM GRANULOCYTES NFR BLD AUTO: 0.5 %
LYMPHOCYTES # BLD AUTO: 1.24 K/UL
LYMPHOCYTES NFR BLD AUTO: 8.4 %
MAN DIFF?: NORMAL
MCHC RBC-ENTMCNC: 32 GM/DL
MCHC RBC-ENTMCNC: 33.2 PG
MCV RBC AUTO: 103.9 FL
MONOCYTES # BLD AUTO: 0.99 K/UL
MONOCYTES NFR BLD AUTO: 6.7 %
NEUTROPHILS # BLD AUTO: 12.39 K/UL
NEUTROPHILS NFR BLD AUTO: 83.3 %
PLATELET # BLD AUTO: 193 K/UL
RBC # BLD: 4.64 M/UL
RBC # FLD: 13.3 %
WBC # FLD AUTO: 14.85 K/UL

## 2024-08-30 PROCEDURE — 99214 OFFICE O/P EST MOD 30 MIN: CPT

## 2024-08-30 RX ORDER — METHYLPREDNISOLONE 4 MG/1
4 TABLET ORAL
Qty: 1 | Refills: 0 | Status: ACTIVE | COMMUNITY
Start: 2024-08-30 | End: 1900-01-01

## 2024-08-30 RX ORDER — DICLOFENAC SODIUM 10 MG/G
1 GEL TOPICAL DAILY
Qty: 2 | Refills: 1 | Status: ACTIVE | COMMUNITY
Start: 2024-08-30 | End: 1900-01-01

## 2024-08-30 NOTE — PHYSICAL EXAM
[General Appearance - Well Nourished] : well nourished [General Appearance - Well Developed] : well developed [Sclera] : the sclera and conjunctiva were normal [Hearing Threshold Finger Rub Not [place]] : hearing was normal [Affect] : the affect was normal [Mood] : the mood was normal [Nail Clubbing] : no clubbing  or cyanosis of the fingernails [Motor Tone] : muscle strength and tone were normal

## 2024-08-30 NOTE — PHYSICAL EXAM
[General Appearance - Well Nourished] : well nourished [General Appearance - Well Developed] : well developed [Sclera] : the sclera and conjunctiva were normal [Hearing Threshold Finger Rub Not [place]] : hearing was normal [Affect] : the affect was normal [Mood] : the mood was normal [Nail Clubbing] : no clubbing  or cyanosis of the fingernails [Motor Tone] : muscle strength and tone were normal

## 2024-08-31 LAB
ALBUMIN SERPL ELPH-MCNC: 5.2 G/DL
ALP BLD-CCNC: 113 U/L
ALT SERPL-CCNC: 26 U/L
ANION GAP SERPL CALC-SCNC: 13 MMOL/L
AST SERPL-CCNC: 25 U/L
BILIRUB SERPL-MCNC: 0.7 MG/DL
BUN SERPL-MCNC: 17 MG/DL
CALCIUM SERPL-MCNC: 9.9 MG/DL
CHLORIDE SERPL-SCNC: 100 MMOL/L
CO2 SERPL-SCNC: 25 MMOL/L
CREAT SERPL-MCNC: 0.97 MG/DL
CRP SERPL-MCNC: <3 MG/L
EGFR: 63 ML/MIN/1.73M2
ERYTHROCYTE [SEDIMENTATION RATE] IN BLOOD BY WESTERGREN METHOD: 2 MM/HR
GLUCOSE SERPL-MCNC: 149 MG/DL
POTASSIUM SERPL-SCNC: 4.5 MMOL/L
PROT SERPL-MCNC: 7.2 G/DL
SODIUM SERPL-SCNC: 138 MMOL/L

## 2024-09-05 ENCOUNTER — OUTPATIENT (OUTPATIENT)
Dept: OUTPATIENT SERVICES | Facility: HOSPITAL | Age: 69
LOS: 1 days | End: 2024-09-05
Payer: MEDICARE

## 2024-09-05 ENCOUNTER — APPOINTMENT (OUTPATIENT)
Dept: RHEUMATOLOGY | Facility: CLINIC | Age: 69
End: 2024-09-05
Payer: MEDICARE

## 2024-09-05 VITALS
TEMPERATURE: 97.6 F | OXYGEN SATURATION: 97 % | DIASTOLIC BLOOD PRESSURE: 83 MMHG | HEART RATE: 93 BPM | RESPIRATION RATE: 18 BRPM | SYSTOLIC BLOOD PRESSURE: 129 MMHG

## 2024-09-05 VITALS
OXYGEN SATURATION: 95 % | SYSTOLIC BLOOD PRESSURE: 128 MMHG | RESPIRATION RATE: 18 BRPM | HEART RATE: 83 BPM | DIASTOLIC BLOOD PRESSURE: 74 MMHG

## 2024-09-05 DIAGNOSIS — Z96.651 PRESENCE OF RIGHT ARTIFICIAL KNEE JOINT: Chronic | ICD-10-CM

## 2024-09-05 DIAGNOSIS — Z98.1 ARTHRODESIS STATUS: Chronic | ICD-10-CM

## 2024-09-05 DIAGNOSIS — M79.672 PAIN IN LEFT FOOT: ICD-10-CM

## 2024-09-05 DIAGNOSIS — Z98.890 OTHER SPECIFIED POSTPROCEDURAL STATES: Chronic | ICD-10-CM

## 2024-09-05 PROCEDURE — 73600 X-RAY EXAM OF ANKLE: CPT | Mod: 26,50

## 2024-09-05 PROCEDURE — 73630 X-RAY EXAM OF FOOT: CPT | Mod: 26,50

## 2024-09-05 PROCEDURE — 96413 CHEMO IV INFUSION 1 HR: CPT

## 2024-09-05 NOTE — HISTORY OF PRESENT ILLNESS
[Denies] : Denies [No] : No [Yes] : Yes [Declined] : Declined [Informed consent documented in EHR.] : Informed consent documented in EHR. [TB] : Tuberculosis screening [Hep acute panel] : Hepatitis acute panel [Total Hep B core AB] : total Hepatitis B Core antibody [Left upper extremity] : Left upper extremity [24g] : 24g [Start Time: ___] : Medication Start Time: [unfilled] [End Time: ___] : Medication End Time: [unfilled] [Medication Name: ___] : Medication Name: [unfilled] [Total Amount Administered: ___] : Total Amount Administered: [unfilled] [IV discontinued. Intact. No signs or symptoms of IV complications noted. Time: ___] : IV discontinued. Intact. No signs or symptoms of IV complications noted. Time: [unfilled] [Patient  instructed to seek medical attention with signs and symptoms of adverse effects] : Patient  instructed to seek medical attention with signs and symptoms of adverse effects [Patient left unit in no acute distress] : Patient left unit in no acute distress [Medications administered as ordered and tolerated well.] : Medications administered as ordered and tolerated well. [de-identified] : 415pm

## 2024-09-06 ENCOUNTER — APPOINTMENT (OUTPATIENT)
Age: 69
End: 2024-09-06
Payer: MEDICARE

## 2024-09-06 DIAGNOSIS — M06.9 RHEUMATOID ARTHRITIS, UNSPECIFIED: ICD-10-CM

## 2024-09-06 DIAGNOSIS — M19.90 UNSPECIFIED OSTEOARTHRITIS, UNSPECIFIED SITE: ICD-10-CM

## 2024-09-06 DIAGNOSIS — E11.9 TYPE 2 DIABETES MELLITUS W/OUT COMPLICATIONS: ICD-10-CM

## 2024-09-06 PROCEDURE — 99204 OFFICE O/P NEW MOD 45 MIN: CPT

## 2024-09-06 NOTE — HISTORY OF PRESENT ILLNESS
[Sneakers] : arabella [FreeTextEntry1] : GOOD  is a 69 year old female recently diagnosed with diabetes, last A1C 6.7. Presents in the office with c/o foot and ankle pain b/l. She states pain is on her feet and describes pain as stabbing burning pain. She denies trauma. Right foot is worse than left

## 2024-09-06 NOTE — PHYSICAL EXAM
[General Appearance - Alert] : alert [Ankle Swelling On The Right] : mild [1+] : left foot dorsalis pedis 1+ [Skin Lesions] : no skin lesions [Oriented To Time, Place, And Person] : oriented to person, place, and time [de-identified] : Pain at the right PT tendon insertion and TN Joint.  Pain over lateral ankle gutter left

## 2024-09-06 NOTE — ASSESSMENT
[FreeTextEntry1] : Discussed etiology and treatment options with patient.  She is aware of the rheumatoid arthritis affecting all of her joints.  And she will be sent for an MRI of the right foot and left ankle.  The patient is advised not to take anti-inflammatories prior to the MRI and also prescription for a Lidoderm patch was given for her current pain.  She will follow-up after the MRI for further evaluation.

## 2024-09-09 ENCOUNTER — RX CHANGE (OUTPATIENT)
Age: 69
End: 2024-09-09

## 2024-09-09 RX ORDER — LIDOCAINE 5% 700 MG/1
5 PATCH TOPICAL
Qty: 30 | Refills: 2 | Status: ACTIVE | COMMUNITY
Start: 2024-09-06 | End: 1900-01-01

## 2024-09-10 ENCOUNTER — RX RENEWAL (OUTPATIENT)
Age: 69
End: 2024-09-10

## 2024-09-11 ENCOUNTER — APPOINTMENT (OUTPATIENT)
Dept: MRI IMAGING | Facility: CLINIC | Age: 69
End: 2024-09-11

## 2024-09-11 ENCOUNTER — OUTPATIENT (OUTPATIENT)
Dept: OUTPATIENT SERVICES | Facility: HOSPITAL | Age: 69
LOS: 1 days | End: 2024-09-11
Payer: MEDICARE

## 2024-09-11 DIAGNOSIS — Z98.890 OTHER SPECIFIED POSTPROCEDURAL STATES: Chronic | ICD-10-CM

## 2024-09-11 DIAGNOSIS — Z96.651 PRESENCE OF RIGHT ARTIFICIAL KNEE JOINT: Chronic | ICD-10-CM

## 2024-09-11 DIAGNOSIS — Z98.1 ARTHRODESIS STATUS: Chronic | ICD-10-CM

## 2024-09-11 DIAGNOSIS — Z00.8 ENCOUNTER FOR OTHER GENERAL EXAMINATION: ICD-10-CM

## 2024-09-11 PROCEDURE — 72148 MRI LUMBAR SPINE W/O DYE: CPT | Mod: 26

## 2024-09-11 NOTE — HISTORY OF PRESENT ILLNESS
----- Message from Zak Ring MD sent at 9/9/2024 10:44 PM CDT -----  No concerning nodules. One year follow up US recommended.    [Denies] : Denies [No] : No [Yes] : Yes [Declined] : Declined [Informed consent documented in EHR.] : Informed consent documented in EHR. [TB] : Tuberculosis screening [Total Hep B core AB] : total Hepatitis B Core antibody [Left upper extremity] : Left upper extremity [24g] : 24g [Start Time: ___] : Medication Start Time: [unfilled] [End Time: ___] : Medication End Time: [unfilled] [Medication Name: ___] : Medication Name: [unfilled] [Total Amount Administered: ___] : Total Amount Administered: [unfilled] [IV discontinued. Intact. No signs or symptoms of IV complications noted. Time: ___] : IV discontinued. Intact. No signs or symptoms of IV complications noted. Time: [unfilled] [Patient  instructed to seek medical attention with signs and symptoms of adverse effects] : Patient  instructed to seek medical attention with signs and symptoms of adverse effects [Patient left unit in no acute distress] : Patient left unit in no acute distress [Medications administered as ordered and tolerated well.] : Medications administered as ordered and tolerated well. [de-identified] : 12:05pm

## 2024-09-12 ENCOUNTER — APPOINTMENT (OUTPATIENT)
Dept: MRI IMAGING | Facility: CLINIC | Age: 69
End: 2024-09-12

## 2024-09-12 ENCOUNTER — RESULT REVIEW (OUTPATIENT)
Age: 69
End: 2024-09-12

## 2024-09-12 DIAGNOSIS — D72.829 ELEVATED WHITE BLOOD CELL COUNT, UNSPECIFIED: ICD-10-CM

## 2024-09-12 PROCEDURE — 73721 MRI JNT OF LWR EXTRE W/O DYE: CPT | Mod: RT,76

## 2024-09-16 ENCOUNTER — RX CHANGE (OUTPATIENT)
Age: 69
End: 2024-09-16

## 2024-09-16 PROBLEM — D72.829 LEUKOCYTOSIS: Status: ACTIVE | Noted: 2024-09-16

## 2024-09-16 RX ADMIN — TOCILIZUMAB 0 MG/20ML: 20 INJECTION, SOLUTION, CONCENTRATE INTRAVENOUS at 00:00

## 2024-09-18 ENCOUNTER — RESULT REVIEW (OUTPATIENT)
Age: 69
End: 2024-09-18

## 2024-09-18 ENCOUNTER — INPATIENT (INPATIENT)
Facility: HOSPITAL | Age: 69
LOS: 11 days | Discharge: NURSING, REHAB OR LONG-TERM CARE | DRG: 872 | End: 2024-09-30
Attending: INTERNAL MEDICINE | Admitting: HOSPITALIST
Payer: MEDICARE

## 2024-09-18 VITALS
HEIGHT: 65 IN | RESPIRATION RATE: 24 BRPM | SYSTOLIC BLOOD PRESSURE: 82 MMHG | TEMPERATURE: 98 F | DIASTOLIC BLOOD PRESSURE: 41 MMHG | HEART RATE: 123 BPM | WEIGHT: 199.96 LBS | OXYGEN SATURATION: 91 %

## 2024-09-18 DIAGNOSIS — Z98.1 ARTHRODESIS STATUS: Chronic | ICD-10-CM

## 2024-09-18 DIAGNOSIS — Z96.651 PRESENCE OF RIGHT ARTIFICIAL KNEE JOINT: Chronic | ICD-10-CM

## 2024-09-18 DIAGNOSIS — A41.9 SEPSIS, UNSPECIFIED ORGANISM: ICD-10-CM

## 2024-09-18 DIAGNOSIS — Z98.890 OTHER SPECIFIED POSTPROCEDURAL STATES: Chronic | ICD-10-CM

## 2024-09-18 LAB
ACETONE SERPL-MCNC: NEGATIVE — SIGNIFICANT CHANGE UP
ALBUMIN SERPL ELPH-MCNC: 2.9 G/DL — LOW (ref 3.3–5.2)
ALBUMIN SERPL ELPH-MCNC: 3.2 G/DL — LOW (ref 3.3–5.2)
ALBUMIN SERPL ELPH-MCNC: 3.7 G/DL — SIGNIFICANT CHANGE UP (ref 3.3–5.2)
ALP SERPL-CCNC: 68 U/L — SIGNIFICANT CHANGE UP (ref 40–120)
ALP SERPL-CCNC: 72 U/L — SIGNIFICANT CHANGE UP (ref 40–120)
ALP SERPL-CCNC: 87 U/L — SIGNIFICANT CHANGE UP (ref 40–120)
ALT FLD-CCNC: 25 U/L — SIGNIFICANT CHANGE UP
ALT FLD-CCNC: 62 U/L — HIGH
ALT FLD-CCNC: 64 U/L — HIGH
ANION GAP SERPL CALC-SCNC: 15 MMOL/L — SIGNIFICANT CHANGE UP (ref 5–17)
ANION GAP SERPL CALC-SCNC: 16 MMOL/L — SIGNIFICANT CHANGE UP (ref 5–17)
ANION GAP SERPL CALC-SCNC: 17 MMOL/L — SIGNIFICANT CHANGE UP (ref 5–17)
ANISOCYTOSIS BLD QL: SLIGHT — SIGNIFICANT CHANGE UP
APPEARANCE UR: CLEAR — SIGNIFICANT CHANGE UP
APPEARANCE UR: CLEAR — SIGNIFICANT CHANGE UP
APTT BLD: 26 SEC — SIGNIFICANT CHANGE UP (ref 24.5–35.6)
APTT BLD: 26.4 SEC — SIGNIFICANT CHANGE UP (ref 24.5–35.6)
AST SERPL-CCNC: 25 U/L — SIGNIFICANT CHANGE UP
AST SERPL-CCNC: 74 U/L — HIGH
AST SERPL-CCNC: 98 U/L — HIGH
BACTERIA # UR AUTO: ABNORMAL /HPF
BACTERIA # UR AUTO: NEGATIVE /HPF — SIGNIFICANT CHANGE UP
BASOPHILS # BLD AUTO: 0 K/UL — SIGNIFICANT CHANGE UP (ref 0–0.2)
BASOPHILS NFR BLD AUTO: 0 % — SIGNIFICANT CHANGE UP (ref 0–2)
BILIRUB SERPL-MCNC: 0.4 MG/DL — SIGNIFICANT CHANGE UP (ref 0.4–2)
BILIRUB SERPL-MCNC: 0.5 MG/DL — SIGNIFICANT CHANGE UP (ref 0.4–2)
BILIRUB SERPL-MCNC: 0.6 MG/DL — SIGNIFICANT CHANGE UP (ref 0.4–2)
BILIRUB UR-MCNC: NEGATIVE — SIGNIFICANT CHANGE UP
BILIRUB UR-MCNC: NEGATIVE — SIGNIFICANT CHANGE UP
BLD GP AB SCN SERPL QL: SIGNIFICANT CHANGE UP
BUN SERPL-MCNC: 15.7 MG/DL — SIGNIFICANT CHANGE UP (ref 8–20)
BUN SERPL-MCNC: 15.8 MG/DL — SIGNIFICANT CHANGE UP (ref 8–20)
BUN SERPL-MCNC: 16.9 MG/DL — SIGNIFICANT CHANGE UP (ref 8–20)
CALCIUM SERPL-MCNC: 7.7 MG/DL — LOW (ref 8.4–10.5)
CALCIUM SERPL-MCNC: 8.5 MG/DL — SIGNIFICANT CHANGE UP (ref 8.4–10.5)
CALCIUM SERPL-MCNC: 8.7 MG/DL — SIGNIFICANT CHANGE UP (ref 8.4–10.5)
CAST: 18 /LPF — HIGH (ref 0–4)
CAST: 4 /LPF — SIGNIFICANT CHANGE UP (ref 0–4)
CHLORIDE SERPL-SCNC: 101 MMOL/L — SIGNIFICANT CHANGE UP (ref 96–108)
CHLORIDE SERPL-SCNC: 104 MMOL/L — SIGNIFICANT CHANGE UP (ref 96–108)
CHLORIDE SERPL-SCNC: 104 MMOL/L — SIGNIFICANT CHANGE UP (ref 96–108)
CK SERPL-CCNC: 36 U/L — SIGNIFICANT CHANGE UP (ref 25–170)
CO2 SERPL-SCNC: 19 MMOL/L — LOW (ref 22–29)
CO2 SERPL-SCNC: 19 MMOL/L — LOW (ref 22–29)
CO2 SERPL-SCNC: 20 MMOL/L — LOW (ref 22–29)
COLOR SPEC: YELLOW — SIGNIFICANT CHANGE UP
COLOR SPEC: YELLOW — SIGNIFICANT CHANGE UP
CREAT SERPL-MCNC: 0.83 MG/DL — SIGNIFICANT CHANGE UP (ref 0.5–1.3)
CREAT SERPL-MCNC: 0.87 MG/DL — SIGNIFICANT CHANGE UP (ref 0.5–1.3)
CREAT SERPL-MCNC: 1.19 MG/DL — SIGNIFICANT CHANGE UP (ref 0.5–1.3)
DIFF PNL FLD: NEGATIVE — SIGNIFICANT CHANGE UP
DIFF PNL FLD: NEGATIVE — SIGNIFICANT CHANGE UP
EGFR: 49 ML/MIN/1.73M2 — LOW
EGFR: 72 ML/MIN/1.73M2 — SIGNIFICANT CHANGE UP
EGFR: 76 ML/MIN/1.73M2 — SIGNIFICANT CHANGE UP
EOSINOPHIL # BLD AUTO: 0 K/UL — SIGNIFICANT CHANGE UP (ref 0–0.5)
EOSINOPHIL NFR BLD AUTO: 0 % — SIGNIFICANT CHANGE UP (ref 0–6)
FLUAV AG NPH QL: SIGNIFICANT CHANGE UP
FLUBV AG NPH QL: SIGNIFICANT CHANGE UP
GAS PNL BLDA: SIGNIFICANT CHANGE UP
GAS PNL BLDV: SIGNIFICANT CHANGE UP
GAS PNL BLDV: SIGNIFICANT CHANGE UP
GIANT PLATELETS BLD QL SMEAR: PRESENT — SIGNIFICANT CHANGE UP
GLUCOSE BLDC GLUCOMTR-MCNC: 279 MG/DL — HIGH (ref 70–99)
GLUCOSE SERPL-MCNC: 231 MG/DL — HIGH (ref 70–99)
GLUCOSE SERPL-MCNC: 260 MG/DL — HIGH (ref 70–99)
GLUCOSE SERPL-MCNC: 263 MG/DL — HIGH (ref 70–99)
GLUCOSE UR QL: 250 MG/DL
GLUCOSE UR QL: NEGATIVE MG/DL — SIGNIFICANT CHANGE UP
HCT VFR BLD CALC: 39.6 % — SIGNIFICANT CHANGE UP (ref 34.5–45)
HCT VFR BLD CALC: 41.9 % — SIGNIFICANT CHANGE UP (ref 34.5–45)
HGB BLD-MCNC: 13.1 G/DL — SIGNIFICANT CHANGE UP (ref 11.5–15.5)
HGB BLD-MCNC: 14 G/DL — SIGNIFICANT CHANGE UP (ref 11.5–15.5)
INR BLD: 1.09 RATIO — SIGNIFICANT CHANGE UP (ref 0.85–1.18)
INR BLD: 1.19 RATIO — HIGH (ref 0.85–1.18)
KETONES UR-MCNC: ABNORMAL MG/DL
KETONES UR-MCNC: ABNORMAL MG/DL
LACTATE SERPL-SCNC: 5.2 MMOL/L — CRITICAL HIGH (ref 0.5–2)
LEGIONELLA AG UR QL: NEGATIVE — SIGNIFICANT CHANGE UP
LEUKOCYTE ESTERASE UR-ACNC: NEGATIVE — SIGNIFICANT CHANGE UP
LEUKOCYTE ESTERASE UR-ACNC: NEGATIVE — SIGNIFICANT CHANGE UP
LIDOCAIN IGE QN: 23 U/L — SIGNIFICANT CHANGE UP (ref 22–51)
LYMPHOCYTES # BLD AUTO: 0.13 K/UL — LOW (ref 1–3.3)
LYMPHOCYTES # BLD AUTO: 0.9 % — LOW (ref 13–44)
MACROCYTES BLD QL: SLIGHT — SIGNIFICANT CHANGE UP
MAGNESIUM SERPL-MCNC: 1.5 MG/DL — LOW (ref 1.6–2.6)
MAGNESIUM SERPL-MCNC: 2.2 MG/DL — SIGNIFICANT CHANGE UP (ref 1.6–2.6)
MANUAL SMEAR VERIFICATION: SIGNIFICANT CHANGE UP
MCHC RBC-ENTMCNC: 33.1 GM/DL — SIGNIFICANT CHANGE UP (ref 32–36)
MCHC RBC-ENTMCNC: 33.4 GM/DL — SIGNIFICANT CHANGE UP (ref 32–36)
MCHC RBC-ENTMCNC: 33.4 PG — SIGNIFICANT CHANGE UP (ref 27–34)
MCHC RBC-ENTMCNC: 33.8 PG — SIGNIFICANT CHANGE UP (ref 27–34)
MCV RBC AUTO: 100 FL — SIGNIFICANT CHANGE UP (ref 80–100)
MCV RBC AUTO: 102.1 FL — HIGH (ref 80–100)
METAMYELOCYTES # FLD: 2.6 % — HIGH (ref 0–0)
MONOCYTES # BLD AUTO: 0.49 K/UL — SIGNIFICANT CHANGE UP (ref 0–0.9)
MONOCYTES NFR BLD AUTO: 3.5 % — SIGNIFICANT CHANGE UP (ref 2–14)
MRSA PCR RESULT.: SIGNIFICANT CHANGE UP
MYELOCYTES NFR BLD: 1.8 % — HIGH (ref 0–0)
NEUTROPHILS # BLD AUTO: 12.65 K/UL — HIGH (ref 1.8–7.4)
NEUTROPHILS NFR BLD AUTO: 83.2 % — HIGH (ref 43–77)
NEUTS BAND # BLD: 7.1 % — SIGNIFICANT CHANGE UP (ref 0–8)
NITRITE UR-MCNC: NEGATIVE — SIGNIFICANT CHANGE UP
NITRITE UR-MCNC: NEGATIVE — SIGNIFICANT CHANGE UP
NT-PROBNP SERPL-SCNC: 1666 PG/ML — HIGH (ref 0–300)
NT-PROBNP SERPL-SCNC: 8471 PG/ML — HIGH (ref 0–300)
OSMOLALITY UR: 395 MOSM/KG — SIGNIFICANT CHANGE UP (ref 300–1000)
PH UR: 5.5 — SIGNIFICANT CHANGE UP (ref 5–8)
PH UR: 6 — SIGNIFICANT CHANGE UP (ref 5–8)
PHOSPHATE SERPL-MCNC: 3.4 MG/DL — SIGNIFICANT CHANGE UP (ref 2.4–4.7)
PHOSPHATE SERPL-MCNC: 3.6 MG/DL — SIGNIFICANT CHANGE UP (ref 2.4–4.7)
PLAT MORPH BLD: NORMAL — SIGNIFICANT CHANGE UP
PLATELET # BLD AUTO: 173 K/UL — SIGNIFICANT CHANGE UP (ref 150–400)
PLATELET # BLD AUTO: 187 K/UL — SIGNIFICANT CHANGE UP (ref 150–400)
POLYCHROMASIA BLD QL SMEAR: SLIGHT — SIGNIFICANT CHANGE UP
POTASSIUM SERPL-MCNC: 3.9 MMOL/L — SIGNIFICANT CHANGE UP (ref 3.5–5.3)
POTASSIUM SERPL-MCNC: 4.1 MMOL/L — SIGNIFICANT CHANGE UP (ref 3.5–5.3)
POTASSIUM SERPL-MCNC: 4.4 MMOL/L — SIGNIFICANT CHANGE UP (ref 3.5–5.3)
POTASSIUM SERPL-SCNC: 3.9 MMOL/L — SIGNIFICANT CHANGE UP (ref 3.5–5.3)
POTASSIUM SERPL-SCNC: 4.1 MMOL/L — SIGNIFICANT CHANGE UP (ref 3.5–5.3)
POTASSIUM SERPL-SCNC: 4.4 MMOL/L — SIGNIFICANT CHANGE UP (ref 3.5–5.3)
PROCALCITONIN SERPL-MCNC: >100 NG/ML — HIGH (ref 0.02–0.1)
PROT SERPL-MCNC: 4.5 G/DL — LOW (ref 6.6–8.7)
PROT SERPL-MCNC: 4.8 G/DL — LOW (ref 6.6–8.7)
PROT SERPL-MCNC: 5.6 G/DL — LOW (ref 6.6–8.7)
PROT UR-MCNC: 30 MG/DL
PROT UR-MCNC: 300 MG/DL
PROTHROM AB SERPL-ACNC: 12.1 SEC — SIGNIFICANT CHANGE UP (ref 9.5–13)
PROTHROM AB SERPL-ACNC: 13.1 SEC — HIGH (ref 9.5–13)
RAPID RVP RESULT: SIGNIFICANT CHANGE UP
RBC # BLD: 3.88 M/UL — SIGNIFICANT CHANGE UP (ref 3.8–5.2)
RBC # BLD: 4.19 M/UL — SIGNIFICANT CHANGE UP (ref 3.8–5.2)
RBC # FLD: 12.9 % — SIGNIFICANT CHANGE UP (ref 10.3–14.5)
RBC # FLD: 13.2 % — SIGNIFICANT CHANGE UP (ref 10.3–14.5)
RBC BLD AUTO: ABNORMAL
RBC CASTS # UR COMP ASSIST: 1 /HPF — SIGNIFICANT CHANGE UP (ref 0–4)
RBC CASTS # UR COMP ASSIST: 1 /HPF — SIGNIFICANT CHANGE UP (ref 0–4)
RSV RNA NPH QL NAA+NON-PROBE: SIGNIFICANT CHANGE UP
S AUREUS DNA NOSE QL NAA+PROBE: SIGNIFICANT CHANGE UP
S PNEUM AG UR QL: NEGATIVE — SIGNIFICANT CHANGE UP
SARS-COV-2 RNA SPEC QL NAA+PROBE: SIGNIFICANT CHANGE UP
SARS-COV-2 RNA SPEC QL NAA+PROBE: SIGNIFICANT CHANGE UP
SMUDGE CELLS # BLD: PRESENT — SIGNIFICANT CHANGE UP
SODIUM SERPL-SCNC: 137 MMOL/L — SIGNIFICANT CHANGE UP (ref 135–145)
SODIUM SERPL-SCNC: 139 MMOL/L — SIGNIFICANT CHANGE UP (ref 135–145)
SODIUM SERPL-SCNC: 139 MMOL/L — SIGNIFICANT CHANGE UP (ref 135–145)
SP GR SPEC: >1.03 — HIGH (ref 1–1.03)
SP GR SPEC: >1.03 — HIGH (ref 1–1.03)
SQUAMOUS # UR AUTO: 0 /HPF — SIGNIFICANT CHANGE UP (ref 0–5)
SQUAMOUS # UR AUTO: 1 /HPF — SIGNIFICANT CHANGE UP (ref 0–5)
TROPONIN T, HIGH SENSITIVITY RESULT: 14 NG/L — SIGNIFICANT CHANGE UP (ref 0–51)
TROPONIN T, HIGH SENSITIVITY RESULT: 15 NG/L — SIGNIFICANT CHANGE UP (ref 0–51)
UROBILINOGEN FLD QL: 0.2 MG/DL — SIGNIFICANT CHANGE UP (ref 0.2–1)
UROBILINOGEN FLD QL: 1 MG/DL — SIGNIFICANT CHANGE UP (ref 0.2–1)
VARIANT LYMPHS # BLD: 0.9 % — SIGNIFICANT CHANGE UP (ref 0–6)
WBC # BLD: 12 K/UL — HIGH (ref 3.8–10.5)
WBC # BLD: 14.01 K/UL — HIGH (ref 3.8–10.5)
WBC # FLD AUTO: 12 K/UL — HIGH (ref 3.8–10.5)
WBC # FLD AUTO: 14.01 K/UL — HIGH (ref 3.8–10.5)
WBC UR QL: 1 /HPF — SIGNIFICANT CHANGE UP (ref 0–5)
WBC UR QL: 1 /HPF — SIGNIFICANT CHANGE UP (ref 0–5)

## 2024-09-18 PROCEDURE — 36556 INSERT NON-TUNNEL CV CATH: CPT | Mod: GC

## 2024-09-18 PROCEDURE — 71045 X-RAY EXAM CHEST 1 VIEW: CPT | Mod: 26

## 2024-09-18 PROCEDURE — 74177 CT ABD & PELVIS W/CONTRAST: CPT | Mod: 26,MC

## 2024-09-18 PROCEDURE — 71045 X-RAY EXAM CHEST 1 VIEW: CPT | Mod: 26,77

## 2024-09-18 PROCEDURE — 99285 EMERGENCY DEPT VISIT HI MDM: CPT

## 2024-09-18 PROCEDURE — 93306 TTE W/DOPPLER COMPLETE: CPT | Mod: 26

## 2024-09-18 PROCEDURE — 99291 CRITICAL CARE FIRST HOUR: CPT | Mod: GC,25

## 2024-09-18 PROCEDURE — 71275 CT ANGIOGRAPHY CHEST: CPT | Mod: 26,MC

## 2024-09-18 PROCEDURE — 31500 INSERT EMERGENCY AIRWAY: CPT | Mod: GC

## 2024-09-18 PROCEDURE — 93010 ELECTROCARDIOGRAM REPORT: CPT

## 2024-09-18 PROCEDURE — 36620 INSERTION CATHETER ARTERY: CPT | Mod: GC

## 2024-09-18 RX ORDER — CEFEPIME 2 G/1
2000 INJECTION, POWDER, FOR SOLUTION INTRAVENOUS ONCE
Refills: 0 | Status: COMPLETED | OUTPATIENT
Start: 2024-09-18 | End: 2024-09-18

## 2024-09-18 RX ORDER — HYDROMORPHONE HYDROCHLORIDE 1 MG/ML
0.5 INJECTION, SOLUTION INTRAMUSCULAR; INTRAVENOUS; SUBCUTANEOUS
Qty: 100 | Refills: 0 | Status: DISCONTINUED | OUTPATIENT
Start: 2024-09-18 | End: 2024-09-22

## 2024-09-18 RX ORDER — MEROPENEM 500 MG/20ML
INJECTION INTRAVENOUS
Refills: 0 | Status: DISCONTINUED | OUTPATIENT
Start: 2024-09-18 | End: 2024-09-18

## 2024-09-18 RX ORDER — PROPOFOL 10 MG/ML
20 INJECTION, EMULSION INTRAVENOUS
Qty: 1000 | Refills: 0 | Status: DISCONTINUED | OUTPATIENT
Start: 2024-09-18 | End: 2024-09-18

## 2024-09-18 RX ORDER — CHLORHEXIDINE GLUCONATE ORAL RINSE 1.2 MG/ML
15 SOLUTION DENTAL EVERY 12 HOURS
Refills: 0 | Status: DISCONTINUED | OUTPATIENT
Start: 2024-09-18 | End: 2024-09-20

## 2024-09-18 RX ORDER — ONDANSETRON HCL/PF 4 MG/2 ML
4 VIAL (ML) INJECTION ONCE
Refills: 0 | Status: COMPLETED | OUTPATIENT
Start: 2024-09-18 | End: 2024-09-18

## 2024-09-18 RX ORDER — PHENYLEPHRINE TANNATE 10 MG/5 ML
0.1 SUSPENSION, ORAL (FINAL DOSE FORM) ORAL
Qty: 40 | Refills: 0 | Status: DISCONTINUED | OUTPATIENT
Start: 2024-09-18 | End: 2024-09-19

## 2024-09-18 RX ORDER — SODIUM CHLORIDE IRRIG SOLUTION 0.9 %
1750 SOLUTION, IRRIGATION IRRIGATION ONCE
Refills: 0 | Status: DISCONTINUED | OUTPATIENT
Start: 2024-09-18 | End: 2024-09-18

## 2024-09-18 RX ORDER — VANCOMYCIN HCL-SODIUM CHLORIDE IV SOLN 1.5 GM/250ML-0.9% 1.5-0.9/25 GM/ML-%
1000 SOLUTION INTRAVENOUS EVERY 24 HOURS
Refills: 0 | Status: DISCONTINUED | OUTPATIENT
Start: 2024-09-19 | End: 2024-09-19

## 2024-09-18 RX ORDER — ROCURONIUM BROMIDE 10 MG/ML
100 INJECTION INTRAVENOUS ONCE
Refills: 0 | Status: COMPLETED | OUTPATIENT
Start: 2024-09-18 | End: 2024-09-18

## 2024-09-18 RX ORDER — PROPOFOL 10 MG/ML
50 INJECTION, EMULSION INTRAVENOUS ONCE
Refills: 0 | Status: COMPLETED | OUTPATIENT
Start: 2024-09-18 | End: 2024-09-18

## 2024-09-18 RX ORDER — PANTOPRAZOLE SODIUM 40 MG/1
40 TABLET, DELAYED RELEASE ORAL DAILY
Refills: 0 | Status: DISCONTINUED | OUTPATIENT
Start: 2024-09-18 | End: 2024-09-24

## 2024-09-18 RX ORDER — GLUCAGON INJECTION, SOLUTION 0.5 MG/.1ML
1 INJECTION, SOLUTION SUBCUTANEOUS ONCE
Refills: 0 | Status: DISCONTINUED | OUTPATIENT
Start: 2024-09-18 | End: 2024-09-24

## 2024-09-18 RX ORDER — PHENYLEPHRINE TANNATE 10 MG/5 ML
500 SUSPENSION, ORAL (FINAL DOSE FORM) ORAL ONCE
Refills: 0 | Status: COMPLETED | OUTPATIENT
Start: 2024-09-18 | End: 2024-09-18

## 2024-09-18 RX ORDER — ALCOHOL ANTISEPTIC PADS
15 PADS, MEDICATED (EA) TOPICAL ONCE
Refills: 0 | Status: DISCONTINUED | OUTPATIENT
Start: 2024-09-18 | End: 2024-09-24

## 2024-09-18 RX ORDER — MEROPENEM 500 MG/20ML
1000 INJECTION INTRAVENOUS EVERY 12 HOURS
Refills: 0 | Status: DISCONTINUED | OUTPATIENT
Start: 2024-09-19 | End: 2024-09-20

## 2024-09-18 RX ORDER — SODIUM BICARBONATE 650 MG
50 TABLET ORAL ONCE
Refills: 0 | Status: COMPLETED | OUTPATIENT
Start: 2024-09-18 | End: 2024-09-18

## 2024-09-18 RX ORDER — MIDAZOLAM HCL 1 MG/ML
0.02 VIAL (ML) INJECTION
Qty: 100 | Refills: 0 | Status: DISCONTINUED | OUTPATIENT
Start: 2024-09-18 | End: 2024-09-18

## 2024-09-18 RX ORDER — ALCOHOL ANTISEPTIC PADS
12.5 PADS, MEDICATED (EA) TOPICAL ONCE
Refills: 0 | Status: DISCONTINUED | OUTPATIENT
Start: 2024-09-18 | End: 2024-09-30

## 2024-09-18 RX ORDER — SODIUM CHLORIDE IRRIG SOLUTION 0.9 %
1000 SOLUTION, IRRIGATION IRRIGATION
Refills: 0 | Status: DISCONTINUED | OUTPATIENT
Start: 2024-09-18 | End: 2024-09-30

## 2024-09-18 RX ORDER — ACETAMINOPHEN 325 MG
1000 TABLET ORAL ONCE
Refills: 0 | Status: COMPLETED | OUTPATIENT
Start: 2024-09-18 | End: 2024-09-18

## 2024-09-18 RX ORDER — FENTANYL CITRATE-0.9 % NACL/PF 300MCG/30
0.5 PATIENT CONTROLLED ANALGESIA VIAL INJECTION
Qty: 2500 | Refills: 0 | Status: DISCONTINUED | OUTPATIENT
Start: 2024-09-18 | End: 2024-09-18

## 2024-09-18 RX ORDER — DOXYCYCLINE HYCLATE 100 MG
CAPSULE ORAL
Refills: 0 | Status: DISCONTINUED | OUTPATIENT
Start: 2024-09-18 | End: 2024-09-18

## 2024-09-18 RX ORDER — SODIUM BICARBONATE 650 MG
0.17 TABLET ORAL
Qty: 150 | Refills: 0 | Status: DISCONTINUED | OUTPATIENT
Start: 2024-09-18 | End: 2024-09-18

## 2024-09-18 RX ORDER — HYDROCORTISONE 5 MG/1
50 TABLET ORAL EVERY 6 HOURS
Refills: 0 | Status: COMPLETED | OUTPATIENT
Start: 2024-09-18 | End: 2024-09-21

## 2024-09-18 RX ORDER — ALCOHOL ANTISEPTIC PADS
25 PADS, MEDICATED (EA) TOPICAL ONCE
Refills: 0 | Status: DISCONTINUED | OUTPATIENT
Start: 2024-09-18 | End: 2024-09-30

## 2024-09-18 RX ORDER — INSULIN LISPRO 100/ML
VIAL (ML) SUBCUTANEOUS EVERY 6 HOURS
Refills: 0 | Status: DISCONTINUED | OUTPATIENT
Start: 2024-09-18 | End: 2024-09-19

## 2024-09-18 RX ORDER — NOREPINEPHRINE BITARTRATE/D5W 16MG/250ML
0.54 PLASTIC BAG, INJECTION (ML) INTRAVENOUS
Qty: 16 | Refills: 0 | Status: DISCONTINUED | OUTPATIENT
Start: 2024-09-18 | End: 2024-09-22

## 2024-09-18 RX ORDER — FENTANYL CITRATE-0.9 % NACL/PF 300MCG/30
25 PATIENT CONTROLLED ANALGESIA VIAL INJECTION ONCE
Refills: 0 | Status: DISCONTINUED | OUTPATIENT
Start: 2024-09-18 | End: 2024-09-18

## 2024-09-18 RX ORDER — PHENYLEPHRINE TANNATE 10 MG/5 ML
1000 SUSPENSION, ORAL (FINAL DOSE FORM) ORAL ONCE
Refills: 0 | Status: COMPLETED | OUTPATIENT
Start: 2024-09-18 | End: 2024-09-18

## 2024-09-18 RX ORDER — MAGNESIUM SULFATE 500 MG/ML
2 VIAL (ML) INJECTION ONCE
Refills: 0 | Status: COMPLETED | OUTPATIENT
Start: 2024-09-18 | End: 2024-09-18

## 2024-09-18 RX ORDER — MIDAZOLAM HCL 1 MG/ML
0.02 VIAL (ML) INJECTION
Qty: 100 | Refills: 0 | Status: DISCONTINUED | OUTPATIENT
Start: 2024-09-18 | End: 2024-09-22

## 2024-09-18 RX ORDER — SODIUM CHLORIDE 0.9 % (FLUSH) 0.9 %
1750 SYRINGE (ML) INJECTION ONCE
Refills: 0 | Status: COMPLETED | OUTPATIENT
Start: 2024-09-18 | End: 2024-09-18

## 2024-09-18 RX ORDER — SODIUM CHLORIDE IRRIG SOLUTION 0.9 %
1000 SOLUTION, IRRIGATION IRRIGATION
Refills: 0 | Status: DISCONTINUED | OUTPATIENT
Start: 2024-09-18 | End: 2024-09-18

## 2024-09-18 RX ORDER — INFLUENZA VIRUS VACCINE 15; 15; 15; 15 UG/.5ML; UG/.5ML; UG/.5ML; UG/.5ML
0.5 SUSPENSION INTRAMUSCULAR ONCE
Refills: 0 | Status: DISCONTINUED | OUTPATIENT
Start: 2024-09-18 | End: 2024-09-30

## 2024-09-18 RX ORDER — MORPHINE SULFATE 30 MG/1
2 TABLET, FILM COATED, EXTENDED RELEASE ORAL ONCE
Refills: 0 | Status: DISCONTINUED | OUTPATIENT
Start: 2024-09-18 | End: 2024-09-18

## 2024-09-18 RX ORDER — CHLORHEXIDINE GLUCONATE ORAL RINSE 1.2 MG/ML
1 SOLUTION DENTAL DAILY
Refills: 0 | Status: DISCONTINUED | OUTPATIENT
Start: 2024-09-18 | End: 2024-09-30

## 2024-09-18 RX ORDER — MEROPENEM 500 MG/20ML
1000 INJECTION INTRAVENOUS ONCE
Refills: 0 | Status: COMPLETED | OUTPATIENT
Start: 2024-09-18 | End: 2024-09-18

## 2024-09-18 RX ORDER — DOBUTAMINE HCL 250MG/20ML
5 VIAL (ML) INTRAVENOUS
Qty: 500 | Refills: 0 | Status: DISCONTINUED | OUTPATIENT
Start: 2024-09-18 | End: 2024-09-19

## 2024-09-18 RX ORDER — FENTANYL CITRATE-0.9 % NACL/PF 300MCG/30
50 PATIENT CONTROLLED ANALGESIA VIAL INJECTION ONCE
Refills: 0 | Status: DISCONTINUED | OUTPATIENT
Start: 2024-09-18 | End: 2024-09-18

## 2024-09-18 RX ORDER — ETOMIDATE INJECTION 2 MG/ML
20 SOLUTION INTRAVENOUS ONCE
Refills: 0 | Status: COMPLETED | OUTPATIENT
Start: 2024-09-18 | End: 2024-09-18

## 2024-09-18 RX ORDER — VANCOMYCIN HCL-SODIUM CHLORIDE IV SOLN 1.5 GM/250ML-0.9% 1.5-0.9/25 GM/ML-%
1000 SOLUTION INTRAVENOUS ONCE
Refills: 0 | Status: DISCONTINUED | OUTPATIENT
Start: 2024-09-18 | End: 2024-09-18

## 2024-09-18 RX ORDER — NOREPINEPHRINE BITARTRATE/D5W 16MG/250ML
0.05 PLASTIC BAG, INJECTION (ML) INTRAVENOUS
Qty: 8 | Refills: 0 | Status: DISCONTINUED | OUTPATIENT
Start: 2024-09-18 | End: 2024-09-18

## 2024-09-18 RX ORDER — SODIUM CHLORIDE IRRIG SOLUTION 0.9 %
500 SOLUTION, IRRIGATION IRRIGATION ONCE
Refills: 0 | Status: COMPLETED | OUTPATIENT
Start: 2024-09-18 | End: 2024-09-18

## 2024-09-18 RX ORDER — VANCOMYCIN HCL-SODIUM CHLORIDE IV SOLN 1.5 GM/250ML-0.9% 1.5-0.9/25 GM/ML-%
2000 SOLUTION INTRAVENOUS ONCE
Refills: 0 | Status: COMPLETED | OUTPATIENT
Start: 2024-09-18 | End: 2024-09-18

## 2024-09-18 RX ADMIN — Medication 1000 MILLILITER(S): at 15:53

## 2024-09-18 RX ADMIN — Medication 6 UNIT(S)/MIN: at 13:49

## 2024-09-18 RX ADMIN — Medication 16.3 MICROGRAM(S)/KG/MIN: at 17:39

## 2024-09-18 RX ADMIN — Medication 1750 MILLILITER(S): at 11:21

## 2024-09-18 RX ADMIN — Medication 45.9 MICROGRAM(S)/KG/MIN: at 21:55

## 2024-09-18 RX ADMIN — MEROPENEM 1000 MILLIGRAM(S): 500 INJECTION INTRAVENOUS at 23:23

## 2024-09-18 RX ADMIN — Medication 5000 UNIT(S): at 23:16

## 2024-09-18 RX ADMIN — Medication 1000 MILLIGRAM(S): at 22:29

## 2024-09-18 RX ADMIN — HYDROCORTISONE 50 MILLIGRAM(S): 5 TABLET ORAL at 13:59

## 2024-09-18 RX ADMIN — Medication 25 MICROGRAM(S): at 11:20

## 2024-09-18 RX ADMIN — Medication 1000 MILLIGRAM(S): at 14:30

## 2024-09-18 RX ADMIN — Medication 8.5 MICROGRAM(S)/KG/MIN: at 17:39

## 2024-09-18 RX ADMIN — Medication 200 GRAM(S): at 17:37

## 2024-09-18 RX ADMIN — Medication 3: at 17:39

## 2024-09-18 RX ADMIN — Medication 1.81 MG/KG/HR: at 23:25

## 2024-09-18 RX ADMIN — Medication 8.5 MICROGRAM(S)/KG/MIN: at 13:49

## 2024-09-18 RX ADMIN — PROPOFOL 50 MILLIGRAM(S): 10 INJECTION, EMULSION INTRAVENOUS at 12:40

## 2024-09-18 RX ADMIN — Medication 4 MILLIGRAM(S): at 12:00

## 2024-09-18 RX ADMIN — HYDROCORTISONE 50 MILLIGRAM(S): 5 TABLET ORAL at 17:38

## 2024-09-18 RX ADMIN — CHLORHEXIDINE GLUCONATE ORAL RINSE 1 APPLICATION(S): 1.2 SOLUTION DENTAL at 16:15

## 2024-09-18 RX ADMIN — Medication 50 MILLIEQUIVALENT(S): at 12:45

## 2024-09-18 RX ADMIN — Medication 8.5 MICROGRAM(S)/KG/MIN: at 11:07

## 2024-09-18 RX ADMIN — Medication 3.4 MICROGRAM(S)/KG/MIN: at 13:49

## 2024-09-18 RX ADMIN — Medication 500 MICROGRAM(S): at 10:15

## 2024-09-18 RX ADMIN — Medication 100 MEQ/KG/HR: at 16:14

## 2024-09-18 RX ADMIN — Medication 6.8 MICROGRAM(S)/KG/MIN: at 16:33

## 2024-09-18 RX ADMIN — MORPHINE SULFATE 2 MILLIGRAM(S): 30 TABLET, FILM COATED, EXTENDED RELEASE ORAL at 12:25

## 2024-09-18 RX ADMIN — Medication 40 MILLIEQUIVALENT(S): at 17:56

## 2024-09-18 RX ADMIN — Medication 100 MILLIEQUIVALENT(S): at 15:51

## 2024-09-18 RX ADMIN — Medication 1.81 MG/KG/HR: at 16:14

## 2024-09-18 RX ADMIN — HYDROCORTISONE 50 MILLIGRAM(S): 5 TABLET ORAL at 23:16

## 2024-09-18 RX ADMIN — Medication 200 GRAM(S): at 14:15

## 2024-09-18 RX ADMIN — PANTOPRAZOLE SODIUM 40 MILLIGRAM(S): 40 TABLET, DELAYED RELEASE ORAL at 17:38

## 2024-09-18 RX ADMIN — Medication 100 MILLIEQUIVALENT(S): at 15:52

## 2024-09-18 RX ADMIN — Medication 1750 MILLILITER(S): at 10:30

## 2024-09-18 RX ADMIN — Medication 5000 UNIT(S): at 15:58

## 2024-09-18 RX ADMIN — MORPHINE SULFATE 2 MILLIGRAM(S): 30 TABLET, FILM COATED, EXTENDED RELEASE ORAL at 11:55

## 2024-09-18 RX ADMIN — Medication 25 GRAM(S): at 17:37

## 2024-09-18 RX ADMIN — PROPOFOL 10.9 MICROGRAM(S)/KG/MIN: 10 INJECTION, EMULSION INTRAVENOUS at 13:48

## 2024-09-18 RX ADMIN — Medication 4 MILLIGRAM(S): at 11:07

## 2024-09-18 RX ADMIN — Medication 50 MICROGRAM(S): at 11:00

## 2024-09-18 RX ADMIN — Medication 400 MILLIGRAM(S): at 14:00

## 2024-09-18 RX ADMIN — ETOMIDATE INJECTION 20 MILLIGRAM(S): 2 SOLUTION INTRAVENOUS at 12:40

## 2024-09-18 RX ADMIN — HYDROMORPHONE HYDROCHLORIDE 0.5 MG/HR: 1 INJECTION, SOLUTION INTRAMUSCULAR; INTRAVENOUS; SUBCUTANEOUS at 16:33

## 2024-09-18 RX ADMIN — Medication 4.54 MICROGRAM(S)/KG/HR: at 13:48

## 2024-09-18 RX ADMIN — Medication 50 MICROGRAM(S): at 11:30

## 2024-09-18 RX ADMIN — ROCURONIUM BROMIDE 100 MILLIGRAM(S): 10 INJECTION INTRAVENOUS at 12:40

## 2024-09-18 RX ADMIN — CHLORHEXIDINE GLUCONATE ORAL RINSE 15 MILLILITER(S): 1.2 SOLUTION DENTAL at 17:38

## 2024-09-18 RX ADMIN — Medication 1 MILLIGRAM(S): at 15:52

## 2024-09-18 RX ADMIN — CEFEPIME 2000 MILLIGRAM(S): 2 INJECTION, POWDER, FOR SOLUTION INTRAVENOUS at 11:05

## 2024-09-18 RX ADMIN — Medication 25 MICROGRAM(S): at 10:50

## 2024-09-18 RX ADMIN — VANCOMYCIN HCL-SODIUM CHLORIDE IV SOLN 1.5 GM/250ML-0.9% 250 MILLIGRAM(S): 1.5-0.9/25 SOLUTION at 11:18

## 2024-09-18 RX ADMIN — MEROPENEM 1000 MILLIGRAM(S): 500 INJECTION INTRAVENOUS at 13:54

## 2024-09-18 RX ADMIN — Medication 500 MICROGRAM(S): at 10:25

## 2024-09-18 RX ADMIN — Medication 100 MILLIEQUIVALENT(S): at 14:15

## 2024-09-18 RX ADMIN — Medication 400 MILLIGRAM(S): at 21:54

## 2024-09-18 RX ADMIN — Medication 1000 MICROGRAM(S): at 12:30

## 2024-09-18 NOTE — PROCEDURE NOTE - NSPROCDETAILS_GEN_ALL_CORE
MEDICARE WELLNESS VISIT NOTE      HISTORY OF PRESENT ILLNESS:   Tamy Farrell presents for her Subsequent Annual Medicare Wellness Visit.   She has no current complaints or concerns.      Patient Care Team:  RODRÍGUEZ Tomlin as PCP - General (Nurse Practitioner)        Patient Active Problem List    Diagnosis Date Noted   • Hyperlipidemia      Priority: Low   • Type 2 diabetes mellitus, without long-term current use of insulin (CMS/HCC) 05/24/2018     Priority: Low   • Essential hypertension 05/24/2018     Priority: Low   • Basal cell carcinoma (BCC) 05/24/2018     Priority: Low   • Other hyperlipidemia 05/24/2018     Priority: Low   • Gout 05/24/2018     Priority: Low   • Arthritis involving multiple sites 05/24/2018     Priority: Low         Past Medical History:   Diagnosis Date   • Cataract     repaired bilateral   • Diabetes mellitus (CMS/[facility])    • Hyperlipidemia    • Malignant neoplasm (CMS/[facility])     Basal cell carcinoma   • Thyroid disease          Past Surgical History:   Procedure Laterality Date   • Eye surgery      cataracts   • Hb baoh arm fx treatment     • Hysterectomy      non cancerous   • Tonsillectomy           Social History   Substance Use Topics   • Smoking status: Former Smoker     Types: Cigarettes   • Smokeless tobacco: Never Used      Comment: smoked from age [age]   • Alcohol use Yes      Comment: occ, 1 a month     Drug use:    Drug Use:    No              Family History - Reviewed; patient has no significant family history    Current Outpatient Prescriptions   Medication Sig Dispense Refill   • aspirin 81 MG tablet Take 1 tablet by mouth daily.     • vitamin D, Cholecalciferol, 1000 units capsule Take 1 capsule by mouth daily.     • levothyroxine (SYNTHROID, LEVOTHROID) 50 MCG tablet Take 1 tablet by mouth daily. 30 tablet 0   • allopurinol (ZYLOPRIM) 300 MG tablet Take 1 tablet by mouth daily. 30 tablet    • lisinopril (ZESTRIL) 20 MG tablet Take 1 tablet by mouth daily. 30 tablet 0 
  • simvastatin (ZOCOR) 40 MG tablet Take 1 tablet by mouth daily. 30 tablet 0   • indapamide (LOZOL) 2.5 MG tablet Take 1 tablet by mouth daily. 30 tablet 0   • hydroCORTisone (CORTIZONE) 1 % cream Apply topically 3 times daily. 30 g 0     No current facility-administered medications for this visit.         The following items on the Medicare Health Risk Assessment were found to be positive            Vision and hearing screens: No exam data present  Advance Directive document: No Value exists for the : [id number]  The patient has the following Advance Directive documents:     Cognitive Assessment: no evidence of cognitive dysfunction by direct observation    Recent PHQ 2/9 Score    PHQ 2:  Date PHQ 2 Score   5/24/2018 0       PHQ 9:       DEPRESSION ASSESSMENT/PLAN:  Depression screening is negative no further plan needed.     Body mass index is 29.91 kg/m².    BMI ASSESSMENT/PLAN:  Patient BMI is within normal range.     Needed Screening/Treatment:   Glaucoma screen / eye exam  and Immunizations reviewed and patient needs: Prevnar 13  Needed follow up:  None    See orders.   See Patient Instructions section.   Return in about 1 year (around 8/28/2019) for Medicare Wellness Visit.  
patient pre-oxygenated, tube inserted, placement confirmed
guidewire recovered/lumen(s) aspirated and flushed/sterile dressing applied/sterile technique, catheter placed/ultrasound guidance with use of sterile gel and probe cove
location identified, draped/prepped, sterile technique used, needle inserted/introduced/positive blood return obtained via catheter/connected to a pressurized flush line/sutured in place/hemostasis with direct pressure, dressing applied/Seldinger technique/all materials/supplies accounted for at end of procedure

## 2024-09-18 NOTE — PROGRESS NOTE ADULT - SUBJECTIVE AND OBJECTIVE BOX
Patient is a 69y old  Female who presents with a chief complaint of Shortness of breath (18 Sep 2024 15:27)      BRIEF HOSPITAL COURSE: ***      Events last 24 hours: ***      PAST MEDICAL & SURGICAL HISTORY:  Hypothyroid      ADHD (attention deficit hyperactivity disorder)      HLD (hyperlipidemia)      Myocardial infarct  2012      Stented coronary artery  2012      Rheumatoid arthritis      Migraine      S/p bilateral carpal tunnel release      H/O cardiac catheterization      H/O laminectomy      H/O spinal fusion      S/P left knee arthroscopy      H/O total knee replacement, right              SOCIAL HISTORY:        Review of Systems:  CONSTITUTIONAL: No fever, chills, or fatigue  EYES: No visual disturbances  ENMT:  No difficulty hearing  NECK: No pain  RESPIRATORY: No cough. No shortness of breath  CARDIOVASCULAR: No chest pain, palpitations, or leg swelling  GASTROINTESTINAL: No abdominal pain. No nausea, vomiting, diarrhea, or constipation. No hematemesis, melena or hematochezia  GENITOURINARY: No dysuria, frequency, hematuria, or incontinence  NEUROLOGICAL: No headaches, loss of strength, numbness, or tremors  SKIN: No rashes  MUSCULOSKELETAL: No back or extremity pain. No calf pain  PSYCHIATRIC: No depression, anxiety, or difficulty sleeping      [  ] Due to altered mental status/intubation, subjective information was not able to be obtained from the patient. History was obtained, to the extent possible, from review of the chart and collateral sources of information.        Medications:  levoFLOXacin IVPB 750 milliGRAM(s) IV Intermittent every 48 hours    DOBUTamine Infusion 2.5 MICROgram(s)/kG/Min IV Continuous <Continuous>  norepinephrine Infusion 0.54 MICROgram(s)/kG/Min IV Continuous <Continuous>  phenylephrine    Infusion 0.1 MICROgram(s)/kG/Min IV Continuous <Continuous>      cisatracurium Infusion 3 MICROgram(s)/kG/Min IV Continuous <Continuous>  HYDROmorphone Infusion. 0.5 mG/Hr IV Continuous <Continuous>  midazolam Infusion 0.02 mG/kG/Hr IV Continuous <Continuous>      heparin   Injectable 5000 Unit(s) SubCutaneous every 8 hours    pantoprazole  Injectable 40 milliGRAM(s) IV Push daily      dextrose 50% Injectable 25 Gram(s) IV Push once  dextrose 50% Injectable 25 Gram(s) IV Push once  dextrose 50% Injectable 12.5 Gram(s) IV Push once  dextrose Oral Gel 15 Gram(s) Oral once PRN  glucagon  Injectable 1 milliGRAM(s) IntraMuscular once  hydrocortisone sodium succinate Injectable 50 milliGRAM(s) IV Push every 6 hours  insulin lispro (ADMELOG) corrective regimen sliding scale   SubCutaneous every 6 hours  vasopressin Infusion 0.04 Unit(s)/Min IV Continuous <Continuous>    dextrose 5%. 1000 milliLiter(s) IV Continuous <Continuous>  dextrose 5%. 1000 milliLiter(s) IV Continuous <Continuous>    influenza  Vaccine (HIGH DOSE) 0.5 milliLiter(s) IntraMuscular once    chlorhexidine 0.12% Liquid 15 milliLiter(s) Oral Mucosa every 12 hours  chlorhexidine 2% Cloths 1 Application(s) Topical daily        Mode: AC/ CMV (Assist Control/ Continuous Mandatory Ventilation)  RR (machine): 26  TV (machine): 380  FiO2: 100  PEEP: 12  MAP: 17  PIP: 25      ICU Vital Signs Last 24 Hrs  T(C): 38.2 (18 Sep 2024 22:00), Max: 38.3 (18 Sep 2024 13:57)  T(F): 100.8 (18 Sep 2024 22:00), Max: 100.9 (18 Sep 2024 13:57)  HR: 115 (18 Sep 2024 22:00) (98 - 142)  BP: 105/54 (18 Sep 2024 16:45) (56/31 - 177/85)  BP(mean): 71 (18 Sep 2024 16:45) (53 - 99)  ABP: 103/54 (18 Sep 2024 22:00) (69/47 - 105/57)  ABP(mean): 70 (18 Sep 2024 22:00) (46 - 78)  RR: 26 (18 Sep 2024 22:00) (18 - 29)  SpO2: 96% (18 Sep 2024 22:00) (88% - 100%)    O2 Parameters below as of 18 Sep 2024 20:00  Patient On (Oxygen Delivery Method): ventilator    O2 Concentration (%): 100        ABG - ( 18 Sep 2024 19:37 )  pH, Arterial: 7.290 pH, Blood: x     /  pCO2: 48    /  pO2: 88    / HCO3: 23    / Base Excess: -3.5  /  SaO2: 98.5                I&O's Detail    18 Sep 2024 07:01  -  18 Sep 2024 22:44  --------------------------------------------------------  IN:    Cisatracurium: 102.8 mL    DOBUTamine: 81.6 mL    FentaNYL: 18.1 mL    HYRDOmorphone: 12 mL    IV PiggyBack: 200 mL    Lactated Ringers: 500 mL    Midazolam: 76 mL    Norepinephrine: 413.4 mL    Norepinephrine: 46.4 mL    Phenylephrine: 268.6 mL    Vasopressin: 48 mL  Total IN: 1766.9 mL    OUT:    Indwelling Catheter - Urethral (mL): 2040 mL  Total OUT: 2040 mL    Total NET: -273.1 mL            LABS:                        13.1   12.00 )-----------( 187      ( 18 Sep 2024 19:45 )             39.6     09-18    139  |  104  |  15.8  ----------------------------<  260[H]  4.4   |  20.0[L]  |  0.83    Ca    8.5      18 Sep 2024 19:45  Phos  3.4     09-18  Mg     2.2     09-18    TPro  4.8[L]  /  Alb  3.2[L]  /  TBili  0.4  /  DBili  x   /  AST  74[H]  /  ALT  64[H]  /  AlkPhos  72  09-18          CAPILLARY BLOOD GLUCOSE      POCT Blood Glucose.: 279 mg/dL (18 Sep 2024 17:33)    PT/INR - ( 18 Sep 2024 15:35 )   PT: 13.1 sec;   INR: 1.19 ratio         PTT - ( 18 Sep 2024 15:35 )  PTT:26.0 sec  Urinalysis Basic - ( 18 Sep 2024 19:45 )    Color: x / Appearance: x / SG: x / pH: x  Gluc: 260 mg/dL / Ketone: x  / Bili: x / Urobili: x   Blood: x / Protein: x / Nitrite: x   Leuk Esterase: x / RBC: x / WBC x   Sq Epi: x / Non Sq Epi: x / Bacteria: x      CULTURES:  Rapid RVP Result: NotDetec (09-18 @ 15:50)        RADIOLOGY: < from: CT Abdomen and Pelvis w/ IV Cont (09.18.24 @ 11:06) >  IMPRESSION:  *  No pulmonary embolism through the proximal segmental level. Limited   evaluation of the distal branches secondary to respiratory motion an   suboptimal timing for contrast administration.  *  Left lower lobe pneumonia with associated small pleural effusion.  *  No acute pathology in the abdomen and pelvis.  *  Mildly enlarged and globular uterus suggestive of adenomyosis versus   myomatous uterus. If clinically indicated, pelvic ultrasound is   recommended for further evaluation.    --- End of Report ---    ELLEN MAHAN MD; Resident Radiologist  This document has been electronically signed.  KELIN SILVA MD; Attending Radiologist  This document has been electronically signed. Sep 18 2024 12:22PM          INVASIVE LINES: R IJ TLC, A line  INDWELLING STEVENSON: Y   VTE PROPHYLAXIS: Heparin SC   CAM ICU: N/A  CODE STATUS: FULL        CRITICAL CARE TIME SPENT: 62 minutes spent performing frequent bedside reassessments and augmenting plan of care to address problems of acute critical illness that pose high probability of life threatening deterioration and/or end organ damage/dysfunction and discussing goals of care, non-inclusive of time spent on procedures performed. Date of note entry is equal to date of services rendered. Patient is a 69y old  Female who presents with a chief complaint of Shortness of breath (18 Sep 2024 15:27)      BRIEF HOSPITAL COURSE: 68 yo female with PMHx CAD NSTEMI in 2011 s/p ELIZABETH to LAD, s/p ELIZABETH Distal LCx (4/30/2024), HTN, HLD, hypothyroid, rheumatoid arthritis, who presented to ER with left-sided chest pain associated with generalized weakness, nausea and vomiting.  Patient ill-appearing, unable to provide much history. History mostly supplied by patient's family member at bedside. Symptoms started 2 days ago, however today she felt much worse with prompted her to come to the ED. CT chest revealed LLL pneumonia. While in the ED developed worsening hypoxic respiratory failure and was emergently intubated. She had profound hypoxemia, ARDS requiring deep sedation and paralysis. Patient had uptrending lactate levels, worsening shock state on three vasopressors. Serial TTEs concerning for acute systolic heart failure, started on Dobutamine.       Events last 24 hours: Remains intubated on full vent support, 100% FiO2 PEEP +12. p:F 98 slightly improved. On Norepinephrine and Vasopressin gtts, weaned off Phenylephrine to Dobutamine at 5 mcg/kg/min. UOP remains brisk ~100cc/hr. Lactate downtrending.   POCUS with decreased biventricular function, small pericardial effusion of hyperechoic density without tamponade physiology.      PAST MEDICAL & SURGICAL HISTORY:  Hypothyroid      ADHD (attention deficit hyperactivity disorder)      HLD (hyperlipidemia)      Myocardial infarct  2012      Stented coronary artery  2012      Rheumatoid arthritis      Migraine      S/p bilateral carpal tunnel release      H/O cardiac catheterization      H/O laminectomy      H/O spinal fusion      S/P left knee arthroscopy      H/O total knee replacement, right          Review of Systems: Due to altered mental status/intubation, subjective information was not able to be obtained from the patient. History was obtained, to the extent possible, from review of the chart and collateral sources of information.        Medications:  levoFLOXacin IVPB 750 milliGRAM(s) IV Intermittent every 48 hours    DOBUTamine Infusion 2.5 MICROgram(s)/kG/Min IV Continuous <Continuous>  norepinephrine Infusion 0.54 MICROgram(s)/kG/Min IV Continuous <Continuous>  phenylephrine    Infusion 0.1 MICROgram(s)/kG/Min IV Continuous <Continuous>      cisatracurium Infusion 3 MICROgram(s)/kG/Min IV Continuous <Continuous>  HYDROmorphone Infusion. 0.5 mG/Hr IV Continuous <Continuous>  midazolam Infusion 0.02 mG/kG/Hr IV Continuous <Continuous>      heparin   Injectable 5000 Unit(s) SubCutaneous every 8 hours    pantoprazole  Injectable 40 milliGRAM(s) IV Push daily      dextrose 50% Injectable 25 Gram(s) IV Push once  dextrose 50% Injectable 25 Gram(s) IV Push once  dextrose 50% Injectable 12.5 Gram(s) IV Push once  dextrose Oral Gel 15 Gram(s) Oral once PRN  glucagon  Injectable 1 milliGRAM(s) IntraMuscular once  hydrocortisone sodium succinate Injectable 50 milliGRAM(s) IV Push every 6 hours  insulin lispro (ADMELOG) corrective regimen sliding scale   SubCutaneous every 6 hours  vasopressin Infusion 0.04 Unit(s)/Min IV Continuous <Continuous>    dextrose 5%. 1000 milliLiter(s) IV Continuous <Continuous>  dextrose 5%. 1000 milliLiter(s) IV Continuous <Continuous>    influenza  Vaccine (HIGH DOSE) 0.5 milliLiter(s) IntraMuscular once    chlorhexidine 0.12% Liquid 15 milliLiter(s) Oral Mucosa every 12 hours  chlorhexidine 2% Cloths 1 Application(s) Topical daily        Mode: AC/ CMV (Assist Control/ Continuous Mandatory Ventilation)  RR (machine): 26  TV (machine): 380  FiO2: 100  PEEP: 12  MAP: 17  PIP: 25      ICU Vital Signs Last 24 Hrs  T(C): 38.2 (18 Sep 2024 22:00), Max: 38.3 (18 Sep 2024 13:57)  T(F): 100.8 (18 Sep 2024 22:00), Max: 100.9 (18 Sep 2024 13:57)  HR: 115 (18 Sep 2024 22:00) (98 - 142)  BP: 105/54 (18 Sep 2024 16:45) (56/31 - 177/85)  BP(mean): 71 (18 Sep 2024 16:45) (53 - 99)  ABP: 103/54 (18 Sep 2024 22:00) (69/47 - 105/57)  ABP(mean): 70 (18 Sep 2024 22:00) (46 - 78)  RR: 26 (18 Sep 2024 22:00) (18 - 29)  SpO2: 96% (18 Sep 2024 22:00) (88% - 100%)    O2 Parameters below as of 18 Sep 2024 20:00  Patient On (Oxygen Delivery Method): ventilator    O2 Concentration (%): 100        ABG - ( 18 Sep 2024 19:37 )  pH, Arterial: 7.290 pH, Blood: x     /  pCO2: 48    /  pO2: 88    / HCO3: 23    / Base Excess: -3.5  /  SaO2: 98.5                I&O's Detail    18 Sep 2024 07:01  -  18 Sep 2024 22:44  --------------------------------------------------------  IN:    Cisatracurium: 102.8 mL    DOBUTamine: 81.6 mL    FentaNYL: 18.1 mL    HYRDOmorphone: 12 mL    IV PiggyBack: 200 mL    Lactated Ringers: 500 mL    Midazolam: 76 mL    Norepinephrine: 413.4 mL    Norepinephrine: 46.4 mL    Phenylephrine: 268.6 mL    Vasopressin: 48 mL  Total IN: 1766.9 mL    OUT:    Indwelling Catheter - Urethral (mL): 2040 mL  Total OUT: 2040 mL    Total NET: -273.1 mL            LABS:                        13.1   12.00 )-----------( 187      ( 18 Sep 2024 19:45 )             39.6     09-18    139  |  104  |  15.8  ----------------------------<  260[H]  4.4   |  20.0[L]  |  0.83    Ca    8.5      18 Sep 2024 19:45  Phos  3.4     09-18  Mg     2.2     09-18    TPro  4.8[L]  /  Alb  3.2[L]  /  TBili  0.4  /  DBili  x   /  AST  74[H]  /  ALT  64[H]  /  AlkPhos  72  09-18          CAPILLARY BLOOD GLUCOSE      POCT Blood Glucose.: 279 mg/dL (18 Sep 2024 17:33)    PT/INR - ( 18 Sep 2024 15:35 )   PT: 13.1 sec;   INR: 1.19 ratio         PTT - ( 18 Sep 2024 15:35 )  PTT:26.0 sec  Urinalysis Basic - ( 18 Sep 2024 19:45 )    Color: x / Appearance: x / SG: x / pH: x  Gluc: 260 mg/dL / Ketone: x  / Bili: x / Urobili: x   Blood: x / Protein: x / Nitrite: x   Leuk Esterase: x / RBC: x / WBC x   Sq Epi: x / Non Sq Epi: x / Bacteria: x      CULTURES:  Rapid RVP Result: NotDetec (09-18 @ 15:50)        RADIOLOGY: < from: CT Abdomen and Pelvis w/ IV Cont (09.18.24 @ 11:06) >  IMPRESSION:  *  No pulmonary embolism through the proximal segmental level. Limited   evaluation of the distal branches secondary to respiratory motion an   suboptimal timing for contrast administration.  *  Left lower lobe pneumonia with associated small pleural effusion.  *  No acute pathology in the abdomen and pelvis.  *  Mildly enlarged and globular uterus suggestive of adenomyosis versus   myomatous uterus. If clinically indicated, pelvic ultrasound is   recommended for further evaluation.    --- End of Report ---    ELLEN MAHAN MD; Resident Radiologist  This document has been electronically signed.  KELIN SILVA MD; Attending Radiologist  This document has been electronically signed. Sep 18 2024 12:22PM          INVASIVE LINES: R IJ TLC, A line  INDWELLING STEVENSON: Y   VTE PROPHYLAXIS: Heparin SC   CAM ICU: N/A  CODE STATUS: FULL        CRITICAL CARE TIME SPENT: 62 minutes spent performing frequent bedside reassessments and augmenting plan of care to address problems of acute critical illness that pose high probability of life threatening deterioration and/or end organ damage/dysfunction and discussing goals of care, non-inclusive of time spent on procedures performed. Date of note entry is equal to date of services rendered.

## 2024-09-18 NOTE — CONSULT NOTE ADULT - SUBJECTIVE AND OBJECTIVE BOX
Patient is a 69y old  Female who presents with a chief complaint of Shortness of breath (18 Sep 2024 13:37)      HPI:  69-year-old female with  PMHx of CAD, HTN, HLD, NSTEMI in 2011 s/p ELIZABETH to LAD, S/p ELIZABETH Distal LCx (4/30/2024), hypothyroid, rheumatoid arthritis, presented to ER with left-sided chest pain associated with generalized weakness, nausea and vomiting.  Patient ill-appearing, unable to provide much history.  History mostly supplied by patient's family member at bedside.  Symptoms started 2 days ago, however today she felt much worse with prompted her to come to the ED.   (18 Sep 2024 13:37)      PAST MEDICAL & SURGICAL HISTORY:  Hypothyroid  ADHD (attention deficit hyperactivity disorder)  HLD (hyperlipidemia)  Myocardial infarct 2012  Stented coronary artery 2012  Rheumatoid arthritis  Migraine  S/p bilateral carpal tunnel release  H/O cardiac catheterization  H/O laminectomy  H/O spinal fusion  S/P left knee arthroscopy  H/O total knee replacement, right          PREVIOUS DIAGNOSTIC TESTING:      ECHO  FINDINGS: < from: TTE W or WO Ultrasound Enhancing Agent (09.18.24 @ 12:09) >  CONCLUSIONS:      1. Left ventricular systolic function is hyperdynamic.   2. Normal right ventricular cavity size and probably normal right ventricular systolic function.   3. Pericardial effusion measuring about 1 cm adjacent to RV, RV free wall appears echogenic, possible fibrinous material covering the RV free wall. No RV diastolic collapse.      These findings needs to be correlated clinically.   4. The inferior vena cava is normal in size measuring 1.63 cm in diameter, (normal <2.1cm) with normal inspiratory collapse (normal >50%) consistent with normal right atrial pressure (~3, range 0-5mmHg).   5. Trace pericardial effusion noted adjacent to the posterolateral left ventricle, moderate pericardial effusion noted adjacent to the right atrium and moderate pericardial effusion noted adjacent to the right ventricle: greater than 60% respiratory variation across the tricuspid valve E wave and diastolic collapse of the right atrium that exceeds 1/3 of the cardiac cycle.    < end of copied text >      NUCLEAR STRESS TEST  FINDINGS:  12/19/2023: Showing Prior Apical infarct, LVEF 75%, Normal LV dilation    CARDIAC CATHETERIZATION  FINDINGS: 4/30/2024:  Normal LVEF, Normal L-Main, Patent stent Prox LAD, Dominant LCx with 80% stenosis of distal portion successfully stented, Minor disease of small non-dominant RCA      Allergies  tetracycline (Hives)  penicillins (Hives)  Butazolactin, Prolaprin (Hives)  azithromycin (Hives)  Zyrtec (Hives)    MEDICATIONS  (HOME):  · 	aspirin 81 mg oral delayed release tablet: 1 tab(s) orally once a day  · 	clopidogrel 75 mg oral tablet: 1 tab(s) orally once a day  · 	enalapril 2.5 mg oral tablet: 1 tab(s) orally once a day  · 	Macrobid 100 mg oral capsule: 1 cap(s) orally once a day  · 	metoprolol tartrate 50 mg oral tablet: 1 tab(s) orally 2 times a day  · 	levothyroxine 100 mcg (0.1 mg) oral tablet: 1 tab(s) orally once a day  · 	oxyCODONE 10 mg oral tablet, extended release: 1 tab(s) orally every 12 hours  · 	Ambien 10 mg oral tablet: 1 tab(s) orally once a day (at bedtime), As Needed  · 	Effexor  mg oral capsule, extended release: 2 cap(s) orally once a day  · 	Neurontin 300 mg oral capsule: orally 2 times a day  · 	metFORMIN 500 mg oral tablet: 1 tab(s) orally 2 times a day hold for 48 hours  · 	pantoprazole 40 mg oral delayed release tablet: 1 tab(s) orally once a day  · 	Zetia 10 mg oral tablet: 1 tab(s) orally once a day  · 	rosuvastatin 40 mg oral capsule: 1 cap(s) orally once a day (at bedtime)        FAMILY HISTORY:  Mother- MI    CIGARETTES: NEVER  ALCOHOL: 2-3 glasses wine daily      REVIEW OF SYSTEMS:  CONSTITUTIONAL: + fever, No weight loss, + fatigue  EYES: No eye pain, visual disturbances, or discharge  ENMT:  No difficulty hearing, tinnitus, vertigo; No sinus or throat pain  NECK: No pain or stiffness  RESPIRATORY: + cough, No wheezing, chills or hemoptysis; + Shortness of Breath  CARDIOVASCULAR: No chest pain, palpitations, passing out, dizziness, or leg swelling  GASTROINTESTINAL: No abdominal or epigastric pain. + nausea and vomiting, No hematemesis; No diarrhea or constipation. No melena or hematochezia.  GENITOURINARY: No dysuria, frequency, hematuria, or incontinence  NEUROLOGICAL: No headaches, memory loss, loss of strength, numbness, or tremors  SKIN: No itching, burning, rashes, or lesions   ENDOCRINE: No heat or cold intolerance; No hair loss  MUSCULOSKELETAL: No joint pain or swelling; No muscle, back, or extremity pain  PSYCHIATRIC: No depression, anxiety, mood swings, or difficulty sleeping  HEME/LYMPH: No easy bruising, or bleeding gums  ALLERY AND IMMUNOLOGIC: No hives or eczema	    Vital Signs Last 24 Hrs  T(C): 38.3 (18 Sep 2024 16:30), Max: 38.3 (18 Sep 2024 13:57)  T(F): 100.9 (18 Sep 2024 16:30), Max: 100.9 (18 Sep 2024 13:57)  HR: 99 (18 Sep 2024 16:30) (98 - 142)  BP: 99/87 (18 Sep 2024 16:30) (56/31 - 177/85)  BP(mean): 93 (18 Sep 2024 16:30) (53 - 99)  RR: 27 (18 Sep 2024 16:30) (18 - 29)  SpO2: 100% (18 Sep 2024 16:30) (91% - 100%)    Parameters below as of 18 Sep 2024 14:50  Patient On (Oxygen Delivery Method): ventilator    O2 Concentration (%): 100    Daily Height in cm: 167.6 (18 Sep 2024 15:32)    Daily     I&O's Detail      PHYSICAL EXAM:  Appearance: Intubated and sedated on Vent  HEENT:   Normal oral mucosa, PERRL, EOMI, sclera non-icteric	  Cardiovascular: Normal S1 S2, No JVD, No cardiac murmurs, No carotid bruits, No peripheral edema  Respiratory: Lungs clear to auscultation	  Gastrointestinal:  Soft, Non-tender, + BS, no bruits	  Skin: No rashes, No ecchymoses, No cyanosis  Neurologic: Grossly non-focal with full strength in all four extremities  Extremities: Normal range of motion, No clubbing, cyanosis or edema  Vascular: Peripheral pulses bilaterally      INTERPRETATION OF TELEMETRY: NSR        LABS:                        14.0   14.01 )-----------( 173      ( 18 Sep 2024 10:34 )             41.9     09-18    139  |  104  |  15.7  ----------------------------<  263[H]  3.9   |  19.0[L]  |  0.87    Ca    7.7[L]      18 Sep 2024 15:35  Phos  3.6     09-18  Mg     1.5     09-18    TPro  4.5[L]  /  Alb  2.9[L]  /  TBili  0.5  /  DBili  x   /  AST  98[H]  /  ALT  62[H]  /  AlkPhos  68  09-18        PT/INR - ( 18 Sep 2024 15:35 )   PT: 13.1 sec;   INR: 1.19 ratio         PTT - ( 18 Sep 2024 15:35 )  PTT:26.0 sec  Urinalysis Basic - ( 18 Sep 2024 15:35 )    Color: x / Appearance: x / SG: x / pH: x  Gluc: 263 mg/dL / Ketone: x  / Bili: x / Urobili: x   Blood: x / Protein: x / Nitrite: x   Leuk Esterase: x / RBC: x / WBC x   Sq Epi: x / Non Sq Epi: x / Bacteria: x      RADIOLOGY & ADDITIONAL STUDIES:  < from: CT Angio Chest PE Protocol w/ IV Cont (09.18.24 @ 10:55) >    IMPRESSION:  *  No pulmonary embolism through the proximal segmental level. Limited   evaluation of the distal branches secondary to respiratory motion an   suboptimal timing for contrast administration.  *  Left lower lobe pneumonia with associated small pleural effusion.  *  No acute pathology in the abdomen and pelvis.  *  Mildly enlarged and globular uterus suggestive of adenomyosis versus   myomatous uterus. If clinically indicated, pelvic ultrasound is   recommended for further evaluation.    --- End of Report ---        Assessment:  69-year-old female with  PMHx of CAD, HTN, HLD, NSTEMI in 2011 s/p ELIZABETH to LAD, S/p ELIZABETH Distal LCx (4/30/2024), hypothyroid, rheumatoid arthritis, presented to ER with left-sided chest pain associated with generalized weakness, nausea and vomiting.  Patient ill-appearing, unable to provide much history.  History mostly supplied by patient's family member at bedside.  Symptoms started 2 days ago, however today she felt much worse with prompted her to come to the ED.  Patient noted to be tachycardic and hypotensive upon arrival, diaphoretic and ill-appearing.  She is noted to have  diminished breath sounds in the left lower lobe, CT scan Negative for PE, does show a left lower lobe pneumonia.  Patient's course complicated by multiple episodes of hypotension requiring vasopressor support and IV fluids.  Patient's labs reviewed, noted to have leukocytosis, elevated lactate 5.5.  EKG independently reviewed, normal sinus rhythm, no acute changes. Pt became hypoxic in ER on high flow O2 N/C and intubated.  - Currently intubated, sedated on ventilator FiO2-100 in MICU with Nimbex for paralytic therapy  - On Multiple pressors for BP support (Dobutamine, Levophed, Phenylephrine, and Vasopressin drips) to maintain MAP > 65  - IV Vanco, Meropenem, and Levofloxacin     Plan:  Critical condition  Condition most likely secondary to severe septic shock  I have reviewed the echo myself.  I do not feel that there is evidence of cardiac tamponade  Latest POCUS shows possible cardiac depressant effects of sepsis and reduced contractility  Agree with Dobutamine  If condition does not improve, consider insertion of SG catheter  Case discussed with intensivist, Dr Jamil and patient's son.

## 2024-09-18 NOTE — ED PROVIDER NOTE - PHYSICAL EXAMINATION
Gen: ill-appearing, intermittently vomiting, diaphoretic, awake and alert  Head: NCAT  HEENT: oral mucosa moist, normal conjunctiva, oropharynx clear without exudate or erythema  Lung: tachypneic, severe respiratory distress, diminished breath sounds left lower lobe, otherwise lungs clear  CV: normal s1/s2, rrr, no murmurs, Normal perfusion, pulses 2+ throughout  Abd: soft, NTND  MSK: No edema, no visible deformities, full range of motion in all 4 extremities  Neuro: CN II-XII grossly intact, No focal neurologic deficits  Skin: No rash

## 2024-09-18 NOTE — PATIENT PROFILE ADULT - FALL HARM RISK - HARM RISK INTERVENTIONS

## 2024-09-18 NOTE — ED PROVIDER NOTE - NSICDXPASTMEDICALHX_GEN_ALL_CORE_FT
PAST MEDICAL HISTORY:  ADHD (attention deficit hyperactivity disorder)     HLD (hyperlipidemia)     Hypothyroid     Migraine     Myocardial infarct 2012    Rheumatoid arthritis     Stented coronary artery 2012     Topical Sulfur Applications Counseling: Topical Sulfur Counseling: Patient counseled that this medication may cause skin irritation or allergic reactions.  In the event of skin irritation, the patient was advised to reduce the amount of the drug applied or use it less frequently.   The patient verbalized understanding of the proper use and possible adverse effects of topical sulfur application.  All of the patient's questions and concerns were addressed.

## 2024-09-18 NOTE — H&P ADULT - ASSESSMENT
70-year-old female presenting with left-sided chest pain associated with nausea vomiting weakness.  Patient noted to be tachycardic and hypotensive upon arrival, diaphoretic and ill-appearing.  She is noted to have  diminished breath sounds in the left lower lobe, point-of-care ultrasound performed,  however views are difficult due to patient's body habitus.  High-priority CAT scan was ordered to evaluate the patient for a pulmonary embolism, which did not demonstrate PE but did show a left lower lobe pneumonia.  Patient's course complicated by multiple episodes of hypotension requiring vasopressor support and IV fluids.  Patient's labs reviewed, noted to have leukocytosis, elevated lactate.  Patient also noted to have elevated troponin, EKG independently reviewed, normal sinus rhythm, no ST elevations or depressions, right ventricular hypertrophy noted which is unchanged from previous.  Cardiology was consulted.  MICU admitted for acute hypoxic respiratory failure requiring intubation and triple pressor shock state.     PLAN:    =====Neurologic=====  Patient alert and oriented upon initial assessment  Initially sedated on prop and fent, upon arrival to unit sedation needs increased 1mg IVP ativan given   Currently intubated and sedated on dilaudid and versed w/Nimbex gtt started     =====Pulmonary=====  intubated following acute hypoxic resp failure while on hi-flow nasal cannula   upon arrival to MICU sedation needs increased   Peak pressures elevated >40, patient was overbreathing the vent. 1mg IVP ativan given   Responded well to 2mg IVP ativan, nimbex gtt started     =====Cardiovascular=====  Started on Levo, Vaso, Gaurang for hypotension  2 amps sodium bicarb  2cc's gaurang  Bedside pocus showed worsening contractility from initial assessment in ED.  Small pericardial effusion noted   @ 2.5 to be started once weaned off gaurang for afterload protection   Stent history in May 2024, Followed by [facility] outpatient, consulted and spoke to patients son re prognosis       =====GI=====  - NPO  - OGT    =====Renal/=====  Rec 3L total   U/A from ER to MICU showed increased proteinuria, ketones and + glucose in urine  BUN/Cr lateral from initial set of labs     =====Endocrine=====  - Stress Dose steroids  - ACHS q6h sliding scale  - A1C ordered for AM     =====Infectious Disease=====  Patient is afebrile and is not currently being treated for any infection.    =====Heme/Onc=====  - Holding home plavix and ASA    =====Ethics=====  FULL CODE  Noé Giles [phone] 70-year-old female presenting with left-sided chest pain associated with nausea vomiting weakness.  Patient noted to be tachycardic and hypotensive upon arrival, diaphoretic and ill-appearing.  She is noted to have  diminished breath sounds in the left lower lobe, point-of-care ultrasound performed,  however views are difficult due to patient's body habitus.  High-priority CAT scan was ordered to evaluate the patient for a pulmonary embolism, which did not demonstrate PE but did show a left lower lobe pneumonia.  Patient's course complicated by multiple episodes of hypotension requiring vasopressor support and IV fluids.  Patient's labs reviewed, noted to have leukocytosis, elevated lactate.  Patient also noted to have elevated troponin, EKG independently reviewed, normal sinus rhythm, no ST elevations or depressions, right ventricular hypertrophy noted which is unchanged from previous.  Cardiology was consulted.  MICU admitted for acute hypoxic respiratory failure requiring intubation and triple pressor shock state.     #Distributive shock  #Developed stress cardiomyopathy 2/2 ? cardiogenic shock  #ARDS  #Acute hypoxic hypercapnic respiratory failure  #Community-acquired pneumonia  #Immunocompromise  #Lactic acidosis  #Hyperglycemia    PLAN:    =====Neurologic=====  Patient alert and oriented upon initial assessment  Initially sedated on prop and fent, upon arrival to unit sedation needs increased 1mg IVP ativan given   Currently intubated and sedated on dilaudid and versed   Nimbex gtt ordered    =====Pulmonary=====  intubated following acute hypoxic resp failure while on hi-flow nasal cannula   Vent: AC//20/100/10  upon arrival to MICU sedation needs increased   Peak pressures elevated >40, patient was overbreathing the vent. 1mg IVP ativan given   Responded well to 1mg IVP ativan, nimbex gtt ordered    =====Cardiovascular=====  Started on Levo, Vaso, Gaurang for hypotension in ER  Acutely hypotensive on arrival to MICU  2 amps sodium bicarb  2cc's gaurang  Bedside pocus showed worsening contractility from initial assessment in ED.  Small pericardial effusion noted   @ 2.5 to be started once weaned off gaurang for afterload protection   Stent history in May 2024, Followed by [facility], consulted and spoke to patients son re prognosis       =====GI=====  - NPO  - OGT    =====Renal/=====  Rec 3L total   U/A from ER to MICU showed increased proteinuria, ketones and + glucose in urine  BUN/Cr 15.7/0.87 lateral from initial set of labs   Urethral cantu     =====Endocrine=====  - Stress Dose steroids  - ACHS q6h sliding scale  - A1C ordered for AM     =====Infectious Disease=====  - WBC 14.01  - Febrile 100.9, cooling 1x dose IV tylenol   - Lactate 4.8 from 4.0  - RVP negative  - BCx pending  - Chloe, Vanc and Levaquin  - QTC > 450 follow EKG w/Levaquin dosing    =====Heme/Onc=====  - Holding home plavix and ASA  - Heparin 5000U q8h     =====Ethics=====  FULL CODE  Son Chan [phone]    LINES:  RIH Central line 9/18  Left Axilary A line 9/18  Peripherals

## 2024-09-18 NOTE — ED ADULT NURSE REASSESSMENT NOTE - NS ED NURSE REASSESS COMMENT FT1
PT with increasing tachycardia in the 140's. O2 sat 89-90%.  ICU pa contacted and made aware.  At bedside to assess pt.  PT titrate off Gaurang and ET tube suctioned.

## 2024-09-18 NOTE — H&P ADULT - NSHPREVIEWOFSYSTEMS_GEN_ALL_CORE
CONSTITUTIONAL: No weakness, fevers or chills  EYES/ENT: No visual changes;  No vertigo or throat pain   NECK: No pain or stiffness  RESPIRATORY: + SOB +cough, no wheezing or hemoptysis;   CARDIOVASCULAR: No chest pain or palpitations  GASTROINTESTINAL: +abd pain/discomfort/nausea/vomiting No hematemesis; No diarrhea or constipation. No melena or hematochezia.  GENITOURINARY: No dysuria, frequency or hematuria  NEUROLOGICAL: No numbness or weakness  SKIN: No itching, burning, rashes, or lesions   PSYCH: no hx depression, no hx anxiety

## 2024-09-18 NOTE — H&P ADULT - NSHPPHYSICALEXAM_GEN_ALL_CORE
CONSTITUTIONAL: Well groomed, no apparent distress  EYES: PERRLA and symmetric, EOMI, No conjunctival or scleral injection, non-icteric  ENMT: Oral mucosa with moist membranes. Normal dentition; no pharyngeal injection or exudates             NECK: Supple, symmetric and without tracheal deviation   RESP: SOB, No respiratory distress, no use of accessory muscles; CTA b/l, no WRR  CV: Tachycardic, +S1S2, no MRG; no JVD; no peripheral edema  GI: Soft, NT, ND, no rebound, no guarding; no palpable masses; no hepatosplenomegaly; no hernia palpated  LYMPH: No cervical LAD or tenderness; no axillary LAD or tenderness; no inguinal LAD or tenderness  SKIN: No rashes or ulcers noted; no subcutaneous nodules or induration palpable  NEURO: CN II-XII intact; normal reflexes in upper and lower extremities, sensation intact in upper and lower extremities b/l to light touch   PSYCH: Appropriate insight/judgment; A+O x 3, mood and affect appropriate, recent/remote memory intact

## 2024-09-18 NOTE — ED ADULT NURSE NOTE - OBJECTIVE STATEMENT
PT presents to ED for left sided chest pain SOB nausea vomiting and fevers since monday.  PT extremely diaphoretic and flushed. O2 sat 83% on RA.  IV's placed labs drawn PT placed on Non-rebreather mask, PT taken for priority chest CT. PT Decompensated in CT requiring Gaurang per orders. Started on Levophed, pt stabilized and able to complete CT. PT placed on High flow nasal canula.  Medications administered per orders. Pending eval by ICU team

## 2024-09-18 NOTE — H&P ADULT - ATTENDING COMMENTS
69-year-old female with history of coronary artery disease hypothyroidism rheumatoid arthritisMigraine dyslipidemia ADHD  on tocilizumab 69-year-old female with history of coronary artery disease hypothyroidism rheumatoid arthritis Migraine dyslipidemia ADHD  on tocilizumab Who was in her usual state of health up until 2 to 3 days ago when she started having productive cough pleuritic chest pain especially on the left side with generalized weakness and now started having worsening difficulty breathing called her son earlier this morning decided to bring her to [facility] ED where she was found to be hypoxic placed on high flow nasal cannula hypotensive placed on norepinephrine infusion after requiring 1000 mcg of Gaurang-Synephrine IV push and 1 L of IV crystalloid infusion with systolic in 60s currently being admitted to medical ICU for    Distributive shock  Developed stress cardiomyopathy and perhaps component of cardiogenic shock as well  ARDS  Acute hypoxic hypercapnic respiratory failure  Community-acquired pneumonia  Immunocompromise  Lactic acidosis  Hyperglycemia     patient seen and examined   through my assessment bedside POCUS performed showing moderate pericardial effusion reviewed with echo tech and subsequently cardiology team ruling out acute pericardial tamponade but through the ICU course patient's IV vasoactive requirements kept increasing now requiring IV vasopressin infusion, IV Gaurang-Synephrine infusion with increasing requirement of IV Levophed and subsequently POCUS was performed with similar pericardial effusion without tamponade but now with global hypokinetic LV and started the patient on dobutamine and stopped IV crystalloid infusion. patient was dyssynchronous with the ventilator with worsening hypoxia hence RASS goal changed to -3 to -4 and added Versed and Dilaudid infusion with Nimbex infusion with serial ABGs and if  PF ratio remained poor oral further decline, low threshold to place the patient in prone position  Full code for now  ====================== NEUROLOGY=====================  cisatracurium Infusion 3 MICROgram(s)/kG/Min (16.3 mL/Hr) IV Continuous <Continuous>  HYDROmorphone Infusion. 0.5 mG/Hr (0.5 mL/Hr) IV Continuous <Continuous>  midazolam Infusion 0.02 mG/kG/Hr (1.81 mL/Hr) IV Continuous <Continuous>  RASS goal -3 to -4  ==================== RESPIRATORY======================  Mechanical Ventilation:  Mode: AC/ CMV (Assist Control/ Continuous Mandatory Ventilation)  RR (machine): 22TV (machine): [id number]: 100PEEP: 10MAP: 16PIP: 29 protective lung strategyHeavy sedation and paralytic therapy for now  Avoid barotrauma and while you trauma as possible  Serial ABGs  Permissive hypercapnia  Net even or net negative fluid balance   ventilator associated pneumonia prevention bundle in place including but not limited to head of the bed elevation to 30 degrees and more, daily oral care with 2% chlorhexidine, subglottic endotracheal suctioning of secretion, spontaneous awakening and breathing trial for daily assessment of mechanical ventilator liberation    ====================CARDIOVASCULAR==================  DOBUTamine Infusion 2.5 MICROgram(s)/kG/Min (6.8 mL/Hr) IV Continuous <Continuous>  norepinephrine Infusion 0.05 MICROgram(s)/kG/Min (8.5 mL/Hr) IV Continuous <Continuous>  phenylephrine    Infusion 0.1 MICROgram(s)/kG/Min (3.4 mL/Hr) IV Continuous <Continuous>  hydrocortisone sodium succinate Injectable 50 milliGRAM(s) IV Push every 6 hours  vasopressin Infusion 0.04 Unit(s)/Min (6 mL/Hr) IV Continuous <Continuous>   repeat echo tomorrow  MAP of more than 65  Central venous O2, serial lactate LFTs RFT's urine output monitoring  ===================HEMATOLOGIC/ONC ===================  heparin   Injectable 5000 Unit(s) SubCutaneous every 8 hours   no PE on CT angio  ===================== RENAL =========================  Continue monitoring urine output   avoid Nephrotoxic agents  Adjust medications for renal  function  Close urinary output monitoring  UA  Close monitoring of potassium, phosphorus, magnesium with target levels of more than 4, 3, 2 respectively  ==================== GASTROINTESTINAL===================  calcium gluconate IVPB 2 Gram(s) IV Intermittent once  dextrose 5%. 1000 milliLiter(s) (100 mL/Hr) IV Continuous <Continuous>  dextrose 5%. 1000 milliLiter(s) (50 mL/Hr) IV Continuous <Continuous>  lactated ringers. 1000 milliLiter(s) (100 mL/Hr) IV Continuous <Continuous>  magnesium sulfate  IVPB 2 Gram(s) IV Intermittent once  pantoprazole  Injectable 40 milliGRAM(s) IV Push daily  sodium bicarbonate  Infusion 0.165 mEq/kG/Hr (100 mL/Hr) IV Continuous <Continuous>: Will discontinue    Diet:  n.p.o. for now  =======================    ENDOCRINE  =====================  dextrose 50% Injectable 12.5 Gram(s) IV Push once  dextrose 50% Injectable 25 Gram(s) IV Push once  dextrose 50% Injectable 25 Gram(s) IV Push once  dextrose Oral Gel 15 Gram(s) Oral once PRN Blood Glucose LESS THAN 70 milliGRAM(s)/deciliter  glucagon  Injectable 1 milliGRAM(s) IntraMuscular once  insulin lispro (ADMELOG) corrective regimen sliding scale   SubCutaneous every 6 hours    Blood sugar goal: 100-200  ========================INFECTIOUS DISEASE================  levoFLOXacin IVPB 750 milliGRAM(s) IV Intermittent every 48 hours  IV vancomycin and IV meropenem  Cx:   RVP, sputum culture, procalcitonin, urine for Legionella and Streptococcus antigen, blood cultures x 2 sets      Care plan discussed with ICU care team  Plan d/w Family   guarded prognosis at present 69-year-old female with history of coronary artery disease hypothyroidism rheumatoid arthritis Migraine dyslipidemia ADHD  on tocilizumab Who was in her usual state of health up until 2 to 3 days ago when she started having productive cough pleuritic chest pain especially on the left side with generalized weakness and now started having worsening difficulty breathing called her son earlier this morning decided to bring her to [facility] ED where she was found to be hypoxic placed on high flow nasal cannula hypotensive placed on norepinephrine infusion after requiring 1000 mcg of Gaurang-Synephrine IV push and 1 L of IV crystalloid infusion with systolic in 60s currently being admitted to medical ICU for   Distributive shock  Developed stress cardiomyopathy and perhaps component of cardiogenic shock as well  ARDS  Acute hypoxic hypercapnic respiratory failure  Community-acquired pneumonia  Immunocompromise  Lactic acidosis  Hyperglycemia     patient seen and examined   through my assessment bedside POCUS performed showing moderate pericardial effusion reviewed with echo tech and subsequently cardiology team ruling out acute pericardial tamponade but through the ICU course patient's IV vasoactive requirements kept increasing now requiring IV vasopressin infusion, IV Gaurang-Synephrine infusion with increasing requirement of IV Levophed and subsequently POCUS was performed with similar pericardial effusion without tamponade but now with global hypokinetic LV and started the patient on dobutamine and stopped IV crystalloid infusion. patient was dyssynchronous with the ventilator with worsening hypoxia hence RASS goal changed to -3 to -4 and added Versed and Dilaudid infusion with Nimbex infusion with serial ABGs and if  PF ratio remained poor oral further decline, low threshold to place the patient in prone position  Full code for now  ====================== NEUROLOGY=====================  cisatracurium Infusion 3 MICROgram(s)/kG/Min (16.3 mL/Hr) IV Continuous <Continuous>  HYDROmorphone Infusion. 0.5 mG/Hr (0.5 mL/Hr) IV Continuous <Continuous>  midazolam Infusion 0.02 mG/kG/Hr (1.81 mL/Hr) IV Continuous <Continuous>  RASS goal -3 to -4  ==================== RESPIRATORY======================  Mechanical Ventilation:  Mode: AC/ CMV (Assist Control/ Continuous Mandatory Ventilation)  RR (machine): 22TV (machine): [id number]: 100PEEP: 10MAP: 16PIP: 29 protective lung strategyHeavy sedation and paralytic therapy for now  Avoid barotrauma and while you trauma as possible Serial ABGs Permissive hypercapnia Net even or net negative fluid balance   ventilator associated pneumonia prevention bundle in place including but not limited to head of the bed elevation to 30 degrees and more, daily oral care with 2% chlorhexidine, subglottic endotracheal suctioning of secretion, spontaneous awakening and breathing trial for daily assessment of mechanical ventilator liberation  ====================CARDIOVASCULAR==================  DOBUTamine Infusion 2.5 MICROgram(s)/kG/Min (6.8 mL/Hr) IV Continuous <Continuous>  norepinephrine Infusion 0.05 MICROgram(s)/kG/Min (8.5 mL/Hr) IV Continuous <Continuous>  phenylephrine    Infusion 0.1 MICROgram(s)/kG/Min (3.4 mL/Hr) IV Continuous <Continuous>  hydrocortisone sodium succinate Injectable 50 milliGRAM(s) IV Push every 6 hours  vasopressin Infusion 0.04 Unit(s)/Min (6 mL/Hr) IV Continuous <Continuous>   repeat echo tomorrow MAP of more than 65  Central venous O2, serial lactate LFTs RFT's urine output monitoring  ===================HEMATOLOGIC/ONC ===================  heparin   Injectable 5000 Unit(s) SubCutaneous every 8 hours   no PE on CT angio  ===================== RENAL =========================  Continue monitoring urine output   avoid Nephrotoxic agents  Adjust medications for renal  function  Close urinary output monitoring  UA  Close monitoring of potassium, phosphorus, magnesium with target levels of more than 4, 3, 2 respectively  ==================== GASTROINTESTINAL===================  calcium gluconate IVPB 2 Gram(s) IV Intermittent once  dextrose 5%. 1000 milliLiter(s) (100 mL/Hr) IV Continuous <Continuous>  dextrose 5%. 1000 milliLiter(s) (50 mL/Hr) IV Continuous <Continuous>  lactated ringers. 1000 milliLiter(s) (100 mL/Hr) IV Continuous <Continuous>  magnesium sulfate  IVPB 2 Gram(s) IV Intermittent once  pantoprazole  Injectable 40 milliGRAM(s) IV Push daily  sodium bicarbonate  Infusion 0.165 mEq/kG/Hr (100 mL/Hr) IV Continuous <Continuous>: Will discontinue    Diet:  n.p.o. for now  =======================    ENDOCRINE  =====================  dextrose 50% Injectable 12.5 Gram(s) IV Push once  dextrose 50% Injectable 25 Gram(s) IV Push once  dextrose 50% Injectable 25 Gram(s) IV Push once  dextrose Oral Gel 15 Gram(s) Oral once PRN Blood Glucose LESS THAN 70 milliGRAM(s)/deciliter  glucagon  Injectable 1 milliGRAM(s) IntraMuscular once  insulin lispro (ADMELOG) corrective regimen sliding scale   SubCutaneous every 6 hours  Blood sugar goal: 100-200  ========================INFECTIOUS DISEASE================  levoFLOXacin IVPB 750 milliGRAM(s) IV Intermittent every 48 hours  IV vancomycin and IV meropenem  Cx:   RVP, sputum culture, procalcitonin, urine for Legionella and Streptococcus antigen, blood cultures x 2 set    Care plan discussed with ICU care team  Plan d/w Family   guarded prognosis at present    Upon my evaluation, this patient had a high probability of imminent or life-threatening deterioration due to critical condition, which required my direct attention, intervention, and personal management. I have personally provided 70 minutes of critical care time exclusive of time spent teaching and/or time spent on separately billable procedures. Time includes review of laboratory data, radiology results, discussion with consultants, and monitoring for potential decompensation. Interventions were performed as documented above.

## 2024-09-18 NOTE — ED ADULT NURSE NOTE - NS ED NURSE LEVEL OF CONSCIOUSNESS AFFECT
Impression: Age-related nuclear cataract, bilateral: H25.13. Plan: Discussed diagnosis in detail with patient. No treatment is required at this time. The patient will monitor vision changes and contact us with any decrease in vision. 

Patient wears soft Contact lens
Impression: Macular cyst, hole, or pseudohole, left eye: H35.342. Plan: Discussed diagnosis in detail with patient. Patient denies Metamorphopsia on Amsler grid OS. Patient was instructed to report any type of visual field changes or visual changes immediately. Use of Amsler grid was explained. Consult recommended [[facility]].
Impression: Puckering of macula, left eye: H35.372.  Plan: see note 2
Appropriate/Anxious

## 2024-09-18 NOTE — H&P ADULT - NSHPLABSRESULTS_GEN_ALL_CORE
LABS:                        14.0   14.01 )-----------( 173      ( 18 Sep 2024 10:34 )             41.9     09-18    137  |  101  |  16.9  ----------------------------<  231[H]  4.1   |  19.0[L]  |  1.19    Ca    8.7      18 Sep 2024 10:34    TPro  5.6[L]  /  Alb  3.7  /  TBili  0.6  /  DBili  x   /  AST  25  /  ALT  25  /  AlkPhos  87  09-18    PT/INR - ( 18 Sep 2024 10:34 )   PT: 12.1 sec;   INR: 1.09 ratio         PTT - ( 18 Sep 2024 10:34 )  PTT:26.4 sec      Urinalysis Basic - ( 18 Sep 2024 12:05 )    Color: Yellow / Appearance: Clear / SG: >1.030 / pH: x  Gluc: x / Ketone: Trace mg/dL  / Bili: Negative / Urobili: 1.0 mg/dL   Blood: x / Protein: 30 mg/dL / Nitrite: Negative   Leuk Esterase: Negative / RBC: 1 /HPF / WBC 1 /HPF   Sq Epi: x / Non Sq Epi: 1 /HPF / Bacteria: Few /HPF      ABG - ( 18 Sep 2024 13:23 )  pH, Arterial: 7.220 pH, Blood: x     /  pCO2: 54    /  pO2: 102   / HCO3: 22    / Base Excess: -5.6  /  SaO2: 99.2        VBG 09-18 @ 10:35  pH: 7.340/pCO2: 37 /pO2: 73/HCO3: 20/lactate: 5.50    Microbiology

## 2024-09-18 NOTE — ED PROVIDER NOTE - OBJECTIVE STATEMENT
69-year-old female with past medical history of CAD status post multiple stents,  hypothyroid, rheumatoid arthritis, presenting with left-sided chest pain associated with generalized weakness, nausea and vomiting.  Patient ill-appearing, unable to provide much history.  History mostly supplied by patient's family member at bedside.  Symptoms started 2 days ago, however today she felt much worse with prompted her to come to the ED.

## 2024-09-18 NOTE — ED ADULT NURSE NOTE - NSFALLRISKINTERV_ED_ALL_ED
Assistance with ambulation/Communicate fall risk and risk factors to all staff, patient, and family/Provide visual cue: yellow wristband, yellow gown, etc/Reinforce activity limits and safety measures with patient and family/Call bell, personal items and telephone in reach/Instruct patient to call for assistance before getting out of bed/chair/stretcher/Non-slip footwear applied when patient is off stretcher/[place] to call system/Physically safe environment - no spills, clutter or unnecessary equipment/Purposeful Proactive Rounding/Room/bathroom lighting operational, light cord in reach

## 2024-09-18 NOTE — PROCEDURE NOTE - ADDITIONAL PROCEDURE DETAILS
ARTERIAL LINE INSERTION NOTE: Emergent Procedure. The patient was prepped and draped in the usual sterile fashion. The L axiliary artery was accessed under ultrasound guidance. Pulsatile, arterial blood was visualized. Using modified Seldinger technique, a guidewire was threaded into the artery and an arterial line catheter was placed. Again the catheter demonstrated good arterial flow after removal of the guidewire. Good wave-form was obtained on the monitor.  The catheter was then sutured into place. The area was cleaned and a sterile dressing was placed. The patient tolerated the procedure well without any immediate complications.

## 2024-09-18 NOTE — PROGRESS NOTE ADULT - ASSESSMENT
68 yo female with PMHx CAD NSTEMI in 2011 s/p ELIZABETH to LAD, s/p ELIZABETH Distal LCx (4/30/2024), HTN, HLD, hypothyroid, rheumatoid arthritis, who presented to ER with left-sided chest pain associated with generalized weakness, nausea and vomiting. Ruled in for severe sepsis secondary to LLL pneumonia POA, while in the ED developed worsening hypoxic respiratory failure and was emergently intubated. She had profound hypoxemia, ARDS requiring deep sedation and paralysis. Patient had uptrending lactate levels, worsening shock state on three vasopressors with concerns for acute systolic heart failure, cardiogenic shock.    # Acute hypoxic respiratory failure  # Undifferentiated shock, distributive and cardiogenic  # Lobar pneumonia  # Severe sepsis  # Acute systolic heart failure  # Metabolic lactic acidosis  # Hypergylcemia    Neuro: On Dilaudid and Versed gtts, as well as neuromuscular blockade for vent synchrony and to optimize oxygenation.   Pulm: Full vent support, meets severe ARDS criteria with p:F <100. Rotated R lung down with some improvement in oxygenation, however remains on 100% Fio2 PEEP +12 deeply sedated and paralyzed with Nimbex. Pplt within goal <30, driving pressure <20. Does not meet criteria for SBT in AM.   CV: In shock requiring Norepinephrine gtt, actively titrating to targeted MAP goal >65, as well as fixed-dose physiologic Vasopressin. Weaned off Phenylephrine gtt. Added dobutamine for concerns for cardiogenic shock, POCUS with acute biventricular failure. Ordered for repeat limited TTE. Monitoring dynamic markers of end-organ perfusion in response to resuscitation. Continue stress steroids while in shock.  GI: Keep NPO. PPI for GI mucosal cytoprotection. Transaminitis likely ischemic hepatitis, trending LFTs.  Renal: CLEMENTINA likely ATN due to hypoperfusion during shock state. Monitoring UOP; supplementing electrolytes to keep K >4, Mg >2, Phos >3. Renally adjusting medications as indicated.  ID: Empiric antibiotics with Meropenem, Vancomycin, and Levaquin (azithromycin allergy). Blood and urine cultures sent, sent sputum cx. Pending urine Legionella Ag and Strep pneumo Ag. RVP sent and is negative. MRSA PCR negative.  Heme: Heparin SC DVT ppx   Endo: Monitoring BG q6h and cover with ISS to keep goal -200 while critically ill. Stress steroids, as above, with iatrogenic hyperglycemia. Increased ISS and added Lantus 10u daily.    Case d/w ICU Attending Dr. Rivera on multidisciplinary rounds.

## 2024-09-18 NOTE — ED PROVIDER NOTE - CLINICAL SUMMARY MEDICAL DECISION MAKING FREE TEXT BOX
70-year-old female presenting with left-sided chest pain associated with nausea vomiting weakness.  Patient noted to be tachycardic and hypotensive upon arrival, diaphoretic and ill-appearing.  She is noted to have  diminished breath sounds in the left lower lobe, point-of-care ultrasound performed,  however views are difficult due to patient's body habitus.  High-priority CAT scan was ordered to evaluate the patient for a pulmonary embolism, which did not demonstrate PE but did show a left lower lobe pneumonia.  Patient's course complicated by multiple episodes of hypotension requiring vasopressor support and IV fluids.  Patient's labs reviewed, noted to have leukocytosis, elevated lactate.  Patient also noted to have elevated troponin, EKG independently reviewed, normal sinus rhythm, no ST elevations or depressions, right ventricular hypertrophy noted which is unchanged from previous.  Cardiology was consulted.  Patient was seen by MICU, plan to admit for further management.

## 2024-09-18 NOTE — ED ADULT TRIAGE NOTE - CHIEF COMPLAINT QUOTE
body aches chills and fever for a couple days with worsening symptoms. cough and congestion. code sepsis called,

## 2024-09-18 NOTE — PROCEDURE NOTE - NSINDICATIONS_GEN_A_CORE
critical illness/venous access
critical patient/monitoring purposes
airway protection/critical patient

## 2024-09-19 ENCOUNTER — RESULT REVIEW (OUTPATIENT)
Age: 69
End: 2024-09-19

## 2024-09-19 ENCOUNTER — APPOINTMENT (OUTPATIENT)
Age: 69
End: 2024-09-19

## 2024-09-19 DIAGNOSIS — J18.9 PNEUMONIA, UNSPECIFIED ORGANISM: ICD-10-CM

## 2024-09-19 DIAGNOSIS — J96.01 ACUTE RESPIRATORY FAILURE WITH HYPOXIA: ICD-10-CM

## 2024-09-19 DIAGNOSIS — Z71.89 OTHER SPECIFIED COUNSELING: ICD-10-CM

## 2024-09-19 DIAGNOSIS — I95.9 HYPOTENSION, UNSPECIFIED: ICD-10-CM

## 2024-09-19 DIAGNOSIS — G89.4 CHRONIC PAIN SYNDROME: ICD-10-CM

## 2024-09-19 DIAGNOSIS — M06.9 RHEUMATOID ARTHRITIS, UNSPECIFIED: ICD-10-CM

## 2024-09-19 DIAGNOSIS — Z51.5 ENCOUNTER FOR PALLIATIVE CARE: ICD-10-CM

## 2024-09-19 LAB
A1C WITH ESTIMATED AVERAGE GLUCOSE RESULT: 6.4 % — HIGH (ref 4–5.6)
ALBUMIN SERPL ELPH-MCNC: 3.1 G/DL — LOW (ref 3.3–5.2)
ALBUMIN SERPL ELPH-MCNC: 3.1 G/DL — LOW (ref 3.3–5.2)
ALBUMIN SERPL ELPH-MCNC: 3.2 G/DL — LOW (ref 3.3–5.2)
ALBUMIN SERPL ELPH-MCNC: 3.3 G/DL — SIGNIFICANT CHANGE UP (ref 3.3–5.2)
ALP SERPL-CCNC: 81 U/L — SIGNIFICANT CHANGE UP (ref 40–120)
ALP SERPL-CCNC: 81 U/L — SIGNIFICANT CHANGE UP (ref 40–120)
ALP SERPL-CCNC: 87 U/L — SIGNIFICANT CHANGE UP (ref 40–120)
ALP SERPL-CCNC: 94 U/L — SIGNIFICANT CHANGE UP (ref 40–120)
ALT FLD-CCNC: 51 U/L — HIGH
ALT FLD-CCNC: 53 U/L — HIGH
ALT FLD-CCNC: 55 U/L — HIGH
ALT FLD-CCNC: 60 U/L — HIGH
ANION GAP SERPL CALC-SCNC: 12 MMOL/L — SIGNIFICANT CHANGE UP (ref 5–17)
ANION GAP SERPL CALC-SCNC: 12 MMOL/L — SIGNIFICANT CHANGE UP (ref 5–17)
ANION GAP SERPL CALC-SCNC: 14 MMOL/L — SIGNIFICANT CHANGE UP (ref 5–17)
ANION GAP SERPL CALC-SCNC: 17 MMOL/L — SIGNIFICANT CHANGE UP (ref 5–17)
APTT BLD: 28.5 SEC — SIGNIFICANT CHANGE UP (ref 24.5–35.6)
AST SERPL-CCNC: 38 U/L — HIGH
AST SERPL-CCNC: 43 U/L — HIGH
AST SERPL-CCNC: 45 U/L — HIGH
AST SERPL-CCNC: 53 U/L — HIGH
BASOPHILS # BLD AUTO: 0.03 K/UL — SIGNIFICANT CHANGE UP (ref 0–0.2)
BASOPHILS NFR BLD AUTO: 0.1 % — SIGNIFICANT CHANGE UP (ref 0–2)
BILIRUB SERPL-MCNC: 0.3 MG/DL — LOW (ref 0.4–2)
BILIRUB SERPL-MCNC: 0.4 MG/DL — SIGNIFICANT CHANGE UP (ref 0.4–2)
BUN SERPL-MCNC: 16.6 MG/DL — SIGNIFICANT CHANGE UP (ref 8–20)
BUN SERPL-MCNC: 19.1 MG/DL — SIGNIFICANT CHANGE UP (ref 8–20)
BUN SERPL-MCNC: 19.9 MG/DL — SIGNIFICANT CHANGE UP (ref 8–20)
BUN SERPL-MCNC: 20.6 MG/DL — HIGH (ref 8–20)
CALCIUM SERPL-MCNC: 8 MG/DL — LOW (ref 8.4–10.5)
CALCIUM SERPL-MCNC: 8.1 MG/DL — LOW (ref 8.4–10.5)
CALCIUM SERPL-MCNC: 8.1 MG/DL — LOW (ref 8.4–10.5)
CALCIUM SERPL-MCNC: 8.5 MG/DL — SIGNIFICANT CHANGE UP (ref 8.4–10.5)
CHLORIDE SERPL-SCNC: 101 MMOL/L — SIGNIFICANT CHANGE UP (ref 96–108)
CHLORIDE SERPL-SCNC: 103 MMOL/L — SIGNIFICANT CHANGE UP (ref 96–108)
CHLORIDE SERPL-SCNC: 104 MMOL/L — SIGNIFICANT CHANGE UP (ref 96–108)
CHLORIDE SERPL-SCNC: 106 MMOL/L — SIGNIFICANT CHANGE UP (ref 96–108)
CO2 SERPL-SCNC: 17 MMOL/L — LOW (ref 22–29)
CO2 SERPL-SCNC: 20 MMOL/L — LOW (ref 22–29)
CO2 SERPL-SCNC: 21 MMOL/L — LOW (ref 22–29)
CO2 SERPL-SCNC: 21 MMOL/L — LOW (ref 22–29)
CREAT SERPL-MCNC: 0.68 MG/DL — SIGNIFICANT CHANGE UP (ref 0.5–1.3)
CREAT SERPL-MCNC: 0.7 MG/DL — SIGNIFICANT CHANGE UP (ref 0.5–1.3)
CREAT SERPL-MCNC: 0.78 MG/DL — SIGNIFICANT CHANGE UP (ref 0.5–1.3)
CREAT SERPL-MCNC: 0.83 MG/DL — SIGNIFICANT CHANGE UP (ref 0.5–1.3)
CULTURE RESULTS: NO GROWTH — SIGNIFICANT CHANGE UP
EGFR: 76 ML/MIN/1.73M2 — SIGNIFICANT CHANGE UP
EGFR: 82 ML/MIN/1.73M2 — SIGNIFICANT CHANGE UP
EGFR: 93 ML/MIN/1.73M2 — SIGNIFICANT CHANGE UP
EGFR: 94 ML/MIN/1.73M2 — SIGNIFICANT CHANGE UP
EOSINOPHIL # BLD AUTO: 0 K/UL — SIGNIFICANT CHANGE UP (ref 0–0.5)
EOSINOPHIL NFR BLD AUTO: 0 % — SIGNIFICANT CHANGE UP (ref 0–6)
ESTIMATED AVERAGE GLUCOSE: 137 MG/DL — HIGH (ref 68–114)
GAS PNL BLDA: SIGNIFICANT CHANGE UP
GAS PNL BLDV: SIGNIFICANT CHANGE UP
GAS PNL BLDV: SIGNIFICANT CHANGE UP
GLUCOSE BLDC GLUCOMTR-MCNC: 141 MG/DL — HIGH (ref 70–99)
GLUCOSE BLDC GLUCOMTR-MCNC: 142 MG/DL — HIGH (ref 70–99)
GLUCOSE BLDC GLUCOMTR-MCNC: 157 MG/DL — HIGH (ref 70–99)
GLUCOSE BLDC GLUCOMTR-MCNC: 160 MG/DL — HIGH (ref 70–99)
GLUCOSE BLDC GLUCOMTR-MCNC: 165 MG/DL — HIGH (ref 70–99)
GLUCOSE BLDC GLUCOMTR-MCNC: 171 MG/DL — HIGH (ref 70–99)
GLUCOSE BLDC GLUCOMTR-MCNC: 175 MG/DL — HIGH (ref 70–99)
GLUCOSE BLDC GLUCOMTR-MCNC: 198 MG/DL — HIGH (ref 70–99)
GLUCOSE BLDC GLUCOMTR-MCNC: 216 MG/DL — HIGH (ref 70–99)
GLUCOSE BLDC GLUCOMTR-MCNC: 227 MG/DL — HIGH (ref 70–99)
GLUCOSE BLDC GLUCOMTR-MCNC: 237 MG/DL — HIGH (ref 70–99)
GLUCOSE BLDC GLUCOMTR-MCNC: 249 MG/DL — HIGH (ref 70–99)
GLUCOSE BLDC GLUCOMTR-MCNC: 252 MG/DL — HIGH (ref 70–99)
GLUCOSE BLDC GLUCOMTR-MCNC: 259 MG/DL — HIGH (ref 70–99)
GLUCOSE SERPL-MCNC: 177 MG/DL — HIGH (ref 70–99)
GLUCOSE SERPL-MCNC: 248 MG/DL — HIGH (ref 70–99)
GLUCOSE SERPL-MCNC: 268 MG/DL — HIGH (ref 70–99)
GLUCOSE SERPL-MCNC: 290 MG/DL — HIGH (ref 70–99)
GRAM STN FLD: ABNORMAL
GRAM STN FLD: SIGNIFICANT CHANGE UP
HCT VFR BLD CALC: 39.8 % — SIGNIFICANT CHANGE UP (ref 34.5–45)
HCT VFR BLD CALC: 40.3 % — SIGNIFICANT CHANGE UP (ref 34.5–45)
HCT VFR BLD CALC: 40.5 % — SIGNIFICANT CHANGE UP (ref 34.5–45)
HCT VFR BLD CALC: 40.7 % — SIGNIFICANT CHANGE UP (ref 34.5–45)
HGB BLD-MCNC: 12.9 G/DL — SIGNIFICANT CHANGE UP (ref 11.5–15.5)
HGB BLD-MCNC: 13.3 G/DL — SIGNIFICANT CHANGE UP (ref 11.5–15.5)
HGB BLD-MCNC: 13.3 G/DL — SIGNIFICANT CHANGE UP (ref 11.5–15.5)
HGB BLD-MCNC: 13.4 G/DL — SIGNIFICANT CHANGE UP (ref 11.5–15.5)
IMM GRANULOCYTES NFR BLD AUTO: 0.2 % — SIGNIFICANT CHANGE UP (ref 0–0.9)
INR BLD: 1.46 RATIO — HIGH (ref 0.85–1.18)
LEGIONELLA AG UR QL: NEGATIVE — SIGNIFICANT CHANGE UP
LYMPHOCYTES # BLD AUTO: 0.7 K/UL — LOW (ref 1–3.3)
LYMPHOCYTES # BLD AUTO: 3.1 % — LOW (ref 13–44)
MAGNESIUM SERPL-MCNC: 1.9 MG/DL — SIGNIFICANT CHANGE UP (ref 1.6–2.6)
MAGNESIUM SERPL-MCNC: 2 MG/DL — SIGNIFICANT CHANGE UP (ref 1.6–2.6)
MAGNESIUM SERPL-MCNC: 2 MG/DL — SIGNIFICANT CHANGE UP (ref 1.8–2.6)
MAGNESIUM SERPL-MCNC: 2.1 MG/DL — SIGNIFICANT CHANGE UP (ref 1.6–2.6)
MCHC RBC-ENTMCNC: 32.4 GM/DL — SIGNIFICANT CHANGE UP (ref 32–36)
MCHC RBC-ENTMCNC: 32.8 GM/DL — SIGNIFICANT CHANGE UP (ref 32–36)
MCHC RBC-ENTMCNC: 32.9 GM/DL — SIGNIFICANT CHANGE UP (ref 32–36)
MCHC RBC-ENTMCNC: 33 GM/DL — SIGNIFICANT CHANGE UP (ref 32–36)
MCHC RBC-ENTMCNC: 33.5 PG — SIGNIFICANT CHANGE UP (ref 27–34)
MCHC RBC-ENTMCNC: 34.1 PG — HIGH (ref 27–34)
MCHC RBC-ENTMCNC: 34.1 PG — HIGH (ref 27–34)
MCHC RBC-ENTMCNC: 34.4 PG — HIGH (ref 27–34)
MCV RBC AUTO: 103.3 FL — HIGH (ref 80–100)
MCV RBC AUTO: 103.4 FL — HIGH (ref 80–100)
MCV RBC AUTO: 103.8 FL — HIGH (ref 80–100)
MCV RBC AUTO: 104.4 FL — HIGH (ref 80–100)
METHOD TYPE: SIGNIFICANT CHANGE UP
MONOCYTES # BLD AUTO: 0.99 K/UL — HIGH (ref 0–0.9)
MONOCYTES NFR BLD AUTO: 4.3 % — SIGNIFICANT CHANGE UP (ref 2–14)
NEUTROPHILS # BLD AUTO: 21.17 K/UL — HIGH (ref 1.8–7.4)
NEUTROPHILS NFR BLD AUTO: 92.3 % — HIGH (ref 43–77)
P AERUGINOSA DNA BLD POS NAA+NON-PROBE: SIGNIFICANT CHANGE UP
PHOSPHATE SERPL-MCNC: 2.5 MG/DL — SIGNIFICANT CHANGE UP (ref 2.4–4.7)
PHOSPHATE SERPL-MCNC: 2.7 MG/DL — SIGNIFICANT CHANGE UP (ref 2.4–4.7)
PHOSPHATE SERPL-MCNC: 2.8 MG/DL — SIGNIFICANT CHANGE UP (ref 2.4–4.7)
PHOSPHATE SERPL-MCNC: 2.9 MG/DL — SIGNIFICANT CHANGE UP (ref 2.4–4.7)
PLATELET # BLD AUTO: 162 K/UL — SIGNIFICANT CHANGE UP (ref 150–400)
PLATELET # BLD AUTO: 171 K/UL — SIGNIFICANT CHANGE UP (ref 150–400)
PLATELET # BLD AUTO: 177 K/UL — SIGNIFICANT CHANGE UP (ref 150–400)
PLATELET # BLD AUTO: 180 K/UL — SIGNIFICANT CHANGE UP (ref 150–400)
POTASSIUM SERPL-MCNC: 4.2 MMOL/L — SIGNIFICANT CHANGE UP (ref 3.5–5.3)
POTASSIUM SERPL-MCNC: 4.3 MMOL/L — SIGNIFICANT CHANGE UP (ref 3.5–5.3)
POTASSIUM SERPL-MCNC: 4.5 MMOL/L — SIGNIFICANT CHANGE UP (ref 3.5–5.3)
POTASSIUM SERPL-MCNC: 4.6 MMOL/L — SIGNIFICANT CHANGE UP (ref 3.5–5.3)
POTASSIUM SERPL-SCNC: 4.2 MMOL/L — SIGNIFICANT CHANGE UP (ref 3.5–5.3)
POTASSIUM SERPL-SCNC: 4.3 MMOL/L — SIGNIFICANT CHANGE UP (ref 3.5–5.3)
POTASSIUM SERPL-SCNC: 4.5 MMOL/L — SIGNIFICANT CHANGE UP (ref 3.5–5.3)
POTASSIUM SERPL-SCNC: 4.6 MMOL/L — SIGNIFICANT CHANGE UP (ref 3.5–5.3)
PROT SERPL-MCNC: 5.1 G/DL — LOW (ref 6.6–8.7)
PROT SERPL-MCNC: 5.2 G/DL — LOW (ref 6.6–8.7)
PROT SERPL-MCNC: 5.3 G/DL — LOW (ref 6.6–8.7)
PROT SERPL-MCNC: 5.5 G/DL — LOW (ref 6.6–8.7)
PROTHROM AB SERPL-ACNC: 16 SEC — HIGH (ref 9.5–13)
RBC # BLD: 3.85 M/UL — SIGNIFICANT CHANGE UP (ref 3.8–5.2)
RBC # BLD: 3.9 M/UL — SIGNIFICANT CHANGE UP (ref 3.8–5.2)
RBC # FLD: 13.2 % — SIGNIFICANT CHANGE UP (ref 10.3–14.5)
RBC # FLD: 13.2 % — SIGNIFICANT CHANGE UP (ref 10.3–14.5)
RBC # FLD: 13.3 % — SIGNIFICANT CHANGE UP (ref 10.3–14.5)
RBC # FLD: 13.4 % — SIGNIFICANT CHANGE UP (ref 10.3–14.5)
SODIUM SERPL-SCNC: 135 MMOL/L — SIGNIFICANT CHANGE UP (ref 135–145)
SODIUM SERPL-SCNC: 136 MMOL/L — SIGNIFICANT CHANGE UP (ref 135–145)
SODIUM SERPL-SCNC: 137 MMOL/L — SIGNIFICANT CHANGE UP (ref 135–145)
SODIUM SERPL-SCNC: 139 MMOL/L — SIGNIFICANT CHANGE UP (ref 135–145)
SPECIMEN SOURCE: SIGNIFICANT CHANGE UP
VANCOMYCIN FLD-MCNC: 6.3 UG/ML — SIGNIFICANT CHANGE UP
WBC # BLD: 20.14 K/UL — HIGH (ref 3.8–10.5)
WBC # BLD: 20.63 K/UL — HIGH (ref 3.8–10.5)
WBC # BLD: 21.76 K/UL — HIGH (ref 3.8–10.5)
WBC # BLD: 22.94 K/UL — HIGH (ref 3.8–10.5)
WBC # FLD AUTO: 20.14 K/UL — HIGH (ref 3.8–10.5)
WBC # FLD AUTO: 20.63 K/UL — HIGH (ref 3.8–10.5)
WBC # FLD AUTO: 21.76 K/UL — HIGH (ref 3.8–10.5)
WBC # FLD AUTO: 22.94 K/UL — HIGH (ref 3.8–10.5)

## 2024-09-19 PROCEDURE — 99223 1ST HOSP IP/OBS HIGH 75: CPT

## 2024-09-19 PROCEDURE — 71045 X-RAY EXAM CHEST 1 VIEW: CPT | Mod: 26

## 2024-09-19 PROCEDURE — 99497 ADVNCD CARE PLAN 30 MIN: CPT | Mod: 25

## 2024-09-19 PROCEDURE — 99291 CRITICAL CARE FIRST HOUR: CPT

## 2024-09-19 PROCEDURE — 93010 ELECTROCARDIOGRAM REPORT: CPT

## 2024-09-19 RX ORDER — METFORMIN HCL 500 MG
1 TABLET ORAL
Refills: 0 | DISCHARGE

## 2024-09-19 RX ORDER — INSULIN LISPRO 100/ML
VIAL (ML) SUBCUTANEOUS EVERY 6 HOURS
Refills: 0 | Status: DISCONTINUED | OUTPATIENT
Start: 2024-09-19 | End: 2024-09-19

## 2024-09-19 RX ORDER — MAGNESIUM SULFATE 500 MG/ML
1 VIAL (ML) INJECTION ONCE
Refills: 0 | Status: COMPLETED | OUTPATIENT
Start: 2024-09-19 | End: 2024-09-19

## 2024-09-19 RX ORDER — ACETAMINOPHEN 325 MG
1000 TABLET ORAL ONCE
Refills: 0 | Status: COMPLETED | OUTPATIENT
Start: 2024-09-19 | End: 2024-09-22

## 2024-09-19 RX ORDER — DOBUTAMINE HCL 250MG/20ML
2.5 VIAL (ML) INTRAVENOUS
Qty: 500 | Refills: 0 | Status: DISCONTINUED | OUTPATIENT
Start: 2024-09-19 | End: 2024-09-19

## 2024-09-19 RX ORDER — INSULIN REGULAR, HUMAN 100/ML
2 VIAL (ML) INJECTION
Qty: 100 | Refills: 0 | Status: DISCONTINUED | OUTPATIENT
Start: 2024-09-19 | End: 2024-09-20

## 2024-09-19 RX ORDER — INSULIN GLARGINE 300 U/ML
10 INJECTION, SOLUTION SUBCUTANEOUS EVERY MORNING
Refills: 0 | Status: DISCONTINUED | OUTPATIENT
Start: 2024-09-19 | End: 2024-09-19

## 2024-09-19 RX ORDER — KETAMINE HYDROCHLORIDE 10 MG/ML
1 INJECTION INTRAMUSCULAR; INTRAVENOUS
Qty: 1000 | Refills: 0 | Status: DISCONTINUED | OUTPATIENT
Start: 2024-09-19 | End: 2024-09-21

## 2024-09-19 RX ORDER — BUMETANIDE 2 MG/1
2 TABLET ORAL ONCE
Refills: 0 | Status: COMPLETED | OUTPATIENT
Start: 2024-09-19 | End: 2024-09-19

## 2024-09-19 RX ADMIN — Medication 45.9 MICROGRAM(S)/KG/MIN: at 13:11

## 2024-09-19 RX ADMIN — HYDROCORTISONE 50 MILLIGRAM(S): 5 TABLET ORAL at 05:05

## 2024-09-19 RX ADMIN — CHLORHEXIDINE GLUCONATE ORAL RINSE 15 MILLILITER(S): 1.2 SOLUTION DENTAL at 05:05

## 2024-09-19 RX ADMIN — CHLORHEXIDINE GLUCONATE ORAL RINSE 1 APPLICATION(S): 1.2 SOLUTION DENTAL at 11:51

## 2024-09-19 RX ADMIN — Medication 2: at 01:01

## 2024-09-19 RX ADMIN — BUMETANIDE 2 MILLIGRAM(S): 2 TABLET ORAL at 11:17

## 2024-09-19 RX ADMIN — Medication 5000 UNIT(S): at 05:05

## 2024-09-19 RX ADMIN — Medication 45.9 MICROGRAM(S)/KG/MIN: at 03:31

## 2024-09-19 RX ADMIN — Medication 6 UNIT(S)/MIN: at 17:49

## 2024-09-19 RX ADMIN — INSULIN GLARGINE 10 UNIT(S): 300 INJECTION, SOLUTION SUBCUTANEOUS at 05:17

## 2024-09-19 RX ADMIN — Medication 6 UNIT(S)/MIN: at 03:30

## 2024-09-19 RX ADMIN — Medication 1.81 MG/KG/HR: at 09:04

## 2024-09-19 RX ADMIN — Medication 75 MICROGRAM(S): at 05:09

## 2024-09-19 RX ADMIN — KETAMINE HYDROCHLORIDE 9.07 MG/KG/HR: 10 INJECTION INTRAMUSCULAR; INTRAVENOUS at 21:47

## 2024-09-19 RX ADMIN — Medication 13.6 MICROGRAM(S)/KG/MIN: at 09:04

## 2024-09-19 RX ADMIN — Medication 1.81 MG/KG/HR: at 17:49

## 2024-09-19 RX ADMIN — Medication 5000 UNIT(S): at 13:08

## 2024-09-19 RX ADMIN — HYDROCORTISONE 50 MILLIGRAM(S): 5 TABLET ORAL at 11:24

## 2024-09-19 RX ADMIN — PANTOPRAZOLE SODIUM 40 MILLIGRAM(S): 40 TABLET, DELAYED RELEASE ORAL at 11:25

## 2024-09-19 RX ADMIN — HYDROMORPHONE HYDROCHLORIDE 0.5 MG/HR: 1 INJECTION, SOLUTION INTRAMUSCULAR; INTRAVENOUS; SUBCUTANEOUS at 21:48

## 2024-09-19 RX ADMIN — Medication 6: at 05:17

## 2024-09-19 RX ADMIN — CHLORHEXIDINE GLUCONATE ORAL RINSE 15 MILLILITER(S): 1.2 SOLUTION DENTAL at 17:15

## 2024-09-19 RX ADMIN — MEROPENEM 1000 MILLIGRAM(S): 500 INJECTION INTRAVENOUS at 23:42

## 2024-09-19 RX ADMIN — Medication 45.9 MICROGRAM(S)/KG/MIN: at 06:21

## 2024-09-19 RX ADMIN — MEROPENEM 1000 MILLIGRAM(S): 500 INJECTION INTRAVENOUS at 11:25

## 2024-09-19 RX ADMIN — Medication 200 GRAM(S): at 15:42

## 2024-09-19 RX ADMIN — Medication 100 GRAM(S): at 15:42

## 2024-09-19 RX ADMIN — Medication 16.3 MICROGRAM(S)/KG/MIN: at 05:24

## 2024-09-19 RX ADMIN — KETAMINE HYDROCHLORIDE 9.07 MG/KG/HR: 10 INJECTION INTRAMUSCULAR; INTRAVENOUS at 12:12

## 2024-09-19 RX ADMIN — Medication 2 UNIT(S)/HR: at 10:52

## 2024-09-19 RX ADMIN — HYDROCORTISONE 50 MILLIGRAM(S): 5 TABLET ORAL at 23:42

## 2024-09-19 RX ADMIN — HYDROCORTISONE 50 MILLIGRAM(S): 5 TABLET ORAL at 17:15

## 2024-09-19 RX ADMIN — Medication 45.9 MICROGRAM(S)/KG/MIN: at 21:47

## 2024-09-19 RX ADMIN — Medication 75 MICROGRAM(S): at 21:47

## 2024-09-19 RX ADMIN — Medication 5000 UNIT(S): at 21:48

## 2024-09-19 NOTE — PROGRESS NOTE ADULT - SUBJECTIVE AND OBJECTIVE BOX
Patient is a 69y old  Female who presents with a chief complaint of Shortness of breath (19 Sep 2024 15:16)      BRIEF HOSPITAL COURSE: 70 y/o F with a h/o CAD NSTEMI in 2011 s/p ELIZABETH to LAD, s/p ELIZABETH Distal LCx (4/30/2024), HTN, HLD, hypothyroid, rheumatoid arthritis, admitted on 9/18 with left-sided chest pain associated with generalized weakness, nausea and vomiting. CT chest revealed LLL pneumonia. While in the ED developed worsening hypoxic respiratory failure and was emergently intubated. Hospital course complicated by progression to ARDS requiring deep sedation/IV paralytic and severe multifactorial shock state requiring multiple IV vasopressors.      Events last 24 hours: She remains dependent on full mechanical ventilator support with high FiO2/PEEP requirement. Off IV paralytic. Requiring dual IV vasopressor support. Blood cultures are growing pseudomonas aeruginosa. She is spiking fevers.        PAST MEDICAL & SURGICAL HISTORY:  Hypothyroid      ADHD (attention deficit hyperactivity disorder)      HLD (hyperlipidemia)      Myocardial infarct  2012      Stented coronary artery  2012      Rheumatoid arthritis      Migraine      S/p bilateral carpal tunnel release      H/O cardiac catheterization      H/O laminectomy      H/O spinal fusion      S/P left knee arthroscopy      H/O total knee replacement, right          Review of Systems:  Unable to obtain secondary to sedation/intubation.          Medications:  meropenem Injectable 1000 milliGRAM(s) IV Push every 12 hours    norepinephrine Infusion 0.54 MICROgram(s)/kG/Min IV Continuous <Continuous>      acetaminophen   IVPB .. 1000 milliGRAM(s) IV Intermittent once  HYDROmorphone Infusion. 0.5 mG/Hr IV Continuous <Continuous>  ketamine Infusion. 1 mG/kG/Hr IV Continuous <Continuous>  midazolam Infusion 0.02 mG/kG/Hr IV Continuous <Continuous>      heparin   Injectable 5000 Unit(s) SubCutaneous every 8 hours    pantoprazole  Injectable 40 milliGRAM(s) IV Push daily      dextrose 50% Injectable 25 Gram(s) IV Push once  dextrose 50% Injectable 25 Gram(s) IV Push once  dextrose 50% Injectable 12.5 Gram(s) IV Push once  dextrose Oral Gel 15 Gram(s) Oral once PRN  glucagon  Injectable 1 milliGRAM(s) IntraMuscular once  hydrocortisone sodium succinate Injectable 50 milliGRAM(s) IV Push every 6 hours  insulin regular Infusion 2 Unit(s)/Hr IV Continuous <Continuous>  levothyroxine Injectable 75 MICROGram(s) IV Push at bedtime  vasopressin Infusion 0.04 Unit(s)/Min IV Continuous <Continuous>    dextrose 5%. 1000 milliLiter(s) IV Continuous <Continuous>  dextrose 5%. 1000 milliLiter(s) IV Continuous <Continuous>    influenza  Vaccine (HIGH DOSE) 0.5 milliLiter(s) IntraMuscular once    chlorhexidine 0.12% Liquid 15 milliLiter(s) Oral Mucosa every 12 hours  chlorhexidine 2% Cloths 1 Application(s) Topical daily        Mode: AC/ CMV (Assist Control/ Continuous Mandatory Ventilation)  RR (machine): 18  TV (machine): 400  FiO2: 75  PEEP: 12  ITime: 0.9  MAP: 16  PIP: 25      ICU Vital Signs Last 24 Hrs  T(C): 38.5 (19 Sep 2024 22:30), Max: 38.8 (19 Sep 2024 03:45)  T(F): 101.3 (19 Sep 2024 22:30), Max: 101.8 (19 Sep 2024 03:45)  HR: 98 (19 Sep 2024 22:30) (86 - 127)  BP: 132/78 (19 Sep 2024 22:00) (93/71 - 132/78)  BP(mean): 95 (19 Sep 2024 22:00) (64 - 95)  ABP: 138/75 (19 Sep 2024 22:30) (94/58 - 143/75)  ABP(mean): 99 (19 Sep 2024 22:30) (0 - 103)  RR: 18 (19 Sep 2024 22:30) (16 - 36)  SpO2: 91% (19 Sep 2024 22:30) (89% - 100%)    O2 Parameters below as of 19 Sep 2024 19:15  Patient On (Oxygen Delivery Method): ventilator    O2 Concentration (%): 70        ABG - ( 19 Sep 2024 22:49 )  pH, Arterial: 7.330 pH, Blood: x     /  pCO2: 53    /  pO2: 135   / HCO3: 28    / Base Excess: 2.0   /  SaO2: 100.0               I&O's Detail    18 Sep 2024 07:01  -  19 Sep 2024 07:00  --------------------------------------------------------  IN:    Cisatracurium: 249.4 mL    DOBUTamine: 176.8 mL    DOBUTamine: 27.2 mL    FentaNYL: 18.1 mL    HYRDOmorphone: 30 mL    IV PiggyBack: 200 mL    IV PiggyBack: 100 mL    Lactated Ringers: 500 mL    Midazolam: 174 mL    Norepinephrine: 413.4 mL    Norepinephrine: 498.3 mL    Phenylephrine: 272 mL    Vasopressin: 102 mL  Total IN: 2761.2 mL    OUT:    Indwelling Catheter - Urethral (mL): 2880 mL  Total OUT: 2880 mL    Total NET: -118.8 mL      19 Sep 2024 07:01  -  19 Sep 2024 22:54  --------------------------------------------------------  IN:    Cisatracurium: 48.9 mL    Cisatracurium: 54.4 mL    Cisatracurium: 10.9 mL    DOBUTamine: 40.8 mL    DOBUTamine: 20.4 mL    HYRDOmorphone: 24 mL    Insulin: 32.5 mL    IV PiggyBack: 50 mL    IV PiggyBack: 100 mL    Ketamine: 67.9 mL    Midazolam: 149.8 mL    Norepinephrine: 416.4 mL    Vasopressin: 72 mL  Total IN: 1088 mL    OUT:    Indwelling Catheter - Urethral (mL): 925 mL  Total OUT: 925 mL    Total NET: 163 mL            LABS:                        13.3   22.94 )-----------( 171      ( 19 Sep 2024 18:00 )             40.3     09-19    139  |  106  |  20.6[H]  ----------------------------<  177[H]  4.6   |  21.0[L]  |  0.83    Ca    8.5      19 Sep 2024 18:00  Phos  2.9     09-19  Mg     2.1     09-19    TPro  5.3[L]  /  Alb  3.2[L]  /  TBili  0.3[L]  /  DBili  x   /  AST  38[H]  /  ALT  51[H]  /  AlkPhos  87  09-19          CAPILLARY BLOOD GLUCOSE      POCT Blood Glucose.: 171 mg/dL (19 Sep 2024 22:18)    PT/INR - ( 19 Sep 2024 02:20 )   PT: 16.0 sec;   INR: 1.46 ratio         PTT - ( 19 Sep 2024 02:20 )  PTT:28.5 sec  Urinalysis Basic - ( 19 Sep 2024 18:00 )    Color: x / Appearance: x / SG: x / pH: x  Gluc: 177 mg/dL / Ketone: x  / Bili: x / Urobili: x   Blood: x / Protein: x / Nitrite: x   Leuk Esterase: x / RBC: x / WBC x   Sq Epi: x / Non Sq Epi: x / Bacteria: x      CULTURES:  Culture Results:   No growth (09-19-24 @ 00:35)  Culture Results:   Culture in progress (09-18-24 @ 16:57)  Rapid RVP Result: NotDetec (09-18-24 @ 15:50)  Culture Results:   No growth (09-18-24 @ 12:05)  Culture Results:   Growth in aerobic bottle: Pseudomonas aeruginosa  Direct identification is available within approximately 3-5  hours either by Blood Panel Multiplexed PCR or Direct  MALDI-TOF. Details: [email or web address] (09-18-24 @ 10:15)        Physical Examination:    General: No acute distress.  sedated/intubated    HEENT: Pupils equal, reactive to light.  Symmetric.    PULM: scattered rhonchi bilaterally bilaterally, no significant sputum production    CVS: Regular rate and rhythm, no murmurs, rubs, or gallops    ABD: Soft, nondistended, nontender, normoactive bowel sounds, no masses    EXT: No edema, nontender    SKIN: Warm and well perfused, no rashes noted.    NEURO: deeply sedated        RADIOLOGY:     < from: CT Angio Chest PE Protocol w/ IV Cont (09.18.24 @ 10:55) >  FINDINGS:  CHEST:  LUNGS AND LARGE AIRWAYS: No endobronchial lesions. Left lower lobe   consolidation. There is subsegmental atelectasis in right lower lobe.  PLEURA: Small left pleural effusion with fissural tracking.  VESSELS: No pulmonary embolism through the proximal segmental level.   Limited evaluation of the distal branches secondary to respiratory motion   an suboptimal timing of contrast administration. Aortic and coronary   artery stenting/calcifications.  HEART: Heart size is normal. No pericardial effusion. There is   subendocardial fatty dysplasia in the periapical region, compatible with   prior myocardial infarction  MEDIASTINUM AND ALEXIS: No lymphadenopathy.  CHEST WALL AND LOWER NECK: Patient status post right rotator cuff repair.    ABDOMEN AND PELVIS:  LIVER: Within normal limits.  BILE DUCTS: Normal caliber.  GALLBLADDER: Within normal limits.  SPLEEN: Within normal limits.  PANCREAS: Within normal limits.  ADRENALS: Within normal limits.  KIDNEYS/URETERS: Right kidney is unremarkable. There is a subcentimeter   low-attenuation lesion in the left kidney, too small to characterize,   likely a cyst.    BLADDER: Within normal limits.  REPRODUCTIVE ORGANS: Uterus has a globular contour and heterogeneous   attenuation which may be suggestive of adenomyosis versus myomatous   uterus.    BOWEL: Evaluation of the bowel is limited due to inadequate distention.   Within this limitation, there is no bowel obstruction. Residual oral   contrast in the cecum and terminal ileum. Appendix is normal.  PERITONEUM/RETROPERITONEUM: Within normal limits.  VESSELS: Atherosclerotic changes.  LYMPH NODES: No lymphadenopathy.  ABDOMINAL WALL: Small fat-containing left inguinal hernia.  BONES: Degenerative changes. L4-S1 laminectomy and posterior spinal   fusion .    IMPRESSION:  *  No pulmonary embolism through the proximal segmental level. Limited   evaluation of the distal branches secondary to respiratory motion an   suboptimal timing for contrast administration.  *  Left lower lobe pneumonia with associated small pleural effusion.  *  No acute pathology in the abdomen and pelvis.  *  Mildly enlarged and globular uterus suggestive of adenomyosis versus   myomatous uterus. If clinically indicated, pelvic ultrasound is   recommended for further evaluation.    < end of copied text >          CRITICAL CARE TIME SPENT: 53 mins  Time spent evaluating/treating patient with medical issues that pose a high risk for life threatening deterioration and/or end-organ damage, reviewing data/labs/imaging, discussing case with multidisciplinary team, discussing plan/goals of care with patient/family. Non-inclusive of procedure time. The date of entry of this note reflects the date of services rendered.

## 2024-09-19 NOTE — PROGRESS NOTE ADULT - NS ATTEND AMEND GEN_ALL_CORE FT
Remains Critical, Intubated, sedated and Paralyzed on Vent. Echo shows LVEF improved Dobutamine titrated off.  Lactate 4.6. Plan as per MICU

## 2024-09-19 NOTE — PROGRESS NOTE ADULT - SUBJECTIVE AND OBJECTIVE BOX
INTERVAL HISTORY:  Intubated, sedated and paralyzed with Nimbex on vent  	  MEDICATIONS:  norepinephrine Infusion 0.54 MICROgram(s)/kG/Min IV Continuous <Continuous>  meropenem Injectable 1000 milliGRAM(s) IV Push every 12 hours  cisatracurium Infusion 2 MICROgram(s)/kG/Min IV Continuous <Continuous>  HYDROmorphone Infusion. 0.5 mG/Hr IV Continuous <Continuous>  ketamine Infusion. 1 mG/kG/Hr IV Continuous <Continuous>  midazolam Infusion 0.02 mG/kG/Hr IV Continuous <Continuous>  pantoprazole  Injectable 40 milliGRAM(s) IV Push daily  dextrose 50% Injectable 25 Gram(s) IV Push once  dextrose 50% Injectable 25 Gram(s) IV Push once  dextrose 50% Injectable 12.5 Gram(s) IV Push once  dextrose Oral Gel 15 Gram(s) Oral once PRN  glucagon  Injectable 1 milliGRAM(s) IntraMuscular once  hydrocortisone sodium succinate Injectable 50 milliGRAM(s) IV Push every 6 hours  insulin regular Infusion 2 Unit(s)/Hr IV Continuous <Continuous>  levothyroxine Injectable 75 MICROGram(s) IV Push at bedtime  vasopressin Infusion 0.04 Unit(s)/Min IV Continuous <Continuous>  chlorhexidine 0.12% Liquid 15 milliLiter(s) Oral Mucosa every 12 hours  chlorhexidine 2% Cloths 1 Application(s) Topical daily  dextrose 5%. 1000 milliLiter(s) IV Continuous <Continuous>  dextrose 5%. 1000 milliLiter(s) IV Continuous <Continuous>  heparin   Injectable 5000 Unit(s) SubCutaneous every 8 hours  influenza  Vaccine (HIGH DOSE) 0.5 milliLiter(s) IntraMuscular once        PHYSICAL EXAM:    T(C): 37.4 (24 @ 16:00), Max: 38.8 (24 @ 03:45)  HR: 104 (24 @ 16:00) (86 - 127)  BP: 114/51 (24 @ 12:00) (99/87 - 117/52)  RR: 26 (24 @ 16:00) (16 - 36)  SpO2: 100% (24 @ 16:00) (88% - 100%)  Wt(kg): --    I&O's Summary    18 Sep 2024 07:01  -  19 Sep 2024 07:00  --------------------------------------------------------  IN: 2761.2 mL / OUT: 2880 mL / NET: -118.8 mL    19 Sep 2024 07:01  -  19 Sep 2024 16:16  --------------------------------------------------------  IN: 891.1 mL / OUT: 675 mL / NET: 216.1 mL        Daily     Daily Weight in k.3 (19 Sep 2024 03:17)    Appearance: Intubated, sedated on Vent	  HEENT:   Normal oral mucosa, PERRL, EOMI	  Cardiovascular: Normal S1 S2, No JVD, No murmurs, No edema  Respiratory: Lungs clear to auscultation	  Gastrointestinal:  Soft, Non-tender, + BS	  Skin: No rashes, No ecchymoses, No cyanosis  Neurologic: Paralyzed on Nimbex drip, sedated and intubated on vent  Extremities: Normal range of motion, No clubbing, cyanosis or edema  Vascular: Peripheral pulses bilaterally        TELEMETRY: ST -120	       	    LABS:	 	    CARDIAC MARKERS:                          13.4   20.63 )-----------( 162      ( 19 Sep 2024 08:45 )             40.7         135  |  101  |  19.9  ----------------------------<  248[H]  4.3   |  17.0[L]  |  0.70    Ca    8.1[L]      19 Sep 2024 13:57  Phos  2.7       Mg     1.9         TPro  5.5[L]  /  Alb  3.3  /  TBili  0.4  /  DBili  x   /  AST  43[H]  /  ALT  53[H]  /  AlkPhos  94         ASSESSMENT: 	  69-year-old female with  PMHx of CAD, HTN, HLD, NSTEMI in  s/p ELIZABETH to LAD, S/p ELIZABETH Distal LCx (2024), hypothyroid, rheumatoid arthritis, presented to ER with left-sided chest pain associated with generalized weakness, nausea and vomiting.  Patient ill-appearing, unable to provide much history.  History mostly supplied by patient's family member at bedside.  Symptoms started 2 days ago, however today she felt much worse with prompted her to come to the ED.  Patient noted to be tachycardic and hypotensive upon arrival, diaphoretic and ill-appearing.  She is noted to have  diminished breath sounds in the left lower lobe, CT scan Negative for PE, does show a left lower lobe pneumonia.  Patient's course complicated by multiple episodes of hypotension requiring vasopressor support and IV fluids.  Patient's labs reviewed, noted to have leukocytosis, elevated lactate 5.5.  EKG independently reviewed, normal sinus rhythm, no acute changes. Pt became hypoxic in ER on high flow O2 N/C and intubated.  - Currently intubated, sedated on ventilator FiO2-90, Peep +16 in MICU with Nimbex for paralytic therapy  - On pressors for BP support  (Levophed, and Vasopressin drips) to maintain MAP > 65  - On IV Meropenem for Blood culture positive Pseudomonas A. , Pending urine Legionella Ag and Strep pneumo Ag.   - Lactate 4.6, and trending    PLAN:   Condition most likely secondary to severe septic shock  Intubated, sedated on ventilator FiO2-90, Peep +16 in MICU with Nimbex for paralytic therapy  On pressors for BP support  (Levophed, and Vasopressin drips) to maintain MAP > 65  Dobutamine titrated off  Echo today shows [id number]%  Condition remains critical

## 2024-09-19 NOTE — CONSULT NOTE ADULT - ASSESSMENT
68 y/o female with hx of CAD s/p PCI and stents, RA on tocilizumab admitted for acute respiratory failure / ARDS s/p intubation, LLL PNA, lactic acidosis complicated further by refractory shock requiring IV vasopressors and new cardiomyopathy suspected stress induced. Palliative consulted for support and to assist with goals of care.      68 y/o female with hx of CAD s/p PCI and stents, RA on tocilizumab admitted for acute respiratory failure / ARDS s/p intubation, LLL PNA, lactic acidosis complicated further by refractory shock requiring IV vasopressors and cardiomyopathy suspected stress induced. Palliative consulted for support and to assist with goals of care.     #Acute Respiratory Failure  #ARDS  #Community Acquired Pneumonia  - remains intubated on full vent support; sedated for vent dyssynchrony --> mgnt per MICU   - imaging reviewed  - IV antibiotic   - initial blood cx 9/18 +pseudomonas --> follow up repeat cultures, ID consult  - trend wbc and fever curve    #Suspected Stress Induced Cardiomyopathy  #Distributive Shock  - currently on triple vasopressor support --> wean as tolerated  - repeat TTE noted, EF 70-75%     #Rheumatoid Arthritis  #Chronic Pain Syndrome  - son reports pt has been on oxy IR 10mg as needed   - istop checked, see above - on oxy ir10mg and zolpidem 10mg for the past 1 year  - currently on hydromorphone infusion for vent dyssynchrony by MICU   - monitor closely for acute pain   - was on tocilizumab for RA (on hold while inpatient)    #Advance Care Planning  #Palliative Care Encounter  - hcp is son Corey --> he has form at home, his brother Tor also confirms this and defers to brother; both make decisions together  - Pt remains critical, continues to be medically optimized at this time with tenuous clinical status. Family taking it day by day to monitor for improvement and aware that likely further goals of care are warranted based on mother's progress.   - Palliative team will continue to support and follow course

## 2024-09-19 NOTE — PROGRESS NOTE ADULT - ASSESSMENT
70 yo female with PMHx CAD NSTEMI in 2011 s/p ELIZABETH to LAD, s/p ELIZABETH Distal LCx (4/30/2024), HTN, HLD, hypothyroid, rheumatoid arthritis, who presented to ER with left-sided chest pain associated with generalized weakness, nausea and vomiting. Ruled in for severe sepsis secondary to LLL pneumonia POA, while in the ED developed worsening hypoxic respiratory failure and was emergently intubated. She had profound hypoxemia, ARDS requiring deep sedation and paralysis. Patient had uptrending lactate levels, worsening shock state on three vasopressors with concerns for acute systolic heart failure, cardiogenic shock.    # Acute hypoxic respiratory failure  # Undifferentiated shock, distributive and cardiogenic  # Lobar pneumonia  # Severe sepsis  # Acute systolic heart failure  # Metabolic lactic acidosis  # Hypergylcemia    Neuro:   On Dilaudid and Versed gtts  On Nimbex for vent synchrony and to optimize oxygenation.     Pulm:   Full vent support, meets severe ARDS criteria with p:F <100.   PaO2 98 from 89 from 62 on 100% FiO2  Rotated R lung down with some improvement in oxygenation, remains on 100% Fio2 PEEP +12 deeply sedated and paralyzed with Nimbex.   Pplt within goal <30, driving pressure <20. Does not meet criteria for SBT in AM.     CV:   In shock requiring Norepinephrine gtt, actively titrating to targeted MAP goal >65, as well as fixed-dose physiologic Vasopressin.   Weaned off Phenylephrine gtt. Added dobutamine for concerns for cardiogenic shock, POCUS with acute biventricular failure.   Ordered for repeat limited TTE. Monitoring dynamic markers of end-organ perfusion in response to resuscitation.   Continue stress steroids while in shock.    GI:   Keep NPO. PPI for GI mucosal cytoprotection.   Transaminitis likely ischemic hepatitis, trending LFTs.    Renal:   CLEMENTINA likely ATN due to hypoperfusion during shock state.   Monitoring UOP; supplementing electrolytes to keep K >4, Mg >2, Phos >3.   Renally adjusting medications as indicated.    ID:   Empiric antibiotics with Meropenem, Vancomycin, and Levaquin (azithromycin allergy).   Blood and urine cultures sent, sent sputum cx.   Pending urine Legionella Ag and Strep pneumo Ag.   RVP sent and is negative. MRSA PCR negative.    Heme:   Heparin SC DVT ppx     Endo:   Monitoring BG q6h and cover with ISS to keep goal -200 while critically ill.   Stress steroids, as above, with iatrogenic hyperglycemia.  Increased ISS and added Lantus 10u daily.   68 yo female with PMHx CAD NSTEMI in 2011 s/p ELIZABETH to LAD, s/p ELIZABETH Distal LCx (4/30/2024), HTN, HLD, hypothyroid, rheumatoid arthritis, who presented to ER with left-sided chest pain associated with generalized weakness, nausea and vomiting. Ruled in for severe sepsis secondary to LLL pneumonia POA, while in the ED developed worsening hypoxic respiratory failure and was emergently intubated. She had profound hypoxemia, ARDS requiring deep sedation and paralysis. Patient had uptrending lactate levels, worsening shock state on three vasopressors with concerns for acute systolic heart failure, cardiogenic shock.    # Acute hypoxic respiratory failure  # Undifferentiated shock, distributive and cardiogenic  # Lobar pneumonia  # Severe sepsis  # Acute systolic heart failure  # Metabolic lactic acidosis  # Hypergylcemia    Neuro:   On Dilaudid and Versed gtts  On Nimbex for vent synchrony and to optimize oxygenation.     Pulm:   Full vent support, meets severe ARDS criteria with p:F <100.   PaO2 98 from 89 from 62 on 100% FiO2  Rotated R lung down with some improvement in oxygenation, remains on 100% Fio2 PEEP +12 deeply sedated and paralyzed with Nimbex.   Pplt within goal <30, driving pressure <20. Does not meet criteria for SBT in AM.     CV:   In shock requiring Norepinephrine gtt, actively titrating to targeted MAP goal >65, as well as fixed-dose physiologic Vasopressin.   Weaned off Phenylephrine gtt. Added dobutamine for concerns for cardiogenic shock, POCUS with acute biventricular failure.   Ordered for repeat TTE. Monitoring dynamic markers of end-organ perfusion in response to resuscitation.   Continue stress steroids while in shock.    GI:   Keep NPO. PPI for GI mucosal cytoprotection.   Transaminitis likely ischemic hepatitis, trending LFTs.    Renal:   CLEMENTINA likely ATN due to hypoperfusion during shock state.   Monitoring UOP; supplementing electrolytes to keep K >4, Mg >2, Phos >3.   Renally adjusting medications as indicated.    ID:   Empiric antibiotics with Meropenem (azithromycin allergy).   Blood and urine cultures sent, sent sputum cx.   Pending urine Legionella Ag and Strep pneumo Ag.   RVP sent and is negative. MRSA PCR negative.    Heme:   Heparin SC DVT ppx     Endo:   Monitoring BG q6h and cover with ISS to keep goal -200 while critically ill.   Stress steroids, as above, with iatrogenic hyperglycemia.  Sliding scale escalated to insulin gtt for better hyperglycemia control    68 yo female with PMHx CAD NSTEMI in  s/p ELIZABETH to LAD, s/p ELIZABETH Distal LCx (2024), HTN, HLD, hypothyroid, rheumatoid arthritis, who presented to ER with left-sided chest pain associated with generalized weakness, nausea and vomiting. Ruled in for severe sepsis secondary to LLL pneumonia POA, while in the ED developed worsening hypoxic respiratory failure and was emergently intubated. She had profound hypoxemia, ARDS requiring deep sedation and paralysis. Patient had uptrending lactate levels, worsening shock state on three vasopressors with concerns for acute systolic heart failure, cardiogenic shock.    # Acute hypoxic respiratory failure  # Undifferentiated shock, distributive and cardiogenic  # Lobar pneumonia  # Severe sepsis  # Acute systolic heart failure  # Metabolic lactic acidosis  # Hypergylcemia    Neuro:   On Dilaudid and Versed gtts  On Nimbex for vent synchrony and to optimize oxygenation.  Titrate down nimbex     Pulm:   Full vent support, meets severe ARDS criteria with p:F <100.   PaO2 98 from 89 from 62 on 100% FiO2  Rotated R lung down with some improvement in oxygenation, remains on 100% Fio2 PEEP +12 deeply sedated and paralyzed with Nimbex.   Pplt within goal <30, driving pressure <20. Does not meet criteria for SBT in AM.     CV:   In shock requiring Norepinephrine gtt, actively titrating to targeted MAP goal >65, as well as fixed-dose physiologic Vasopressin.   Weaned off Phenylephrine gtt. Added dobutamine for concerns for cardiogenic shock, POCUS with acute biventricular failure.    at 2.5 from 5  Ordered for repeat TTE. Monitoring dynamic markers of end-organ perfusion in response to resuscitation.   Continue stress steroids while in shock.    GI:   Keep NPO. PPI for GI mucosal cytoprotection.   Transaminitis likely ischemic hepatitis, trending LFTs.    Renal:   CLEMENTINA likely ATN due to hypoperfusion during shock state.   Monitoring UOP; supplementing electrolytes to keep K >4, Mg >2, Phos >3.   Renally adjusting medications as indicated.    ID:   Empiric antibiotics with Meropenem (azithromycin allergy).   Blood and urine cultures sent, sent sputum cx.   Pending urine Legionella Ag and Strep pneumo Ag.   RVP sent and is negative. MRSA PCR negative.    Heme:   Heparin SC DVT ppx     Endo:   A1C 6.4  Stress steroids, as above, with iatrogenic hyperglycemia.  Sliding scale escalated to insulin gtt for better hyperglycemia control    68 yo female with PMHx CAD NSTEMI in  s/p ELIZABETH to LAD, s/p ELIZABETH Distal LCx (2024), HTN, HLD, hypothyroid, rheumatoid arthritis, who presented to ER with left-sided chest pain associated with generalized weakness, nausea and vomiting. Ruled in for severe sepsis secondary to LLL pneumonia POA, while in the ED developed worsening hypoxic respiratory failure and was emergently intubated. She had profound hypoxemia, ARDS requiring deep sedation and paralysis. Patient had uptrending lactate levels, worsening shock state on three vasopressors with concerns for acute systolic heart failure, cardiogenic shock.    # Acute hypoxic respiratory failure  # Undifferentiated shock, distributive and cardiogenic  # Lobar pneumonia  # Severe sepsis  # Acute systolic heart failure  # Metabolic lactic acidosis  # Hypergylcemia    Neuro:   On Dilaudid and Versed gtts  Ketamine added for vent dyssynchrony while titrating down nimbex  On Nimbex for vent synchrony and to optimize oxygenation.  Titrate down nimbex     Pulm:   Full vent support, meets severe ARDS criteria with p:F <100.   PaO2 98 from 89 from 62 on 100% FiO2  Rotated R lung down with some improvement in oxygenation overnight, remains on 100% Fio2 PEEP +16 deeply sedated and paralyzed with Nimbex.   Pplt within goal <30, driving pressure <20. Does not meet criteria for SBT in AM.     CV:   In shock requiring Norepinephrine gtt, actively titrating to targeted MAP goal >65, as well as fixed-dose physiologic Vasopressin.   Weaned off Phenylephrine gtt. Added dobutamine for concerns for cardiogenic shock, POCUS with acute biventricular failure.    titrated off   Ordered for repeat TTE. Monitoring dynamic markers of end-organ perfusion in response to resuscitation.   Continue stress steroids while in shock.    GI:   Keep NPO. PPI for GI mucosal cytoprotection.   Transaminitis likely ischemic hepatitis, trending LFTs.    Renal:   CLEMENTINA likely ATN due to hypoperfusion during shock state.   Monitoring UOP; supplementing electrolytes to keep K >4, Mg >2, Phos >3.   Renally adjusting medications as indicated.  Diurese with 1x Bumex with good effect    ID:   Empiric antibiotics with Meropenem (azithromycin allergy).   Blood culture positive Pseudomonas A.   Urine cultures sent, sent sputum cx.   Pending urine Legionella Ag and Strep pneumo Ag.   RVP sent and is negative. MRSA PCR negative.  Lactate 4.6 continue to trend    Heme:   Heparin SC DVT ppx     Endo:   A1C 6.4  Stress steroids, as above, with iatrogenic hyperglycemia.  Sliding scale escalated to insulin gtt for better hyperglycemia control    68 yo female with PMHx CAD NSTEMI in  s/p ELIZABETH to LAD, s/p ELIZABETH Distal LCx (2024), HTN, HLD, hypothyroid, rheumatoid arthritis, who presented to ER with left-sided chest pain associated with generalized weakness, nausea and vomiting. Ruled in for severe sepsis secondary to LLL pneumonia POA, while in the ED developed worsening hypoxic respiratory failure and was emergently intubated. She had profound hypoxemia, ARDS requiring deep sedation and paralysis. Patient had uptrending lactate levels, worsening shock state on three vasopressors with concerns for acute systolic heart failure, cardiogenic shock.    # Acute hypoxic respiratory failure  # Undifferentiated shock, distributive and cardiogenic  # Lobar pneumonia  # Severe sepsis  # Acute systolic heart failure  # Metabolic lactic acidosis  # Hypergylcemia    Neuro:   On Dilaudid and Versed gtts  Ketamine added for vent dyssynchrony while titrating down nimbex  On Nimbex for vent synchrony and to optimize oxygenation.  Titrate down nimbex     Pulm:   Full vent support, meets severe ARDS criteria with p:F <100.   PaO2 98 from 89 from 62 on 100% FiO2  Rotated R lung down with some improvement in oxygenation overnight, remains on 100% Fio2 PEEP +16 deeply sedated and paralyzed with Nimbex.   Pplt within goal <30, driving pressure <20. Does not meet criteria for SBT in AM.     CV:   In shock requiring Norepinephrine gtt, actively titrating to targeted MAP goal >65, as well as fixed-dose physiologic Vasopressin.   Weaned off Phenylephrine gtt. Added dobutamine for concerns for cardiogenic shock, POCUS with acute biventricular failure.    titrated off   Ordered for repeat TTE. Monitoring dynamic markers of end-organ perfusion in response to resuscitation.   Continue stress steroids while in shock.    GI:   Keep NPO. PPI for GI mucosal cytoprotection.   Transaminitis likely ischemic hepatitis, trending LFTs.    Renal:   CLEMENTINA likely ATN due to hypoperfusion during shock state.   Monitoring UOP; supplementing electrolytes to keep K >4, Mg >2, Phos >3.   Renally adjusting medications as indicated.  Diurese with 1x Bumex with good effect    ID:   Empiric antibiotics with Meropenem (azithromycin allergy).   Blood culture positive Pseudomonas A.   Urine cultures sent, sent sputum cx.   Pending urine Legionella Ag and Strep pneumo Ag.   RVP sent and is negative. MRSA PCR negative.  Lactate 4.6 continue to trend    Heme:   Heparin SC DVT ppx     Endo:   A1C 6.4  Stress steroids, as above, with iatrogenic hyperglycemia.  Sliding scale escalated to insulin gtt for better hyperglycemia control     Full Code  Palliative consulted    Care plan discussed with Dr. Mckenzie

## 2024-09-19 NOTE — PROGRESS NOTE ADULT - ASSESSMENT
70 y/o F with a h/o CAD NSTEMI in 2011 s/p ELIZABETH to LAD, s/p ELIZABETH Distal LCx (4/30/2024), HTN, HLD, hypothyroid, rheumatoid arthritis, with:    # Acute hypoxemic respiratory failure  # ARDS  # Lobar pneumonia  # Septic/cardiogenic shock  # Pseudomonas bacteremia  # Hyperglycemia    - actively titrating ventilator settings to maintain SpO2 > 92%  - FiO2 increased to 75%, PEEP weaned 16 --> 15  - RR reduced to 18bpm, TV increased to 400cc  - repeat ABG indicates acceptable ventilation, will trend and follow closely  - Pplat goal < 30 for lung protective purposes  - actively titrating norepinephrine infusion to maintain a MAP > 65  - maintain fixed dose vasopressin  - weaned off dobutamine, formal TTE and subsequent bedside POCUS show preserved LVEF, transient stress cardiomyopathy?  - continue empiric meropenem for now pending organism speciation  - actively titrating insulin infusion in accordance with Q 1 hour accu-checks, may be able to transition to sliding scale regimen soon    Case discussed with MICU physician, Dr. Rivera.

## 2024-09-19 NOTE — CONSULT NOTE ADULT - SUBJECTIVE AND OBJECTIVE BOX
[facility] PALLIATIVE MEDICINE CONSULT    CC: Patient is a 69y old  Female who presents with a chief complaint of Shortness of breath (19 Sep 2024 08:11)    HPI:  Pt unable to provide history, info obtained from chart and staff.   69-year-old female with past medical history of CAD status post multiple stents,  hypothyroid, rheumatoid arthritis, presenting with left-sided chest pain associated with generalized weakness, nausea and vomiting.  Patient ill-appearing, unable to provide much history.  History mostly supplied by patient's family member at bedside.  Symptoms started 2 days ago, however today she felt much worse with prompted her to come to the ED.  (18 Sep 2024 13:37)    69 year old female with PMHx listed above presented from home with left sided chest pain. In the ER, found to be hypotensive, tachycardiac and in respiratory distress requiring eventual intubation on vent support and initiation of vasopressor support. CTA with left sided pneumonia. Admitted to medical ICU for further medical management of shock, acute respiratory failure/ARDS, community acquired pneumonia and new cardiomyopathy (newly reduced EF per team). Palliative consulted for support and to assist with goals of care.     Present Symptoms:     Dyspnea: intubated  Nausea/Vomiting:  No  Anxiety:   No  Depression: unable to obtain  Fatigue: Yes    Loss of appetite: NPO  Constipation:  None documented     Pain: no overt sign of distress            Character-            Duration-            Effect-            Factors-            Frequency-            Location-            Severity-    Pain AD Score:  [email or web address] (press ctrl + left click to view)    Review of Systems:  Unable to obtain due to poor mentation   - per sons, baseline chronic pain syndrome on opioid therapy (oxy IR 10mg PRN) and more recently with increasing gait instability and falls    PERTINENT PMH REVIEWED: Yes      PAST MEDICAL & SURGICAL HISTORY:  Hypothyroid      ADHD (attention deficit hyperactivity disorder)      HLD (hyperlipidemia)      Myocardial infarct  2012      Stented coronary artery  2012      Rheumatoid arthritis      Migraine      S/p bilateral carpal tunnel release      H/O cardiac catheterization      H/O laminectomy      H/O spinal fusion      S/P left knee arthroscopy      H/O total knee replacement, right            FAMILY HISTORY:      Allergies    tetracycline (Hives)  penicillins (Hives)  Butazolactin, Prolaprin (Hives)  azithromycin (Hives)  Zyrtec (Hives)    Intolerances        SOCIAL HISTORY:                      Substance history:                    Admitted from:  home   alone                    Children: 2 sons Corey vishal Lentz)                    Orthodoxy/spirituality:                    Cultural concerns:       DECISION MAKER(s):[] Health Care Proxy(s)  [] Surrogate(s)  [] Guardian           - HCP is son Corey (has form at home)    ADVANCE DIRECTIVES/TREATMENT PREFERENCES: FULL CODE  DNR YES NO  Completed on:                     MOLST  YES NO   Completed on:  Living Will  YES NO   Completed on:    Karnofsky/Palliative Performance Status Version 2:  %  [email or web address]    Baseline ADLs (prior to admission): Independent/ Dependent       MEDICATIONS  (STANDING):  chlorhexidine 0.12% Liquid 15 milliLiter(s) Oral Mucosa every 12 hours  chlorhexidine 2% Cloths 1 Application(s) Topical daily  cisatracurium Infusion 3 MICROgram(s)/kG/Min (16.3 mL/Hr) IV Continuous <Continuous>  dextrose 5%. 1000 milliLiter(s) (100 mL/Hr) IV Continuous <Continuous>  dextrose 5%. 1000 milliLiter(s) (50 mL/Hr) IV Continuous <Continuous>  dextrose 50% Injectable 25 Gram(s) IV Push once  dextrose 50% Injectable 12.5 Gram(s) IV Push once  dextrose 50% Injectable 25 Gram(s) IV Push once  DOBUTamine Infusion 2.5 MICROgram(s)/kG/Min (6.8 mL/Hr) IV Continuous <Continuous>  glucagon  Injectable 1 milliGRAM(s) IntraMuscular once  heparin   Injectable 5000 Unit(s) SubCutaneous every 8 hours  hydrocortisone sodium succinate Injectable 50 milliGRAM(s) IV Push every 6 hours  HYDROmorphone Infusion. 0.5 mG/Hr (0.5 mL/Hr) IV Continuous <Continuous>  influenza  Vaccine (HIGH DOSE) 0.5 milliLiter(s) IntraMuscular once  insulin regular Infusion 2 Unit(s)/Hr (2 mL/Hr) IV Continuous <Continuous>  ketamine Infusion. 1 mG/kG/Hr (9.07 mL/Hr) IV Continuous <Continuous>  levothyroxine Injectable 75 MICROGram(s) IV Push at bedtime  meropenem Injectable 1000 milliGRAM(s) IV Push every 12 hours  midazolam Infusion 0.02 mG/kG/Hr (1.81 mL/Hr) IV Continuous <Continuous>  norepinephrine Infusion 0.54 MICROgram(s)/kG/Min (45.9 mL/Hr) IV Continuous <Continuous>  pantoprazole  Injectable 40 milliGRAM(s) IV Push daily  vasopressin Infusion 0.04 Unit(s)/Min (6 mL/Hr) IV Continuous <Continuous>    MEDICATIONS  (PRN):  dextrose Oral Gel 15 Gram(s) Oral once PRN Blood Glucose LESS THAN 70 milliGRAM(s)/deciliter      PHYSICAL EXAM:    Vital Signs Last 24 Hrs  T(C): 35.5 (19 Sep 2024 10:30), Max: 38.8 (19 Sep 2024 03:45)  T(F): 95.9 (19 Sep 2024 10:30), Max: 101.8 (19 Sep 2024 03:45)  HR: 100 (19 Sep 2024 10:30) (91 - 142)  BP: 109/75 (19 Sep 2024 10:00) (84/74 - 177/85)  BP(mean): 86 (19 Sep 2024 10:00) (53 - 97)  RR: 21 (19 Sep 2024 10:30) (16 - 29)  SpO2: 100% (19 Sep 2024 10:30) (88% - 100%)    Parameters below as of 19 Sep 2024 10:00  Patient On (Oxygen Delivery Method): ventilator    O2 Concentration (%): 100    General: resting comfortably.    HEENT: mmm  +ETT  Lungs: comfortable nonlabored + vent support   CV:  rrr  GI: +BS abdomen soft, NTND   MSK:   no cyanosis. +edema +weakness      Neuro: nonfocal. lethargic  Skin: warm and dry.     LABS:                        13.4   20.63 )-----------( 162      ( 19 Sep 2024 08:45 )             40.7     09-19    136  |  103  |  19.1  ----------------------------<  290[H]  4.2   |  20.0[L]  |  0.68    Ca    8.1[L]      19 Sep 2024 08:45  Phos  2.5     09-19  Mg     2.0     09-19    TPro  5.2[L]  /  Alb  3.1[L]  /  TBili  0.4  /  DBili  x   /  AST  45[H]  /  ALT  55[H]  /  AlkPhos  81  09-19    PT/INR - ( 19 Sep 2024 02:20 )   PT: 16.0 sec;   INR: 1.46 ratio         PTT - ( 19 Sep 2024 02:20 )  PTT:28.5 sec  Urinalysis Basic - ( 19 Sep 2024 08:45 )    Color: x / Appearance: x / SG: x / pH: x  Gluc: 290 mg/dL / Ketone: x  / Bili: x / Urobili: x   Blood: x / Protein: x / Nitrite: x   Leuk Esterase: x / RBC: x / WBC x   Sq Epi: x / Non Sq Epi: x / Bacteria: x      I&O's Summary    18 Sep 2024 07:01  -  19 Sep 2024 07:00  --------------------------------------------------------  IN: 2761.2 mL / OUT: 2880 mL / NET: -118.8 mL    19 Sep 2024 07:01  -  19 Sep 2024 11:30  --------------------------------------------------------  IN: 274.9 mL / OUT: 85 mL / NET: 189.9 mL    RADIOLOGY & ADDITIONAL STUDIES: reviewed     CXR    ACC: [id number] EXAM:  XR CHEST PORTABLE URGENT 1V   ORDERED BY: VICKY WINN ABU     PROCEDURE DATE:  09/18/2024          INTERPRETATION:  Portable AP chest radiograph    COMPARISON: 12/8/2023 chest x-ray.    CLINICAL INFORMATION: Line placement.    FINDINGS:  CATHETERS AND TUBES: RIGHT IJ catheter tip in SVC.  ET tube tip above tracheal bifurcation.    PULMONARY:  Moderate LEFT pleural effusion and/or lower zone airspace consolidation.  Visualized RIGHT lung parenchyma clear.    HEART/VASCULAR: The  heart is mildly enlarged in transverse diameter. No   hilar mass.  .    BONES: The visualized osseous thorax is intact.    IMPRESSION: ET tube tip above trachea location.  RIGHT IJ catheter tip in SVC.  Moderate LEFT pleural effusion and/or lower zone airspace consolidation.   No radiographic evidence of active chest disease..    --- End of Report ---    DEBRA VELAZQUEZ MD; Attending Radiologist  This document has been electronically signed. Sep 18 2024  3:37PM    CTA CAP     ACC: [id number] EXAM:  CT ABDOMEN AND PELVIS IC   ORDERED BY: ILEANA MELGAR     ACC: [id number] EXAM:  CT ANGIO CHEST PULM ART WAWIC   ORDERED BY: ILEANA MELGAR     PROCEDURE DATE:  09/18/2024          INTERPRETATION:  CLINICAL INFORMATION: Severe chest pain, shortness of   breath, and right upper quadrant abdominal pain.    COMPARISON: None.    CONTRAST/COMPLICATIONS:  IV Contrast: Omnipaque 350  90 cc administered   10 cc discarded  Oral Contrast: NONE  Complications: None reported at time of study completion    PROCEDURE:  CT Angiography of the Chest, Abdomen and Pelvis.  Precontrast imaging was performed through the chest followed by arterial   phase imaging of the chest, abdomen and pelvis.  Sagittal and coronal reformats were performed as well as 3D (MIP)   reconstructions.    FINDINGS:  CHEST:  LUNGS AND LARGE AIRWAYS: No endobronchial lesions. Left lower lobe   consolidation. There is subsegmental atelectasis in right lower lobe.  PLEURA: Small left pleural effusion with fissural tracking.  VESSELS: No pulmonary embolism through the proximal segmental level.   Limited evaluation of the distal branches secondary to respiratory motion   an suboptimal timing of contrast administration. Aortic and coronary   artery stenting/calcifications.  HEART: Heart size is normal. No pericardial effusion. There is   subendocardial fatty dysplasia in the periapical region, compatible with   prior myocardial infarction  MEDIASTINUM AND ALEXIS: No lymphadenopathy.  CHEST WALL AND LOWER NECK: Patient status post right rotator cuff repair.    ABDOMEN AND PELVIS:  LIVER: Within normal limits.  BILE DUCTS: Normal caliber.  GALLBLADDER: Within normal limits.  SPLEEN: Within normal limits.  PANCREAS: Within normal limits.  ADRENALS: Within normal limits.  KIDNEYS/URETERS: Right kidney is unremarkable. There is a subcentimeter   low-attenuation lesion in the left kidney, too small to characterize,   likely a cyst.    BLADDER: Within normal limits.  REPRODUCTIVE ORGANS: Uterus has a globular contour and heterogeneous   attenuation which may be suggestive of adenomyosis versus myomatous   uterus.    BOWEL: Evaluation of the bowel is limited due to inadequate distention.   Within this limitation, there is no bowel obstruction. Residual oral   contrast in the cecum and terminal ileum. Appendix is normal.  PERITONEUM/RETROPERITONEUM: Within normal limits.  VESSELS: Atherosclerotic changes.  LYMPH NODES: No lymphadenopathy.  ABDOMINAL WALL: Small fat-containing left inguinal hernia.  BONES: Degenerative changes. L4-S1 laminectomy and posterior spinal   fusion .    IMPRESSION:  *  No pulmonary embolism through the proximal segmental level. Limited   evaluation of the distal branches secondary to respiratory motion an   suboptimal timing for contrast administration.  *  Left lower lobe pneumonia with associated small pleural effusion.  *  No acute pathology in the abdomen and pelvis.  *  Mildly enlarged and globular uterus suggestive of adenomyosis versus   myomatous uterus. If clinically indicated, pelvic ultrasound is   recommended for further evaluation.    --- End of Report ---    ELLEN MAHAN MD; Resident Radiologist  This document has been electronically signed.  KELIN SILVA MD; Attending Radiologist  This document has been electronically signed. Sep 18 2024 12:22PM    NEUROLOGICAL MEDICATIONS/OPIOIDS/BENZODIAZEPINE OVER PAST 24 HOURS  acetaminophen   IVPB ..   400 mL/Hr IV Intermittent (09-18-24 @ 14:00)    acetaminophen   IVPB ..   400 mL/Hr IV Intermittent (09-18-24 @ 21:54)    cisatracurium Infusion   16.3 mL/Hr IV Continuous (09-18-24 @ 17:40)   16.3 mL/Hr IV Continuous (09-18-24 @ 15:53)    etomidate Injectable   20 milliGRAM(s) IV Push (09-18-24 @ 12:40)    HYDROmorphone Infusion.   0.5 mL/Hr IV Continuous (09-18-24 @ 15:54)    LORazepam   Injectable   1 milliGRAM(s) IV Push (09-18-24 @ 15:52)    midazolam Infusion   1.81 mL/Hr IV Continuous (09-18-24 @ 23:26)   1.81 mL/Hr IV Continuous (09-18-24 @ 16:15)   1.81 mL/Hr IV Continuous (09-18-24 @ 15:55)    morphine  - Injectable   2 milliGRAM(s) IV Push (09-18-24 @ 11:55)    ondansetron Injectable   4 milliGRAM(s) IV Push (09-18-24 @ 12:00)    propofol Infusion   10.9 mL/Hr IV Continuous (09-18-24 @ 13:00)    propofol Injectable   50 milliGRAM(s) IV Push (09-18-24 @ 12:40)    rocuronium Injectable   100 milliGRAM(s) IV Push (09-18-24 @ 12:40)     [facility] PALLIATIVE MEDICINE CONSULT    CC: Patient is a 69y old  Female who presents with a chief complaint of Shortness of breath (19 Sep 2024 08:11)    HPI:  Pt unable to provide history, info obtained from chart and staff.   69-year-old female with past medical history of CAD status post multiple stents,  hypothyroid, rheumatoid arthritis, presenting with left-sided chest pain associated with generalized weakness, nausea and vomiting.  Patient ill-appearing, unable to provide much history.  History mostly supplied by patient's family member at bedside.  Symptoms started 2 days ago, however today she felt much worse with prompted her to come to the ED.  (18 Sep 2024 13:37)    69 year old female with PMHx listed above presented from home with left sided chest pain. In the ER, found to be hypotensive, tachycardiac and in respiratory distress requiring eventual intubation on vent support and initiation of vasopressor support. CTA with left sided pneumonia. Admitted to medical ICU for further medical management of shock, acute respiratory failure/ARDS, community acquired pneumonia and new cardiomyopathy (newly reduced EF per team). Palliative consulted for support and to assist with goals of care.     Present Symptoms:     Dyspnea: intubated  Nausea/Vomiting:  No  Anxiety:   No  Depression: unable to obtain  Fatigue: Yes    Loss of appetite: NPO  Constipation:  None documented     Pain: no overt sign of distress            Character-            Duration-            Effect-            Factors-            Frequency-            Location-            Severity-    Pain AD Score:  [email or web address] (press ctrl + left click to view)    Review of Systems:  Unable to obtain due to poor mentation   - per sons, baseline chronic pain syndrome on opioid therapy (oxy IR 10mg PRN) and more recently with increasing gait instability and falls    PERTINENT PMH REVIEWED: Yes      PAST MEDICAL & SURGICAL HISTORY:  Hypothyroid      ADHD (attention deficit hyperactivity disorder)      HLD (hyperlipidemia)      Myocardial infarct  2012      Stented coronary artery  2012      Rheumatoid arthritis      Migraine      S/p bilateral carpal tunnel release      H/O cardiac catheterization      H/O laminectomy      H/O spinal fusion      S/P left knee arthroscopy      H/O total knee replacement, right            FAMILY HISTORY:      Allergies    tetracycline (Hives)  penicillins (Hives)  Butazolactin, Prolaprin (Hives)  azithromycin (Hives)  Zyrtec (Hives)    Intolerances        SOCIAL HISTORY:                      Substance history:                    Admitted from:  home   alone                    Children: 2 sons Corey vishal Lentz)                    Buddhist/spirituality:                    Cultural concerns:       DECISION MAKER(s):[] Health Care Proxy(s)  [] Surrogate(s)  [] Guardian           - HCP is son Corey (has form at home)    ADVANCE DIRECTIVES/TREATMENT PREFERENCES: FULL CODE  DNR YES NO  Completed on:                     MOLST  YES NO   Completed on:  Living Will  YES NO   Completed on:    Karnofsky/Palliative Performance Status Version 2:  30%  [email or web address]    Baseline ADLs (prior to admission): mostly independent but increasing assist with ADLs      MEDICATIONS  (STANDING):  chlorhexidine 0.12% Liquid 15 milliLiter(s) Oral Mucosa every 12 hours  chlorhexidine 2% Cloths 1 Application(s) Topical daily  cisatracurium Infusion 3 MICROgram(s)/kG/Min (16.3 mL/Hr) IV Continuous <Continuous>  dextrose 5%. 1000 milliLiter(s) (100 mL/Hr) IV Continuous <Continuous>  dextrose 5%. 1000 milliLiter(s) (50 mL/Hr) IV Continuous <Continuous>  dextrose 50% Injectable 25 Gram(s) IV Push once  dextrose 50% Injectable 12.5 Gram(s) IV Push once  dextrose 50% Injectable 25 Gram(s) IV Push once  DOBUTamine Infusion 2.5 MICROgram(s)/kG/Min (6.8 mL/Hr) IV Continuous <Continuous>  glucagon  Injectable 1 milliGRAM(s) IntraMuscular once  heparin   Injectable 5000 Unit(s) SubCutaneous every 8 hours  hydrocortisone sodium succinate Injectable 50 milliGRAM(s) IV Push every 6 hours  HYDROmorphone Infusion. 0.5 mG/Hr (0.5 mL/Hr) IV Continuous <Continuous>  influenza  Vaccine (HIGH DOSE) 0.5 milliLiter(s) IntraMuscular once  insulin regular Infusion 2 Unit(s)/Hr (2 mL/Hr) IV Continuous <Continuous>  ketamine Infusion. 1 mG/kG/Hr (9.07 mL/Hr) IV Continuous <Continuous>  levothyroxine Injectable 75 MICROGram(s) IV Push at bedtime  meropenem Injectable 1000 milliGRAM(s) IV Push every 12 hours  midazolam Infusion 0.02 mG/kG/Hr (1.81 mL/Hr) IV Continuous <Continuous>  norepinephrine Infusion 0.54 MICROgram(s)/kG/Min (45.9 mL/Hr) IV Continuous <Continuous>  pantoprazole  Injectable 40 milliGRAM(s) IV Push daily  vasopressin Infusion 0.04 Unit(s)/Min (6 mL/Hr) IV Continuous <Continuous>    MEDICATIONS  (PRN):  dextrose Oral Gel 15 Gram(s) Oral once PRN Blood Glucose LESS THAN 70 milliGRAM(s)/deciliter      PHYSICAL EXAM:    Vital Signs Last 24 Hrs  T(C): 35.5 (19 Sep 2024 10:30), Max: 38.8 (19 Sep 2024 03:45)  T(F): 95.9 (19 Sep 2024 10:30), Max: 101.8 (19 Sep 2024 03:45)  HR: 100 (19 Sep 2024 10:30) (91 - 142)  BP: 109/75 (19 Sep 2024 10:00) (84/74 - 177/85)  BP(mean): 86 (19 Sep 2024 10:00) (53 - 97)  RR: 21 (19 Sep 2024 10:30) (16 - 29)  SpO2: 100% (19 Sep 2024 10:30) (88% - 100%)    Parameters below as of 19 Sep 2024 10:00  Patient On (Oxygen Delivery Method): ventilator    O2 Concentration (%): 100    General: resting comfortably.    HEENT: mmm  +ETT  Lungs: comfortable nonlabored + vent support   CV:  rrr  GI: +BS abdomen soft, NTND   MSK:   no cyanosis. +edema +weakness      Neuro: nonfocal. lethargic  Skin: warm and dry.     LABS:                        13.4   20.63 )-----------( 162      ( 19 Sep 2024 08:45 )             40.7     09-19    136  |  103  |  19.1  ----------------------------<  290[H]  4.2   |  20.0[L]  |  0.68    Ca    8.1[L]      19 Sep 2024 08:45  Phos  2.5     09-19  Mg     2.0     09-19    TPro  5.2[L]  /  Alb  3.1[L]  /  TBili  0.4  /  DBili  x   /  AST  45[H]  /  ALT  55[H]  /  AlkPhos  81  09-19    PT/INR - ( 19 Sep 2024 02:20 )   PT: 16.0 sec;   INR: 1.46 ratio         PTT - ( 19 Sep 2024 02:20 )  PTT:28.5 sec  Urinalysis Basic - ( 19 Sep 2024 08:45 )    Color: x / Appearance: x / SG: x / pH: x  Gluc: 290 mg/dL / Ketone: x  / Bili: x / Urobili: x   Blood: x / Protein: x / Nitrite: x   Leuk Esterase: x / RBC: x / WBC x   Sq Epi: x / Non Sq Epi: x / Bacteria: x      I&O's Summary    18 Sep 2024 07:01  -  19 Sep 2024 07:00  --------------------------------------------------------  IN: 2761.2 mL / OUT: 2880 mL / NET: -118.8 mL    19 Sep 2024 07:01  -  19 Sep 2024 11:30  --------------------------------------------------------  IN: 274.9 mL / OUT: 85 mL / NET: 189.9 mL    RADIOLOGY & ADDITIONAL STUDIES: reviewed     CXR    ACC: [id number] EXAM:  XR CHEST PORTABLE URGENT 1V   ORDERED BY: VICKY WINN ABU     PROCEDURE DATE:  09/18/2024          INTERPRETATION:  Portable AP chest radiograph    COMPARISON: 12/8/2023 chest x-ray.    CLINICAL INFORMATION: Line placement.    FINDINGS:  CATHETERS AND TUBES: RIGHT IJ catheter tip in SVC.  ET tube tip above tracheal bifurcation.    PULMONARY:  Moderate LEFT pleural effusion and/or lower zone airspace consolidation.  Visualized RIGHT lung parenchyma clear.    HEART/VASCULAR: The  heart is mildly enlarged in transverse diameter. No   hilar mass.  .    BONES: The visualized osseous thorax is intact.    IMPRESSION: ET tube tip above trachea location.  RIGHT IJ catheter tip in SVC.  Moderate LEFT pleural effusion and/or lower zone airspace consolidation.   No radiographic evidence of active chest disease..    --- End of Report ---    DEBRA VELAZQUEZ MD; Attending Radiologist  This document has been electronically signed. Sep 18 2024  3:37PM    CTA CAP     ACC: [id number] EXAM:  CT ABDOMEN AND PELVIS IC   ORDERED BY: ILEANA MELGAR     ACC: [id number] EXAM:  CT ANGIO CHEST PULM ART WAWIC   ORDERED BY: ILEANA MELGAR     PROCEDURE DATE:  09/18/2024          INTERPRETATION:  CLINICAL INFORMATION: Severe chest pain, shortness of   breath, and right upper quadrant abdominal pain.    COMPARISON: None.    CONTRAST/COMPLICATIONS:  IV Contrast: Omnipaque 350  90 cc administered   10 cc discarded  Oral Contrast: NONE  Complications: None reported at time of study completion    PROCEDURE:  CT Angiography of the Chest, Abdomen and Pelvis.  Precontrast imaging was performed through the chest followed by arterial   phase imaging of the chest, abdomen and pelvis.  Sagittal and coronal reformats were performed as well as 3D (MIP)   reconstructions.    FINDINGS:  CHEST:  LUNGS AND LARGE AIRWAYS: No endobronchial lesions. Left lower lobe   consolidation. There is subsegmental atelectasis in right lower lobe.  PLEURA: Small left pleural effusion with fissural tracking.  VESSELS: No pulmonary embolism through the proximal segmental level.   Limited evaluation of the distal branches secondary to respiratory motion   an suboptimal timing of contrast administration. Aortic and coronary   artery stenting/calcifications.  HEART: Heart size is normal. No pericardial effusion. There is   subendocardial fatty dysplasia in the periapical region, compatible with   prior myocardial infarction  MEDIASTINUM AND ALEXIS: No lymphadenopathy.  CHEST WALL AND LOWER NECK: Patient status post right rotator cuff repair.    ABDOMEN AND PELVIS:  LIVER: Within normal limits.  BILE DUCTS: Normal caliber.  GALLBLADDER: Within normal limits.  SPLEEN: Within normal limits.  PANCREAS: Within normal limits.  ADRENALS: Within normal limits.  KIDNEYS/URETERS: Right kidney is unremarkable. There is a subcentimeter   low-attenuation lesion in the left kidney, too small to characterize,   likely a cyst.    BLADDER: Within normal limits.  REPRODUCTIVE ORGANS: Uterus has a globular contour and heterogeneous   attenuation which may be suggestive of adenomyosis versus myomatous   uterus.    BOWEL: Evaluation of the bowel is limited due to inadequate distention.   Within this limitation, there is no bowel obstruction. Residual oral   contrast in the cecum and terminal ileum. Appendix is normal.  PERITONEUM/RETROPERITONEUM: Within normal limits.  VESSELS: Atherosclerotic changes.  LYMPH NODES: No lymphadenopathy.  ABDOMINAL WALL: Small fat-containing left inguinal hernia.  BONES: Degenerative changes. L4-S1 laminectomy and posterior spinal   fusion .    IMPRESSION:  *  No pulmonary embolism through the proximal segmental level. Limited   evaluation of the distal branches secondary to respiratory motion an   suboptimal timing for contrast administration.  *  Left lower lobe pneumonia with associated small pleural effusion.  *  No acute pathology in the abdomen and pelvis.  *  Mildly enlarged and globular uterus suggestive of adenomyosis versus   myomatous uterus. If clinically indicated, pelvic ultrasound is   recommended for further evaluation.    --- End of Report ---    ELLEN MAHAN MD; Resident Radiologist  This document has been electronically signed.  KELIN SILVA MD; Attending Radiologist  This document has been electronically signed. Sep 18 2024 12:22PM    NEUROLOGICAL MEDICATIONS/OPIOIDS/BENZODIAZEPINE OVER PAST 24 HOURS  acetaminophen   IVPB ..   400 mL/Hr IV Intermittent (09-18-24 @ 14:00)    acetaminophen   IVPB ..   400 mL/Hr IV Intermittent (09-18-24 @ 21:54)    cisatracurium Infusion   16.3 mL/Hr IV Continuous (09-18-24 @ 17:40)   16.3 mL/Hr IV Continuous (09-18-24 @ 15:53)    etomidate Injectable   20 milliGRAM(s) IV Push (09-18-24 @ 12:40)    HYDROmorphone Infusion.   0.5 mL/Hr IV Continuous (09-18-24 @ 15:54)    LORazepam   Injectable   1 milliGRAM(s) IV Push (09-18-24 @ 15:52)    midazolam Infusion   1.81 mL/Hr IV Continuous (09-18-24 @ 23:26)   1.81 mL/Hr IV Continuous (09-18-24 @ 16:15)   1.81 mL/Hr IV Continuous (09-18-24 @ 15:55)    morphine  - Injectable   2 milliGRAM(s) IV Push (09-18-24 @ 11:55)    ondansetron Injectable   4 milliGRAM(s) IV Push (09-18-24 @ 12:00)    propofol Infusion   10.9 mL/Hr IV Continuous (09-18-24 @ 13:00)    propofol Injectable   50 milliGRAM(s) IV Push (09-18-24 @ 12:40)    rocuronium Injectable   100 milliGRAM(s) IV Push (09-18-24 @ 12:40)    ISTOP  Reference #: [id number]  Prescription Information      PDI Filter:    PDI	My Rx	Current Rx	Drug Type	Rx Written	Rx Dispensed	Drug	Quantity	Days Supply	Prescriber Name	Prescriber ERIC #	Payment Method	Dispenser  A	N	Y		06/26/2024	09/13/2024	zolpidem tartrate 10 mg tablet	30	30	Zulemahomer Kadi	[id number]	Medicare	[facility] [id number]  A	N	Y	O	08/22/2024	09/06/2024	oxycodone hcl (ir) 10 mg tab	90	30	Replogle, Yomi	[id number]	Insurance	[facility] [id number]  A	N	N		06/26/2024	08/13/2024	zolpidem tartrate 10 mg tablet	30	30	Lynette Kadi	[id number]	Medicare	[facility] [id number]  A	N	N	O	07/18/2024	07/22/2024	oxycodone hcl (ir) 10 mg tab	90	30	Replogle, Yomi	[id number]	Insurance	[facility] [id number]  A	N	N		06/26/2024	07/11/2024	zolpidem tartrate 10 mg tablet	30	30	msKadi coronel	[id number]	Medicare	[facility] [id number]  A	N	N	O	06/12/2024	06/23/2024	oxycodone hcl (ir) 10 mg tab	90	30	Janiya Qureshi	[id number]	[facility] [id number]  A	N	N		04/08/2024	06/09/2024	zolpidem tartrate 10 mg tablet	30	30	ZulemaKadi coronel	[id number]	Insurance	[address] [facility]  A	N	N		04/08/2024	05/10/2024	zolpidem tartrate 10 mg tablet	30	30	[facility]	[id number]	[facility]  A	N	N	O	04/10/2024	04/17/2024	oxycodone hcl (ir) 10 mg tab	90	30	Janiya Qureshi	[id number]	Insurance	[facility] [id number]  A	N	N		04/08/2024	04/08/2024	zolpidem tartrate 10 mg tablet	30	30	[facility]	[id number]	[facility]  A	N	N		12/01/2023	03/08/2024	zolpidem tartrate 10 mg tablet	30	30	Bumshomer Kadi	[id number]	Insurance	[facility]  A	N	N		12/01/2023	02/05/2024	zolpidem tartrate 10 mg tablet	30	30	[facility] Kadi	[id number]	Insurance	[facility]  A	N	N	O	01/05/2024	01/14/2024	oxycodone-acetaminophen  mg tab	90	30	Ora Iyera Maisha	[id number]	[place]	[facility]  A	N	N		12/01/2023	01/01/2024	zolpidem tartrate 10 mg tablet	30	30	[facility]	[id number]	Insurance	[facility]  A	N	N		12/01/2023	12/02/2023	zolpidem tartrate 10 mg tablet	30	30	[facility] Kadi	[id number]	Insurance	[facility]  A	N	N		08/02/2023	11/02/2023	zolpidem tartrate 10 mg tablet	30	30	[facility] Kadi	[id number]	Insurance	[facility]  A	N	N	O	09/28/2023	10/14/2023	oxycodone hcl (ir) 10 mg tab	90	30	CésarYomi	[id number]	Insurance	[facility] [id number]  A	N	N		08/02/2023	10/04/2023	zolpidem tartrate 10 mg tablet	30	30	[facility] Kadi	[id number]	Insurance	[facility]

## 2024-09-19 NOTE — CONSULT NOTE ADULT - CONVERSATION DETAILS
Met with sons Corey and Tor to introduce role and scope of palliative care team as supportive in the setting of complex comorbidities/serious illnesses, to assist and navigate with complex medical decision making and symptoms during their hospital stay here. They were engaged in conversation and able to provide collateral history. Pt resided at home alone with son Corey living nearby. Other son Tor lives in [place]. Family  has been looking into intermediate placement as they have noted that mother has "slowed down" and at times with "memory issues," reports increasing gait stability and falls. Sons affirmed earlier discussion with medical ICU team, aware that mother's clinical status is critical at this juncture and taking it day by day to monitor for clinical improvement. They understand further goals of care conversation will take place pending on her clinical progress. Psychosocial support provided and all questions answered.     Reviewed any prior Health Care Proxy (HCP) / Living Will/ Advance Directives or Medical Order for Life Sustaining Treatments (MOLST) Forms.   - sons confirmed that there is a HCP form, designated proxy as liang Nicole. Corey also gives permission to call his brother Tor in the event he is not available. Tor will be staying with Corey for the time being.     Contact:   - Liang Giles: [phone]  - liang Lentz [phone]

## 2024-09-20 ENCOUNTER — RESULT REVIEW (OUTPATIENT)
Age: 69
End: 2024-09-20

## 2024-09-20 LAB
-  AZTREONAM: SIGNIFICANT CHANGE UP
-  CEFEPIME: SIGNIFICANT CHANGE UP
-  CEFTAZIDIME: SIGNIFICANT CHANGE UP
-  CIPROFLOXACIN: SIGNIFICANT CHANGE UP
-  IMIPENEM: SIGNIFICANT CHANGE UP
-  LEVOFLOXACIN: SIGNIFICANT CHANGE UP
-  MEROPENEM: SIGNIFICANT CHANGE UP
-  PIPERACILLIN/TAZOBACTAM: SIGNIFICANT CHANGE UP
ALBUMIN SERPL ELPH-MCNC: 3.1 G/DL — LOW (ref 3.3–5.2)
ALBUMIN SERPL ELPH-MCNC: 3.2 G/DL — LOW (ref 3.3–5.2)
ALBUMIN SERPL ELPH-MCNC: 3.2 G/DL — LOW (ref 3.3–5.2)
ALBUMIN SERPL ELPH-MCNC: 3.3 G/DL — SIGNIFICANT CHANGE UP (ref 3.3–5.2)
ALP SERPL-CCNC: 103 U/L — SIGNIFICANT CHANGE UP (ref 40–120)
ALP SERPL-CCNC: 88 U/L — SIGNIFICANT CHANGE UP (ref 40–120)
ALP SERPL-CCNC: 91 U/L — SIGNIFICANT CHANGE UP (ref 40–120)
ALP SERPL-CCNC: 98 U/L — SIGNIFICANT CHANGE UP (ref 40–120)
ALT FLD-CCNC: 42 U/L — HIGH
ALT FLD-CCNC: 44 U/L — HIGH
ALT FLD-CCNC: 45 U/L — HIGH
ALT FLD-CCNC: 47 U/L — HIGH
ANION GAP SERPL CALC-SCNC: 10 MMOL/L — SIGNIFICANT CHANGE UP (ref 5–17)
ANION GAP SERPL CALC-SCNC: 10 MMOL/L — SIGNIFICANT CHANGE UP (ref 5–17)
ANION GAP SERPL CALC-SCNC: 12 MMOL/L — SIGNIFICANT CHANGE UP (ref 5–17)
ANION GAP SERPL CALC-SCNC: 12 MMOL/L — SIGNIFICANT CHANGE UP (ref 5–17)
AST SERPL-CCNC: 29 U/L — SIGNIFICANT CHANGE UP
AST SERPL-CCNC: 33 U/L — HIGH
AST SERPL-CCNC: 34 U/L — HIGH
AST SERPL-CCNC: 35 U/L — HIGH
BILIRUB SERPL-MCNC: 0.3 MG/DL — LOW (ref 0.4–2)
BUN SERPL-MCNC: 22.4 MG/DL — HIGH (ref 8–20)
BUN SERPL-MCNC: 25.9 MG/DL — HIGH (ref 8–20)
BUN SERPL-MCNC: 26.6 MG/DL — HIGH (ref 8–20)
BUN SERPL-MCNC: 28.7 MG/DL — HIGH (ref 8–20)
CALCIUM SERPL-MCNC: 8.3 MG/DL — LOW (ref 8.4–10.5)
CALCIUM SERPL-MCNC: 8.4 MG/DL — SIGNIFICANT CHANGE UP (ref 8.4–10.5)
CALCIUM SERPL-MCNC: 8.6 MG/DL — SIGNIFICANT CHANGE UP (ref 8.4–10.5)
CALCIUM SERPL-MCNC: 8.7 MG/DL — SIGNIFICANT CHANGE UP (ref 8.4–10.5)
CHLORIDE SERPL-SCNC: 104 MMOL/L — SIGNIFICANT CHANGE UP (ref 96–108)
CHLORIDE SERPL-SCNC: 104 MMOL/L — SIGNIFICANT CHANGE UP (ref 96–108)
CHLORIDE SERPL-SCNC: 105 MMOL/L — SIGNIFICANT CHANGE UP (ref 96–108)
CHLORIDE SERPL-SCNC: 106 MMOL/L — SIGNIFICANT CHANGE UP (ref 96–108)
CO2 SERPL-SCNC: 22 MMOL/L — SIGNIFICANT CHANGE UP (ref 22–29)
CO2 SERPL-SCNC: 24 MMOL/L — SIGNIFICANT CHANGE UP (ref 22–29)
CO2 SERPL-SCNC: 24 MMOL/L — SIGNIFICANT CHANGE UP (ref 22–29)
CO2 SERPL-SCNC: 25 MMOL/L — SIGNIFICANT CHANGE UP (ref 22–29)
CREAT SERPL-MCNC: 0.61 MG/DL — SIGNIFICANT CHANGE UP (ref 0.5–1.3)
CREAT SERPL-MCNC: 0.74 MG/DL — SIGNIFICANT CHANGE UP (ref 0.5–1.3)
CREAT SERPL-MCNC: 0.76 MG/DL — SIGNIFICANT CHANGE UP (ref 0.5–1.3)
CREAT SERPL-MCNC: 0.88 MG/DL — SIGNIFICANT CHANGE UP (ref 0.5–1.3)
CULTURE RESULTS: ABNORMAL
CULTURE RESULTS: SIGNIFICANT CHANGE UP
EGFR: 71 ML/MIN/1.73M2 — SIGNIFICANT CHANGE UP
EGFR: 84 ML/MIN/1.73M2 — SIGNIFICANT CHANGE UP
EGFR: 87 ML/MIN/1.73M2 — SIGNIFICANT CHANGE UP
EGFR: 96 ML/MIN/1.73M2 — SIGNIFICANT CHANGE UP
GAS PNL BLDA: SIGNIFICANT CHANGE UP
GLUCOSE BLDC GLUCOMTR-MCNC: 119 MG/DL — HIGH (ref 70–99)
GLUCOSE BLDC GLUCOMTR-MCNC: 125 MG/DL — HIGH (ref 70–99)
GLUCOSE BLDC GLUCOMTR-MCNC: 127 MG/DL — HIGH (ref 70–99)
GLUCOSE BLDC GLUCOMTR-MCNC: 129 MG/DL — HIGH (ref 70–99)
GLUCOSE BLDC GLUCOMTR-MCNC: 129 MG/DL — HIGH (ref 70–99)
GLUCOSE BLDC GLUCOMTR-MCNC: 137 MG/DL — HIGH (ref 70–99)
GLUCOSE BLDC GLUCOMTR-MCNC: 141 MG/DL — HIGH (ref 70–99)
GLUCOSE BLDC GLUCOMTR-MCNC: 145 MG/DL — HIGH (ref 70–99)
GLUCOSE BLDC GLUCOMTR-MCNC: 147 MG/DL — HIGH (ref 70–99)
GLUCOSE BLDC GLUCOMTR-MCNC: 155 MG/DL — HIGH (ref 70–99)
GLUCOSE BLDC GLUCOMTR-MCNC: 167 MG/DL — HIGH (ref 70–99)
GLUCOSE SERPL-MCNC: 160 MG/DL — HIGH (ref 70–99)
GLUCOSE SERPL-MCNC: 168 MG/DL — HIGH (ref 70–99)
GLUCOSE SERPL-MCNC: 172 MG/DL — HIGH (ref 70–99)
GLUCOSE SERPL-MCNC: 189 MG/DL — HIGH (ref 70–99)
HCT VFR BLD CALC: 35.3 % — SIGNIFICANT CHANGE UP (ref 34.5–45)
HCT VFR BLD CALC: 37.7 % — SIGNIFICANT CHANGE UP (ref 34.5–45)
HCT VFR BLD CALC: 39.5 % — SIGNIFICANT CHANGE UP (ref 34.5–45)
HGB BLD-MCNC: 11.6 G/DL — SIGNIFICANT CHANGE UP (ref 11.5–15.5)
HGB BLD-MCNC: 12.3 G/DL — SIGNIFICANT CHANGE UP (ref 11.5–15.5)
HGB BLD-MCNC: 13 G/DL — SIGNIFICANT CHANGE UP (ref 11.5–15.5)
MAGNESIUM SERPL-MCNC: 2.1 MG/DL — SIGNIFICANT CHANGE UP (ref 1.6–2.6)
MAGNESIUM SERPL-MCNC: 2.1 MG/DL — SIGNIFICANT CHANGE UP (ref 1.6–2.6)
MAGNESIUM SERPL-MCNC: 2.3 MG/DL — SIGNIFICANT CHANGE UP (ref 1.8–2.6)
MAGNESIUM SERPL-MCNC: 2.4 MG/DL — SIGNIFICANT CHANGE UP (ref 1.6–2.6)
MCHC RBC-ENTMCNC: 32.6 GM/DL — SIGNIFICANT CHANGE UP (ref 32–36)
MCHC RBC-ENTMCNC: 32.9 GM/DL — SIGNIFICANT CHANGE UP (ref 32–36)
MCHC RBC-ENTMCNC: 32.9 GM/DL — SIGNIFICANT CHANGE UP (ref 32–36)
MCHC RBC-ENTMCNC: 33.6 PG — SIGNIFICANT CHANGE UP (ref 27–34)
MCHC RBC-ENTMCNC: 33.7 PG — SIGNIFICANT CHANGE UP (ref 27–34)
MCHC RBC-ENTMCNC: 34 PG — SIGNIFICANT CHANGE UP (ref 27–34)
MCV RBC AUTO: 102.6 FL — HIGH (ref 80–100)
MCV RBC AUTO: 103 FL — HIGH (ref 80–100)
MCV RBC AUTO: 103.4 FL — HIGH (ref 80–100)
METHOD TYPE: SIGNIFICANT CHANGE UP
ORGANISM # SPEC MICROSCOPIC CNT: ABNORMAL
ORGANISM # SPEC MICROSCOPIC CNT: ABNORMAL
ORGANISM # SPEC MICROSCOPIC CNT: SIGNIFICANT CHANGE UP
PHOSPHATE SERPL-MCNC: 2.5 MG/DL — SIGNIFICANT CHANGE UP (ref 2.4–4.7)
PHOSPHATE SERPL-MCNC: 3.3 MG/DL — SIGNIFICANT CHANGE UP (ref 2.4–4.7)
PHOSPHATE SERPL-MCNC: 3.6 MG/DL — SIGNIFICANT CHANGE UP (ref 2.4–4.7)
PHOSPHATE SERPL-MCNC: 3.7 MG/DL — SIGNIFICANT CHANGE UP (ref 2.4–4.7)
PLATELET # BLD AUTO: 128 K/UL — LOW (ref 150–400)
PLATELET # BLD AUTO: 149 K/UL — LOW (ref 150–400)
PLATELET # BLD AUTO: 186 K/UL — SIGNIFICANT CHANGE UP (ref 150–400)
POTASSIUM SERPL-MCNC: 4.6 MMOL/L — SIGNIFICANT CHANGE UP (ref 3.5–5.3)
POTASSIUM SERPL-MCNC: 4.7 MMOL/L — SIGNIFICANT CHANGE UP (ref 3.5–5.3)
POTASSIUM SERPL-MCNC: 5 MMOL/L — SIGNIFICANT CHANGE UP (ref 3.5–5.3)
POTASSIUM SERPL-MCNC: 5.3 MMOL/L — SIGNIFICANT CHANGE UP (ref 3.5–5.3)
POTASSIUM SERPL-SCNC: 4.6 MMOL/L — SIGNIFICANT CHANGE UP (ref 3.5–5.3)
POTASSIUM SERPL-SCNC: 4.7 MMOL/L — SIGNIFICANT CHANGE UP (ref 3.5–5.3)
POTASSIUM SERPL-SCNC: 5 MMOL/L — SIGNIFICANT CHANGE UP (ref 3.5–5.3)
POTASSIUM SERPL-SCNC: 5.3 MMOL/L — SIGNIFICANT CHANGE UP (ref 3.5–5.3)
PROT SERPL-MCNC: 5 G/DL — LOW (ref 6.6–8.7)
PROT SERPL-MCNC: 5.2 G/DL — LOW (ref 6.6–8.7)
PROT SERPL-MCNC: 5.2 G/DL — LOW (ref 6.6–8.7)
PROT SERPL-MCNC: 5.3 G/DL — LOW (ref 6.6–8.7)
RBC # BLD: 3.44 M/UL — LOW (ref 3.8–5.2)
RBC # BLD: 3.66 M/UL — LOW (ref 3.8–5.2)
RBC # BLD: 3.82 M/UL — SIGNIFICANT CHANGE UP (ref 3.8–5.2)
RBC # FLD: 13.2 % — SIGNIFICANT CHANGE UP (ref 10.3–14.5)
RBC # FLD: 13.3 % — SIGNIFICANT CHANGE UP (ref 10.3–14.5)
RBC # FLD: 13.4 % — SIGNIFICANT CHANGE UP (ref 10.3–14.5)
SODIUM SERPL-SCNC: 138 MMOL/L — SIGNIFICANT CHANGE UP (ref 135–145)
SODIUM SERPL-SCNC: 139 MMOL/L — SIGNIFICANT CHANGE UP (ref 135–145)
SODIUM SERPL-SCNC: 140 MMOL/L — SIGNIFICANT CHANGE UP (ref 135–145)
SODIUM SERPL-SCNC: 140 MMOL/L — SIGNIFICANT CHANGE UP (ref 135–145)
SPECIMEN SOURCE: SIGNIFICANT CHANGE UP
SPECIMEN SOURCE: SIGNIFICANT CHANGE UP
TROPONIN T, HIGH SENSITIVITY RESULT: 13 NG/L — SIGNIFICANT CHANGE UP (ref 0–51)
WBC # BLD: 12.13 K/UL — HIGH (ref 3.8–10.5)
WBC # BLD: 18.05 K/UL — HIGH (ref 3.8–10.5)
WBC # BLD: 22.02 K/UL — HIGH (ref 3.8–10.5)
WBC # FLD AUTO: 12.13 K/UL — HIGH (ref 3.8–10.5)
WBC # FLD AUTO: 18.05 K/UL — HIGH (ref 3.8–10.5)
WBC # FLD AUTO: 22.02 K/UL — HIGH (ref 3.8–10.5)

## 2024-09-20 PROCEDURE — 93010 ELECTROCARDIOGRAM REPORT: CPT

## 2024-09-20 PROCEDURE — 99291 CRITICAL CARE FIRST HOUR: CPT

## 2024-09-20 PROCEDURE — 93306 TTE W/DOPPLER COMPLETE: CPT | Mod: 26

## 2024-09-20 RX ORDER — INSULIN LISPRO 100/ML
VIAL (ML) SUBCUTANEOUS EVERY 6 HOURS
Refills: 0 | Status: DISCONTINUED | OUTPATIENT
Start: 2024-09-20 | End: 2024-09-26

## 2024-09-20 RX ORDER — CEFEPIME 2 G/1
INJECTION, POWDER, FOR SOLUTION INTRAVENOUS
Refills: 0 | Status: DISCONTINUED | OUTPATIENT
Start: 2024-09-20 | End: 2024-09-21

## 2024-09-20 RX ORDER — INSULIN GLARGINE 300 U/ML
10 INJECTION, SOLUTION SUBCUTANEOUS EVERY MORNING
Refills: 0 | Status: DISCONTINUED | OUTPATIENT
Start: 2024-09-20 | End: 2024-09-24

## 2024-09-20 RX ORDER — CHLORHEXIDINE GLUCONATE ORAL RINSE 1.2 MG/ML
15 SOLUTION DENTAL EVERY 12 HOURS
Refills: 0 | Status: DISCONTINUED | OUTPATIENT
Start: 2024-09-20 | End: 2024-09-21

## 2024-09-20 RX ORDER — CEFEPIME 2 G/1
500 INJECTION, POWDER, FOR SOLUTION INTRAVENOUS ONCE
Refills: 0 | Status: COMPLETED | OUTPATIENT
Start: 2024-09-20 | End: 2024-09-20

## 2024-09-20 RX ORDER — CEFEPIME 2 G/1
500 INJECTION, POWDER, FOR SOLUTION INTRAVENOUS EVERY 12 HOURS
Refills: 0 | Status: DISCONTINUED | OUTPATIENT
Start: 2024-09-21 | End: 2024-09-21

## 2024-09-20 RX ORDER — DOBUTAMINE HCL 250MG/20ML
5 VIAL (ML) INTRAVENOUS
Qty: 500 | Refills: 0 | Status: DISCONTINUED | OUTPATIENT
Start: 2024-09-20 | End: 2024-09-22

## 2024-09-20 RX ORDER — ASPIRIN 325 MG
81 TABLET ORAL DAILY
Refills: 0 | Status: DISCONTINUED | OUTPATIENT
Start: 2024-09-20 | End: 2024-09-24

## 2024-09-20 RX ADMIN — Medication 75 MICROGRAM(S): at 21:15

## 2024-09-20 RX ADMIN — Medication 2: at 17:37

## 2024-09-20 RX ADMIN — Medication 5000 UNIT(S): at 11:34

## 2024-09-20 RX ADMIN — CEFEPIME 100 MILLIGRAM(S): 2 INJECTION, POWDER, FOR SOLUTION INTRAVENOUS at 21:15

## 2024-09-20 RX ADMIN — INSULIN GLARGINE 10 UNIT(S): 300 INJECTION, SOLUTION SUBCUTANEOUS at 12:10

## 2024-09-20 RX ADMIN — CHLORHEXIDINE GLUCONATE ORAL RINSE 1 APPLICATION(S): 1.2 SOLUTION DENTAL at 11:36

## 2024-09-20 RX ADMIN — KETAMINE HYDROCHLORIDE 9.07 MG/KG/HR: 10 INJECTION INTRAMUSCULAR; INTRAVENOUS at 23:42

## 2024-09-20 RX ADMIN — Medication 2: at 23:29

## 2024-09-20 RX ADMIN — PANTOPRAZOLE SODIUM 40 MILLIGRAM(S): 40 TABLET, DELAYED RELEASE ORAL at 11:35

## 2024-09-20 RX ADMIN — KETAMINE HYDROCHLORIDE 9.07 MG/KG/HR: 10 INJECTION INTRAMUSCULAR; INTRAVENOUS at 12:09

## 2024-09-20 RX ADMIN — Medication 45.9 MICROGRAM(S)/KG/MIN: at 08:54

## 2024-09-20 RX ADMIN — Medication 1.81 MG/KG/HR: at 05:05

## 2024-09-20 RX ADMIN — HYDROCORTISONE 50 MILLIGRAM(S): 5 TABLET ORAL at 23:29

## 2024-09-20 RX ADMIN — Medication 13.6 MICROGRAM(S)/KG/MIN: at 17:32

## 2024-09-20 RX ADMIN — Medication 5000 UNIT(S): at 21:14

## 2024-09-20 RX ADMIN — Medication 81 MILLIGRAM(S): at 17:39

## 2024-09-20 RX ADMIN — CHLORHEXIDINE GLUCONATE ORAL RINSE 15 MILLILITER(S): 1.2 SOLUTION DENTAL at 05:05

## 2024-09-20 RX ADMIN — Medication 2 UNIT(S)/HR: at 05:55

## 2024-09-20 RX ADMIN — Medication 5000 UNIT(S): at 05:05

## 2024-09-20 RX ADMIN — Medication 1.81 MG/KG/HR: at 17:33

## 2024-09-20 RX ADMIN — CHLORHEXIDINE GLUCONATE ORAL RINSE 15 MILLILITER(S): 1.2 SOLUTION DENTAL at 17:32

## 2024-09-20 RX ADMIN — HYDROCORTISONE 50 MILLIGRAM(S): 5 TABLET ORAL at 11:35

## 2024-09-20 RX ADMIN — HYDROCORTISONE 50 MILLIGRAM(S): 5 TABLET ORAL at 05:05

## 2024-09-20 RX ADMIN — Medication 75 MILLIGRAM(S): at 17:39

## 2024-09-20 RX ADMIN — MEROPENEM 1000 MILLIGRAM(S): 500 INJECTION INTRAVENOUS at 11:35

## 2024-09-20 RX ADMIN — HYDROMORPHONE HYDROCHLORIDE 0.5 MG/HR: 1 INJECTION, SOLUTION INTRAMUSCULAR; INTRAVENOUS; SUBCUTANEOUS at 08:53

## 2024-09-20 RX ADMIN — HYDROCORTISONE 50 MILLIGRAM(S): 5 TABLET ORAL at 17:33

## 2024-09-20 NOTE — PROGRESS NOTE ADULT - SUBJECTIVE AND OBJECTIVE BOX
Downtitrated pressors overnight.     Vital Signs:    T(C): 36.7 (09-20-24 @ 15:45), Max: 38.8 (09-19-24 @ 21:15)  HR: 77 (09-20-24 @ 15:54) (68 - 107)  BP: 125/84 (09-20-24 @ 06:00) (93/71 - 141/85)  RR: 18 (09-20-24 @ 15:45) (16 - 26)  SpO2: 100% (09-20-24 @ 15:54) (86% - 100%)    Vent data:    Mode: AC/ CMV (Assist Control/ Continuous Mandatory Ventilation), RR (machine): 18, TV (machine): 400, FiO2: 50, PEEP: 15, ITime: 0.8, MAP: 19, PIP: 30    I's/O's:      09-19-24 @ 07:01  -  09-20-24 @ 07:00  --------------------------------------------------------  IN: 1704.8 mL / OUT: 1825 mL / NET: -120.2 mL    09-20-24 @ 07:01  -  09-20-24 @ 16:58  --------------------------------------------------------  IN: 302.4 mL / OUT: 340 mL / NET: -37.6 mL        PMH:    Sepsis    Dermatitis, unspecified-L30.9    Elevated white blood cell count, unspecified    Encounter for other screening for malignant neoplasm of breast-Z12.39    Other abnormality of red blood cells-R71.8    Pain in left foot    Pain in left shoulder-M25.512    Rheumatoid arthritis, unspecified    Spondylosis w/out myelopathy or radiculopathy, cervical region-M47.812    Unspecified osteoarthritis, unspecified site-M19.90    Handoff    MEWS Score    Hypothyroid    ADHD (attention deficit hyperactivity disorder)    HLD (hyperlipidemia)    Myocardial infarct    Stented coronary artery    Rheumatoid arthritis    Migraine    Severe sepsis    Acute respiratory failure with hypoxia    Community acquired pneumonia    Acute hypotension    Rheumatoid arthritis    Chronic pain syndrome    Advance care planning    Palliative care encounter    S/p bilateral carpal tunnel release    H/O cardiac catheterization    H/O laminectomy    H/O spinal fusion    S/P left knee arthroscopy    H/O total knee replacement, right    BODY ACHES    90+    Left lower lobe pneumonia    [email or web address]        Allergies:    tetracycline (Hives)  penicillins (Hives)  Butazolactin, Prolaprin (Hives)  azithromycin (Hives)  Zyrtec (Hives)      Labs:                          11.6   12.13 )-----------( 128      ( 20 Sep 2024 16:40 )             35.3       Micro:        Physical Exam:    Gen: intubated, sedated  HEENT: AT  CV: No obvious murmurs  Lungs: mechanical breath sounds.  Abd: Soft, NT  Ext: No edema  Neuro: RASS -1      Radiology:      Medications:    acetaminophen   IVPB .. 1000 milliGRAM(s) IV Intermittent once  aspirin  chewable 81 milliGRAM(s) Oral daily  chlorhexidine 0.12% Liquid 15 milliLiter(s) Oral Mucosa every 12 hours  chlorhexidine 2% Cloths 1 Application(s) Topical daily  clopidogrel Tablet 75 milliGRAM(s) Oral daily  dextrose 5%. 1000 milliLiter(s) IV Continuous <Continuous>  dextrose 5%. 1000 milliLiter(s) IV Continuous <Continuous>  dextrose 50% Injectable 25 Gram(s) IV Push once  dextrose 50% Injectable 25 Gram(s) IV Push once  dextrose 50% Injectable 12.5 Gram(s) IV Push once  dextrose Oral Gel 15 Gram(s) Oral once PRN  DOBUTamine Infusion 5 MICROgram(s)/kG/Min IV Continuous <Continuous>  glucagon  Injectable 1 milliGRAM(s) IntraMuscular once  heparin   Injectable 5000 Unit(s) SubCutaneous every 8 hours  hydrocortisone sodium succinate Injectable 50 milliGRAM(s) IV Push every 6 hours  HYDROmorphone Infusion. 0.5 mG/Hr IV Continuous <Continuous>  influenza  Vaccine (HIGH DOSE) 0.5 milliLiter(s) IntraMuscular once  insulin glargine Injectable (LANTUS) 10 Unit(s) SubCutaneous every morning  insulin lispro (ADMELOG) corrective regimen sliding scale   SubCutaneous every 6 hours  ketamine Infusion. 1 mG/kG/Hr IV Continuous <Continuous>  levothyroxine Injectable 75 MICROGram(s) IV Push at bedtime  meropenem Injectable 1000 milliGRAM(s) IV Push every 12 hours  midazolam Infusion 0.02 mG/kG/Hr IV Continuous <Continuous>  norepinephrine Infusion 0.54 MICROgram(s)/kG/Min IV Continuous <Continuous>  pantoprazole  Injectable 40 milliGRAM(s) IV Push daily  vasopressin Infusion 0.04 Unit(s)/Min IV Continuous <Continuous>

## 2024-09-20 NOTE — DIETITIAN INITIAL EVALUATION ADULT - PERTINENT LABORATORY DATA
09-20    138  |  104  |  25.9[H]  ----------------------------<  189[H]  5.0   |  24.0  |  0.76    Ca    8.7      20 Sep 2024 04:20  Phos  3.6     09-20  Mg     2.4     09-20    TPro  5.2[L]  /  Alb  3.2[L]  /  TBili  0.3[L]  /  DBili  x   /  AST  29  /  ALT  44[H]  /  AlkPhos  103  09-20  POCT Blood Glucose.: 147 mg/dL (09-20-24 @ 08:40)  A1C with Estimated Average Glucose Result: 6.4 % (09-19-24 @ 02:20)

## 2024-09-20 NOTE — PROGRESS NOTE ADULT - ASSESSMENT
70 y/o F with a h/o CAD NSTEMI in 2011 s/p ELIZABETH to LAD, s/p ELIZABETH Distal LCx (4/30/2024), HTN, HLD, hypothyroid, rheumatoid arthritis, with:    # Acute hypoxemic respiratory failure  # ARDS  # Lobar pneumonia  # Septic/cardiogenic shock  # Pseudomonas bacteremia  # Hyperglycemia    - actively titrating ventilator settings to maintain SpO2 > 92%  - FiO2 gradually weaned to 50%, PEEP weaned 15 --> 14, SpO2 borderline  - repeat ABG indicates acceptable ventilation, will trend and follow closely  - Pplat goal < 30 for lung protective purposes  - actively titrating norepinephrine infusion to maintain a MAP > 65  - maintain fixed dose vasopressin  - restarted dobutamine @ 5 mcg/kg/min for acute reduction in LVEF to 25%, stress cardiomyopathy?  - meropenem deescalated to cefepime based on organism sensitivites  - insulin infusion transitioned to sliding scale regimen + Lantus, goal BG < 180  - deep sedation with ketamine and midazolam infusions, will begin weaning hydromorphone gtt    Case discussed with MICU physician, Dr. Reyes.

## 2024-09-20 NOTE — PROGRESS NOTE ADULT - ASSESSMENT
69 F w/ CAD/NSTEMI 2011 s/p LAD stent, Distal LCx stent 4/20/24), RA (on tocilizumab infusions), admitted to MICU for acute hypoxemic respiratory failure requiring intubation and mechanical ventilation secondary to LLL pneumonia and pseudomonas bacteremia presumed secondary to pneumonia (CT A/P negative for acute pathology), sensitive to cefepime/zosyn. Will deescalate abx. Comprehensive TTE shows newly reduced EF to 25% with VTI 9 cm. Will restart dobutamine to improve CO. Check end-organ markers q 6 H. Discussed w/ cardiology, likely stress-induced cardiomyopathy. Titrate pressors to MAP > 65. PO2 improving and decreased FiO2 to 50%. Monitor in MICU.

## 2024-09-20 NOTE — DIETITIAN INITIAL EVALUATION ADULT - PERTINENT MEDS FT
MEDICATIONS  (STANDING):  dextrose 5%. 1000 milliLiter(s) (100 mL/Hr) IV Continuous <Continuous>  hydrocortisone sodium succinate Injectable 50 milliGRAM(s) IV Push every 6 hours  HYDROmorphone Infusion. 0.5 mG/Hr (0.5 mL/Hr) IV Continuous <Continuous>  insulin glargine Injectable (LANTUS) 10 Unit(s) SubCutaneous every morning  insulin lispro (ADMELOG) corrective regimen sliding scale   SubCutaneous every 6 hours  ketamine Infusion. 1 mG/kG/Hr (9.07 mL/Hr) IV Continuous <Continuous>  norepinephrine Infusion 0.54 MICROgram(s)/kG/Min (45.9 mL/Hr) IV Continuous <Continuous>  pantoprazole  Injectable 40 milliGRAM(s) IV Push daily  vasopressin Infusion 0.04 Unit(s)/Min (6 mL/Hr) IV Continuous <Continuous>

## 2024-09-20 NOTE — PROGRESS NOTE ADULT - SUBJECTIVE AND OBJECTIVE BOX
Cardiology Addendum    Repeat echo (09/20/2024) with drop in EF - 20 - 25% with global hypokinesis. Echo reviewed by me. Likely due to stunning in setting of sepsis and removal of inotropic effects from dobutamine which was discontinued 09/19/2024. Her initial troponin I 09/18/2024 were negative x 2.     Her last cath 04/2024 - she is s/p LCX ELIZABETH.       plan:  Repeat EKG  Repeat Troponin I.   Would resume aspirin and plavix if there is no bleeding or platelet dyscrasia.   Consider CHF team consult.   Case discussed with interventional cardiologist, Dr. Snyder via telephone and MICU resident Dr. Bob.

## 2024-09-20 NOTE — DIETITIAN INITIAL EVALUATION ADULT - ENTERAL
When feasible, start Glucerna 1.5 @ 10 cc/hr and increase by 10 cc q 4 hrs until goal rate 60 cc/hr x 18 hrs (1080 ml, 1620 kcals, 89 g pro, 821 ml free water)  Additional free water per medical teams discretion

## 2024-09-20 NOTE — DIETITIAN INITIAL EVALUATION ADULT - OTHER INFO
70 yo F with a h/o CAD NSTEMI in 2011 s/p ELIZABETH to LAD, s/p ELIZABETH Distal LCx (4/30/2024), HTN, HLD, hypothyroid, rheumatoid arthritis, admitted on 9/18 with left-sided chest pain associated with generalized weakness, nausea and vomiting. CT chest revealed LLL pneumonia. While in the ED developed worsening hypoxic respiratory failure and was emergently intubated. Hospital course complicated by progression to ARDS requiring deep sedation/IV paralytic and severe multifactorial shock state requiring multiple IV vasopressors. Dependent on full mechanical ventilator support with high FiO2/PEEP requirement. Off IV paralytic. Requiring dual IV vasopressor support. Blood cultures are growing pseudomonas aeruginosa.

## 2024-09-20 NOTE — PROGRESS NOTE ADULT - ASSESSMENT
69-year-old female with CAD, HTN, HLD, NSTEMI in 2011 s/p ELIZABETH to LAD, S/p ELIZABETH Distal LCx (4/30/2024), hypothyroid, rheumatoid arthritis, admitted 09/18/2024  with left-sided chest pain associated with generalized weakness, nausea and vomiting.    - Acute hypoxemic respiratory failure,  intubated, sedated on ventilator with Nimbex for paralytic therapy  - ARDS  - Lobar pneumonia s/p CT scan Negative for PE, does show a left lower lobe pneumonia.    - Septic/cardiogenic shock -   leukocytosis, elevated lactate 5.5, hypotension requiring vasopressor support (Levo and Pit) and IV fluids.  - Pseudomonas bacteremia  - Acute on chronic HFpEF, elevated BNP 8471 (09/18/2024)   - s/p TTE (09/19/2024)  1. Left ventricular systolic function is normal with an ejection fraction visually estimated at 70 to 75 %.   2. Small pericardial effusion with no evidence of hemodynamic compromise (or echocardiographic evidence of cardiac tamponade).      PLAN:   Plan per ICU team  On pressors for BP support  (Levophed, and Vasopressin drips) to maintain MAP > 65  Dobutamine titrated off  Condition remains critical  Patient's primary cardiologist, Dr. Snyder

## 2024-09-20 NOTE — PROGRESS NOTE ADULT - SUBJECTIVE AND OBJECTIVE BOX
Patient is a 69y old  Female who presents with a chief complaint of Shortness of breath (20 Sep 2024 16:57)      BRIEF HOSPITAL COURSE: 70 y/o F with a h/o CAD NSTEMI in 2011 s/p ELIZABETH to LAD, s/p ELIZABETH Distal LCx (4/30/2024), HTN, HLD, hypothyroid, rheumatoid arthritis, admitted on 9/18 with left-sided chest pain associated with generalized weakness, nausea and vomiting. CT chest revealed LLL pneumonia. While in the ED developed worsening hypoxic respiratory failure and was emergently intubated. Hospital course complicated by progression to ARDS requiring deep sedation/IV paralytic and severe multifactorial shock state requiring multiple IV vasopressors. Blood cultures are growing pseudomonas aeruginosa.      Events last 24 hours: She remains dependent on full mechanical ventilator support with high FiO2/PEEP requirement. Has remained off IV paralytic. Requiring dual IV vasopressor support. Dobutamine restarted for significantly reduced LVEF on repeat TTE which is new from yesterday.        PAST MEDICAL & SURGICAL HISTORY:  Hypothyroid      ADHD (attention deficit hyperactivity disorder)      HLD (hyperlipidemia)      Myocardial infarct  2012      Stented coronary artery  2012      Rheumatoid arthritis      Migraine      S/p bilateral carpal tunnel release      H/O cardiac catheterization      H/O laminectomy      H/O spinal fusion      S/P left knee arthroscopy      H/O total knee replacement, right          Review of Systems:  Unable to obtain secondary to sedation/intubation.          Medications:  cefepime   IVPB 500 milliGRAM(s) IV Intermittent every 12 hours  cefepime   IVPB        DOBUTamine Infusion 5 MICROgram(s)/kG/Min IV Continuous <Continuous>  norepinephrine Infusion 0.54 MICROgram(s)/kG/Min IV Continuous <Continuous>      acetaminophen   IVPB .. 1000 milliGRAM(s) IV Intermittent once  HYDROmorphone Infusion. 0.5 mG/Hr IV Continuous <Continuous>  ketamine Infusion. 1 mG/kG/Hr IV Continuous <Continuous>  midazolam Infusion 0.02 mG/kG/Hr IV Continuous <Continuous>      aspirin  chewable 81 milliGRAM(s) Oral daily  clopidogrel Tablet 75 milliGRAM(s) Oral daily  heparin   Injectable 5000 Unit(s) SubCutaneous every 8 hours    pantoprazole  Injectable 40 milliGRAM(s) IV Push daily      dextrose 50% Injectable 25 Gram(s) IV Push once  dextrose 50% Injectable 25 Gram(s) IV Push once  dextrose 50% Injectable 12.5 Gram(s) IV Push once  dextrose Oral Gel 15 Gram(s) Oral once PRN  glucagon  Injectable 1 milliGRAM(s) IntraMuscular once  hydrocortisone sodium succinate Injectable 50 milliGRAM(s) IV Push every 6 hours  insulin glargine Injectable (LANTUS) 10 Unit(s) SubCutaneous every morning  insulin lispro (ADMELOG) corrective regimen sliding scale   SubCutaneous every 6 hours  levothyroxine Injectable 75 MICROGram(s) IV Push at bedtime  vasopressin Infusion 0.04 Unit(s)/Min IV Continuous <Continuous>    dextrose 5%. 1000 milliLiter(s) IV Continuous <Continuous>  dextrose 5%. 1000 milliLiter(s) IV Continuous <Continuous>    influenza  Vaccine (HIGH DOSE) 0.5 milliLiter(s) IntraMuscular once    chlorhexidine 0.12% Liquid 15 milliLiter(s) Oral Mucosa every 12 hours  chlorhexidine 2% Cloths 1 Application(s) Topical daily        Mode: AC/ CMV (Assist Control/ Continuous Mandatory Ventilation)  RR (machine): 18  TV (machine): 400  FiO2: 50  PEEP: 14  ITime: 0.9  MAP: 14  PIP: 29      ICU Vital Signs Last 24 Hrs  T(C): 35.8 (20 Sep 2024 23:00), Max: 37.8 (20 Sep 2024 00:15)  T(F): 96.4 (20 Sep 2024 23:00), Max: 100 (20 Sep 2024 00:15)  HR: 87 (20 Sep 2024 22:44) (66 - 97)  BP: 103/58 (20 Sep 2024 22:00) (92/54 - 152/81)  BP(mean): 72 (20 Sep 2024 22:00) (67 - 102)  ABP: 99/59 (20 Sep 2024 22:00) (88/54 - 163/89)  ABP(mean): 75 (20 Sep 2024 22:00) (67 - 120)  RR: 18 (20 Sep 2024 22:00) (16 - 22)  SpO2: 96% (20 Sep 2024 22:44) (86% - 100%)    O2 Parameters below as of 20 Sep 2024 16:00  Patient On (Oxygen Delivery Method): ventilator    O2 Concentration (%): 50        ABG - ( 20 Sep 2024 16:40 )  pH, Arterial: 7.380 pH, Blood: x     /  pCO2: 45    /  pO2: 133   / HCO3: 27    / Base Excess: 1.5   /  SaO2: 99.9                I&O's Detail    19 Sep 2024 07:01  -  20 Sep 2024 07:00  --------------------------------------------------------  IN:    Cisatracurium: 48.9 mL    Cisatracurium: 54.4 mL    Cisatracurium: 10.9 mL    DOBUTamine: 40.8 mL    DOBUTamine: 20.4 mL    HYRDOmorphone: 48 mL    Insulin: 69.5 mL    IV PiggyBack: 50 mL    IV PiggyBack: 100 mL    Ketamine: 175.9 mL    Midazolam: 257.8 mL    Norepinephrine: 684.2 mL    Vasopressin: 144 mL  Total IN: 1704.8 mL    OUT:    Indwelling Catheter - Urethral (mL): 1825 mL  Total OUT: 1825 mL    Total NET: -120.2 mL      20 Sep 2024 07:01  -  21 Sep 2024 00:08  --------------------------------------------------------  IN:    DOBUTamine: 149.6 mL    HYRDOmorphone: 24.5 mL    Insulin: 6 mL    IV PiggyBack: 50 mL    Ketamine: 145.6 mL    Midazolam: 107.6 mL    Norepinephrine: 65.1 mL    Vasopressin: 66 mL  Total IN: 614.4 mL    OUT:    Indwelling Catheter - Urethral (mL): 560 mL  Total OUT: 560 mL    Total NET: 54.4 mL            LABS:                        11.6   12.13 )-----------( 128      ( 20 Sep 2024 16:40 )             35.3     09-20    140  |  104  |  28.7[H]  ----------------------------<  168[H]  4.7   |  24.0  |  0.61    Ca    8.3[L]      20 Sep 2024 16:40  Phos  2.5     09-20  Mg     2.1     09-20    TPro  5.0[L]  /  Alb  3.2[L]  /  TBili  0.3[L]  /  DBili  x   /  AST  33[H]  /  ALT  42[H]  /  AlkPhos  88  09-20          CAPILLARY BLOOD GLUCOSE      POCT Blood Glucose.: 155 mg/dL (20 Sep 2024 23:23)    PT/INR - ( 19 Sep 2024 02:20 )   PT: 16.0 sec;   INR: 1.46 ratio         PTT - ( 19 Sep 2024 02:20 )  PTT:28.5 sec  Urinalysis Basic - ( 20 Sep 2024 16:40 )    Color: x / Appearance: x / SG: x / pH: x  Gluc: 168 mg/dL / Ketone: x  / Bili: x / Urobili: x   Blood: x / Protein: x / Nitrite: x   Leuk Esterase: x / RBC: x / WBC x   Sq Epi: x / Non Sq Epi: x / Bacteria: x      CULTURES:  Culture Results:   Normal Respiratory Mary present (09-19-24 @ 00:35)  Culture Results:   No Legionella species isolated  Culture in progress (09-18-24 @ 16:57)  Culture Results:   No growth at 48 Hours (09-18-24 @ 16:57)  Rapid RVP Result: NotDetec (09-18-24 @ 15:50)  Culture Results:   No growth (09-18-24 @ 12:05)  Culture Results:   Growth in aerobic bottle: Pseudomonas aeruginosa  Direct identification is available within approximately 3-5  hours either by Blood Panel Multiplexed PCR or Direct  MALDI-TOF. Details: [email or web address] (09-18-24 @ 10:15)        Physical Examination:    General: No acute distress.  sedated/intubated    HEENT: Pupils equal, reactive to light.  Symmetric.    PULM: scattered rhonchi bilaterally bilaterally, no significant sputum production    CVS: Regular rate and rhythm, no murmurs, rubs, or gallops    ABD: Soft, nondistended, nontender, normoactive bowel sounds, no masses    EXT: No edema, nontender    SKIN: Warm and well perfused, no rashes noted.    NEURO: deeply sedated          RADIOLOGY:     < from: Xray Chest 1 View- PORTABLE-Routine (09.19.24 @ 06:08) >  Frontal expiratory view of the chest shows the heart to be borderline in   size. Endotracheal tube reaches the level of the medial clavicles.   Nasogastric tube reaches at least to the central stomach. Right jugular   central line is similar.    The lungs show progression of the left lung infiltrate or effusion or   combination of both and there is no evidence of pneumothorax nor right   pleural effusion.    IMPRESSION:  Progression of left infiltrate/effusion.    < end of copied text >          CRITICAL CARE TIME SPENT: 38 mins  Time spent evaluating/treating patient with medical issues that pose a high risk for life threatening deterioration and/or end-organ damage, reviewing data/labs/imaging, discussing case with multidisciplinary team, discussing plan/goals of care with patient/family. Non-inclusive of procedure time. The date of entry of this note reflects the date of services rendered.

## 2024-09-20 NOTE — PROGRESS NOTE ADULT - SUBJECTIVE AND OBJECTIVE BOX
Patient remains intubated in ICU    TELEMETRY: sr    MEDICATIONS  (STANDING):  acetaminophen   IVPB .. 1000 milliGRAM(s) IV Intermittent once  chlorhexidine 0.12% Liquid 15 milliLiter(s) Oral Mucosa every 12 hours  chlorhexidine 2% Cloths 1 Application(s) Topical daily  dextrose 5%. 1000 milliLiter(s) (100 mL/Hr) IV Continuous <Continuous>  dextrose 5%. 1000 milliLiter(s) (50 mL/Hr) IV Continuous <Continuous>  dextrose 50% Injectable 25 Gram(s) IV Push once  dextrose 50% Injectable 25 Gram(s) IV Push once  dextrose 50% Injectable 12.5 Gram(s) IV Push once  glucagon  Injectable 1 milliGRAM(s) IntraMuscular once  heparin   Injectable 5000 Unit(s) SubCutaneous every 8 hours  hydrocortisone sodium succinate Injectable 50 milliGRAM(s) IV Push every 6 hours  HYDROmorphone Infusion. 0.5 mG/Hr (0.5 mL/Hr) IV Continuous <Continuous>  influenza  Vaccine (HIGH DOSE) 0.5 milliLiter(s) IntraMuscular once  insulin regular Infusion 2 Unit(s)/Hr (2 mL/Hr) IV Continuous <Continuous>  ketamine Infusion. 1 mG/kG/Hr (9.07 mL/Hr) IV Continuous <Continuous>  levothyroxine Injectable 75 MICROGram(s) IV Push at bedtime  meropenem Injectable 1000 milliGRAM(s) IV Push every 12 hours  midazolam Infusion 0.02 mG/kG/Hr (1.81 mL/Hr) IV Continuous <Continuous>  norepinephrine Infusion 0.54 MICROgram(s)/kG/Min (45.9 mL/Hr) IV Continuous <Continuous>  pantoprazole  Injectable 40 milliGRAM(s) IV Push daily  vasopressin Infusion 0.04 Unit(s)/Min (6 mL/Hr) IV Continuous <Continuous>    MEDICATIONS  (PRN):  dextrose Oral Gel 15 Gram(s) Oral once PRN Blood Glucose LESS THAN 70 milliGRAM(s)/deciliter        Vital Signs Last 24 Hrs  T(C): 36.3 (20 Sep 2024 08:00), Max: 38.8 (19 Sep 2024 21:15)  T(F): 97.3 (20 Sep 2024 08:00), Max: 101.8 (19 Sep 2024 21:15)  HR: 71 (20 Sep 2024 08:36) (68 - 111)  BP: 125/84 (20 Sep 2024 06:00) (93/71 - 141/85)  BP(mean): 97 (20 Sep 2024 06:00) (64 - 102)  RR: 18 (20 Sep 2024 08:00) (16 - 36)  SpO2: 100% (20 Sep 2024 08:36) (86% - 100%)    Parameters below as of 20 Sep 2024 07:45  Patient On (Oxygen Delivery Method): ventilator    O2 Concentration (%): 75    Daily     Daily Weight in k.8 (20 Sep 2024 03:14)    I&O's Detail    19 Sep 2024 07:01  -  20 Sep 2024 07:00  --------------------------------------------------------  IN:    Cisatracurium: 48.9 mL    Cisatracurium: 54.4 mL    Cisatracurium: 10.9 mL    DOBUTamine: 40.8 mL    DOBUTamine: 20.4 mL    HYRDOmorphone: 48 mL    Insulin: 69.5 mL    IV PiggyBack: 50 mL    IV PiggyBack: 100 mL    Ketamine: 175.9 mL    Midazolam: 257.8 mL    Norepinephrine: 684.2 mL    Vasopressin: 144 mL  Total IN: 1704.8 mL    OUT:    Indwelling Catheter - Urethral (mL): 1825 mL  Total OUT: 1825 mL    Total NET: -120.2 mL      20 Sep 2024 07:01  -  20 Sep 2024 08:53  --------------------------------------------------------  IN:    HYRDOmorphone: 2 mL    Insulin: 3 mL    Ketamine: 9.1 mL    Midazolam: 9.1 mL    Norepinephrine: 12.8 mL    Vasopressin: 6 mL  Total IN: 42 mL    OUT:    Indwelling Catheter - Urethral (mL): 60 mL  Total OUT: 60 mL    Total NET: -18 mL          PHYSICAL EXAM:  Appearance: Intubated  Neck: + TLC  Cardiovascular: Normal S1 S2  Respiratory: Scattered coarse BS and rhonchi  Gastrointestinal:  Soft, Non-tender, + BS, no bruits	  Neurologic: Grossly non-focal.  Extremities: + edema, dopplerable pulses    LABS:                        13.0   22.02 )-----------( 186      ( 20 Sep 2024 04:20 )             39.5     20    138  |  104  |  25.9[H]  ----------------------------<  189[H]  5.0   |  24.0  |  0.76    Ca    8.7      20 Sep 2024 04:20  Phos  3.6     20  Mg     2.4     20    TPro  5.2[L]  /  Alb  3.2[L]  /  TBili  0.3[L]  /  DBili  x   /  AST  29  /  ALT  44[H]  /  AlkPhos  103  -20        PT/INR - ( 19 Sep 2024 02:20 )   PT: 16.0 sec;   INR: 1.46 ratio         PTT - ( 19 Sep 2024 02:20 )  PTT:28.5 sec  Urinalysis Basic - ( 20 Sep 2024 04:20 )    Color: x / Appearance: x / SG: x / pH: x  Gluc: 189 mg/dL / Ketone: x  / Bili: x / Urobili: x   Blood: x / Protein: x / Nitrite: x   Leuk Esterase: x / RBC: x / WBC x   Sq Epi: x / Non Sq Epi: x / Bacteria: x      I&O's Summary    19 Sep 2024 07:  -  20 Sep 2024 07:00  --------------------------------------------------------  IN: 1704.8 mL / OUT: 1825 mL / NET: -120.2 mL    20 Sep 2024 07:  -  20 Sep 2024 08:53  --------------------------------------------------------  IN: 42 mL / OUT: 60 mL / NET: -18 mL

## 2024-09-20 NOTE — DIETITIAN INITIAL EVALUATION ADULT - ADD RECOMMEND
Monitor initiation/tolerance of nutrition  Monitor nutrition related labs, weight trends, BMs and I/Os  Follow GOC plans

## 2024-09-20 NOTE — DIETITIAN INITIAL EVALUATION ADULT - ORAL INTAKE PTA/DIET HISTORY
Pt intubated and sedated in ICU, on IV pressor support and insulin gtt. Currently NPO. Weight 224 lbs, no further weight hx per chart review. Unable to obtain hx from pt at this time.

## 2024-09-21 LAB
ALBUMIN SERPL ELPH-MCNC: 3.1 G/DL — LOW (ref 3.3–5.2)
ALP SERPL-CCNC: 86 U/L — SIGNIFICANT CHANGE UP (ref 40–120)
ALT FLD-CCNC: 37 U/L — HIGH
ANION GAP SERPL CALC-SCNC: 10 MMOL/L — SIGNIFICANT CHANGE UP (ref 5–17)
AST SERPL-CCNC: 26 U/L — SIGNIFICANT CHANGE UP
BILIRUB SERPL-MCNC: 0.3 MG/DL — LOW (ref 0.4–2)
BUN SERPL-MCNC: 29.4 MG/DL — HIGH (ref 8–20)
CALCIUM SERPL-MCNC: 7.9 MG/DL — LOW (ref 8.4–10.5)
CHLORIDE SERPL-SCNC: 106 MMOL/L — SIGNIFICANT CHANGE UP (ref 96–108)
CO2 SERPL-SCNC: 26 MMOL/L — SIGNIFICANT CHANGE UP (ref 22–29)
CREAT SERPL-MCNC: 0.72 MG/DL — SIGNIFICANT CHANGE UP (ref 0.5–1.3)
EGFR: 90 ML/MIN/1.73M2 — SIGNIFICANT CHANGE UP
GAS PNL BLDA: SIGNIFICANT CHANGE UP
GLUCOSE BLDC GLUCOMTR-MCNC: 165 MG/DL — HIGH (ref 70–99)
GLUCOSE BLDC GLUCOMTR-MCNC: 170 MG/DL — HIGH (ref 70–99)
GLUCOSE BLDC GLUCOMTR-MCNC: 173 MG/DL — HIGH (ref 70–99)
GLUCOSE BLDC GLUCOMTR-MCNC: 178 MG/DL — HIGH (ref 70–99)
GLUCOSE BLDC GLUCOMTR-MCNC: 183 MG/DL — HIGH (ref 70–99)
GLUCOSE SERPL-MCNC: 171 MG/DL — HIGH (ref 70–99)
HCT VFR BLD CALC: 33.4 % — LOW (ref 34.5–45)
HGB BLD-MCNC: 10.9 G/DL — LOW (ref 11.5–15.5)
MAGNESIUM SERPL-MCNC: 2 MG/DL — SIGNIFICANT CHANGE UP (ref 1.6–2.6)
MCHC RBC-ENTMCNC: 32.6 GM/DL — SIGNIFICANT CHANGE UP (ref 32–36)
MCHC RBC-ENTMCNC: 33.7 PG — SIGNIFICANT CHANGE UP (ref 27–34)
MCV RBC AUTO: 103.4 FL — HIGH (ref 80–100)
PHOSPHATE SERPL-MCNC: 2.8 MG/DL — SIGNIFICANT CHANGE UP (ref 2.4–4.7)
PLATELET # BLD AUTO: 128 K/UL — LOW (ref 150–400)
POTASSIUM SERPL-MCNC: 4.7 MMOL/L — SIGNIFICANT CHANGE UP (ref 3.5–5.3)
POTASSIUM SERPL-SCNC: 4.7 MMOL/L — SIGNIFICANT CHANGE UP (ref 3.5–5.3)
PROT SERPL-MCNC: 5 G/DL — LOW (ref 6.6–8.7)
RBC # BLD: 3.23 M/UL — LOW (ref 3.8–5.2)
RBC # FLD: 13.4 % — SIGNIFICANT CHANGE UP (ref 10.3–14.5)
SODIUM SERPL-SCNC: 142 MMOL/L — SIGNIFICANT CHANGE UP (ref 135–145)
WBC # BLD: 14.34 K/UL — HIGH (ref 3.8–10.5)
WBC # FLD AUTO: 14.34 K/UL — HIGH (ref 3.8–10.5)

## 2024-09-21 PROCEDURE — 71045 X-RAY EXAM CHEST 1 VIEW: CPT | Mod: 26,76

## 2024-09-21 RX ORDER — HYDROCORTISONE 5 MG/1
50 TABLET ORAL EVERY 12 HOURS
Refills: 0 | Status: COMPLETED | OUTPATIENT
Start: 2024-09-22 | End: 2024-09-22

## 2024-09-21 RX ORDER — CHLORHEXIDINE GLUCONATE ORAL RINSE 1.2 MG/ML
15 SOLUTION DENTAL EVERY 12 HOURS
Refills: 0 | Status: DISCONTINUED | OUTPATIENT
Start: 2024-09-21 | End: 2024-09-24

## 2024-09-21 RX ORDER — CEFEPIME 2 G/1
2000 INJECTION, POWDER, FOR SOLUTION INTRAVENOUS EVERY 8 HOURS
Refills: 0 | Status: DISCONTINUED | OUTPATIENT
Start: 2024-09-21 | End: 2024-09-22

## 2024-09-21 RX ADMIN — Medication 5000 UNIT(S): at 14:33

## 2024-09-21 RX ADMIN — Medication 2: at 05:30

## 2024-09-21 RX ADMIN — Medication 5000 UNIT(S): at 21:31

## 2024-09-21 RX ADMIN — INSULIN GLARGINE 10 UNIT(S): 300 INJECTION, SOLUTION SUBCUTANEOUS at 08:40

## 2024-09-21 RX ADMIN — CEFEPIME 2000 MILLIGRAM(S): 2 INJECTION, POWDER, FOR SOLUTION INTRAVENOUS at 14:33

## 2024-09-21 RX ADMIN — HYDROCORTISONE 50 MILLIGRAM(S): 5 TABLET ORAL at 17:14

## 2024-09-21 RX ADMIN — CEFEPIME 2000 MILLIGRAM(S): 2 INJECTION, POWDER, FOR SOLUTION INTRAVENOUS at 05:29

## 2024-09-21 RX ADMIN — Medication 75 MILLIGRAM(S): at 11:47

## 2024-09-21 RX ADMIN — HYDROCORTISONE 50 MILLIGRAM(S): 5 TABLET ORAL at 11:47

## 2024-09-21 RX ADMIN — Medication 2: at 23:51

## 2024-09-21 RX ADMIN — CHLORHEXIDINE GLUCONATE ORAL RINSE 15 MILLILITER(S): 1.2 SOLUTION DENTAL at 05:30

## 2024-09-21 RX ADMIN — CEFEPIME 2000 MILLIGRAM(S): 2 INJECTION, POWDER, FOR SOLUTION INTRAVENOUS at 21:31

## 2024-09-21 RX ADMIN — CHLORHEXIDINE GLUCONATE ORAL RINSE 1 APPLICATION(S): 1.2 SOLUTION DENTAL at 11:48

## 2024-09-21 RX ADMIN — HYDROCORTISONE 50 MILLIGRAM(S): 5 TABLET ORAL at 05:29

## 2024-09-21 RX ADMIN — CHLORHEXIDINE GLUCONATE ORAL RINSE 15 MILLILITER(S): 1.2 SOLUTION DENTAL at 17:14

## 2024-09-21 RX ADMIN — Medication 5000 UNIT(S): at 05:30

## 2024-09-21 RX ADMIN — PANTOPRAZOLE SODIUM 40 MILLIGRAM(S): 40 TABLET, DELAYED RELEASE ORAL at 11:48

## 2024-09-21 RX ADMIN — Medication 2: at 11:43

## 2024-09-21 RX ADMIN — Medication 13.6 MICROGRAM(S)/KG/MIN: at 06:54

## 2024-09-21 RX ADMIN — Medication 2: at 17:14

## 2024-09-21 RX ADMIN — Medication 75 MICROGRAM(S): at 21:32

## 2024-09-21 RX ADMIN — Medication 81 MILLIGRAM(S): at 11:48

## 2024-09-21 NOTE — PROGRESS NOTE ADULT - NS PANP COMMENT GEN_ALL_CORE FT
I have personally seen and examined the patient. I fully participated in the care of this patient. I have made amendments to the documentation where necessary, and agree with the history, physical exam, and plan as documented by the Resident.    69 F w/ CAD/NSTEMI 2011 s/p LAD stent, Distal LCx stent 4/20/24), RA (on tocilizumab infusions), admitted to MICU for acute hypoxemic respiratory failure requiring intubation and mechanical ventilation secondary to LLL pneumonia and pseudomonas bacteremia presumed secondary to pneumonia (CT A/P negative for acute pathology), now on cefepime. Improved LV function on dobutamine. End-organ parameters remain adequate. Oxygenation requirements decreasing. Will titrate off sedatives as well. Monitor in MICU.

## 2024-09-21 NOTE — PROGRESS NOTE ADULT - ASSESSMENT
69-year-old female with CAD, HTN, HLD, NSTEMI in 2011 s/p ELIZABETH to LAD, S/p ELIZABETH Distal LCx (4/30/2024), hypothyroid, rheumatoid arthritis, admitted 09/18/2024  with left-sided chest pain associated with generalized weakness, nausea and vomiting.    - Acute hypoxemic respiratory failure,  intubated, sedated on ventilator with Nimbex for paralytic therapy  - ARDS  - Lobar pneumonia s/p CT scan Negative for PE, does show a left lower lobe pneumonia.    - Septic/cardiogenic shock -   leukocytosis, elevated lactate 5.5, hypotension requiring vasopressor support (Levo and Pit) and IV fluids.  - Pseudomonas bacteremia  - Acute on chronic HFpEF, elevated BNP 8471 (09/18/2024)   - s/p TTE (09/19/2024)  1. Left ventricular systolic function is normal with an ejection fraction visually estimated at 70 to 75 %.   2. Small pericardial effusion with no evidence of hemodynamic compromise (or echocardiographic evidence of cardiac tamponade).  - s/p Repeat echo (09/20/2024) with drop in EF - 20 - 25% with global hypokinesis. Echo reviewed by me. Likely due to stunning in setting of sepsis and removal of inotropic effects from dobutamine which was discontinued 09/19/2024. Her initial troponin I 09/18/2024 were negative x 2. Repeat troponin I  negative (09/20/2024)         plan:  Continue aspirin and plavix if there is no bleeding or platelet dyscrasia.   Case discussed with HF team, pt likely with stress induced CM.   Remains in primary vasodilatory shock requiring Norepinephrine gtt, actively titrating to targeted MAP goal >65, now off Vasopressin.   being continued for inotropic support.   Case discussed with sons at bedside.   Guarded prognosis.       PLAN:   Plan per ICU team  On pressors for BP support  (Levophed, and Vasopressin drips) to maintain MAP > 65  Dobutamine titrated off  Condition remains critical  Patient's primary cardiologist, Dr. Snyder 69-year-old female with CAD, HTN, HLD, NSTEMI in 2011 s/p ELIZABETH to LAD, S/p ELIZABETH Distal LCx (4/30/2024), hypothyroid, rheumatoid arthritis, admitted 09/18/2024  with left-sided chest pain associated with generalized weakness, nausea and vomiting.    - Acute hypoxemic respiratory failure,  intubated, sedated on ventilator with Nimbex for paralytic therapy  - ARDS  - Lobar pneumonia s/p CT scan Negative for PE, does show a left lower lobe pneumonia.    - Septic/cardiogenic shock -   leukocytosis, elevated lactate 5.5, hypotension requiring vasopressor support (Levo and Pit) and IV fluids.  - Pseudomonas bacteremia  - Acute on chronic HFpEF, elevated BNP 8471 (09/18/2024)   - s/p TTE (09/19/2024)  1. Left ventricular systolic function is normal with an ejection fraction visually estimated at 70 to 75 %.   2. Small pericardial effusion with no evidence of hemodynamic compromise (or echocardiographic evidence of cardiac tamponade).  - s/p Repeat echo (09/20/2024) with drop in EF - 20 - 25% with global hypokinesis. Echo reviewed by me. Likely due to stunning in setting of sepsis and removal of inotropic effects from dobutamine which was discontinued 09/19/2024. Her initial troponin I 09/18/2024 were negative x 2. Repeat troponin I  negative (09/20/2024)         plan:  Continue aspirin and plavix if there is no bleeding or platelet dyscrasia.   Case discussed with HF team, pt likely with stress induced CM.   Remains in primary vasodilatory shock requiring Norepinephrine gtt, actively titrating to targeted MAP goal >65, now off Vasopressin.   being continued for inotropic support.   Case discussed with sons at bedside.   Guarded prognosis.

## 2024-09-21 NOTE — PROGRESS NOTE ADULT - SUBJECTIVE AND OBJECTIVE BOX
Patient is a 69y old  Female who presents with a chief complaint of Shortness of breath (20 Sep 2024 23:15)      BRIEF HOSPITAL COURSE: 70 yo female with PMHx CAD NSTEMI in 2011 s/p ELIZABETH to LAD, s/p ELIZABETH Distal LCx (4/30/2024), HTN, HLD, hypothyroid, rheumatoid arthritis, who presented to ER with left-sided chest pain associated with generalized weakness, nausea and vomiting.  Patient ill-appearing, unable to provide much history. History mostly supplied by patient's family member at bedside. Symptoms started 2 days ago, however today she felt much worse with prompted her to come to the ED. CT chest revealed LLL pneumonia. While in the ED developed worsening hypoxic respiratory failure and was emergently intubated. She had profound hypoxemia, ARDS requiring deep sedation and paralysis. Patient had uptrending lactate levels, worsening shock state on three vasopressors. Serial TTEs concerning for acute systolic heart failure, started on Dobutamine.       Events last 24 hours: Remains intubated on full vent support, oxygenation improving FiO2 50% able to wean PEEP form +12 to +8. Shock state improving as well, remains on low dose Norepinephrine; stopped Vasopressin. Continues to require Dobutamine, LVEF POCUS appears improved on inotropic support and UOP remains brisk.           PAST MEDICAL & SURGICAL HISTORY:  Hypothyroid      ADHD (attention deficit hyperactivity disorder)      HLD (hyperlipidemia)      Myocardial infarct  2012      Stented coronary artery  2012      Rheumatoid arthritis      Migraine      S/p bilateral carpal tunnel release      H/O cardiac catheterization      H/O laminectomy      H/O spinal fusion      S/P left knee arthroscopy      H/O total knee replacement, right          Review of Systems: Due to altered mental status/intubation, subjective information was not able to be obtained from the patient. History was obtained, to the extent possible, from review of the chart and collateral sources of information.        Medications:  cefepime  Injectable. 2000 milliGRAM(s) IV Push every 8 hours    DOBUTamine Infusion 5 MICROgram(s)/kG/Min IV Continuous <Continuous>  norepinephrine Infusion 0.54 MICROgram(s)/kG/Min IV Continuous <Continuous>      acetaminophen   IVPB .. 1000 milliGRAM(s) IV Intermittent once  HYDROmorphone Infusion. 0.5 mG/Hr IV Continuous <Continuous>  ketamine Infusion. 1 mG/kG/Hr IV Continuous <Continuous>  midazolam Infusion 0.02 mG/kG/Hr IV Continuous <Continuous>      aspirin  chewable 81 milliGRAM(s) Oral daily  clopidogrel Tablet 75 milliGRAM(s) Oral daily  heparin   Injectable 5000 Unit(s) SubCutaneous every 8 hours    pantoprazole  Injectable 40 milliGRAM(s) IV Push daily      dextrose 50% Injectable 25 Gram(s) IV Push once  dextrose 50% Injectable 25 Gram(s) IV Push once  dextrose 50% Injectable 12.5 Gram(s) IV Push once  dextrose Oral Gel 15 Gram(s) Oral once PRN  glucagon  Injectable 1 milliGRAM(s) IntraMuscular once  hydrocortisone sodium succinate Injectable 50 milliGRAM(s) IV Push every 6 hours  insulin glargine Injectable (LANTUS) 10 Unit(s) SubCutaneous every morning  insulin lispro (ADMELOG) corrective regimen sliding scale   SubCutaneous every 6 hours  levothyroxine Injectable 75 MICROGram(s) IV Push at bedtime  vasopressin Infusion 0.04 Unit(s)/Min IV Continuous <Continuous>    dextrose 5%. 1000 milliLiter(s) IV Continuous <Continuous>  dextrose 5%. 1000 milliLiter(s) IV Continuous <Continuous>    influenza  Vaccine (HIGH DOSE) 0.5 milliLiter(s) IntraMuscular once    chlorhexidine 0.12% Liquid 15 milliLiter(s) Oral Mucosa every 12 hours  chlorhexidine 2% Cloths 1 Application(s) Topical daily          Mode: AC/ CMV (Assist Control/ Continuous Mandatory Ventilation)  RR (machine): 18  TV (machine): 400  FiO2: 50  PEEP: 10  ITime: 0.9  MAP: 15  PIP: 24          ICU Vital Signs Last 24 Hrs  T(C): 38 (21 Sep 2024 11:00), Max: 38.2 (21 Sep 2024 08:30)  T(F): 100.4 (21 Sep 2024 11:00), Max: 100.8 (21 Sep 2024 08:30)  HR: 111 (21 Sep 2024 11:43) (66 - 116)  BP: 144/90 (21 Sep 2024 10:00) (92/54 - 152/81)  BP(mean): 107 (21 Sep 2024 10:00) (67 - 107)  ABP: 111/62 (21 Sep 2024 11:00) (82/48 - 163/89)  ABP(mean): 79 (21 Sep 2024 11:00) (60 - 120)  RR: 18 (21 Sep 2024 11:00) (16 - 18)  SpO2: 96% (21 Sep 2024 11:43) (88% - 100%)    O2 Parameters below as of 21 Sep 2024 08:00  Patient On (Oxygen Delivery Method): ventilator    O2 Concentration (%): 50      ABG - ( 21 Sep 2024 05:25 )  pH, Arterial: 7.430 pH, Blood: x     /  pCO2: 45    /  pO2: 129   / HCO3: 30    / Base Excess: 5.6   /  SaO2: 99.7            I&O's Detail    20 Sep 2024 07:01  -  21 Sep 2024 07:00  --------------------------------------------------------  IN:    DOBUTamine: 258.4 mL    HYRDOmorphone: 31.5 mL    Insulin: 6 mL    IV PiggyBack: 50 mL    Ketamine: 218.4 mL    Midazolam: 158 mL    Norepinephrine: 106.4 mL    Vasopressin: 66 mL  Total IN: 894.7 mL    OUT:    Indwelling Catheter - Urethral (mL): 1230 mL  Total OUT: 1230 mL    Total NET: -335.3 mL      21 Sep 2024 07:01  -  21 Sep 2024 13:36  --------------------------------------------------------  IN:    DOBUTamine: 54.4 mL    HYRDOmorphone: 3 mL    Ketamine: 36.4 mL    Midazolam: 21.6 mL    Norepinephrine: 13.6 mL  Total IN: 129 mL    OUT:    Indwelling Catheter - Urethral (mL): 155 mL  Total OUT: 155 mL    Total NET: -26 mL            LABS:                        10.9   14.34 )-----------( 128      ( 21 Sep 2024 02:00 )             33.4     09-21    142  |  106  |  29.4[H]  ----------------------------<  171[H]  4.7   |  26.0  |  0.72    Ca    7.9[L]      21 Sep 2024 02:00  Phos  2.8     09-21  Mg     2.0     09-21    TPro  5.0[L]  /  Alb  3.1[L]  /  TBili  0.3[L]  /  DBili  x   /  AST  26  /  ALT  37[H]  /  AlkPhos  86  09-21          CAPILLARY BLOOD GLUCOSE      POCT Blood Glucose.: 170 mg/dL (21 Sep 2024 11:41)      Urinalysis Basic - ( 21 Sep 2024 02:00 )    Color: x / Appearance: x / SG: x / pH: x  Gluc: 171 mg/dL / Ketone: x  / Bili: x / Urobili: x   Blood: x / Protein: x / Nitrite: x   Leuk Esterase: x / RBC: x / WBC x   Sq Epi: x / Non Sq Epi: x / Bacteria: x      CULTURES:  Culture Results:   Normal Respiratory Mary present (09-19 @ 00:35)  Culture Results:   No Legionella species isolated  Culture in progress (09-18 @ 16:57)  Culture Results:   No growth at 48 Hours (09-18 @ 16:57)  Rapid RVP Result: NotDetec (09-18 @ 15:50)  Culture Results:   No growth (09-18 @ 12:05)  Culture Results:   Growth in aerobic bottle: Pseudomonas aeruginosa  Direct identification is available within approximately 3-5  hours either by Blood Panel Multiplexed PCR or Direct  MALDI-TOF. Details: [email or web address] (09-18 @ 10:15)            Physical Examination:    General: No acute distress.      HEENT: Pupils equal, reactive to light. Symmetric.    PULM: Coarse to auscultation bilaterally.    CVS: Sinus tachycardia    ABD: Soft, nondistended, normoactive bowel sounds.    SKIN: Warm and well perfused.    NEURO: Deeply sedated         RADIOLOGY: < from: Xray Chest 1 View- PORTABLE-Routine (09.19.24 @ 06:08) >  IMPRESSION:  Progression of left infiltrate/effusion.      Thank you for the courtesy of this referral.    --- End of Report ---      BERT RICHMOND MD; Attending Interventional Radiologist  This document has been electronically signed. Sep 20 2024  2:19PM          INVASIVE LINES: R IJ TLC, Axillary A line  INDWELLING STEVENSON: Y  VTE PROPHYLAXIS: Heparin SC   CAM ICU: N/A   CODE STATUS: FULL        CRITICAL CARE TIME SPENT: 72 minutes spent performing frequent bedside reassessments and augmenting plan of care to address problems of acute critical illness that pose high probability of life threatening deterioration and/or end organ damage/dysfunction and discussing goals of care, non-inclusive of time spent on procedures performed. Date of note entry is equal to date of services rendered.

## 2024-09-21 NOTE — PROGRESS NOTE ADULT - ASSESSMENT
70 yo female with PMHx CAD NSTEMI in 2011 s/p ELIZABETH to LAD, s/p ELIZABETH Distal LCx (4/30/2024), HTN, HLD, hypothyroid, rheumatoid arthritis, who presented to ER with left-sided chest pain associated with generalized weakness, nausea and vomiting. Ruled in for severe sepsis secondary to LLL pneumonia POA, while in the ED developed worsening hypoxic respiratory failure and was emergently intubated. She had profound hypoxemia, ARDS requiring deep sedation and paralysis. Patient had uptrending lactate levels, worsening shock state on three vasopressors with concerns for acute systolic heart failure, cardiogenic shock.    # Acute hypoxic respiratory failure  # Undifferentiated shock, distributive and cardiogenic  # Lobar pneumonia  # Severe sepsis  # Acute systolic heart failure  # Metabolic lactic acidosis  # Hypergylcemia    Neuro: On Dilaudid and Versed gtts, as well as neuromuscular blockade for vent synchrony and to optimize oxygenation.   Pulm: Full vent support, meets severe ARDS criteria with p:F <100. Rotated R lung down with some improvement in oxygenation, however remains on 100% Fio2 PEEP +12 deeply sedated and paralyzed with Nimbex. Pplt within goal <30, driving pressure <20. Does not meet criteria for SBT in AM.   CV: In shock requiring Norepinephrine gtt, actively titrating to targeted MAP goal >65, as well as fixed-dose physiologic Vasopressin. Weaned off Phenylephrine gtt. Added dobutamine for concerns for cardiogenic shock, POCUS with acute biventricular failure. Ordered for repeat limited TTE. Monitoring dynamic markers of end-organ perfusion in response to resuscitation. Continue stress steroids while in shock.  GI: Keep NPO. PPI for GI mucosal cytoprotection. Transaminitis likely ischemic hepatitis, trending LFTs.  Renal: CLEMENTINA likely ATN due to hypoperfusion during shock state. Monitoring UOP; supplementing electrolytes to keep K >4, Mg >2, Phos >3. Renally adjusting medications as indicated.  ID: Empiric antibiotics with Meropenem, Vancomycin, and Levaquin (azithromycin allergy). Blood and urine cultures sent, sent sputum cx. Pending urine Legionella Ag and Strep pneumo Ag. RVP sent and is negative. MRSA PCR negative.  Heme: Heparin SC DVT ppx   Endo: Monitoring BG q6h and cover with ISS to keep goal -200 while critically ill. Stress steroids, as above, with iatrogenic hyperglycemia. Increased ISS and added Lantus 10u daily.    Case d/w ICU Attending Dr. Rivera on multidisciplinary rounds.   70 yo female with PMHx CAD NSTEMI in 2011 s/p ELIZABETH to LAD, s/p ELIZABETH Distal LCx (4/30/2024), HTN, HLD, hypothyroid, rheumatoid arthritis, who presented to ER with left-sided chest pain associated with generalized weakness, nausea and vomiting. Ruled in for severe sepsis secondary to LLL pneumonia POA, while in the ED developed worsening hypoxic respiratory failure and was emergently intubated. She had profound hypoxemia, ARDS requiring deep sedation and paralysis. Patient had uptrending lactate levels, worsening shock state on three vasopressors with concerns for acute systolic heart failure, cardiogenic shock.    # Acute hypoxic respiratory failure  # Undifferentiated shock, distributive and cardiogenic  # Lobar pneumonia  # Severe ARDS  # Severe sepsis  # Acute systolic heart failure  # Metabolic lactic acidosis  # Hypergylcemia    Neuro: Deeply sedated on Dilaudid, Ketamine, and Versed gtts, off NMB. Will aim to wean off Versed gtt today.  Pulm: Full vent support, AC/VC low Tv lung protective strategy. FiO2 weaned down to 50%, weaning down PEEP as tolerated assessing oxygenation with interval ABGs. Targeting Ppk <30, driving pressure <20.  CV: Remains in shock requiring Norepinephrine gtt, actively titrating to targeted MAP goal >65, now off Vasopressin. Component of cardiogenic shock, acute biventricular failure on TTE with EF 25-30%, with drop in LVEF and worsening end-organ perfusion markers (i.e. oliguria) when off inotropic support. Will continue Dobutamine for now, POCUS with improved cardiac output with inotropic assistance. Monitoring dynamic end-points of perfusion. Continue stress steroids while in shock.  GI: Has been NPO due to unstable shock state, will initiate tube feeds. Continue PPI for GI mucosal cytoprotection for now.  Renal: CLEMENTINA likely ATN due to hypoperfusion during shock state, resolved. Monitoring UOP; supplementing electrolytes to keep K >4, Mg >2, Phos >3.   ID: BCx +Pseudomonas aeruginosa, antibiotic sensitivities reviewed and narrowed to Cefepime. Low grade fevers, trending fever curve.  Heme: Heparin SC DVT ppx   Endo: Glycemic control improved, continue Lantus 10u daily and cover with ISS to keep goal -200 while critically ill. Continue levothyroxine for hypothyroidism.     Case d/w ICU Attending Dr. Rivera on multidisciplinary rounds.   68 yo female with PMHx CAD NSTEMI in 2011 s/p ELIZABETH to LAD, s/p ELIZABETH Distal LCx (4/30/2024), HTN, HLD, hypothyroid, rheumatoid arthritis, who presented to ER with left-sided chest pain associated with generalized weakness, nausea and vomiting. Ruled in for severe sepsis secondary to LLL pneumonia POA, while in the ED developed worsening hypoxic respiratory failure and was emergently intubated. She had profound hypoxemia, ARDS requiring deep sedation and paralysis. Patient had uptrending lactate levels, worsening shock state on three vasopressors with concerns for acute systolic heart failure, cardiogenic shock.    # Acute hypoxic respiratory failure  # Undifferentiated shock, distributive and cardiogenic  # Lobar pneumonia  # Severe ARDS  # Severe sepsis  # Acute systolic heart failure  # Metabolic lactic acidosis  # Hypergylcemia    Neuro: Deeply sedated on Dilaudid, Ketamine, and Versed gtts, off NMB. Will aim to wean off Versed gtt today.  Pulm: Full vent support, AC/VC low Tv lung protective strategy. FiO2 weaned down to 50%, weaning down PEEP as tolerated assessing oxygenation with interval ABGs. Targeting Ppk <30, driving pressure <20.  CV: Remains in shock requiring Norepinephrine gtt, actively titrating to targeted MAP goal >65, now off Vasopressin. Component of cardiogenic shock, acute biventricular failure on TTE with EF 25-30%, with drop in LVEF and worsening end-organ perfusion markers (i.e. oliguria) when off inotropic support. Will continue Dobutamine for now, POCUS with improved cardiac output with inotropic assistance. Monitoring dynamic end-points of perfusion. Continue stress steroids while in shock.  GI: Has been NPO due to unstable shock state, will initiate tube feeds. Continue PPI for GI mucosal cytoprotection for now.  Renal: CLEMENTINA likely ATN due to hypoperfusion during shock state, resolved. Monitoring UOP; supplementing electrolytes to keep K >4, Mg >2, Phos >3.   ID: BCx +Pseudomonas aeruginosa, antibiotic sensitivities reviewed and narrowed to Cefepime. Low grade fevers, trending fever curve.  Heme: Heparin SC DVT ppx   Endo: Glycemic control improved, continue Lantus 10u daily and cover with ISS to keep goal -200 while critically ill. Continue levothyroxine for hypothyroidism.     Case d/w ICU Attending Dr. Mckenzie on multidisciplinary rounds.

## 2024-09-21 NOTE — PROGRESS NOTE ADULT - SUBJECTIVE AND OBJECTIVE BOX
S; Patient denies chest pain or dyspnea.     TELEMETRY: sr    MEDICATIONS  (STANDING):  acetaminophen   IVPB .. 1000 milliGRAM(s) IV Intermittent once  aspirin  chewable 81 milliGRAM(s) Oral daily  cefepime  Injectable. 2000 milliGRAM(s) IV Push every 8 hours  chlorhexidine 0.12% Liquid 15 milliLiter(s) Oral Mucosa every 12 hours  chlorhexidine 2% Cloths 1 Application(s) Topical daily  clopidogrel Tablet 75 milliGRAM(s) Oral daily  dextrose 5%. 1000 milliLiter(s) (50 mL/Hr) IV Continuous <Continuous>  dextrose 5%. 1000 milliLiter(s) (100 mL/Hr) IV Continuous <Continuous>  dextrose 50% Injectable 25 Gram(s) IV Push once  dextrose 50% Injectable 25 Gram(s) IV Push once  dextrose 50% Injectable 12.5 Gram(s) IV Push once  DOBUTamine Infusion 5 MICROgram(s)/kG/Min (13.6 mL/Hr) IV Continuous <Continuous>  glucagon  Injectable 1 milliGRAM(s) IntraMuscular once  heparin   Injectable 5000 Unit(s) SubCutaneous every 8 hours  hydrocortisone sodium succinate Injectable 50 milliGRAM(s) IV Push every 6 hours  HYDROmorphone Infusion. 0.5 mG/Hr (0.5 mL/Hr) IV Continuous <Continuous>  influenza  Vaccine (HIGH DOSE) 0.5 milliLiter(s) IntraMuscular once  insulin glargine Injectable (LANTUS) 10 Unit(s) SubCutaneous every morning  insulin lispro (ADMELOG) corrective regimen sliding scale   SubCutaneous every 6 hours  ketamine Infusion. 1 mG/kG/Hr (9.07 mL/Hr) IV Continuous <Continuous>  levothyroxine Injectable 75 MICROGram(s) IV Push at bedtime  midazolam Infusion 0.02 mG/kG/Hr (1.81 mL/Hr) IV Continuous <Continuous>  norepinephrine Infusion 0.54 MICROgram(s)/kG/Min (45.9 mL/Hr) IV Continuous <Continuous>  pantoprazole  Injectable 40 milliGRAM(s) IV Push daily  vasopressin Infusion 0.04 Unit(s)/Min (6 mL/Hr) IV Continuous <Continuous>    MEDICATIONS  (PRN):  dextrose Oral Gel 15 Gram(s) Oral once PRN Blood Glucose LESS THAN 70 milliGRAM(s)/deciliter        Vital Signs Last 24 Hrs  T(C): 38 (21 Sep 2024 11:00), Max: 38.2 (21 Sep 2024 08:30)  T(F): 100.4 (21 Sep 2024 11:00), Max: 100.8 (21 Sep 2024 08:30)  HR: 111 (21 Sep 2024 11:43) (66 - 116)  BP: 144/90 (21 Sep 2024 10:00) (92/54 - 152/81)  BP(mean): 107 (21 Sep 2024 10:00) (67 - 107)  RR: 18 (21 Sep 2024 11:00) (16 - 18)  SpO2: 96% (21 Sep 2024 11:43) (88% - 100%)    Parameters below as of 21 Sep 2024 08:00  Patient On (Oxygen Delivery Method): ventilator    O2 Concentration (%): 50    Daily     Daily     I&O's Detail    20 Sep 2024 07:01  -  21 Sep 2024 07:00  --------------------------------------------------------  IN:    DOBUTamine: 258.4 mL    HYRDOmorphone: 31.5 mL    Insulin: 6 mL    IV PiggyBack: 50 mL    Ketamine: 218.4 mL    Midazolam: 158 mL    Norepinephrine: 106.4 mL    Vasopressin: 66 mL  Total IN: 894.7 mL    OUT:    Indwelling Catheter - Urethral (mL): 1230 mL  Total OUT: 1230 mL    Total NET: -335.3 mL      21 Sep 2024 07:01  -  21 Sep 2024 13:58  --------------------------------------------------------  IN:    DOBUTamine: 54.4 mL    HYRDOmorphone: 3 mL    Ketamine: 36.4 mL    Midazolam: 21.6 mL    Norepinephrine: 13.6 mL  Total IN: 129 mL    OUT:    Indwelling Catheter - Urethral (mL): 155 mL  Total OUT: 155 mL    Total NET: -26 mL          PHYSICAL EXAM:  Appearance: In NAD  Neck: No JVD,   Cardiovascular: Normal S1 S2  Respiratory: Lungs clear to auscultation	  Gastrointestinal:  Soft, Non-tender, + BS, no bruits	  Neurologic: Grossly non-focal.  Extremities: No edema    LABS:                        10.9   14.34 )-----------( 128      ( 21 Sep 2024 02:00 )             33.4     09-21    142  |  106  |  29.4[H]  ----------------------------<  171[H]  4.7   |  26.0  |  0.72    Ca    7.9[L]      21 Sep 2024 02:00  Phos  2.8     09-21  Mg     2.0     09-21    TPro  5.0[L]  /  Alb  3.1[L]  /  TBili  0.3[L]  /  DBili  x   /  AST  26  /  ALT  37[H]  /  AlkPhos  86  09-21          Urinalysis Basic - ( 21 Sep 2024 02:00 )    Color: x / Appearance: x / SG: x / pH: x  Gluc: 171 mg/dL / Ketone: x  / Bili: x / Urobili: x   Blood: x / Protein: x / Nitrite: x   Leuk Esterase: x / RBC: x / WBC x   Sq Epi: x / Non Sq Epi: x / Bacteria: x      I&O's Summary    20 Sep 2024 07:01  -  21 Sep 2024 07:00  --------------------------------------------------------  IN: 894.7 mL / OUT: 1230 mL / NET: -335.3 mL    21 Sep 2024 07:01  -  21 Sep 2024 13:58  --------------------------------------------------------  IN: 129 mL / OUT: 155 mL / NET: -26 mL

## 2024-09-22 LAB
ALBUMIN SERPL ELPH-MCNC: 3.1 G/DL — LOW (ref 3.3–5.2)
ALBUMIN SERPL ELPH-MCNC: 3.3 G/DL — SIGNIFICANT CHANGE UP (ref 3.3–5.2)
ALBUMIN SERPL ELPH-MCNC: 3.3 G/DL — SIGNIFICANT CHANGE UP (ref 3.3–5.2)
ALP SERPL-CCNC: 105 U/L — SIGNIFICANT CHANGE UP (ref 40–120)
ALP SERPL-CCNC: 111 U/L — SIGNIFICANT CHANGE UP (ref 40–120)
ALP SERPL-CCNC: 114 U/L — SIGNIFICANT CHANGE UP (ref 40–120)
ALT FLD-CCNC: 29 U/L — SIGNIFICANT CHANGE UP
ALT FLD-CCNC: 31 U/L — SIGNIFICANT CHANGE UP
ALT FLD-CCNC: 32 U/L — SIGNIFICANT CHANGE UP
ANION GAP SERPL CALC-SCNC: 11 MMOL/L — SIGNIFICANT CHANGE UP (ref 5–17)
ANION GAP SERPL CALC-SCNC: 9 MMOL/L — SIGNIFICANT CHANGE UP (ref 5–17)
ANION GAP SERPL CALC-SCNC: 9 MMOL/L — SIGNIFICANT CHANGE UP (ref 5–17)
ANISOCYTOSIS BLD QL: SLIGHT — SIGNIFICANT CHANGE UP
AST SERPL-CCNC: 26 U/L — SIGNIFICANT CHANGE UP
AST SERPL-CCNC: 39 U/L — HIGH
AST SERPL-CCNC: 40 U/L — HIGH
BASOPHILS # BLD AUTO: 0 K/UL — SIGNIFICANT CHANGE UP (ref 0–0.2)
BASOPHILS NFR BLD AUTO: 0 % — SIGNIFICANT CHANGE UP (ref 0–2)
BILIRUB SERPL-MCNC: 0.4 MG/DL — SIGNIFICANT CHANGE UP (ref 0.4–2)
BILIRUB SERPL-MCNC: 0.5 MG/DL — SIGNIFICANT CHANGE UP (ref 0.4–2)
BILIRUB SERPL-MCNC: 0.5 MG/DL — SIGNIFICANT CHANGE UP (ref 0.4–2)
BUN SERPL-MCNC: 38.8 MG/DL — HIGH (ref 8–20)
BUN SERPL-MCNC: 43.5 MG/DL — HIGH (ref 8–20)
BUN SERPL-MCNC: 43.9 MG/DL — HIGH (ref 8–20)
CALCIUM SERPL-MCNC: 8.1 MG/DL — LOW (ref 8.4–10.5)
CALCIUM SERPL-MCNC: 8.4 MG/DL — SIGNIFICANT CHANGE UP (ref 8.4–10.5)
CALCIUM SERPL-MCNC: 8.5 MG/DL — SIGNIFICANT CHANGE UP (ref 8.4–10.5)
CHLORIDE SERPL-SCNC: 107 MMOL/L — SIGNIFICANT CHANGE UP (ref 96–108)
CHLORIDE SERPL-SCNC: 108 MMOL/L — SIGNIFICANT CHANGE UP (ref 96–108)
CHLORIDE SERPL-SCNC: 108 MMOL/L — SIGNIFICANT CHANGE UP (ref 96–108)
CO2 SERPL-SCNC: 26 MMOL/L — SIGNIFICANT CHANGE UP (ref 22–29)
CO2 SERPL-SCNC: 27 MMOL/L — SIGNIFICANT CHANGE UP (ref 22–29)
CO2 SERPL-SCNC: 29 MMOL/L — SIGNIFICANT CHANGE UP (ref 22–29)
CREAT SERPL-MCNC: 0.72 MG/DL — SIGNIFICANT CHANGE UP (ref 0.5–1.3)
CREAT SERPL-MCNC: 0.73 MG/DL — SIGNIFICANT CHANGE UP (ref 0.5–1.3)
CREAT SERPL-MCNC: 0.76 MG/DL — SIGNIFICANT CHANGE UP (ref 0.5–1.3)
EGFR: 84 ML/MIN/1.73M2 — SIGNIFICANT CHANGE UP
EGFR: 88 ML/MIN/1.73M2 — SIGNIFICANT CHANGE UP
EGFR: 90 ML/MIN/1.73M2 — SIGNIFICANT CHANGE UP
EOSINOPHIL # BLD AUTO: 0 K/UL — SIGNIFICANT CHANGE UP (ref 0–0.5)
EOSINOPHIL NFR BLD AUTO: 0 % — SIGNIFICANT CHANGE UP (ref 0–6)
GAS PNL BLDA: SIGNIFICANT CHANGE UP
GAS PNL BLDV: SIGNIFICANT CHANGE UP
GAS PNL BLDV: SIGNIFICANT CHANGE UP
GIANT PLATELETS BLD QL SMEAR: PRESENT — SIGNIFICANT CHANGE UP
GLUCOSE BLDC GLUCOMTR-MCNC: 155 MG/DL — HIGH (ref 70–99)
GLUCOSE BLDC GLUCOMTR-MCNC: 162 MG/DL — HIGH (ref 70–99)
GLUCOSE BLDC GLUCOMTR-MCNC: 203 MG/DL — HIGH (ref 70–99)
GLUCOSE SERPL-MCNC: 163 MG/DL — HIGH (ref 70–99)
GLUCOSE SERPL-MCNC: 178 MG/DL — HIGH (ref 70–99)
GLUCOSE SERPL-MCNC: 197 MG/DL — HIGH (ref 70–99)
HCT VFR BLD CALC: 33.9 % — LOW (ref 34.5–45)
HGB BLD-MCNC: 10.8 G/DL — LOW (ref 11.5–15.5)
LYMPHOCYTES # BLD AUTO: 0.2 K/UL — LOW (ref 1–3.3)
LYMPHOCYTES # BLD AUTO: 1.7 % — LOW (ref 13–44)
MACROCYTES BLD QL: SLIGHT — SIGNIFICANT CHANGE UP
MAGNESIUM SERPL-MCNC: 2.3 MG/DL — SIGNIFICANT CHANGE UP (ref 1.8–2.6)
MAGNESIUM SERPL-MCNC: 2.4 MG/DL — SIGNIFICANT CHANGE UP (ref 1.6–2.6)
MANUAL SMEAR VERIFICATION: SIGNIFICANT CHANGE UP
MCHC RBC-ENTMCNC: 31.9 GM/DL — LOW (ref 32–36)
MCHC RBC-ENTMCNC: 33.4 PG — SIGNIFICANT CHANGE UP (ref 27–34)
MCV RBC AUTO: 105 FL — HIGH (ref 80–100)
MONOCYTES # BLD AUTO: 1.45 K/UL — HIGH (ref 0–0.9)
MONOCYTES NFR BLD AUTO: 12.3 % — SIGNIFICANT CHANGE UP (ref 2–14)
NEUTROPHILS # BLD AUTO: 10.14 K/UL — HIGH (ref 1.8–7.4)
NEUTROPHILS NFR BLD AUTO: 86 % — HIGH (ref 43–77)
PHOSPHATE SERPL-MCNC: 1.5 MG/DL — LOW (ref 2.4–4.7)
PHOSPHATE SERPL-MCNC: 1.9 MG/DL — LOW (ref 2.4–4.7)
PLAT MORPH BLD: NORMAL — SIGNIFICANT CHANGE UP
PLATELET # BLD AUTO: 120 K/UL — LOW (ref 150–400)
POLYCHROMASIA BLD QL SMEAR: SLIGHT — SIGNIFICANT CHANGE UP
POTASSIUM SERPL-MCNC: 3.8 MMOL/L — SIGNIFICANT CHANGE UP (ref 3.5–5.3)
POTASSIUM SERPL-MCNC: 4 MMOL/L — SIGNIFICANT CHANGE UP (ref 3.5–5.3)
POTASSIUM SERPL-MCNC: 4.1 MMOL/L — SIGNIFICANT CHANGE UP (ref 3.5–5.3)
POTASSIUM SERPL-SCNC: 3.8 MMOL/L — SIGNIFICANT CHANGE UP (ref 3.5–5.3)
POTASSIUM SERPL-SCNC: 4 MMOL/L — SIGNIFICANT CHANGE UP (ref 3.5–5.3)
POTASSIUM SERPL-SCNC: 4.1 MMOL/L — SIGNIFICANT CHANGE UP (ref 3.5–5.3)
PROT SERPL-MCNC: 5 G/DL — LOW (ref 6.6–8.7)
PROT SERPL-MCNC: 5.4 G/DL — LOW (ref 6.6–8.7)
PROT SERPL-MCNC: 5.4 G/DL — LOW (ref 6.6–8.7)
RAPID RVP RESULT: SIGNIFICANT CHANGE UP
RBC # BLD: 3.23 M/UL — LOW (ref 3.8–5.2)
RBC # FLD: 13.2 % — SIGNIFICANT CHANGE UP (ref 10.3–14.5)
RBC BLD AUTO: ABNORMAL
SARS-COV-2 RNA SPEC QL NAA+PROBE: SIGNIFICANT CHANGE UP
SMUDGE CELLS # BLD: PRESENT — SIGNIFICANT CHANGE UP
SODIUM SERPL-SCNC: 144 MMOL/L — SIGNIFICANT CHANGE UP (ref 135–145)
SODIUM SERPL-SCNC: 145 MMOL/L — SIGNIFICANT CHANGE UP (ref 135–145)
SODIUM SERPL-SCNC: 145 MMOL/L — SIGNIFICANT CHANGE UP (ref 135–145)
WBC # BLD: 11.79 K/UL — HIGH (ref 3.8–10.5)
WBC # FLD AUTO: 11.79 K/UL — HIGH (ref 3.8–10.5)

## 2024-09-22 PROCEDURE — 99291 CRITICAL CARE FIRST HOUR: CPT

## 2024-09-22 RX ORDER — MEROPENEM 500 MG/20ML
1000 INJECTION INTRAVENOUS EVERY 8 HOURS
Refills: 0 | Status: DISCONTINUED | OUTPATIENT
Start: 2024-09-22 | End: 2024-09-22

## 2024-09-22 RX ORDER — FENTANYL CITRATE-0.9 % NACL/PF 300MCG/30
50 PATIENT CONTROLLED ANALGESIA VIAL INJECTION
Refills: 0 | Status: DISCONTINUED | OUTPATIENT
Start: 2024-09-22 | End: 2024-09-24

## 2024-09-22 RX ORDER — DEXMEDETOMIDINE HYDROCHLORIDE IN 0.9% SODIUM CHLORIDE 400 UG/100ML
0.5 INJECTION INTRAVENOUS
Qty: 200 | Refills: 0 | Status: DISCONTINUED | OUTPATIENT
Start: 2024-09-22 | End: 2024-09-24

## 2024-09-22 RX ORDER — POTASSIUM PHOSPHATE, MONOBASIC POTASSIUM PHOSPHATE, DIBASIC 224; 236 MG/ML; MG/ML
30 INJECTION, SOLUTION, CONCENTRATE INTRAVENOUS ONCE
Refills: 0 | Status: COMPLETED | OUTPATIENT
Start: 2024-09-22 | End: 2024-09-22

## 2024-09-22 RX ORDER — ACETAMINOPHEN 325 MG
1000 TABLET ORAL EVERY 6 HOURS
Refills: 0 | Status: COMPLETED | OUTPATIENT
Start: 2024-09-22 | End: 2024-09-23

## 2024-09-22 RX ORDER — MEROPENEM 500 MG/20ML
1000 INJECTION INTRAVENOUS EVERY 8 HOURS
Refills: 0 | Status: COMPLETED | OUTPATIENT
Start: 2024-09-22 | End: 2024-09-29

## 2024-09-22 RX ORDER — PROPOFOL 10 MG/ML
10 INJECTION, EMULSION INTRAVENOUS
Qty: 1000 | Refills: 0 | Status: DISCONTINUED | OUTPATIENT
Start: 2024-09-22 | End: 2024-09-23

## 2024-09-22 RX ORDER — DOBUTAMINE HCL 250MG/20ML
2.5 VIAL (ML) INTRAVENOUS
Qty: 500 | Refills: 0 | Status: DISCONTINUED | OUTPATIENT
Start: 2024-09-22 | End: 2024-09-24

## 2024-09-22 RX ORDER — VANCOMYCIN HCL-SODIUM CHLORIDE IV SOLN 1.5 GM/250ML-0.9% 1.5-0.9/25 GM/ML-%
1250 SOLUTION INTRAVENOUS ONCE
Refills: 0 | Status: COMPLETED | OUTPATIENT
Start: 2024-09-22 | End: 2024-09-22

## 2024-09-22 RX ORDER — SOD PHOS DI, MONO/K PHOS MONO 250 MG
1 TABLET ORAL ONCE
Refills: 0 | Status: COMPLETED | OUTPATIENT
Start: 2024-09-22 | End: 2024-09-22

## 2024-09-22 RX ORDER — ENALAPRIL MALEATE 5 MG
1.25 TABLET ORAL EVERY 6 HOURS
Refills: 0 | Status: DISCONTINUED | OUTPATIENT
Start: 2024-09-22 | End: 2024-09-23

## 2024-09-22 RX ORDER — POLYVINYL ALCOHOL 1 %
1 DROPS OPHTHALMIC (EYE) EVERY 4 HOURS
Refills: 0 | Status: DISCONTINUED | OUTPATIENT
Start: 2024-09-22 | End: 2024-09-24

## 2024-09-22 RX ADMIN — Medication 75 MILLIGRAM(S): at 11:55

## 2024-09-22 RX ADMIN — Medication 2: at 05:12

## 2024-09-22 RX ADMIN — POTASSIUM PHOSPHATE, MONOBASIC POTASSIUM PHOSPHATE, DIBASIC 83.33 MILLIMOLE(S): 224; 236 INJECTION, SOLUTION, CONCENTRATE INTRAVENOUS at 17:55

## 2024-09-22 RX ADMIN — VANCOMYCIN HCL-SODIUM CHLORIDE IV SOLN 1.5 GM/250ML-0.9% 166.67 MILLIGRAM(S): 1.5-0.9/25 SOLUTION at 20:37

## 2024-09-22 RX ADMIN — DEXMEDETOMIDINE HYDROCHLORIDE IN 0.9% SODIUM CHLORIDE 11.3 MICROGRAM(S)/KG/HR: 400 INJECTION INTRAVENOUS at 11:52

## 2024-09-22 RX ADMIN — Medication 50 MICROGRAM(S): at 22:30

## 2024-09-22 RX ADMIN — Medication 1 DROP(S): at 14:34

## 2024-09-22 RX ADMIN — Medication 1000 MILLIGRAM(S): at 18:17

## 2024-09-22 RX ADMIN — Medication 75 MICROGRAM(S): at 21:13

## 2024-09-22 RX ADMIN — Medication 400 MILLIGRAM(S): at 23:13

## 2024-09-22 RX ADMIN — Medication 2: at 17:57

## 2024-09-22 RX ADMIN — PANTOPRAZOLE SODIUM 40 MILLIGRAM(S): 40 TABLET, DELAYED RELEASE ORAL at 11:53

## 2024-09-22 RX ADMIN — CEFEPIME 2000 MILLIGRAM(S): 2 INJECTION, POWDER, FOR SOLUTION INTRAVENOUS at 05:06

## 2024-09-22 RX ADMIN — DEXMEDETOMIDINE HYDROCHLORIDE IN 0.9% SODIUM CHLORIDE 11.3 MICROGRAM(S)/KG/HR: 400 INJECTION INTRAVENOUS at 09:15

## 2024-09-22 RX ADMIN — Medication 6.8 MICROGRAM(S)/KG/MIN: at 11:52

## 2024-09-22 RX ADMIN — INSULIN GLARGINE 10 UNIT(S): 300 INJECTION, SOLUTION SUBCUTANEOUS at 08:45

## 2024-09-22 RX ADMIN — HYDROCORTISONE 50 MILLIGRAM(S): 5 TABLET ORAL at 17:55

## 2024-09-22 RX ADMIN — MEROPENEM 1000 MILLIGRAM(S): 500 INJECTION INTRAVENOUS at 20:43

## 2024-09-22 RX ADMIN — CHLORHEXIDINE GLUCONATE ORAL RINSE 15 MILLILITER(S): 1.2 SOLUTION DENTAL at 05:06

## 2024-09-22 RX ADMIN — Medication 5000 UNIT(S): at 05:06

## 2024-09-22 RX ADMIN — CHLORHEXIDINE GLUCONATE ORAL RINSE 1 APPLICATION(S): 1.2 SOLUTION DENTAL at 11:53

## 2024-09-22 RX ADMIN — Medication 4: at 11:54

## 2024-09-22 RX ADMIN — Medication 1 DROP(S): at 21:12

## 2024-09-22 RX ADMIN — Medication 5000 UNIT(S): at 21:12

## 2024-09-22 RX ADMIN — CEFEPIME 2000 MILLIGRAM(S): 2 INJECTION, POWDER, FOR SOLUTION INTRAVENOUS at 14:33

## 2024-09-22 RX ADMIN — Medication 400 MILLIGRAM(S): at 17:54

## 2024-09-22 RX ADMIN — Medication 50 MICROGRAM(S): at 20:37

## 2024-09-22 RX ADMIN — Medication 1 PACKET(S): at 05:47

## 2024-09-22 RX ADMIN — PROPOFOL 5.44 MICROGRAM(S)/KG/MIN: 10 INJECTION, EMULSION INTRAVENOUS at 22:30

## 2024-09-22 RX ADMIN — Medication 400 MILLIGRAM(S): at 00:59

## 2024-09-22 RX ADMIN — CHLORHEXIDINE GLUCONATE ORAL RINSE 15 MILLILITER(S): 1.2 SOLUTION DENTAL at 17:56

## 2024-09-22 RX ADMIN — Medication 400 MILLIGRAM(S): at 11:52

## 2024-09-22 RX ADMIN — Medication 1.25 MILLIGRAM(S): at 11:52

## 2024-09-22 RX ADMIN — Medication 1.25 MILLIGRAM(S): at 17:58

## 2024-09-22 RX ADMIN — Medication 5000 UNIT(S): at 14:33

## 2024-09-22 RX ADMIN — Medication 1000 MILLIGRAM(S): at 01:30

## 2024-09-22 RX ADMIN — HYDROCORTISONE 50 MILLIGRAM(S): 5 TABLET ORAL at 05:06

## 2024-09-22 RX ADMIN — DEXMEDETOMIDINE HYDROCHLORIDE IN 0.9% SODIUM CHLORIDE 11.3 MICROGRAM(S)/KG/HR: 400 INJECTION INTRAVENOUS at 14:33

## 2024-09-22 RX ADMIN — Medication 81 MILLIGRAM(S): at 11:55

## 2024-09-22 RX ADMIN — Medication 1000 MILLIGRAM(S): at 12:47

## 2024-09-22 RX ADMIN — Medication 13.6 MICROGRAM(S)/KG/MIN: at 03:41

## 2024-09-22 NOTE — PROGRESS NOTE ADULT - ASSESSMENT
69 F w/ CAD/NSTEMI  s/p LAD stent, Distal LCx stent 24), RA (on tocilizumab infusions), admitted to MICU for acute hypoxemic respiratory failure requiring intubation and mechanical ventilation secondary to LLL pneumonia and pseudomonas bacteremia presumed secondary to pneumonia (CT A/P negative for acute pathology). Cardiogenic shock likely secondary to stress cardiomyopathy from sepsis. Bacteremia cleared. Continue cefepime. Vasopressors discontinued. Now hypertensive likely due to titrating off sedation. Start enaliprat for now (takes enalapril at home). Titrate off midazolam infusion and D/C ketamine infusion. Titrate down fio2 given improvement in oxygenation. ccTTE done by me shows normal LV function and LVOT VTI 18 cm on 5 of dobutamine. End organ markers show adequate perfusion. Reduced to 2.5 mg  with VTI 18 cm and CO 4.5 L/min. Off dobutamine, VTI reduced to 15 cm and CO 3.2 L/min. Will keep patient at 2.5 mg of dobutamine infusion for now and continue to monitor end-organ parameters. Updated family at bedside. Monitor in MICU. 69 F w/ CAD/NSTEMI  s/p LAD stent, Distal LCx stent 24), RA (on tocilizumab infusions), admitted to MICU for acute hypoxemic respiratory failure requiring intubation and mechanical ventilation secondary to LLL pneumonia and pseudomonas bacteremia presumed secondary to pneumonia (CT A/P negative for acute pathology). Cardiogenic shock likely secondary to stress cardiomyopathy from sepsis. Bacteremia cleared. Continue cefepime. Vasopressors discontinued. Now hypertensive likely due to titrating off sedation. Start enaliprat for now (takes enalapril at home). Titrate off midazolam infusion and D/C ketamine infusion. Titrate down fio2 given improvement in oxygenation. ccTTE done by me shows normal LV function and LVOT VTI 18 cm on 5 of dobutamine. End organ markers show adequate perfusion. Reduced to 2.5 mg  with VTI 18 cm and CO 4.5 L/min. Off dobutamine, VTI reduced to 15 cm and CO 3.2 L/min. Will keep patient at 2.5 mg of dobutamine infusion for now and continue to monitor end-organ parameters. Updated family at bedside. D/C R IJ central line given rising fever curve. Obtained adequate peripheral access. Notably, patient had low grade fever even while on meropenem so I don't think deescalating to cefepime is the cause. Keep Lindo for strict I's/O's in setting of cardiogenic shock. Will reassess for D/C Lindo tomorrow. Monitor in MICU.

## 2024-09-22 NOTE — PROGRESS NOTE ADULT - SUBJECTIVE AND OBJECTIVE BOX
Patient is a 69y old  Female who presents with a chief complaint of Shortness of breath (21 Sep 2024 13:58)      BRIEF HOSPITAL COURSE:   70 yo f pmhx CAD, NSTEMI (2011) s/p ELIZABETH to LAD and dLCx (4/2024), HTN,  HLD, hypothyroidism, RA admitted with LLL PNA, acute hypoxic respiratory failure requiring intubation, deteriorated into ARDS requiring deep sedation and paralytic therapy found with pseudomonal bacteremia and new inotrope dependent BiV fx.    Events last 24 hours:   Patient remains on dobutamine 5mcg/kg/min, sedated on dilaudid and versed gtt for sedation.  Good urine output, maintaining ~50-70 cc/hr.      PAST MEDICAL & SURGICAL HISTORY:  Hypothyroid  ADHD (attention deficit hyperactivity disorder)  HLD (hyperlipidemia)  Myocardial infarct 2012  Stented coronary artery 2012  Rheumatoid arthritis  Migraine  S/p bilateral carpal tunnel release  H/O cardiac catheterization  H/O laminectomy  H/O spinal fusion  S/P left knee arthroscopy  H/O total knee replacement, right      Allergies  tetracycline (Hives)  penicillins (Hives)  Butazolactin, Prolaprin (Hives)  azithromycin (Hives)  Zyrtec (Hives)      FAMILY HISTORY:  unable to obtain       Social History:   from home      Review of Systems:  unable to obtain 2/2 intubated/sedated      Physical Examination:    General: elderly female, lying in bed    HEENT: ett and ogt in place    PULM: diminished b/l    CVS: sinus tach    ABD: Soft, mildly distended, nontender, +bs    EXT: +edema    SKIN: Warm     NEURO: sedated      Medications:  cefepime  Injectable. 2000 milliGRAM(s) IV Push every 8 hours  DOBUTamine Infusion 5 MICROgram(s)/kG/Min IV Continuous <Continuous>  norepinephrine Infusion 0.54 MICROgram(s)/kG/Min IV Continuous <Continuous>  HYDROmorphone Infusion. 0.5 mG/Hr IV Continuous <Continuous>  midazolam Infusion 0.02 mG/kG/Hr IV Continuous <Continuous>  aspirin  chewable 81 milliGRAM(s) Oral daily  clopidogrel Tablet 75 milliGRAM(s) Oral daily  heparin   Injectable 5000 Unit(s) SubCutaneous every 8 hours  pantoprazole  Injectable 40 milliGRAM(s) IV Push daily  dextrose 50% Injectable 25 Gram(s) IV Push once  dextrose 50% Injectable 25 Gram(s) IV Push once  dextrose 50% Injectable 12.5 Gram(s) IV Push once  dextrose Oral Gel 15 Gram(s) Oral once PRN  glucagon  Injectable 1 milliGRAM(s) IntraMuscular once  hydrocortisone sodium succinate Injectable 50 milliGRAM(s) IV Push every 12 hours  insulin glargine Injectable (LANTUS) 10 Unit(s) SubCutaneous every morning  insulin lispro (ADMELOG) corrective regimen sliding scale   SubCutaneous every 6 hours  levothyroxine Injectable 75 MICROGram(s) IV Push at bedtime  vasopressin Infusion 0.04 Unit(s)/Min IV Continuous <Continuous>  dextrose 5%. 1000 milliLiter(s) IV Continuous <Continuous>  dextrose 5%. 1000 milliLiter(s) IV Continuous <Continuous>  influenza  Vaccine (HIGH DOSE) 0.5 milliLiter(s) IntraMuscular once  chlorhexidine 0.12% Liquid 15 milliLiter(s) Oral Mucosa every 12 hours  chlorhexidine 2% Cloths 1 Application(s) Topical daily      Mode: AC/ CMV (Assist Control/ Continuous Mandatory Ventilation)  RR (machine): 18  TV (machine): 400  FiO2: 50  PEEP: 10  ITime: 0.9  MAP: 11  PIP: 29      ICU Vital Signs Last 24 Hrs  T(C): 38.1 (22 Sep 2024 00:30), Max: 38.3 (21 Sep 2024 13:00)  T(F): 100.6 (22 Sep 2024 00:30), Max: 100.9 (21 Sep 2024 13:00)  HR: 107 (22 Sep 2024 00:30) (95 - 118)  BP: 122/67 (22 Sep 2024 00:00) (110/57 - 148/86)  BP(mean): 82 (22 Sep 2024 00:00) (72 - 107)  ABP: 109/59 (22 Sep 2024 00:30) (89/58 - 163/92)  ABP(mean): 76 (22 Sep 2024 00:30) (70 - 123)  RR: 18 (22 Sep 2024 00:30) (18 - 22)  SpO2: 90% (22 Sep 2024 00:30) (88% - 99%)    O2 Parameters below as of 22 Sep 2024 00:30  Patient On (Oxygen Delivery Method): ventilator    O2 Concentration (%): 50      Vital Signs Last 24 Hrs  T(C): 38.1 (22 Sep 2024 00:30), Max: 38.3 (21 Sep 2024 13:00)  T(F): 100.6 (22 Sep 2024 00:30), Max: 100.9 (21 Sep 2024 13:00)  HR: 107 (22 Sep 2024 00:30) (95 - 118)  BP: 122/67 (22 Sep 2024 00:00) (110/57 - 148/86)  BP(mean): 82 (22 Sep 2024 00:00) (72 - 107)  RR: 18 (22 Sep 2024 00:30) (18 - 22)  SpO2: 90% (22 Sep 2024 00:30) (88% - 99%)    Parameters below as of 22 Sep 2024 00:30  Patient On (Oxygen Delivery Method): ventilator    O2 Concentration (%): 50    ABG - ( 21 Sep 2024 05:25 )  pH, Arterial: 7.430 pH, Blood: x     /  pCO2: 45    /  pO2: 129   / HCO3: 30    / Base Excess: 5.6   /  SaO2: 99.7        I&O's Detail    20 Sep 2024 07:01  -  21 Sep 2024 07:00  --------------------------------------------------------  IN:    DOBUTamine: 258.4 mL    HYRDOmorphone: 31.5 mL    Insulin: 6 mL    IV PiggyBack: 50 mL    Ketamine: 218.4 mL    Midazolam: 158 mL    Norepinephrine: 106.4 mL    Vasopressin: 66 mL  Total IN: 894.7 mL    OUT:    Indwelling Catheter - Urethral (mL): 1230 mL  Total OUT: 1230 mL  Total NET: -335.3 mL      21 Sep 2024 07:01  -  22 Sep 2024 01:11  --------------------------------------------------------  IN:    DOBUTamine: 244.8 mL    Glucerna 1.5: 50 mL    HYRDOmorphone: 13.5 mL    Ketamine: 100.1 mL    Midazolam: 84.6 mL    Norepinephrine: 30.6 mL  Total IN: 523.6 mL    OUT:    Indwelling Catheter - Urethral (mL): 1140 mL  Total OUT: 1140 mL  Total NET: -616.4 mL      LABS:                        10.9   14.34 )-----------( 128      ( 21 Sep 2024 02:00 )             33.4     09-21    142  |  106  |  29.4[H]  ----------------------------<  171[H]  4.7   |  26.0  |  0.72    Ca    7.9[L]      21 Sep 2024 02:00  Phos  2.8     09-21  Mg     2.0     09-21    TPro  5.0[L]  /  Alb  3.1[L]  /  TBili  0.3[L]  /  DBili  x   /  AST  26  /  ALT  37[H]  /  AlkPhos  86  09-21      CAPILLARY BLOOD GLUCOSE  POCT Blood Glucose.: 178 mg/dL (21 Sep 2024 23:49)      Urinalysis Basic - ( 21 Sep 2024 02:00 )  Color: x / Appearance: x / SG: x / pH: x  Gluc: 171 mg/dL / Ketone: x  / Bili: x / Urobili: x   Blood: x / Protein: x / Nitrite: x   Leuk Esterase: x / RBC: x / WBC x   Sq Epi: x / Non Sq Epi: x / Bacteria: x      CULTURES:  Culture Results:   Normal Respiratory Mary present (09-19 @ 00:35)  Culture Results:   No Legionella species isolated  Culture in progress (09-18 @ 16:57)  Culture Results:   No growth at 72 Hours (09-18 @ 16:57)  Culture Results:   No growth (09-18 @ 12:05)  Culture Results:   Growth in aerobic bottle: Pseudomonas aeruginosa  Direct identification is available within approximately 3-5  hours either by Blood Panel Multiplexed PCR or Direct  MALDI-TOF. Details: [email or web address] (09-18 @ 10:15)      RADIOLOGY:   < from: Xray Chest 1 View- PORTABLE-Routine (Xray Chest 1 View- PORTABLE-Routine in AM.) (09.21.24 @ 10:46) >    ACC: [id number] EXAM:  XR CHEST PORTABLE ROUTINE 1V   ORDERED BY: ALEXANDRA M GOLDMANN     ACC: [id number] EXAM:  XR CHEST PORTABLE URGENT 1V   ORDERED BY: BOO MCKEON     PROCEDURE DATE:  09/21/2024      INTERPRETATION:  HISTORY: Admitting Dxs: A41.9 BODY ACHES; ; AHRF;  TECHNIQUE: Serial portable chest  x-rays, 2 studies.  COMPARISON: 9/19.  FINDINGS/  IMPRESSION:    First study is from 9/20 and shows  The endotracheal tube is above the david. The nasogastric tube courses   below the left hemidiaphragm, tip is off the edge of film. Right sided   central line is in the region of the superior vena cava. Left axillary   midline  HEART: Obscured  LUNGS: There is opacity at the left base compatible with   effusion/infiltrate.  BONES: degenerative changes.    The follow-up is from 9/21 and shows stable left effusion/infiltrate..    --- End of Report ---    HOMERO BRADLEY MD; Attending Interventional Radiologist  This document has been electronically signed. Sep 21 2024  2:13PM    < end of copied text >

## 2024-09-22 NOTE — PROGRESS NOTE ADULT - SUBJECTIVE AND OBJECTIVE BOX
Patient is a 69y old  Female who presents with a chief complaint of Shortness of breath (22 Sep 2024 10:09)      BRIEF HOSPITAL COURSE: ***      Events last 24 hours: ***      PAST MEDICAL & SURGICAL HISTORY:  Hypothyroid      ADHD (attention deficit hyperactivity disorder)      HLD (hyperlipidemia)      Myocardial infarct  2012      Stented coronary artery  2012      Rheumatoid arthritis      Migraine      S/p bilateral carpal tunnel release      H/O cardiac catheterization      H/O laminectomy      H/O spinal fusion      S/P left knee arthroscopy      H/O total knee replacement, right              SOCIAL HISTORY:        Review of Systems:  CONSTITUTIONAL: No fever, chills, or fatigue  EYES: No visual disturbances  ENMT:  No difficulty hearing  NECK: No pain  RESPIRATORY: No cough. No shortness of breath  CARDIOVASCULAR: No chest pain, palpitations, or leg swelling  GASTROINTESTINAL: No abdominal pain. No nausea, vomiting, diarrhea, or constipation. No hematemesis, melena or hematochezia  GENITOURINARY: No dysuria, frequency, hematuria, or incontinence  NEUROLOGICAL: No headaches, loss of strength, numbness, or tremors  SKIN: No rashes  MUSCULOSKELETAL: No back or extremity pain. No calf pain  PSYCHIATRIC: No depression, anxiety, or difficulty sleeping      [  ] Due to altered mental status/intubation, subjective information was not able to be obtained from the patient. History was obtained, to the extent possible, from review of the chart and collateral sources of information.        Medications:  cefepime  Injectable. 2000 milliGRAM(s) IV Push every 8 hours    enalaprilat Injectable 1.25 milliGRAM(s) IV Push every 6 hours      acetaminophen   IVPB .. 1000 milliGRAM(s) IV Intermittent every 6 hours  dexMEDEtomidine Infusion 0.5 MICROgram(s)/kG/Hr IV Continuous <Continuous>  HYDROmorphone Infusion. 0.5 mG/Hr IV Continuous <Continuous>      aspirin  chewable 81 milliGRAM(s) Oral daily  clopidogrel Tablet 75 milliGRAM(s) Oral daily  heparin   Injectable 5000 Unit(s) SubCutaneous every 8 hours    pantoprazole  Injectable 40 milliGRAM(s) IV Push daily      dextrose 50% Injectable 12.5 Gram(s) IV Push once  dextrose 50% Injectable 25 Gram(s) IV Push once  dextrose 50% Injectable 25 Gram(s) IV Push once  dextrose Oral Gel 15 Gram(s) Oral once PRN  glucagon  Injectable 1 milliGRAM(s) IntraMuscular once  hydrocortisone sodium succinate Injectable 50 milliGRAM(s) IV Push every 12 hours  insulin glargine Injectable (LANTUS) 10 Unit(s) SubCutaneous every morning  insulin lispro (ADMELOG) corrective regimen sliding scale   SubCutaneous every 6 hours  levothyroxine Injectable 75 MICROGram(s) IV Push at bedtime    dextrose 5%. 1000 milliLiter(s) IV Continuous <Continuous>  dextrose 5%. 1000 milliLiter(s) IV Continuous <Continuous>    influenza  Vaccine (HIGH DOSE) 0.5 milliLiter(s) IntraMuscular once    chlorhexidine 0.12% Liquid 15 milliLiter(s) Oral Mucosa every 12 hours  chlorhexidine 2% Cloths 1 Application(s) Topical daily        Mode: AC/ CMV (Assist Control/ Continuous Mandatory Ventilation)  RR (machine): 18  TV (machine): 400  FiO2: 40  PEEP: 8  ITime: 0.9  MAP: 11  PIP: 20      ICU Vital Signs Last 24 Hrs  T(C): 38.2 (22 Sep 2024 07:00), Max: 38.3 (21 Sep 2024 13:00)  T(F): 100.8 (22 Sep 2024 07:00), Max: 100.9 (21 Sep 2024 13:00)  HR: 102 (22 Sep 2024 09:51) (98 - 122)  BP: 141/75 (22 Sep 2024 04:00) (120/63 - 156/93)  BP(mean): 92 (22 Sep 2024 04:00) (79 - 112)  ABP: 175/97 (22 Sep 2024 07:00) (100/56 - 175/97)  ABP(mean): 132 (22 Sep 2024 07:00) (71 - 132)  RR: 22 (22 Sep 2024 07:00) (18 - 23)  SpO2: 97% (22 Sep 2024 09:51) (88% - 99%)    O2 Parameters below as of 22 Sep 2024 04:00  Patient On (Oxygen Delivery Method): ventilator    O2 Concentration (%): 50        ABG - ( 22 Sep 2024 09:34 )  pH, Arterial: 7.490 pH, Blood: x     /  pCO2: 40    /  pO2: 173   / HCO3: 30    / Base Excess: 7.2   /  SaO2: 100.0               I&O's Detail    21 Sep 2024 07:01  -  22 Sep 2024 07:00  --------------------------------------------------------  IN:    DOBUTamine: 326.4 mL    Glucerna 1.5: 160 mL    HYRDOmorphone: 18 mL    Ketamine: 100.1 mL    Midazolam: 111.6 mL    Norepinephrine: 30.6 mL  Total IN: 746.7 mL    OUT:    Indwelling Catheter - Urethral (mL): 1620 mL  Total OUT: 1620 mL    Total NET: -873.3 mL      22 Sep 2024 07:01  -  22 Sep 2024 11:33  --------------------------------------------------------  IN:    Dexmedetomidine: 22.6 mL    DOBUTamine: 40.8 mL    Glucerna 1.5: 90 mL    HYRDOmorphone: 2.4 mL    Midazolam: 13.5 mL  Total IN: 169.3 mL    OUT:    Indwelling Catheter - Urethral (mL): 200 mL    Norepinephrine: 0 mL  Total OUT: 200 mL    Total NET: -30.7 mL            LABS:                        10.8   11.79 )-----------( 120      ( 22 Sep 2024 03:00 )             33.9     09-22    145  |  107  |  38.8[H]  ----------------------------<  163[H]  4.0   |  29.0  |  0.72    Ca    8.5      22 Sep 2024 03:00  Phos  1.9     09-22  Mg     2.4     09-22    TPro  5.4[L]  /  Alb  3.3  /  TBili  0.4  /  DBili  x   /  AST  26  /  ALT  29  /  AlkPhos  105  09-22          CAPILLARY BLOOD GLUCOSE      POCT Blood Glucose.: 162 mg/dL (22 Sep 2024 05:11)      Urinalysis Basic - ( 22 Sep 2024 03:00 )    Color: x / Appearance: x / SG: x / pH: x  Gluc: 163 mg/dL / Ketone: x  / Bili: x / Urobili: x   Blood: x / Protein: x / Nitrite: x   Leuk Esterase: x / RBC: x / WBC x   Sq Epi: x / Non Sq Epi: x / Bacteria: x      CULTURES:  Culture Results:   Normal Respiratory Mary present (09-19 @ 00:35)  Culture Results:   No Legionella species isolated  Culture in progress (09-18 @ 16:57)  Culture Results:   No growth at 72 Hours (09-18 @ 16:57)  Rapid RVP Result: NotDetec (09-18 @ 15:50)  Culture Results:   No growth (09-18 @ 12:05)  Culture Results:   Growth in aerobic bottle: Pseudomonas aeruginosa  Direct identification is available within approximately 3-5  hours either by Blood Panel Multiplexed PCR or Direct  MALDI-TOF. Details: [email or web address] (09-18 @ 10:15)            Physical Examination:    General: No acute distress.      HEENT: Pupils equal, reactive to light. Symmetric.    PULM: Clear to auscultation bilaterally.    CVS: Regular rate and rhythm, no murmurs.    ABD: Soft, nondistended, nontender, normoactive bowel sounds.    EXT: No edema, nontender    SKIN: Warm and well perfused.    NEURO: RASS -1, eyes open does not track or follow commands        RADIOLOGY: ***        INVASIVE LINES:  INDWELLING STEVENSON:  VTE PROPHYLAXIS:  CAM ICU:  CODE STATUS:        CRITICAL CARE TIME SPENT: *** minutes spent performing frequent bedside reassessments and augmenting plan of care to address problems of acute critical illness that pose high probability of life threatening deterioration and/or end organ damage/dysfunction and discussing goals of care, non-inclusive of time spent on procedures performed. Date of note entry is equal to date of services rendered. Patient is a 69y old  Female who presents with a chief complaint of Shortness of breath (22 Sep 2024 10:09)      BRIEF HOSPITAL COURSE: 68 yo female with PMHx CAD NSTEMI in 2011 s/p ELIZABETH to LAD, s/p ELIZABETH Distal LCx (4/30/2024), HTN, HLD, hypothyroid, rheumatoid arthritis, who presented to ER with left-sided chest pain associated with generalized weakness, nausea and vomiting.  Patient ill-appearing, unable to provide much history. History mostly supplied by patient's family member at bedside. Symptoms started 2 days ago, however today she felt much worse with prompted her to come to the ED. CT chest revealed LLL pneumonia. While in the ED developed worsening hypoxic respiratory failure and was emergently intubated. She had profound hypoxemia, ARDS requiring deep sedation and paralysis. Patient had uptrending lactate levels, worsening shock state on three vasopressors. Serial TTEs concerning for acute systolic heart failure, started on Dobutamine.       Events last 24 hours: Remains intubated on full vent support, oxygenation improving paO2 173 on .50/+10 PEEP weaned to 8.   Hemodynamics remain stable, actually hypertensive, off vasopressors.   On Dobutamine at 5mcg/kg/min. Dobutamine weaned down and serial bedside POCUS revealed dose-responsive drop in VTI, with VTI 13.8 / CO 3.5 off inotropic support.  Weaning sedation, added Dexmedetomidine, stopped Versed and coming down on Hydromorphone infusion.        PAST MEDICAL & SURGICAL HISTORY:  Hypothyroid      ADHD (attention deficit hyperactivity disorder)      HLD (hyperlipidemia)      Myocardial infarct  2012      Stented coronary artery  2012      Rheumatoid arthritis      Migraine      S/p bilateral carpal tunnel release      H/O cardiac catheterization      H/O laminectomy      H/O spinal fusion      S/P left knee arthroscopy      H/O total knee replacement, right        Review of Systems: Due to altered mental status/intubation, subjective information was not able to be obtained from the patient. History was obtained, to the extent possible, from review of the chart and collateral sources of information.        Medications:  cefepime  Injectable. 2000 milliGRAM(s) IV Push every 8 hours    enalaprilat Injectable 1.25 milliGRAM(s) IV Push every 6 hours      acetaminophen   IVPB .. 1000 milliGRAM(s) IV Intermittent every 6 hours  dexMEDEtomidine Infusion 0.5 MICROgram(s)/kG/Hr IV Continuous <Continuous>  HYDROmorphone Infusion. 0.5 mG/Hr IV Continuous <Continuous>      aspirin  chewable 81 milliGRAM(s) Oral daily  clopidogrel Tablet 75 milliGRAM(s) Oral daily  heparin   Injectable 5000 Unit(s) SubCutaneous every 8 hours    pantoprazole  Injectable 40 milliGRAM(s) IV Push daily      dextrose 50% Injectable 12.5 Gram(s) IV Push once  dextrose 50% Injectable 25 Gram(s) IV Push once  dextrose 50% Injectable 25 Gram(s) IV Push once  dextrose Oral Gel 15 Gram(s) Oral once PRN  glucagon  Injectable 1 milliGRAM(s) IntraMuscular once  hydrocortisone sodium succinate Injectable 50 milliGRAM(s) IV Push every 12 hours  insulin glargine Injectable (LANTUS) 10 Unit(s) SubCutaneous every morning  insulin lispro (ADMELOG) corrective regimen sliding scale   SubCutaneous every 6 hours  levothyroxine Injectable 75 MICROGram(s) IV Push at bedtime    dextrose 5%. 1000 milliLiter(s) IV Continuous <Continuous>  dextrose 5%. 1000 milliLiter(s) IV Continuous <Continuous>    influenza  Vaccine (HIGH DOSE) 0.5 milliLiter(s) IntraMuscular once    chlorhexidine 0.12% Liquid 15 milliLiter(s) Oral Mucosa every 12 hours  chlorhexidine 2% Cloths 1 Application(s) Topical daily        Mode: AC/ CMV (Assist Control/ Continuous Mandatory Ventilation)  RR (machine): 18  TV (machine): 400  FiO2: 40  PEEP: 8  ITime: 0.9  MAP: 11  PIP: 20      ICU Vital Signs Last 24 Hrs  T(C): 38.2 (22 Sep 2024 07:00), Max: 38.3 (21 Sep 2024 13:00)  T(F): 100.8 (22 Sep 2024 07:00), Max: 100.9 (21 Sep 2024 13:00)  HR: 102 (22 Sep 2024 09:51) (98 - 122)  BP: 141/75 (22 Sep 2024 04:00) (120/63 - 156/93)  BP(mean): 92 (22 Sep 2024 04:00) (79 - 112)  ABP: 175/97 (22 Sep 2024 07:00) (100/56 - 175/97)  ABP(mean): 132 (22 Sep 2024 07:00) (71 - 132)  RR: 22 (22 Sep 2024 07:00) (18 - 23)  SpO2: 97% (22 Sep 2024 09:51) (88% - 99%)    O2 Parameters below as of 22 Sep 2024 04:00  Patient On (Oxygen Delivery Method): ventilator    O2 Concentration (%): 50        ABG - ( 22 Sep 2024 09:34 )  pH, Arterial: 7.490 pH, Blood: x     /  pCO2: 40    /  pO2: 173   / HCO3: 30    / Base Excess: 7.2   /  SaO2: 100.0               I&O's Detail    21 Sep 2024 07:01  -  22 Sep 2024 07:00  --------------------------------------------------------  IN:    DOBUTamine: 326.4 mL    Glucerna 1.5: 160 mL    HYRDOmorphone: 18 mL    Ketamine: 100.1 mL    Midazolam: 111.6 mL    Norepinephrine: 30.6 mL  Total IN: 746.7 mL    OUT:    Indwelling Catheter - Urethral (mL): 1620 mL  Total OUT: 1620 mL    Total NET: -873.3 mL      22 Sep 2024 07:01  -  22 Sep 2024 11:33  --------------------------------------------------------  IN:    Dexmedetomidine: 22.6 mL    DOBUTamine: 40.8 mL    Glucerna 1.5: 90 mL    HYRDOmorphone: 2.4 mL    Midazolam: 13.5 mL  Total IN: 169.3 mL    OUT:    Indwelling Catheter - Urethral (mL): 200 mL    Norepinephrine: 0 mL  Total OUT: 200 mL    Total NET: -30.7 mL            LABS:                        10.8   11.79 )-----------( 120      ( 22 Sep 2024 03:00 )             33.9     09-22    145  |  107  |  38.8[H]  ----------------------------<  163[H]  4.0   |  29.0  |  0.72    Ca    8.5      22 Sep 2024 03:00  Phos  1.9     09-22  Mg     2.4     09-22    TPro  5.4[L]  /  Alb  3.3  /  TBili  0.4  /  DBili  x   /  AST  26  /  ALT  29  /  AlkPhos  105  09-22          CAPILLARY BLOOD GLUCOSE      POCT Blood Glucose.: 162 mg/dL (22 Sep 2024 05:11)      Urinalysis Basic - ( 22 Sep 2024 03:00 )    Color: x / Appearance: x / SG: x / pH: x  Gluc: 163 mg/dL / Ketone: x  / Bili: x / Urobili: x   Blood: x / Protein: x / Nitrite: x   Leuk Esterase: x / RBC: x / WBC x   Sq Epi: x / Non Sq Epi: x / Bacteria: x      CULTURES:  Culture Results:   Normal Respiratory Mary present (09-19 @ 00:35)  Culture Results:   No Legionella species isolated  Culture in progress (09-18 @ 16:57)  Culture Results:   No growth at 72 Hours (09-18 @ 16:57)  Rapid RVP Result: NotDetec (09-18 @ 15:50)  Culture Results:   No growth (09-18 @ 12:05)  Culture Results:   Growth in aerobic bottle: Pseudomonas aeruginosa  Direct identification is available within approximately 3-5  hours either by Blood Panel Multiplexed PCR or Direct  MALDI-TOF. Details: [email or web address] (09-18 @ 10:15)            Physical Examination:    General: No acute distress.      HEENT: Pupils equal, reactive to light. Symmetric. Chemosis.    PULM: Clear to auscultation bilaterally.    CVS: Regular rate and rhythm.    ABD: Soft, nondistended, nontender, hypoactive bowel sounds.    EXT: Mild generalized anasarca    SKIN: Warm and well perfused.    NEURO: RASS -1, eyes open does not track or follow commands        RADIOLOGY: < from: Xray Chest 1 View- PORTABLE-Routine (Xray Chest 1 View- PORTABLE-Routine in AM.) (09.21.24 @ 10:46) >  First study is from 9/20 and shows  The endotracheal tube is above the david. The nasogastric tube courses   below the left hemidiaphragm, tip is off the edge of film. Right sided   central line is in the region of the superior vena cava. Left axillary   midline  HEART: Obscured  LUNGS: There is opacity at the left base compatible with   effusion/infiltrate.  BONES: degenerative changes.    The follow-up is from 9/21 and shows stable left effusion/infiltrate..    --- End of Report ---      HOMERO BRADLEY MD; Attending Interventional Radiologist  This document has been electronically signed. Sep 21 2024  2:13PM        INVASIVE LINES: R IJ TLC 9/18, L ax A line   INDWELLING TSEVENSON: Y   VTE PROPHYLAXIS: Heparin SC   CAM ICU: +  CODE STATUS: FULL        CRITICAL CARE TIME SPENT: additional 72 minutes spent performing frequent bedside reassessments and augmenting plan of care to address problems of acute critical illness that pose high probability of life threatening deterioration and/or end organ damage/dysfunction and discussing goals of care, non-inclusive of time spent on procedures performed. Date of note entry is equal to date of services rendered.

## 2024-09-22 NOTE — PROGRESS NOTE ADULT - ASSESSMENT
69-year-old female with CAD, HTN, HLD, NSTEMI in 2011 s/p ELIZABETH to LAD, S/p ELIZABETH Distal LCx (4/30/2024), hypothyroid, rheumatoid arthritis, admitted 09/18/2024  with left-sided chest pain associated with generalized weakness, nausea and vomiting.    - Acute hypoxemic respiratory failure,  intubated, sedated on ventilator with Nimbex for paralytic therapy  - ARDS  - Lobar pneumonia s/p CT scan Negative for PE, does show a left lower lobe pneumonia.    - Septic/cardiogenic shock -   leukocytosis, elevated lactate 5.5, hypotension requiring vasopressor support (Levo and Pit) and IV fluids.  - Pseudomonas bacteremia  - Acute on chronic HFpEF, elevated BNP [id number] (09/18/2024)   - s/p TTE (09/19/2024) LVEF - 70 to 75 %.  - s/p Repeat echo (09/20/2024) with drop in EF - 20 - 25% with global hypokinesis off of . - Likely stunnint/stress induced CM, no evidence of ACS,   - s/p repeat portable TTE (09/22/2024) done by me with ICU team at bedside. LVEF - 60 - 65% on  - 5. Pericardial fat pad in anterior pericardial space.       plan:  Continue aspirin and plavix if there is no bleeding or platelet dyscrasia.   Case discussed with HF team, pt likely with stress induced CM.   Off of pressors   wean planned  Case discussed with ICU team at bedside.   Guarded prognosis.   Patient's primary cardiologist, Dr. Snyder

## 2024-09-22 NOTE — PROGRESS NOTE ADULT - ASSESSMENT
70 yo female with PMHx CAD NSTEMI in 2011 s/p ELIZABETH to LAD, s/p ELIZABETH Distal LCx (4/30/2024), HTN, HLD, hypothyroid, rheumatoid arthritis, who presented to ER with left-sided chest pain associated with generalized weakness, nausea and vomiting. Ruled in for severe sepsis secondary to LLL pneumonia POA, while in the ED developed worsening hypoxic respiratory failure and was emergently intubated. She had profound hypoxemia, ARDS requiring deep sedation and paralysis. Patient had uptrending lactate levels, worsening shock state on three vasopressors with concerns for acute systolic heart failure, cardiogenic shock.    # Acute hypoxic respiratory failure  # Undifferentiated shock, distributive and cardiogenic  # Lobar pneumonia  # Pseudomonas bacteremia  # Severe sepsis  # Acute systolic heart failure  # Metabolic lactic acidosis  # Hypergylcemia    Neuro: Attempting to lighten sedation burden now that oxygenation has improved, started Dexmedetomidine infusion and stopped Versed gtt. Weaning down Dilaudid gtt as tolerated.  Pulm: Full vent support, initially with profound hypoxemia with oxygenation improving s/p neuromuscular blockade. Weaned down to PEEP +8 / FiO2 50%. Will f/u repeat ABG and continue to wean further as tolerated.  CV: Distributive shock resoling, weaned off vasopressors. Acute systolic heart failure, suspected non-ischemic stress cardiomyopathy in response to severe sepsis. EF significantly improved on inotropic support, now that shock is resolving attempted weaning off inotropic support today however again with significant drop in VTI on bedside POCUS. VTI demonstrates a dose-dependent response, appears to be able to tolerate a dose reduction of dobutamine to 2.5 mcg/kg/min. Will check markers of end-organ perfusion including scvO2 and lactate, restart dobutamine at 2.5 mcg/kg/min and continue to trend end-organ perfusion markers. Added IV enalaprilat for uncontrolled hypertension and pre/afterload reduction. Tapering stress steroids.  GI: Continue tube feeds, PPI for GI ppx while critically ill  Renal: Renal perfusion improved in response to hemodynamic and inotropic support, will continue cantu today to monitor strict I&Os while manipulating inotrope dosing. Hypophosphatemia supplemented.  ID: Initial blood culture Pseudomonas aerguinosa, abx narrowed to Cefepime 2g q8h. New fever since 9/21 AM, with fever curve uptrending. Will give line holiday, look for non-infectious etiologies obtained LE duplex to r/o DVT, and look to d/c pepe tomorrow.  Heme: Heparin SC DVT ppx   Endo: Glycemic control improved, continue Lantus 10u daily and cover with ISS to keep goal -200 while critically ill. Continue levothyroxine for hypothyroidism.     Case d/w ICU Attending Dr. Mckenzie on multidisciplinary rounds.

## 2024-09-22 NOTE — PROGRESS NOTE ADULT - SUBJECTIVE AND OBJECTIVE BOX
Responsive this AM by opening her eyes, but not following comamnds. Now off vasopressors. Hypertensive.      Vital Signs:    T(C): 38.2 (09-22-24 @ 07:00), Max: 38.3 (09-21-24 @ 13:00)  HR: 102 (09-22-24 @ 09:51) (98 - 122)  BP: 141/75 (09-22-24 @ 04:00) (120/63 - 156/93)  RR: 22 (09-22-24 @ 07:00) (18 - 23)  SpO2: 97% (09-22-24 @ 09:51) (88% - 99%)    Vent data:    Mode: AC/ CMV (Assist Control/ Continuous Mandatory Ventilation), RR (machine): 18, TV (machine): 400, FiO2: 40, PEEP: 8, ITime: 0.9, MAP: 11, PIP: 20    I's/O's:      09-21-24 @ 07:01  -  09-22-24 @ 07:00  --------------------------------------------------------  IN: 746.7 mL / OUT: 1620 mL / NET: -873.3 mL    09-22-24 @ 07:01  -  09-22-24 @ 11:47  --------------------------------------------------------  IN: 169.3 mL / OUT: 200 mL / NET: -30.7 mL        PMH:    Sepsis    Dermatitis, unspecified-L30.9    Elevated white blood cell count, unspecified    Encounter for other screening for malignant neoplasm of breast-Z12.39    Other abnormality of red blood cells-R71.8    Pain in left foot    Pain in left shoulder-M25.512    Rheumatoid arthritis, unspecified    Spondylosis w/out myelopathy or radiculopathy, cervical region-M47.812    Unspecified osteoarthritis, unspecified site-M19.90    Handoff    MEWS Score    Hypothyroid    ADHD (attention deficit hyperactivity disorder)    HLD (hyperlipidemia)    Myocardial infarct    Stented coronary artery    Rheumatoid arthritis    Migraine    Severe sepsis    Acute respiratory failure with hypoxia    Community acquired pneumonia    Acute hypotension    Rheumatoid arthritis    Chronic pain syndrome    Advance care planning    Palliative care encounter    S/p bilateral carpal tunnel release    H/O cardiac catheterization    H/O laminectomy    H/O spinal fusion    S/P left knee arthroscopy    H/O total knee replacement, right    BODY ACHES    90+    Room Service Assist    Left lower lobe pneumonia    [email or web address]        Allergies:    tetracycline (Hives)  penicillins (Hives)  Butazolactin, Prolaprin (Hives)  azithromycin (Hives)  Zyrtec (Hives)      Labs:        Micro:        Physical Exam:    Gen: Intubated, sedated  HEENT: AT  CV: No obvious murmurs  Lungs: Mechanical breath sounds  Abd: Soft, NT  Ext: 2+ edema  Neuro: RASS -2      Radiology:      Medications:    acetaminophen   IVPB .. 1000 milliGRAM(s) IV Intermittent every 6 hours  aspirin  chewable 81 milliGRAM(s) Oral daily  cefepime  Injectable. 2000 milliGRAM(s) IV Push every 8 hours  chlorhexidine 0.12% Liquid 15 milliLiter(s) Oral Mucosa every 12 hours  chlorhexidine 2% Cloths 1 Application(s) Topical daily  clopidogrel Tablet 75 milliGRAM(s) Oral daily  dexMEDEtomidine Infusion 0.5 MICROgram(s)/kG/Hr IV Continuous <Continuous>  dextrose 5%. 1000 milliLiter(s) IV Continuous <Continuous>  dextrose 5%. 1000 milliLiter(s) IV Continuous <Continuous>  dextrose 50% Injectable 12.5 Gram(s) IV Push once  dextrose 50% Injectable 25 Gram(s) IV Push once  dextrose 50% Injectable 25 Gram(s) IV Push once  dextrose Oral Gel 15 Gram(s) Oral once PRN  enalaprilat Injectable 1.25 milliGRAM(s) IV Push every 6 hours  glucagon  Injectable 1 milliGRAM(s) IntraMuscular once  heparin   Injectable 5000 Unit(s) SubCutaneous every 8 hours  hydrocortisone sodium succinate Injectable 50 milliGRAM(s) IV Push every 12 hours  HYDROmorphone Infusion. 0.5 mG/Hr IV Continuous <Continuous>  influenza  Vaccine (HIGH DOSE) 0.5 milliLiter(s) IntraMuscular once  insulin glargine Injectable (LANTUS) 10 Unit(s) SubCutaneous every morning  insulin lispro (ADMELOG) corrective regimen sliding scale   SubCutaneous every 6 hours  levothyroxine Injectable 75 MICROGram(s) IV Push at bedtime  pantoprazole  Injectable 40 milliGRAM(s) IV Push daily

## 2024-09-22 NOTE — PROGRESS NOTE ADULT - ASSESSMENT
70 yo f pmhx CAD, NSTEMI (2011) s/p ELIZABETH to LAD and dLCx (4/2024), HTN,  HLD, hypothyroidism, RA admitted with LLL PNA, acute hypoxic respiratory failure requiring intubation, deteriorated into ARDS requiring deep sedation and paralytic therapy found with pseudomonal bacteremia and new inotrope dependent BiV fx.    NEURO: Remains sedation on versed and dilaudid gtt, weaning versed gtt as tolerated  CV: Weaned off levophed evening, remains dependent on dobutamine for inotropic support for organ perfusion. Monitoring end points of perfusion. Continued stress dose steroids. DAPT therapy  RESP: ac/vc 4-6 cc/kg tv lung protective strategies, on 50%/8, actively titrating fio2/peep to maintain spo2 >92%   RENAL: monitor urine output, bun/cr and electrolytes. replace lytes as needed. cantu for strict I&Os  GI: NPO on tf, No BM since admission on bowel regimen and tf  ENDO: Lantus/ISS for glycemic control   ID: Cefepime for coverage  HEME: Heparin for vte ppx    DISPO: Full code     Critical Care time: 40 mins assessing presenting problems of acute illness that poses high probability of life threatening deterioration or end organ damage/dysfunction.  Medical decision making including Initiating plan of care, reviewing data, reviewing radiology, discussing with multidisciplinary team, non inclusive of procedures, discussing goals of care with patient/family    DATE OF DOCUMENTATION EQUIVALENT TO DATE OF SERVICES RENDERED

## 2024-09-22 NOTE — PROGRESS NOTE ADULT - SUBJECTIVE AND OBJECTIVE BOX
S: Patient remains intubated in ICU. She is off of pressors and only remains on  5    TELEMETRY: sr    MEDICATIONS  (STANDING):  aspirin  chewable 81 milliGRAM(s) Oral daily  cefepime  Injectable. 2000 milliGRAM(s) IV Push every 8 hours  chlorhexidine 0.12% Liquid 15 milliLiter(s) Oral Mucosa every 12 hours  chlorhexidine 2% Cloths 1 Application(s) Topical daily  clopidogrel Tablet 75 milliGRAM(s) Oral daily  dexMEDEtomidine Infusion 0.5 MICROgram(s)/kG/Hr (11.3 mL/Hr) IV Continuous <Continuous>  dextrose 5%. 1000 milliLiter(s) (50 mL/Hr) IV Continuous <Continuous>  dextrose 5%. 1000 milliLiter(s) (100 mL/Hr) IV Continuous <Continuous>  dextrose 50% Injectable 25 Gram(s) IV Push once  dextrose 50% Injectable 25 Gram(s) IV Push once  dextrose 50% Injectable 12.5 Gram(s) IV Push once  DOBUTamine Infusion 5 MICROgram(s)/kG/Min (13.6 mL/Hr) IV Continuous <Continuous>  enalaprilat Injectable 1.25 milliGRAM(s) IV Push every 6 hours  glucagon  Injectable 1 milliGRAM(s) IntraMuscular once  heparin   Injectable 5000 Unit(s) SubCutaneous every 8 hours  hydrocortisone sodium succinate Injectable 50 milliGRAM(s) IV Push every 12 hours  HYDROmorphone Infusion. 0.5 mG/Hr (0.5 mL/Hr) IV Continuous <Continuous>  influenza  Vaccine (HIGH DOSE) 0.5 milliLiter(s) IntraMuscular once  insulin glargine Injectable (LANTUS) 10 Unit(s) SubCutaneous every morning  insulin lispro (ADMELOG) corrective regimen sliding scale   SubCutaneous every 6 hours  levothyroxine Injectable 75 MICROGram(s) IV Push at bedtime  midazolam Infusion 0.02 mG/kG/Hr (1.81 mL/Hr) IV Continuous <Continuous>  norepinephrine Infusion 0.54 MICROgram(s)/kG/Min (45.9 mL/Hr) IV Continuous <Continuous>  pantoprazole  Injectable 40 milliGRAM(s) IV Push daily    MEDICATIONS  (PRN):  dextrose Oral Gel 15 Gram(s) Oral once PRN Blood Glucose LESS THAN 70 milliGRAM(s)/deciliter        Vital Signs Last 24 Hrs  T(C): 38.2 (22 Sep 2024 07:00), Max: 38.3 (21 Sep 2024 13:00)  T(F): 100.8 (22 Sep 2024 07:00), Max: 100.9 (21 Sep 2024 13:00)  HR: 102 (22 Sep 2024 09:51) (98 - 122)  BP: 141/75 (22 Sep 2024 04:00) (120/63 - 156/93)  BP(mean): 92 (22 Sep 2024 04:00) (79 - 112)  RR: 22 (22 Sep 2024 07:00) (18 - 23)  SpO2: 97% (22 Sep 2024 09:51) (88% - 99%)    Parameters below as of 22 Sep 2024 04:00  Patient On (Oxygen Delivery Method): ventilator    O2 Concentration (%): 50    Daily     Daily Weight in k.5 (22 Sep 2024 03:20)    I&O's Detail    21 Sep 2024 07:01  -  22 Sep 2024 07:00  --------------------------------------------------------  IN:    DOBUTamine: 326.4 mL    Glucerna 1.5: 160 mL    HYRDOmorphone: 18 mL    Ketamine: 100.1 mL    Midazolam: 111.6 mL    Norepinephrine: 30.6 mL  Total IN: 746.7 mL    OUT:    Indwelling Catheter - Urethral (mL): 1620 mL  Total OUT: 1620 mL    Total NET: -873.3 mL      22 Sep 2024 07:01  -  22 Sep 2024 10:10  --------------------------------------------------------  IN:    Dexmedetomidine: 22.6 mL    DOBUTamine: 40.8 mL    Glucerna 1.5: 90 mL    HYRDOmorphone: 2.4 mL    Midazolam: 13.5 mL  Total IN: 169.3 mL    OUT:    Indwelling Catheter - Urethral (mL): 200 mL    Norepinephrine: 0 mL  Total OUT: 200 mL    Total NET: -30.7 mL          PHYSICAL EXAM:  Appearance: Intubated  Cardiovascular: Normal S1 S2  Respiratory: Scattered coarse BS  Gastrointestinal:  Soft, Non-tender, + BS, no bruits	  Neurologic: Grossly non-focal.  Extremities: + edema    LABS:                        10.8   11.79 )-----------( 120      ( 22 Sep 2024 03:00 )             33.9         145  |  107  |  38.8[H]  ----------------------------<  163[H]  4.0   |  29.0  |  0.72    Ca    8.5      22 Sep 2024 03:00  Phos  1.9       Mg     2.4         TPro  5.4[L]  /  Alb  3.3  /  TBili  0.4  /  DBili  x   /  AST  26  /  ALT  29  /  AlkPhos  105            Urinalysis Basic - ( 22 Sep 2024 03:00 )    Color: x / Appearance: x / SG: x / pH: x  Gluc: 163 mg/dL / Ketone: x  / Bili: x / Urobili: x   Blood: x / Protein: x / Nitrite: x   Leuk Esterase: x / RBC: x / WBC x   Sq Epi: x / Non Sq Epi: x / Bacteria: x      I&O's Summary    21 Sep 2024 07:  -  22 Sep 2024 07:00  --------------------------------------------------------  IN: 746.7 mL / OUT: 1620 mL / NET: -873.3 mL    22 Sep 2024 07:  -  22 Sep 2024 10:10  --------------------------------------------------------  IN: 169.3 mL / OUT: 200 mL / NET: -30.7 mL

## 2024-09-23 DIAGNOSIS — M06.9 RHEUMATOID ARTHRITIS, UNSPECIFIED: ICD-10-CM

## 2024-09-23 LAB
ALBUMIN SERPL ELPH-MCNC: 3.1 G/DL — LOW (ref 3.3–5.2)
ALBUMIN SERPL ELPH-MCNC: 3.1 G/DL — LOW (ref 3.3–5.2)
ALBUMIN SERPL ELPH-MCNC: 3.2 G/DL — LOW (ref 3.3–5.2)
ALP SERPL-CCNC: 112 U/L — SIGNIFICANT CHANGE UP (ref 40–120)
ALP SERPL-CCNC: 115 U/L — SIGNIFICANT CHANGE UP (ref 40–120)
ALP SERPL-CCNC: 116 U/L — SIGNIFICANT CHANGE UP (ref 40–120)
ALT FLD-CCNC: 31 U/L — SIGNIFICANT CHANGE UP
ALT FLD-CCNC: 31 U/L — SIGNIFICANT CHANGE UP
ALT FLD-CCNC: 36 U/L — HIGH
ANION GAP SERPL CALC-SCNC: 10 MMOL/L — SIGNIFICANT CHANGE UP (ref 5–17)
ANION GAP SERPL CALC-SCNC: 10 MMOL/L — SIGNIFICANT CHANGE UP (ref 5–17)
ANION GAP SERPL CALC-SCNC: 11 MMOL/L — SIGNIFICANT CHANGE UP (ref 5–17)
AST SERPL-CCNC: 35 U/L — HIGH
AST SERPL-CCNC: 40 U/L — HIGH
AST SERPL-CCNC: 47 U/L — HIGH
BASOPHILS # BLD AUTO: 0.07 K/UL — SIGNIFICANT CHANGE UP (ref 0–0.2)
BASOPHILS NFR BLD AUTO: 0.6 % — SIGNIFICANT CHANGE UP (ref 0–2)
BILIRUB SERPL-MCNC: 0.4 MG/DL — SIGNIFICANT CHANGE UP (ref 0.4–2)
BILIRUB SERPL-MCNC: 0.5 MG/DL — SIGNIFICANT CHANGE UP (ref 0.4–2)
BILIRUB SERPL-MCNC: 0.5 MG/DL — SIGNIFICANT CHANGE UP (ref 0.4–2)
BUN SERPL-MCNC: 27 MG/DL — HIGH (ref 8–20)
BUN SERPL-MCNC: 29.7 MG/DL — HIGH (ref 8–20)
BUN SERPL-MCNC: 33.4 MG/DL — HIGH (ref 8–20)
CALCIUM SERPL-MCNC: 7.6 MG/DL — LOW (ref 8.4–10.5)
CALCIUM SERPL-MCNC: 7.8 MG/DL — LOW (ref 8.4–10.5)
CALCIUM SERPL-MCNC: 8.2 MG/DL — LOW (ref 8.4–10.5)
CHLORIDE SERPL-SCNC: 108 MMOL/L — SIGNIFICANT CHANGE UP (ref 96–108)
CHLORIDE SERPL-SCNC: 109 MMOL/L — HIGH (ref 96–108)
CHLORIDE SERPL-SCNC: 110 MMOL/L — HIGH (ref 96–108)
CO2 SERPL-SCNC: 24 MMOL/L — SIGNIFICANT CHANGE UP (ref 22–29)
CO2 SERPL-SCNC: 26 MMOL/L — SIGNIFICANT CHANGE UP (ref 22–29)
CO2 SERPL-SCNC: 26 MMOL/L — SIGNIFICANT CHANGE UP (ref 22–29)
CREAT SERPL-MCNC: 0.31 MG/DL — LOW (ref 0.5–1.3)
CREAT SERPL-MCNC: 0.36 MG/DL — LOW (ref 0.5–1.3)
CREAT SERPL-MCNC: 0.5 MG/DL — SIGNIFICANT CHANGE UP (ref 0.5–1.3)
CULTURE RESULTS: SIGNIFICANT CHANGE UP
EGFR: 101 ML/MIN/1.73M2 — SIGNIFICANT CHANGE UP
EGFR: 109 ML/MIN/1.73M2 — SIGNIFICANT CHANGE UP
EGFR: 113 ML/MIN/1.73M2 — SIGNIFICANT CHANGE UP
EOSINOPHIL # BLD AUTO: 0.05 K/UL — SIGNIFICANT CHANGE UP (ref 0–0.5)
EOSINOPHIL NFR BLD AUTO: 0.5 % — SIGNIFICANT CHANGE UP (ref 0–6)
GAS PNL BLDA: SIGNIFICANT CHANGE UP
GLUCOSE BLDC GLUCOMTR-MCNC: 156 MG/DL — HIGH (ref 70–99)
GLUCOSE BLDC GLUCOMTR-MCNC: 159 MG/DL — HIGH (ref 70–99)
GLUCOSE BLDC GLUCOMTR-MCNC: 162 MG/DL — HIGH (ref 70–99)
GLUCOSE BLDC GLUCOMTR-MCNC: 184 MG/DL — HIGH (ref 70–99)
GLUCOSE SERPL-MCNC: 167 MG/DL — HIGH (ref 70–99)
GLUCOSE SERPL-MCNC: 175 MG/DL — HIGH (ref 70–99)
GLUCOSE SERPL-MCNC: 195 MG/DL — HIGH (ref 70–99)
HCT VFR BLD CALC: 28.6 % — LOW (ref 34.5–45)
HCT VFR BLD CALC: 34.2 % — LOW (ref 34.5–45)
HCT VFR BLD CALC: 35.5 % — SIGNIFICANT CHANGE UP (ref 34.5–45)
HGB BLD-MCNC: 11.3 G/DL — LOW (ref 11.5–15.5)
HGB BLD-MCNC: 11.5 G/DL — SIGNIFICANT CHANGE UP (ref 11.5–15.5)
HGB BLD-MCNC: 9.2 G/DL — LOW (ref 11.5–15.5)
IMM GRANULOCYTES NFR BLD AUTO: 6.1 % — HIGH (ref 0–0.9)
LYMPHOCYTES # BLD AUTO: 1.4 K/UL — SIGNIFICANT CHANGE UP (ref 1–3.3)
LYMPHOCYTES # BLD AUTO: 12.8 % — LOW (ref 13–44)
MAGNESIUM SERPL-MCNC: 2.2 MG/DL — SIGNIFICANT CHANGE UP (ref 1.6–2.6)
MAGNESIUM SERPL-MCNC: 2.2 MG/DL — SIGNIFICANT CHANGE UP (ref 1.6–2.6)
MAGNESIUM SERPL-MCNC: 2.2 MG/DL — SIGNIFICANT CHANGE UP (ref 1.8–2.6)
MCHC RBC-ENTMCNC: 32.2 GM/DL — SIGNIFICANT CHANGE UP (ref 32–36)
MCHC RBC-ENTMCNC: 32.4 GM/DL — SIGNIFICANT CHANGE UP (ref 32–36)
MCHC RBC-ENTMCNC: 33 GM/DL — SIGNIFICANT CHANGE UP (ref 32–36)
MCHC RBC-ENTMCNC: 33.6 PG — SIGNIFICANT CHANGE UP (ref 27–34)
MCHC RBC-ENTMCNC: 33.6 PG — SIGNIFICANT CHANGE UP (ref 27–34)
MCHC RBC-ENTMCNC: 33.9 PG — SIGNIFICANT CHANGE UP (ref 27–34)
MCV RBC AUTO: 102.7 FL — HIGH (ref 80–100)
MCV RBC AUTO: 103.8 FL — HIGH (ref 80–100)
MCV RBC AUTO: 104.4 FL — HIGH (ref 80–100)
MONOCYTES # BLD AUTO: 0.99 K/UL — HIGH (ref 0–0.9)
MONOCYTES NFR BLD AUTO: 9 % — SIGNIFICANT CHANGE UP (ref 2–14)
NEUTROPHILS # BLD AUTO: 7.79 K/UL — HIGH (ref 1.8–7.4)
NEUTROPHILS NFR BLD AUTO: 71 % — SIGNIFICANT CHANGE UP (ref 43–77)
NRBC # BLD: 1 /100 WBCS — HIGH (ref 0–0)
NRBC # BLD: 2 /100 WBCS — HIGH (ref 0–0)
PHOSPHATE SERPL-MCNC: 2.1 MG/DL — LOW (ref 2.4–4.7)
PHOSPHATE SERPL-MCNC: 2.2 MG/DL — LOW (ref 2.4–4.7)
PHOSPHATE SERPL-MCNC: 2.6 MG/DL — SIGNIFICANT CHANGE UP (ref 2.4–4.7)
PLATELET # BLD AUTO: 59 K/UL — LOW (ref 150–400)
PLATELET # BLD AUTO: 70 K/UL — LOW (ref 150–400)
PLATELET # BLD AUTO: 74 K/UL — LOW (ref 150–400)
POTASSIUM SERPL-MCNC: 3.4 MMOL/L — LOW (ref 3.5–5.3)
POTASSIUM SERPL-MCNC: 3.4 MMOL/L — LOW (ref 3.5–5.3)
POTASSIUM SERPL-MCNC: 4 MMOL/L — SIGNIFICANT CHANGE UP (ref 3.5–5.3)
POTASSIUM SERPL-SCNC: 3.4 MMOL/L — LOW (ref 3.5–5.3)
POTASSIUM SERPL-SCNC: 3.4 MMOL/L — LOW (ref 3.5–5.3)
POTASSIUM SERPL-SCNC: 4 MMOL/L — SIGNIFICANT CHANGE UP (ref 3.5–5.3)
PROCALCITONIN SERPL-MCNC: 8.05 NG/ML — HIGH (ref 0.02–0.1)
PROT SERPL-MCNC: 4.9 G/DL — LOW (ref 6.6–8.7)
PROT SERPL-MCNC: 5 G/DL — LOW (ref 6.6–8.7)
PROT SERPL-MCNC: 5 G/DL — LOW (ref 6.6–8.7)
RBC # BLD: 2.74 M/UL — LOW (ref 3.8–5.2)
RBC # BLD: 3.33 M/UL — LOW (ref 3.8–5.2)
RBC # BLD: 3.42 M/UL — LOW (ref 3.8–5.2)
RBC # FLD: 13.5 % — SIGNIFICANT CHANGE UP (ref 10.3–14.5)
RBC # FLD: 13.6 % — SIGNIFICANT CHANGE UP (ref 10.3–14.5)
RBC # FLD: 13.6 % — SIGNIFICANT CHANGE UP (ref 10.3–14.5)
SODIUM SERPL-SCNC: 143 MMOL/L — SIGNIFICANT CHANGE UP (ref 135–145)
SODIUM SERPL-SCNC: 144 MMOL/L — SIGNIFICANT CHANGE UP (ref 135–145)
SODIUM SERPL-SCNC: 146 MMOL/L — HIGH (ref 135–145)
SPECIMEN SOURCE: SIGNIFICANT CHANGE UP
WBC # BLD: 10.97 K/UL — HIGH (ref 3.8–10.5)
WBC # BLD: 13.27 K/UL — HIGH (ref 3.8–10.5)
WBC # BLD: 14.9 K/UL — HIGH (ref 3.8–10.5)
WBC # FLD AUTO: 10.97 K/UL — HIGH (ref 3.8–10.5)
WBC # FLD AUTO: 13.27 K/UL — HIGH (ref 3.8–10.5)
WBC # FLD AUTO: 14.9 K/UL — HIGH (ref 3.8–10.5)

## 2024-09-23 PROCEDURE — 93970 EXTREMITY STUDY: CPT | Mod: 26

## 2024-09-23 PROCEDURE — 71045 X-RAY EXAM CHEST 1 VIEW: CPT | Mod: 26

## 2024-09-23 PROCEDURE — 99291 CRITICAL CARE FIRST HOUR: CPT

## 2024-09-23 RX ORDER — ACETAMINOPHEN 325 MG
1000 TABLET ORAL ONCE
Refills: 0 | Status: DISCONTINUED | OUTPATIENT
Start: 2024-09-23 | End: 2024-09-24

## 2024-09-23 RX ORDER — SOD PHOS DI, MONO/K PHOS MONO 250 MG
1 TABLET ORAL ONCE
Refills: 0 | Status: COMPLETED | OUTPATIENT
Start: 2024-09-23 | End: 2024-09-23

## 2024-09-23 RX ORDER — POTASSIUM PHOSPHATE, MONOBASIC POTASSIUM PHOSPHATE, DIBASIC 224; 236 MG/ML; MG/ML
15 INJECTION, SOLUTION, CONCENTRATE INTRAVENOUS ONCE
Refills: 0 | Status: COMPLETED | OUTPATIENT
Start: 2024-09-23 | End: 2024-09-23

## 2024-09-23 RX ORDER — LOSARTAN POTASSIUM 100 MG/1
50 TABLET, FILM COATED ORAL DAILY
Refills: 0 | Status: DISCONTINUED | OUTPATIENT
Start: 2024-09-23 | End: 2024-09-24

## 2024-09-23 RX ORDER — PROPOFOL 10 MG/ML
10 INJECTION, EMULSION INTRAVENOUS
Qty: 1000 | Refills: 0 | Status: DISCONTINUED | OUTPATIENT
Start: 2024-09-23 | End: 2024-09-24

## 2024-09-23 RX ORDER — SODIUM PHOSPHATE IN D5W 15MMOL/250
15 PLASTIC BAG, INJECTION (ML) INTRAVENOUS ONCE
Refills: 0 | Status: COMPLETED | OUTPATIENT
Start: 2024-09-23 | End: 2024-09-24

## 2024-09-23 RX ORDER — HYDRALAZINE HYDROCHLORIDE 100 MG/1
10 TABLET ORAL EVERY 4 HOURS
Refills: 0 | Status: DISCONTINUED | OUTPATIENT
Start: 2024-09-23 | End: 2024-09-30

## 2024-09-23 RX ADMIN — Medication 1 DROP(S): at 21:25

## 2024-09-23 RX ADMIN — PROPOFOL 5.44 MICROGRAM(S)/KG/MIN: 10 INJECTION, EMULSION INTRAVENOUS at 16:18

## 2024-09-23 RX ADMIN — DEXMEDETOMIDINE HYDROCHLORIDE IN 0.9% SODIUM CHLORIDE 11.3 MICROGRAM(S)/KG/HR: 400 INJECTION INTRAVENOUS at 08:02

## 2024-09-23 RX ADMIN — DEXMEDETOMIDINE HYDROCHLORIDE IN 0.9% SODIUM CHLORIDE 11.3 MICROGRAM(S)/KG/HR: 400 INJECTION INTRAVENOUS at 11:24

## 2024-09-23 RX ADMIN — Medication 40 MILLIEQUIVALENT(S): at 11:25

## 2024-09-23 RX ADMIN — Medication 100 MICROGRAM(S): at 05:24

## 2024-09-23 RX ADMIN — CHLORHEXIDINE GLUCONATE ORAL RINSE 15 MILLILITER(S): 1.2 SOLUTION DENTAL at 17:39

## 2024-09-23 RX ADMIN — Medication 2: at 17:37

## 2024-09-23 RX ADMIN — Medication 1 DROP(S): at 02:41

## 2024-09-23 RX ADMIN — Medication 1000 MILLIGRAM(S): at 02:39

## 2024-09-23 RX ADMIN — Medication 50 MICROGRAM(S): at 15:32

## 2024-09-23 RX ADMIN — DEXMEDETOMIDINE HYDROCHLORIDE IN 0.9% SODIUM CHLORIDE 11.3 MICROGRAM(S)/KG/HR: 400 INJECTION INTRAVENOUS at 09:18

## 2024-09-23 RX ADMIN — Medication 1 DROP(S): at 17:39

## 2024-09-23 RX ADMIN — Medication 100 MILLIEQUIVALENT(S): at 15:10

## 2024-09-23 RX ADMIN — Medication 1000 MILLIGRAM(S): at 06:13

## 2024-09-23 RX ADMIN — Medication 100 MILLIEQUIVALENT(S): at 13:51

## 2024-09-23 RX ADMIN — CHLORHEXIDINE GLUCONATE ORAL RINSE 1 APPLICATION(S): 1.2 SOLUTION DENTAL at 11:26

## 2024-09-23 RX ADMIN — DEXMEDETOMIDINE HYDROCHLORIDE IN 0.9% SODIUM CHLORIDE 11.3 MICROGRAM(S)/KG/HR: 400 INJECTION INTRAVENOUS at 00:38

## 2024-09-23 RX ADMIN — DEXMEDETOMIDINE HYDROCHLORIDE IN 0.9% SODIUM CHLORIDE 11.3 MICROGRAM(S)/KG/HR: 400 INJECTION INTRAVENOUS at 13:53

## 2024-09-23 RX ADMIN — DEXMEDETOMIDINE HYDROCHLORIDE IN 0.9% SODIUM CHLORIDE 11.3 MICROGRAM(S)/KG/HR: 400 INJECTION INTRAVENOUS at 17:36

## 2024-09-23 RX ADMIN — DEXMEDETOMIDINE HYDROCHLORIDE IN 0.9% SODIUM CHLORIDE 11.3 MICROGRAM(S)/KG/HR: 400 INJECTION INTRAVENOUS at 02:15

## 2024-09-23 RX ADMIN — Medication 2: at 00:41

## 2024-09-23 RX ADMIN — DEXMEDETOMIDINE HYDROCHLORIDE IN 0.9% SODIUM CHLORIDE 11.3 MICROGRAM(S)/KG/HR: 400 INJECTION INTRAVENOUS at 13:01

## 2024-09-23 RX ADMIN — Medication 75 MILLIGRAM(S): at 11:25

## 2024-09-23 RX ADMIN — Medication 81 MILLIGRAM(S): at 11:25

## 2024-09-23 RX ADMIN — Medication 1 PACKET(S): at 11:25

## 2024-09-23 RX ADMIN — Medication 1.25 MILLIGRAM(S): at 00:42

## 2024-09-23 RX ADMIN — Medication 2: at 06:48

## 2024-09-23 RX ADMIN — Medication 100 MILLIEQUIVALENT(S): at 16:18

## 2024-09-23 RX ADMIN — DEXMEDETOMIDINE HYDROCHLORIDE IN 0.9% SODIUM CHLORIDE 11.3 MICROGRAM(S)/KG/HR: 400 INJECTION INTRAVENOUS at 15:10

## 2024-09-23 RX ADMIN — Medication 1 DROP(S): at 13:02

## 2024-09-23 RX ADMIN — POTASSIUM PHOSPHATE, MONOBASIC POTASSIUM PHOSPHATE, DIBASIC 62.5 MILLIMOLE(S): 224; 236 INJECTION, SOLUTION, CONCENTRATE INTRAVENOUS at 08:01

## 2024-09-23 RX ADMIN — MEROPENEM 1000 MILLIGRAM(S): 500 INJECTION INTRAVENOUS at 21:25

## 2024-09-23 RX ADMIN — INSULIN GLARGINE 10 UNIT(S): 300 INJECTION, SOLUTION SUBCUTANEOUS at 08:49

## 2024-09-23 RX ADMIN — CHLORHEXIDINE GLUCONATE ORAL RINSE 15 MILLILITER(S): 1.2 SOLUTION DENTAL at 05:24

## 2024-09-23 RX ADMIN — Medication 5000 UNIT(S): at 05:24

## 2024-09-23 RX ADMIN — MEROPENEM 1000 MILLIGRAM(S): 500 INJECTION INTRAVENOUS at 05:23

## 2024-09-23 RX ADMIN — LOSARTAN POTASSIUM 50 MILLIGRAM(S): 100 TABLET, FILM COATED ORAL at 11:28

## 2024-09-23 RX ADMIN — Medication 50 MICROGRAM(S): at 16:00

## 2024-09-23 RX ADMIN — Medication 2: at 11:33

## 2024-09-23 RX ADMIN — PANTOPRAZOLE SODIUM 40 MILLIGRAM(S): 40 TABLET, DELAYED RELEASE ORAL at 11:26

## 2024-09-23 RX ADMIN — Medication 1.25 MILLIGRAM(S): at 05:24

## 2024-09-23 RX ADMIN — MEROPENEM 1000 MILLIGRAM(S): 500 INJECTION INTRAVENOUS at 13:01

## 2024-09-23 RX ADMIN — Medication 1 DROP(S): at 05:25

## 2024-09-23 RX ADMIN — Medication 5000 UNIT(S): at 21:25

## 2024-09-23 RX ADMIN — Medication 40 MILLIEQUIVALENT(S): at 13:52

## 2024-09-23 RX ADMIN — Medication 200 GRAM(S): at 13:01

## 2024-09-23 RX ADMIN — Medication 1 DROP(S): at 09:49

## 2024-09-23 RX ADMIN — Medication 400 MILLIGRAM(S): at 05:24

## 2024-09-23 RX ADMIN — Medication 5000 UNIT(S): at 13:02

## 2024-09-23 NOTE — PROGRESS NOTE ADULT - SUBJECTIVE AND OBJECTIVE BOX
Intubated and sedated  bilat air entry  abd soft  bilat edema/anasarca     Hypertensive, not on pressors.     On Admission  09-18-24 (5d)  HPI:  69-year-old female with past medical history of CAD status post multiple stents,  hypothyroid, rheumatoid arthritis, presenting with left-sided chest pain associated with generalized weakness, nausea and vomiting.  Patient ill-appearing, unable to provide much history.  History mostly supplied by patient's family member at bedside.  Symptoms started 2 days ago, however today she felt much worse with prompted her to come to the ED.   (18 Sep 2024 13:37)    PAST MEDICAL & SURGICAL HISTORY:  Hypothyroid      ADHD (attention deficit hyperactivity disorder)      HLD (hyperlipidemia)      Myocardial infarct  2012      Stented coronary artery  2012      Rheumatoid arthritis      Migraine      S/p bilateral carpal tunnel release      H/O cardiac catheterization      H/O laminectomy      H/O spinal fusion      S/P left knee arthroscopy      H/O total knee replacement, right          Antimicrobial:  meropenem Injectable 1000 milliGRAM(s) IV Push every 8 hours    Cardiovascular:  DOBUTamine Infusion 2.5 MICROgram(s)/kG/Min IV Continuous <Continuous>  enalaprilat Injectable 1.25 milliGRAM(s) IV Push every 6 hours    Pulmonary:    Hematalogic:  aspirin  chewable 81 milliGRAM(s) Oral daily  clopidogrel Tablet 75 milliGRAM(s) Oral daily  heparin   Injectable 5000 Unit(s) SubCutaneous every 8 hours    Other:  artificial  tears Solution 1 Drop(s) Both EYES every 4 hours  chlorhexidine 0.12% Liquid 15 milliLiter(s) Oral Mucosa every 12 hours  chlorhexidine 2% Cloths 1 Application(s) Topical daily  dexMEDEtomidine Infusion 0.5 MICROgram(s)/kG/Hr IV Continuous <Continuous>  dextrose 5%. 1000 milliLiter(s) IV Continuous <Continuous>  dextrose 5%. 1000 milliLiter(s) IV Continuous <Continuous>  dextrose 50% Injectable 25 Gram(s) IV Push once  dextrose 50% Injectable 12.5 Gram(s) IV Push once  dextrose 50% Injectable 25 Gram(s) IV Push once  dextrose Oral Gel 15 Gram(s) Oral once PRN  fentaNYL    Injectable 50 MICROGram(s) IV Push every 2 hours PRN  glucagon  Injectable 1 milliGRAM(s) IntraMuscular once  influenza  Vaccine (HIGH DOSE) 0.5 milliLiter(s) IntraMuscular once  insulin glargine Injectable (LANTUS) 10 Unit(s) SubCutaneous every morning  insulin lispro (ADMELOG) corrective regimen sliding scale   SubCutaneous every 6 hours  levothyroxine 100 MICROGram(s) Oral daily  pantoprazole  Injectable 40 milliGRAM(s) IV Push daily  polyethylene glycol 3350 17 Gram(s) Oral daily PRN  propofol Infusion 10 MICROgram(s)/kG/Min IV Continuous <Continuous>      Drug Dosing Weight  Height (cm): 167.6 (18 Sep 2024 15:32)  Weight (kg): 90.7 (18 Sep 2024 15:32)  BMI (kg/m2): 32.3 (18 Sep 2024 15:32)  BSA (m2): 2 (18 Sep 2024 15:32)    T(C): 37.5 (09-23-24 @ 08:00), Max: 39 (09-22-24 @ 14:00)  HR: 87 (09-23-24 @ 08:37)  BP: 154/84 (09-22-24 @ 10:00)  BP(mean): 103 (09-22-24 @ 10:00)  ABP: 153/80 (09-23-24 @ 08:00)  ABP(mean): 110 (09-23-24 @ 08:00)  RR: 24 (09-23-24 @ 08:00)  SpO2: 98% (09-23-24 @ 08:37)    ABG - ( 23 Sep 2024 04:00 )  pH, Arterial: 7.520 pH, Blood: x     /  pCO2: 33    /  pO2: 131   / HCO3: 27    / Base Excess: 4.0   /  SaO2: 100.0                 09-22 @ 07:01  -  09-23 @ 07:00  --------------------------------------------------------  IN: 1554.4 mL / OUT: 1055 mL / NET: 499.4 mL        Mode: CPAP with PS  FiO2: 40  PEEP: 6  MAP: 12        LABS:  CBC Full  -  ( 23 Sep 2024 04:12 )  WBC Count : 10.97 K/uL  RBC Count : 2.74 M/uL  Hemoglobin : 9.2 g/dL  Hematocrit : 28.6 %  Platelet Count - Automated : 59 K/uL  Mean Cell Volume : 104.4 fl  Mean Cell Hemoglobin : 33.6 pg  Mean Cell Hemoglobin Concentration : 32.2 gm/dL  Auto Neutrophil # : 7.79 K/uL  Auto Lymphocyte # : 1.40 K/uL  Auto Monocyte # : 0.99 K/uL  Auto Eosinophil # : 0.05 K/uL  Auto Basophil # : 0.07 K/uL  Auto Neutrophil % : 71.0 %  Auto Lymphocyte % : 12.8 %  Auto Monocyte % : 9.0 %  Auto Eosinophil % : 0.5 %  Auto Basophil % : 0.6 %    09-23    144  |  108  |  33.4[H]  ----------------------------<  195[H]  3.4[L]   |  26.0  |  0.50    Ca    7.8[L]      23 Sep 2024 05:14  Phos  2.1     09-23  Mg     2.2     09-23    TPro  5.0[L]  /  Alb  3.1[L]  /  TBili  0.5  /  DBili  x   /  AST  40[H]  /  ALT  31  /  AlkPhos  116  09-23      Urinalysis Basic - ( 23 Sep 2024 05:14 )    Color: x / Appearance: x / SG: x / pH: x  Gluc: 195 mg/dL / Ketone: x  / Bili: x / Urobili: x   Blood: x / Protein: x / Nitrite: x   Leuk Esterase: x / RBC: x / WBC x   Sq Epi: x / Non Sq Epi: x / Bacteria: x        ____________________________________________________________________________________________________

## 2024-09-23 NOTE — PROGRESS NOTE ADULT - CRITICAL CARE ATTENDING COMMENT
Critical care time spent titrating IV sedatives, vasoactive medications, adjusting mechanical ventilator settings, critical care echocardiogram, interpreting blood gases and chest imaging.
Critical care time spent titrating IV sedatives, vasoactive medications, adjusting mechanical ventilator settings, interpreting blood gases and chest imaging.

## 2024-09-23 NOTE — CHART NOTE - NSCHARTNOTEFT_GEN_A_CORE
BRIEF HOSPITAL COURSE:  68 yo f pmhx CAD, NSTEMI (2011) s/p ELIZABETH to LAD and dLCx (4/2024), HTN,  HLD, hypothyroidism, RA admitted with LLL PNA, acute hypoxic respiratory failure requiring intubation, deteriorated into ARDS requiring deep sedation and paralytic therapy found with pseudomonal bacteremia and new inotrope dependent BiV fx.    Events last 24 hours:   Fever up trending, temp 102 now >99,   abx broadened from cefepime to vasyl for esbl coverage.  vanco x1.   patient comfortable on precedex, fentanyl ivp prn added, propofol held    68 yo f pmhx CAD, NSTEMI (2011) s/p ELIZABETH to LAD and dLCx (4/2024), HTN,  HLD, hypothyroidism, RA admitted with LLL PNA, acute hypoxic respiratory failure requiring intubation, deteriorated into ARDS requiring deep sedation and paralytic therapy found with pseudomonal bacteremia and new inotrope dependent BiV fx.    NEURO:   Patient on precedex for sedation, fentanyl ivp prn, prop held    CV:   remains dependent on dobutamine @ 2.5 for inotropic support for organ perfusion.  Adding __ for afterload reduction   Monitoring end points of perfusion.  discontinued stress dose steroids.   DAPT therapy stent history     RESP:   ac/vc 4-6 cc/kg tv lung protective strategies, on 40%/6, actively titrating fio2/peep to maintain spo2 >92%     RENAL:   monitor urine output, bun/cr and electrolytes. replace lytes as needed. cantu for strict I&Os  9/23 BUN 33.4/0.5 From 43.9/0.76 on 9/22  Net + 24H 500cc LOS net negative >900 cc  u/o 40-70 cc/hr 1L overnight     GI:   NPO on Glucerna 1.5  + BM 9/23    ENDO:   Lantus/ISS for glycemic control   A1C 6.4    ID:   Meropenem and vanco for coverage  Procal AM level  AM Lactate 1.2 on admission >5   9/18 BCx Pseudomonas A.   9/18 Sputum & Urine culture negative     HEME: Heparin for vte ppx      DISPO: Full code     Plan of care discussed with Dr. Fajardo BRIEF HOSPITAL COURSE:  68 yo f pmhx CAD, NSTEMI (2011) s/p ELIZABETH to LAD and dLCx (4/2024), HTN,  HLD, hypothyroidism, RA admitted with LLL PNA, acute hypoxic respiratory failure requiring intubation, deteriorated into ARDS requiring deep sedation and paralytic therapy found with pseudomonal bacteremia and new inotrope dependent BiV fx.    Events last 24 hours:   Fever up trending, temp 102 now >99,   abx broadened from cefepime to vasyl for esbl coverage.  vanco x1.   patient comfortable on precedex, fentanyl ivp prn added, propofol held    68 yo f pmhx CAD, NSTEMI (2011) s/p ELIZABETH to LAD and dLCx (4/2024), HTN,  HLD, hypothyroidism, RA admitted with LLL PNA, acute hypoxic respiratory failure requiring intubation, deteriorated into ARDS requiring deep sedation and paralytic therapy found with pseudomonal bacteremia and new inotrope dependent BiV fx.    NEURO:   Patient on precedex for sedation & fentanyl ivp prn  Prop restarted at 1630 for increased vent desynchrony RASS -2    CV:   Remains dependent on dobutamine @ 2.5 for inotropic support for organ perfusion.   Repeat TTE ordered for 9/24 AM  Adding losartan for afterload reduction   Monitoring end points of perfusion.  No longer on stress dose steroids.   DAPT therapy stent history     RESP:   ac/vc 4-6 cc/kg tv lung protective strategies,   Tolerated cpap 10/6 throughout day  on 40%/6, actively titrating fio2/peep to maintain spo2 >92%     RENAL:   monitor urine output, bun/cr and electrolytes. replace lytes as needed. cantu for strict I&Os  9/23 BUN 29.7/0.37 From 33.4/0.5  Net + 24H 500cc LOS net negative >900 cc  u/o 40-70 cc/hr 1L overnight - U/O  cc/hr dayshift holding off diuresis     GI:   NPO on Glucerna 1.5  + BM 9/23    ENDO:   Lantus/ISS for glycemic control   A1C 6.4    ID:   Meropenem and vanco for coverage  Procal AM level  AM Lactate 1.2 on admission >5   9/18 BCx Pseudomonas A.   9/18 Sputum & Urine culture negative     HEME: Heparin for vte ppx      DISPO: Full code     Plan of care discussed with Dr. Fajardo

## 2024-09-23 NOTE — PROGRESS NOTE ADULT - SUBJECTIVE AND OBJECTIVE BOX
Patient is a 69y old  Female who presents with a chief complaint of Shortness of breath (22 Sep 2024 11:46)      BRIEF HOSPITAL COURSE:  70 yo f pmhx CAD, NSTEMI (2011) s/p ELIZABETH to LAD and dLCx (4/2024), HTN,  HLD, hypothyroidism, RA admitted with LLL PNA, acute hypoxic respiratory failure requiring intubation, deteriorated into ARDS requiring deep sedation and paralytic therapy found with pseudomonal bacteremia and new inotrope dependent BiV fx.    Events last 24 hours:   Fever up trending, temp 102, abx broadened from cefepime to vasyl for esbl coverage.  vanco x1. patient restless on max dose precedex, fentanyl ivp prn added with minimal improvement, low dose propofol added    PAST MEDICAL & SURGICAL HISTORY:  Hypothyroid  ADHD (attention deficit hyperactivity disorder)  HLD (hyperlipidemia)  Myocardial infarct  2012  Stented coronary artery  2012  Rheumatoid arthritis  Migraine  S/p bilateral carpal tunnel release  H/O cardiac catheterization  H/O laminectomy  H/O spinal fusion  S/P left knee arthroscopy  H/O total knee replacement, right      Allergies  tetracycline (Hives)  penicillins (Hives)  Butazolactin, Prolaprin (Hives)  azithromycin (Hives)  Zyrtec (Hives)      FAMILY HISTORY:  unable to obtain       Social History:   from home      Review of Systems:  unable to obtain 2/2 intubated/sedated      Physical Examination:    General: elderly female, lying in bed    HEENT: ett and ogt in place    PULM: diminished b/l    CVS: sinus tach    ABD: Soft, mildly distended, nontender, +bs    EXT: +edema    SKIN: Warm     NEURO: sedated      Medications:  meropenem Injectable 1000 milliGRAM(s) IV Push every 8 hours  DOBUTamine Infusion 2.5 MICROgram(s)/kG/Min IV Continuous <Continuous>  enalaprilat Injectable 1.25 milliGRAM(s) IV Push every 6 hours  acetaminophen   IVPB .. 1000 milliGRAM(s) IV Intermittent every 6 hours  dexMEDEtomidine Infusion 0.5 MICROgram(s)/kG/Hr IV Continuous <Continuous>  fentaNYL    Injectable 50 MICROGram(s) IV Push every 2 hours PRN  propofol Infusion 10 MICROgram(s)/kG/Min IV Continuous <Continuous>  aspirin  chewable 81 milliGRAM(s) Oral daily  clopidogrel Tablet 75 milliGRAM(s) Oral daily  heparin   Injectable 5000 Unit(s) SubCutaneous every 8 hours  pantoprazole  Injectable 40 milliGRAM(s) IV Push daily  dextrose 50% Injectable 25 Gram(s) IV Push once  dextrose 50% Injectable 25 Gram(s) IV Push once  dextrose 50% Injectable 12.5 Gram(s) IV Push once  dextrose Oral Gel 15 Gram(s) Oral once PRN  glucagon  Injectable 1 milliGRAM(s) IntraMuscular once  insulin glargine Injectable (LANTUS) 10 Unit(s) SubCutaneous every morning  insulin lispro (ADMELOG) corrective regimen sliding scale   SubCutaneous every 6 hours  levothyroxine 100 MICROGram(s) Oral daily  dextrose 5%. 1000 milliLiter(s) IV Continuous <Continuous>  dextrose 5%. 1000 milliLiter(s) IV Continuous <Continuous>  influenza  Vaccine (HIGH DOSE) 0.5 milliLiter(s) IntraMuscular once  artificial  tears Solution 1 Drop(s) Both EYES every 4 hours  chlorhexidine 0.12% Liquid 15 milliLiter(s) Oral Mucosa every 12 hours  chlorhexidine 2% Cloths 1 Application(s) Topical daily      Mode: AC/ CMV (Assist Control/ Continuous Mandatory Ventilation)  RR (machine): 18  TV (machine): 400  FiO2: 40  PEEP: 6  ITime: 0.9  MAP: 13  PIP: 28      ICU Vital Signs Last 24 Hrs  T(C): 38.9 (23 Sep 2024 00:00), Max: 39 (22 Sep 2024 14:00)  T(F): 102 (23 Sep 2024 00:00), Max: 102.2 (22 Sep 2024 14:00)  HR: 101 (23 Sep 2024 00:14) (82 - 122)  BP: 154/84 (22 Sep 2024 10:00) (141/75 - 156/93)  BP(mean): 103 (22 Sep 2024 10:00) (92 - 112)  ABP: 156/85 (23 Sep 2024 00:00) (129/72 - 179/100)  ABP(mean): 114 (23 Sep 2024 00:00) (95 - 135)  RR: 25 (23 Sep 2024 00:00) (11 - 26)  SpO2: 99% (23 Sep 2024 00:14) (91% - 100%)    O2 Parameters below as of 22 Sep 2024 04:00  Patient On (Oxygen Delivery Method): ventilator    O2 Concentration (%): 50      Vital Signs Last 24 Hrs  T(C): 38.9 (23 Sep 2024 00:00), Max: 39 (22 Sep 2024 14:00)  T(F): 102 (23 Sep 2024 00:00), Max: 102.2 (22 Sep 2024 14:00)  HR: 101 (23 Sep 2024 00:14) (82 - 122)  BP: 154/84 (22 Sep 2024 10:00) (141/75 - 156/93)  BP(mean): 103 (22 Sep 2024 10:00) (92 - 112)  RR: 25 (23 Sep 2024 00:00) (11 - 26)  SpO2: 99% (23 Sep 2024 00:14) (91% - 100%)    Parameters below as of 22 Sep 2024 04:00  Patient On (Oxygen Delivery Method): ventilator    O2 Concentration (%): 50    ABG - ( 22 Sep 2024 09:34 )  pH, Arterial: 7.490 pH, Blood: x     /  pCO2: 40    /  pO2: 173   / HCO3: 30    / Base Excess: 7.2   /  SaO2: 100.0       I&O's Detail    21 Sep 2024 07:01  -  22 Sep 2024 07:00  --------------------------------------------------------  IN:    DOBUTamine: 326.4 mL    Glucerna 1.5: 160 mL    HYRDOmorphone: 18 mL    Ketamine: 100.1 mL    Midazolam: 111.6 mL    Norepinephrine: 30.6 mL  Total IN: 746.7 mL    OUT:    Indwelling Catheter - Urethral (mL): 1620 mL  Total OUT: 1620 mL  Total NET: -873.3 mL      22 Sep 2024 07:01  -  23 Sep 2024 00:46  --------------------------------------------------------  IN:    Dexmedetomidine: 263 mL    DOBUTamine: 54.4 mL    DOBUTamine: 54.4 mL    Glucerna 1.5: 480 mL    HYRDOmorphone: 4 mL    IV PiggyBack: 20 mL    Midazolam: 18 mL  Total IN: 893.8 mL    OUT:    Indwelling Catheter - Urethral (mL): 635 mL    Norepinephrine: 0 mL  Total OUT: 635 mL  Total NET: 258.8 mL      LABS:                        10.8   11.79 )-----------( 120      ( 22 Sep 2024 03:00 )             33.9     09-22    144  |  108  |  43.9[H]  ----------------------------<  178[H]  3.8   |  27.0  |  0.76    Ca    8.4      22 Sep 2024 14:50  Phos  1.5     09-22  Mg     2.3     09-22    TPro  5.4[L]  /  Alb  3.3  /  TBili  0.5  /  DBili  x   /  AST  40[H]  /  ALT  32  /  AlkPhos  114  09-22      CAPILLARY BLOOD GLUCOSE  POCT Blood Glucose.: 162 mg/dL (23 Sep 2024 00:32)      Urinalysis Basic - ( 22 Sep 2024 14:50 )  Color: x / Appearance: x / SG: x / pH: x  Gluc: 178 mg/dL / Ketone: x  / Bili: x / Urobili: x   Blood: x / Protein: x / Nitrite: x   Leuk Esterase: x / RBC: x / WBC x   Sq Epi: x / Non Sq Epi: x / Bacteria: x      CULTURES:  Culture Results:   Normal Respiratory Mary present (09-19 @ 00:35)  Culture Results:   No Legionella species isolated  Culture in progress (09-18 @ 16:57)  Culture Results:   No growth at 4 days (09-18 @ 16:57)  Culture Results:   No growth (09-18 @ 12:05)  Culture Results:   Growth in aerobic bottle: Pseudomonas aeruginosa  Direct identification is available within approximately 3-5  hours either by Blood Panel Multiplexed PCR or Direct  MALDI-TOF. Details: [email or web address] (09-18 @ 10:15)      RADIOLOGY:   < from: Xray Chest 1 View- PORTABLE-Routine (Xray Chest 1 View- PORTABLE-Routine in AM.) (09.21.24 @ 10:46) >    ACC: [id number] EXAM:  XR CHEST PORTABLE ROUTINE 1V   ORDERED BY: ALEXANDRA M GOLDMANN     ACC: [id number] EXAM:  XR CHEST PORTABLE URGENT 1V   ORDERED BY: BOO MCKEON     PROCEDURE DATE:  09/21/2024      INTERPRETATION:  HISTORY: Admitting Dxs: A41.9 BODY ACHES; ; AHRF;  TECHNIQUE: Serial portable chest  x-rays, 2 studies.  COMPARISON: 9/19.  FINDINGS/  IMPRESSION:    First study is from 9/20 and shows  The endotracheal tube is above the david. The nasogastric tube courses   below the left hemidiaphragm, tip is off the edge of film. Right sided   central line is in the region of the superior vena cava. Left axillary   midline  HEART: Obscured  LUNGS: There is opacity at the left base compatible with   effusion/infiltrate.  BONES: degenerative changes.    The follow-up is from 9/21 and shows stable left effusion/infiltrate..    --- End of Report ---      HOMERO BRADLEY MD; Attending Interventional Radiologist  This document has been electronically signed. Sep 21 2024  2:13PM    < end of copied text >

## 2024-09-23 NOTE — PROGRESS NOTE ADULT - ASSESSMENT
70 yo female with CAD s/p PCI, hypothyroid, RA, admitted with chest pain, left lower lobe pneumonia, pseudomonas bacteremia, acute respiratory failure, found to have newly reduced EF/ systolic CHF, stress induced cardiomyopathy, cardiogenic shock requiring dobutamine.     Plan    Pneumonia/ acute respiratory failure/ pseudomonas bacteremia  - SBT/SAT, attempt to wean from vent  - continue anti - pseudomonal abx  - negative fluid balance    New systolic CHF/ cardiomyopathy  - on ACE, needs further afterload reduction  - continue dobutamine for now  - diurese   - GDMT when off inotrope  - cardiology to decide on whether to perform ischemic eval    CAD  - ASA, statin    Hypothyroid  - synthroid    DVT/GI prophylaxis  Tube feeds

## 2024-09-23 NOTE — PROGRESS NOTE ADULT - SUBJECTIVE AND OBJECTIVE BOX
S: Patient remains in ICU    TELEMETRY: sr    MEDICATIONS  (STANDING):  artificial  tears Solution 1 Drop(s) Both EYES every 4 hours  aspirin  chewable 81 milliGRAM(s) Oral daily  chlorhexidine 0.12% Liquid 15 milliLiter(s) Oral Mucosa every 12 hours  chlorhexidine 2% Cloths 1 Application(s) Topical daily  clopidogrel Tablet 75 milliGRAM(s) Oral daily  dexMEDEtomidine Infusion 0.5 MICROgram(s)/kG/Hr (11.3 mL/Hr) IV Continuous <Continuous>  dextrose 5%. 1000 milliLiter(s) (50 mL/Hr) IV Continuous <Continuous>  dextrose 5%. 1000 milliLiter(s) (100 mL/Hr) IV Continuous <Continuous>  dextrose 50% Injectable 12.5 Gram(s) IV Push once  dextrose 50% Injectable 25 Gram(s) IV Push once  dextrose 50% Injectable 25 Gram(s) IV Push once  DOBUTamine Infusion 2.5 MICROgram(s)/kG/Min (6.8 mL/Hr) IV Continuous <Continuous>  enalaprilat Injectable 1.25 milliGRAM(s) IV Push every 6 hours  glucagon  Injectable 1 milliGRAM(s) IntraMuscular once  heparin   Injectable 5000 Unit(s) SubCutaneous every 8 hours  influenza  Vaccine (HIGH DOSE) 0.5 milliLiter(s) IntraMuscular once  insulin glargine Injectable (LANTUS) 10 Unit(s) SubCutaneous every morning  insulin lispro (ADMELOG) corrective regimen sliding scale   SubCutaneous every 6 hours  levothyroxine 100 MICROGram(s) Oral daily  meropenem Injectable 1000 milliGRAM(s) IV Push every 8 hours  pantoprazole  Injectable 40 milliGRAM(s) IV Push daily  propofol Infusion 10 MICROgram(s)/kG/Min (5.44 mL/Hr) IV Continuous <Continuous>    MEDICATIONS  (PRN):  dextrose Oral Gel 15 Gram(s) Oral once PRN Blood Glucose LESS THAN 70 milliGRAM(s)/deciliter  fentaNYL    Injectable 50 MICROGram(s) IV Push every 2 hours PRN pain/vent synchrony  polyethylene glycol 3350 17 Gram(s) Oral daily PRN Constipation        Vital Signs Last 24 Hrs  T(C): 37.5 (23 Sep 2024 08:00), Max: 39 (22 Sep 2024 14:00)  T(F): 99.5 (23 Sep 2024 08:00), Max: 102.2 (22 Sep 2024 14:00)  HR: 87 (23 Sep 2024 08:37) (71 - 113)  BP: 154/84 (22 Sep 2024 10:00) (154/84 - 154/84)  BP(mean): 103 (22 Sep 2024 10:00) (103 - 103)  RR: 24 (23 Sep 2024 08:00) (11 - 26)  SpO2: 98% (23 Sep 2024 08:37) (95% - 100%)    Parameters below as of 23 Sep 2024 08:00  Patient On (Oxygen Delivery Method): ventilator    O2 Concentration (%): 40    Daily     Daily Weight in k.3 (23 Sep 2024 01:49)    I&O's Detail    22 Sep 2024 07:01  -  23 Sep 2024 07:00  --------------------------------------------------------  IN:    Dexmedetomidine: 501 mL    DOBUTamine: 54.4 mL    DOBUTamine: 102 mL    Glucerna 1.5: 795 mL    HYRDOmorphone: 4 mL    IV PiggyBack: 20 mL    Midazolam: 18 mL    Propofol: 60 mL  Total IN: 1554.4 mL    OUT:    Indwelling Catheter - Urethral (mL): 1055 mL    Norepinephrine: 0 mL  Total OUT: 1055 mL    Total NET: 499.4 mL          PHYSICAL EXAM:  Appearance: In NAD  Neck: No JVD,   Cardiovascular: Normal S1 S2  Respiratory: Lungs clear to auscultation	  Gastrointestinal:  Soft, Non-tender, + BS, no bruits	  Neurologic: Grossly non-focal.  Extremities: No edema    LABS:                        9.2    10.97 )-----------( 59       ( 23 Sep 2024 04:12 )             28.6     09-    144  |  108  |  33.4[H]  ----------------------------<  195[H]  3.4[L]   |  26.0  |  0.50    Ca    7.8[L]      23 Sep 2024 05:14  Phos  2.1       Mg     2.2         TPro  5.0[L]  /  Alb  3.1[L]  /  TBili  0.5  /  DBili  x   /  AST  40[H]  /  ALT  31  /  AlkPhos  116            Urinalysis Basic - ( 23 Sep 2024 05:14 )    Color: x / Appearance: x / SG: x / pH: x  Gluc: 195 mg/dL / Ketone: x  / Bili: x / Urobili: x   Blood: x / Protein: x / Nitrite: x   Leuk Esterase: x / RBC: x / WBC x   Sq Epi: x / Non Sq Epi: x / Bacteria: x      I&O's Summary    22 Sep 2024 07:01  -  23 Sep 2024 07:00  --------------------------------------------------------  IN: 1554.4 mL / OUT: 1055 mL / NET: 499.4 mL

## 2024-09-23 NOTE — PROGRESS NOTE ADULT - ASSESSMENT
69-year-old female with CAD, HTN, HLD, NSTEMI in 2011 s/p ELIZABETH to LAD, S/p ELIZABETH Distal LCx (4/30/2024), hypothyroid, rheumatoid arthritis, admitted 09/18/2024  with left-sided chest pain associated with generalized weakness, nausea and vomiting.    - Acute hypoxemic respiratory failure,  intubated, sedated on ventilator with Nimbex for paralytic therapy  - ARDS  - Lobar pneumonia s/p CT scan Negative for PE, does show a left lower lobe pneumonia.    - Septic/cardiogenic shock -   leukocytosis, elevated lactate 5.5, hypotension requiring vasopressor support (Levo and Pit) and IV fluids.  - Pseudomonas bacteremia  - Acute on chronic HFpEF, elevated BNP 8471 (09/18/2024)   - s/p TTE (09/19/2024) LVEF - 70 to 75 %.  - s/p Repeat echo (09/20/2024) with drop in EF - 20 - 25% with global hypokinesis off of . - Likely stunning/stress induced CM, no evidence of ACS,   - s/p repeat portable TTE (09/22/2024) done by me with ICU team at bedside. LVEF - 60 - 65% on  - 5. Pericardial fat pad in anterior pericardial space.   - 09/23/2024 - decreased  2.5      plan:  Continue aspirin and plavix if there is no bleeding or platelet dyscrasia.   Case discussed with HF team, pt likely with stress induced CM.   Off of pressors   wean planned  Case discussed with ICU team at bedside.   Guarded prognosis.   Patient's primary cardiologist, Dr. Snyder

## 2024-09-23 NOTE — PROGRESS NOTE ADULT - ASSESSMENT
68 yo f pmhx CAD, NSTEMI (2011) s/p ELIZABETH to LAD and dLCx (4/2024), HTN,  HLD, hypothyroidism, RA admitted with LLL PNA, acute hypoxic respiratory failure requiring intubation, deteriorated into ARDS requiring deep sedation and paralytic therapy found with pseudomonal bacteremia and new inotrope dependent BiV fx.    NEURO: Patient on precedex and propofol for sedation, fentanyl ivp prn  CV: remains dependent on dobutamine for inotropic support for organ perfusion. Monitoring end points of perfusion. Continued stress dose steroids. DAPT therapy  RESP: ac/vc 4-6 cc/kg tv lung protective strategies, on 50%/8, actively titrating fio2/peep to maintain spo2 >92%   RENAL: monitor urine output, bun/cr and electrolytes. replace lytes as needed. cantu for strict I&Os  GI: NPO on tf, No BM since admission on bowel regimen and tf  ENDO: Lantus/ISS for glycemic control   ID: Meropenem and vanco for coverage  HEME: Heparin for vte ppx    DISPO: Full code     Critical Care time: 40 mins assessing presenting problems of acute illness that poses high probability of life threatening deterioration or end organ damage/dysfunction.  Medical decision making including Initiating plan of care, reviewing data, reviewing radiology, discussing with multidisciplinary team, non inclusive of procedures, discussing goals of care with patient/family    DATE OF DOCUMENTATION EQUIVALENT TO DATE OF SERVICES RENDERED

## 2024-09-24 ENCOUNTER — RESULT REVIEW (OUTPATIENT)
Age: 69
End: 2024-09-24

## 2024-09-24 LAB
ALBUMIN SERPL ELPH-MCNC: 3.5 G/DL — SIGNIFICANT CHANGE UP (ref 3.3–5.2)
ALP SERPL-CCNC: 142 U/L — HIGH (ref 40–120)
ALT FLD-CCNC: 42 U/L — HIGH
ANION GAP SERPL CALC-SCNC: 10 MMOL/L — SIGNIFICANT CHANGE UP (ref 5–17)
ANION GAP SERPL CALC-SCNC: 12 MMOL/L — SIGNIFICANT CHANGE UP (ref 5–17)
AST SERPL-CCNC: 49 U/L — HIGH
BILIRUB SERPL-MCNC: 0.7 MG/DL — SIGNIFICANT CHANGE UP (ref 0.4–2)
BUN SERPL-MCNC: 19.8 MG/DL — SIGNIFICANT CHANGE UP (ref 8–20)
BUN SERPL-MCNC: 21.6 MG/DL — HIGH (ref 8–20)
CALCIUM SERPL-MCNC: 8.3 MG/DL — LOW (ref 8.4–10.5)
CALCIUM SERPL-MCNC: 8.5 MG/DL — SIGNIFICANT CHANGE UP (ref 8.4–10.5)
CHLORIDE SERPL-SCNC: 103 MMOL/L — SIGNIFICANT CHANGE UP (ref 96–108)
CHLORIDE SERPL-SCNC: 110 MMOL/L — HIGH (ref 96–108)
CO2 SERPL-SCNC: 25 MMOL/L — SIGNIFICANT CHANGE UP (ref 22–29)
CO2 SERPL-SCNC: 25 MMOL/L — SIGNIFICANT CHANGE UP (ref 22–29)
CREAT SERPL-MCNC: 0.36 MG/DL — LOW (ref 0.5–1.3)
CREAT SERPL-MCNC: 0.36 MG/DL — LOW (ref 0.5–1.3)
EGFR: 109 ML/MIN/1.73M2 — SIGNIFICANT CHANGE UP
EGFR: 109 ML/MIN/1.73M2 — SIGNIFICANT CHANGE UP
FIBRINOGEN PPP-MCNC: 594 MG/DL — HIGH (ref 200–450)
GLUCOSE BLDC GLUCOMTR-MCNC: 103 MG/DL — HIGH (ref 70–99)
GLUCOSE BLDC GLUCOMTR-MCNC: 122 MG/DL — HIGH (ref 70–99)
GLUCOSE BLDC GLUCOMTR-MCNC: 124 MG/DL — HIGH (ref 70–99)
GLUCOSE BLDC GLUCOMTR-MCNC: 129 MG/DL — HIGH (ref 70–99)
GLUCOSE SERPL-MCNC: 107 MG/DL — HIGH (ref 70–99)
GLUCOSE SERPL-MCNC: 116 MG/DL — HIGH (ref 70–99)
HCT VFR BLD CALC: 37.4 % — SIGNIFICANT CHANGE UP (ref 34.5–45)
HCT VFR BLD CALC: 37.7 % — SIGNIFICANT CHANGE UP (ref 34.5–45)
HGB BLD-MCNC: 12.3 G/DL — SIGNIFICANT CHANGE UP (ref 11.5–15.5)
HGB BLD-MCNC: 12.7 G/DL — SIGNIFICANT CHANGE UP (ref 11.5–15.5)
INR BLD: 1.02 RATIO — SIGNIFICANT CHANGE UP (ref 0.85–1.18)
MAGNESIUM SERPL-MCNC: 2 MG/DL — SIGNIFICANT CHANGE UP (ref 1.6–2.6)
MAGNESIUM SERPL-MCNC: 2 MG/DL — SIGNIFICANT CHANGE UP (ref 1.8–2.6)
MCHC RBC-ENTMCNC: 32.9 GM/DL — SIGNIFICANT CHANGE UP (ref 32–36)
MCHC RBC-ENTMCNC: 33.4 PG — SIGNIFICANT CHANGE UP (ref 27–34)
MCHC RBC-ENTMCNC: 33.7 GM/DL — SIGNIFICANT CHANGE UP (ref 32–36)
MCHC RBC-ENTMCNC: 34.2 PG — HIGH (ref 27–34)
MCV RBC AUTO: 101.6 FL — HIGH (ref 80–100)
MCV RBC AUTO: 101.6 FL — HIGH (ref 80–100)
NRBC # BLD: 1 /100 WBCS — HIGH (ref 0–0)
NRBC # BLD: 1 /100 WBCS — HIGH (ref 0–0)
PHOSPHATE SERPL-MCNC: 2.1 MG/DL — LOW (ref 2.4–4.7)
PHOSPHATE SERPL-MCNC: 3.6 MG/DL — SIGNIFICANT CHANGE UP (ref 2.4–4.7)
PLATELET # BLD AUTO: 72 K/UL — LOW (ref 150–400)
PLATELET # BLD AUTO: 88 K/UL — LOW (ref 150–400)
POTASSIUM SERPL-MCNC: 3.6 MMOL/L — SIGNIFICANT CHANGE UP (ref 3.5–5.3)
POTASSIUM SERPL-MCNC: 3.9 MMOL/L — SIGNIFICANT CHANGE UP (ref 3.5–5.3)
POTASSIUM SERPL-SCNC: 3.6 MMOL/L — SIGNIFICANT CHANGE UP (ref 3.5–5.3)
POTASSIUM SERPL-SCNC: 3.9 MMOL/L — SIGNIFICANT CHANGE UP (ref 3.5–5.3)
PROT SERPL-MCNC: 5.6 G/DL — LOW (ref 6.6–8.7)
PROTHROM AB SERPL-ACNC: 11.3 SEC — SIGNIFICANT CHANGE UP (ref 9.5–13)
RBC # BLD: 3.68 M/UL — LOW (ref 3.8–5.2)
RBC # BLD: 3.71 M/UL — LOW (ref 3.8–5.2)
RBC # FLD: 13.5 % — SIGNIFICANT CHANGE UP (ref 10.3–14.5)
RBC # FLD: 13.7 % — SIGNIFICANT CHANGE UP (ref 10.3–14.5)
SODIUM SERPL-SCNC: 140 MMOL/L — SIGNIFICANT CHANGE UP (ref 135–145)
SODIUM SERPL-SCNC: 145 MMOL/L — SIGNIFICANT CHANGE UP (ref 135–145)
WBC # BLD: 17.28 K/UL — HIGH (ref 3.8–10.5)
WBC # BLD: 17.48 K/UL — HIGH (ref 3.8–10.5)
WBC # FLD AUTO: 17.28 K/UL — HIGH (ref 3.8–10.5)
WBC # FLD AUTO: 17.48 K/UL — HIGH (ref 3.8–10.5)

## 2024-09-24 PROCEDURE — 93308 TTE F-UP OR LMTD: CPT | Mod: 26

## 2024-09-24 PROCEDURE — 99233 SBSQ HOSP IP/OBS HIGH 50: CPT

## 2024-09-24 PROCEDURE — 93321 DOPPLER ECHO F-UP/LMTD STD: CPT | Mod: 26

## 2024-09-24 PROCEDURE — 93325 DOPPLER ECHO COLOR FLOW MAPG: CPT | Mod: 26

## 2024-09-24 RX ORDER — CARVEDILOL 3.125 MG
6.25 TABLET ORAL EVERY 12 HOURS
Refills: 0 | Status: DISCONTINUED | OUTPATIENT
Start: 2024-09-24 | End: 2024-09-26

## 2024-09-24 RX ORDER — LOSARTAN POTASSIUM 100 MG/1
100 TABLET, FILM COATED ORAL DAILY
Refills: 0 | Status: DISCONTINUED | OUTPATIENT
Start: 2024-09-24 | End: 2024-09-30

## 2024-09-24 RX ORDER — SODIUM PHOSPHATE IN D5W 15MMOL/250
15 PLASTIC BAG, INJECTION (ML) INTRAVENOUS ONCE
Refills: 0 | Status: COMPLETED | OUTPATIENT
Start: 2024-09-24 | End: 2024-09-24

## 2024-09-24 RX ORDER — FUROSEMIDE 10 MG/ML
40 INJECTION INTRAVENOUS ONCE
Refills: 0 | Status: COMPLETED | OUTPATIENT
Start: 2024-09-24 | End: 2024-09-24

## 2024-09-24 RX ORDER — CARVEDILOL 3.125 MG
6.25 TABLET ORAL EVERY 12 HOURS
Refills: 0 | Status: DISCONTINUED | OUTPATIENT
Start: 2024-09-24 | End: 2024-09-24

## 2024-09-24 RX ORDER — LOSARTAN POTASSIUM 100 MG/1
100 TABLET, FILM COATED ORAL DAILY
Refills: 0 | Status: DISCONTINUED | OUTPATIENT
Start: 2024-09-24 | End: 2024-09-24

## 2024-09-24 RX ORDER — ACETAMINOPHEN 325 MG
1000 TABLET ORAL ONCE
Refills: 0 | Status: COMPLETED | OUTPATIENT
Start: 2024-09-24 | End: 2024-09-24

## 2024-09-24 RX ORDER — FUROSEMIDE 10 MG/ML
40 INJECTION INTRAVENOUS DAILY
Refills: 0 | Status: DISCONTINUED | OUTPATIENT
Start: 2024-09-25 | End: 2024-09-30

## 2024-09-24 RX ORDER — ASPIRIN 325 MG
81 TABLET ORAL DAILY
Refills: 0 | Status: DISCONTINUED | OUTPATIENT
Start: 2024-09-24 | End: 2024-09-30

## 2024-09-24 RX ADMIN — Medication 1000 MILLIGRAM(S): at 19:10

## 2024-09-24 RX ADMIN — MEROPENEM 1000 MILLIGRAM(S): 500 INJECTION INTRAVENOUS at 12:52

## 2024-09-24 RX ADMIN — Medication 5000 UNIT(S): at 13:52

## 2024-09-24 RX ADMIN — Medication 6.25 MILLIGRAM(S): at 17:19

## 2024-09-24 RX ADMIN — Medication 100 MILLIEQUIVALENT(S): at 12:00

## 2024-09-24 RX ADMIN — HYDRALAZINE HYDROCHLORIDE 10 MILLIGRAM(S): 100 TABLET ORAL at 18:32

## 2024-09-24 RX ADMIN — Medication 100 MICROGRAM(S): at 17:20

## 2024-09-24 RX ADMIN — FUROSEMIDE 40 MILLIGRAM(S): 10 INJECTION INTRAVENOUS at 09:12

## 2024-09-24 RX ADMIN — Medication 5000 UNIT(S): at 05:41

## 2024-09-24 RX ADMIN — INSULIN GLARGINE 10 UNIT(S): 300 INJECTION, SOLUTION SUBCUTANEOUS at 09:06

## 2024-09-24 RX ADMIN — Medication 81 MILLIGRAM(S): at 17:19

## 2024-09-24 RX ADMIN — HYDRALAZINE HYDROCHLORIDE 10 MILLIGRAM(S): 100 TABLET ORAL at 09:10

## 2024-09-24 RX ADMIN — MEROPENEM 1000 MILLIGRAM(S): 500 INJECTION INTRAVENOUS at 21:38

## 2024-09-24 RX ADMIN — PANTOPRAZOLE SODIUM 40 MILLIGRAM(S): 40 TABLET, DELAYED RELEASE ORAL at 11:22

## 2024-09-24 RX ADMIN — Medication 400 MILLIGRAM(S): at 18:55

## 2024-09-24 RX ADMIN — Medication 5000 UNIT(S): at 21:38

## 2024-09-24 RX ADMIN — Medication 100 MILLIEQUIVALENT(S): at 14:32

## 2024-09-24 RX ADMIN — Medication 63.75 MILLIMOLE(S): at 11:23

## 2024-09-24 RX ADMIN — MEROPENEM 1000 MILLIGRAM(S): 500 INJECTION INTRAVENOUS at 05:41

## 2024-09-24 RX ADMIN — CHLORHEXIDINE GLUCONATE ORAL RINSE 1 APPLICATION(S): 1.2 SOLUTION DENTAL at 11:23

## 2024-09-24 RX ADMIN — Medication 63.75 MILLIMOLE(S): at 05:48

## 2024-09-24 RX ADMIN — Medication 75 MILLIGRAM(S): at 17:20

## 2024-09-24 RX ADMIN — Medication 100 MILLIEQUIVALENT(S): at 12:52

## 2024-09-24 NOTE — PROGRESS NOTE ADULT - SUBJECTIVE AND OBJECTIVE BOX
Patient is a 69y old  Female who presents with a chief complaint of Shortness of breath (23 Sep 2024 09:33)      BRIEF HOSPITAL COURSE: 70 y/o F with a h/o CAD NSTEMI in 2011 s/p ELIZABETH to LAD, s/p ELIZABETH Distal LCx (4/30/2024), HTN, HLD, hypothyroid, rheumatoid arthritis, admitted on 9/18 with left-sided chest pain associated with generalized weakness, nausea and vomiting. CT chest revealed LLL pneumonia. While in the ED developed worsening hypoxic respiratory failure and was emergently intubated. Hospital course complicated by progression to ARDS requiring deep sedation/IV paralytic and severe multifactorial shock state with stress cardiomyopathy requiring multiple IV vasopressors and inotrope support. Blood cultures grew pseudomonas aeruginosa.      Events last 24 hours: She remains on full mechanical ventilator support with improved FiO2/PEEP requirement. Off IV vasopressors, however was did not tolerate stopping dobutamine earlier.        PAST MEDICAL & SURGICAL HISTORY:  Hypothyroid      ADHD (attention deficit hyperactivity disorder)      HLD (hyperlipidemia)      Myocardial infarct  2012      Stented coronary artery  2012      Rheumatoid arthritis      Migraine      S/p bilateral carpal tunnel release      H/O cardiac catheterization      H/O laminectomy      H/O spinal fusion      S/P left knee arthroscopy      H/O total knee replacement, right          Review of Systems:  Unable to obtain secondary to sedation/intubation.          Medications:  meropenem Injectable 1000 milliGRAM(s) IV Push every 8 hours    hydrALAZINE Injectable 10 milliGRAM(s) IV Push every 4 hours PRN  losartan 50 milliGRAM(s) Oral daily      acetaminophen   IVPB .. 1000 milliGRAM(s) IV Intermittent once PRN      aspirin  chewable 81 milliGRAM(s) Oral daily  clopidogrel Tablet 75 milliGRAM(s) Oral daily  heparin   Injectable 5000 Unit(s) SubCutaneous every 8 hours    pantoprazole  Injectable 40 milliGRAM(s) IV Push daily  polyethylene glycol 3350 17 Gram(s) Oral daily PRN      dextrose 50% Injectable 25 Gram(s) IV Push once  dextrose 50% Injectable 12.5 Gram(s) IV Push once  dextrose 50% Injectable 25 Gram(s) IV Push once  dextrose Oral Gel 15 Gram(s) Oral once PRN  glucagon  Injectable 1 milliGRAM(s) IntraMuscular once  insulin glargine Injectable (LANTUS) 10 Unit(s) SubCutaneous every morning  insulin lispro (ADMELOG) corrective regimen sliding scale   SubCutaneous every 6 hours  levothyroxine 100 MICROGram(s) Oral daily    dextrose 5%. 1000 milliLiter(s) IV Continuous <Continuous>  dextrose 5%. 1000 milliLiter(s) IV Continuous <Continuous>  sodium phosphate 15 milliMole(s)/250 mL IVPB 15 milliMole(s) IV Intermittent once    influenza  Vaccine (HIGH DOSE) 0.5 milliLiter(s) IntraMuscular once    artificial  tears Solution 1 Drop(s) Both EYES every 4 hours  chlorhexidine 2% Cloths 1 Application(s) Topical daily        Mode: CPAP with PS  FiO2: 40  PEEP: 6  PS: 10  MAP: 11  PIP: 16      ICU Vital Signs Last 24 Hrs  T(C): 37.7 (24 Sep 2024 04:00), Max: 38.1 (23 Sep 2024 11:30)  T(F): 99.9 (24 Sep 2024 04:00), Max: 100.6 (23 Sep 2024 11:30)  HR: 88 (24 Sep 2024 04:00) (68 - 92)  BP: --  BP(mean): --  ABP: 160/81 (24 Sep 2024 04:00) (97/53 - 163/87)  ABP(mean): 114 (24 Sep 2024 04:00) (71 - 119)  RR: 27 (24 Sep 2024 04:00) (12 - 27)  SpO2: 96% (24 Sep 2024 04:00) (96% - 100%)    O2 Parameters below as of 24 Sep 2024 03:00  Patient On (Oxygen Delivery Method): mask, nonrebreather  O2 Flow (L/min): 8  O2 Concentration (%): 40        ABG - ( 23 Sep 2024 18:48 )  pH, Arterial: 7.500 pH, Blood: x     /  pCO2: 36    /  pO2: 135   / HCO3: 28    / Base Excess: 4.9   /  SaO2: 100.0               I&O's Detail    22 Sep 2024 07:01  -  23 Sep 2024 07:00  --------------------------------------------------------  IN:    Dexmedetomidine: 535 mL    DOBUTamine: 54.4 mL    DOBUTamine: 108.8 mL    Glucerna 1.5: 840 mL    HYRDOmorphone: 4 mL    IV PiggyBack: 20 mL    Midazolam: 18 mL    Propofol: 60 mL  Total IN: 1640.2 mL    OUT:    Indwelling Catheter - Urethral (mL): 1055 mL    Norepinephrine: 0 mL  Total OUT: 1055 mL    Total NET: 585.2 mL      23 Sep 2024 07:01  -  24 Sep 2024 04:12  --------------------------------------------------------  IN:    Dexmedetomidine: 361.2 mL    DOBUTamine: 81.6 mL    Glucerna 1.5: 540 mL    IV PiggyBack: 250 mL    IV PiggyBack: 300 mL    IV PiggyBack: 50 mL    Propofol: 16.2 mL    Propofol: 10.8 mL  Total IN: 1609.8 mL    OUT:    Indwelling Catheter - Urethral (mL): 1070 mL  Total OUT: 1070 mL    Total NET: 539.8 mL            LABS:                        11.5   14.90 )-----------( 70       ( 23 Sep 2024 19:00 )             35.5     09-23    143  |  109[H]  |  27.0[H]  ----------------------------<  175[H]  4.0   |  24.0  |  0.31[L]    Ca    8.2[L]      23 Sep 2024 19:00  Phos  2.2     09-23  Mg     2.2     09-23    TPro  4.9[L]  /  Alb  3.2[L]  /  TBili  0.4  /  DBili  x   /  AST  47[H]  /  ALT  36[H]  /  AlkPhos  115  09-23          CAPILLARY BLOOD GLUCOSE      POCT Blood Glucose.: 129 mg/dL (24 Sep 2024 00:28)      Urinalysis Basic - ( 23 Sep 2024 19:00 )    Color: x / Appearance: x / SG: x / pH: x  Gluc: 175 mg/dL / Ketone: x  / Bili: x / Urobili: x   Blood: x / Protein: x / Nitrite: x   Leuk Esterase: x / RBC: x / WBC x   Sq Epi: x / Non Sq Epi: x / Bacteria: x      CULTURES:  Rapid RVP Result: NotDetec (09-22-24 @ 16:04)  Culture Results:   Normal Respiratory Mary present (09-19-24 @ 00:35)  Culture Results:   No Legionella species isolated  Culture in progress (09-18-24 @ 16:57)  Culture Results:   No growth at 5 days (09-18-24 @ 16:57)  Rapid RVP Result: NotDetec (09-18-24 @ 15:50)  Culture Results:   No growth (09-18-24 @ 12:05)  Culture Results:   Growth in aerobic bottle: Pseudomonas aeruginosa  Direct identification is available within approximately 3-5  hours either by Blood Panel Multiplexed PCR or Direct  MALDI-TOF. Details: [email or web address] (09-18-24 @ 10:15)        Physical Examination:    General: No acute distress.  sedated/intubated    HEENT: Pupils equal, reactive to light.  Symmetric.    PULM: scattered rhonchi bilaterally bilaterally, no significant sputum production    CVS: Regular rate and rhythm, no murmurs, rubs, or gallops    ABD: Soft, nondistended, nontender, normoactive bowel sounds, no masses    EXT: No edema, nontender    SKIN: Warm and well perfused, no rashes noted.    NEURO: sedated, moves all extremities spontaneously      RADIOLOGY:       < from: US Duplex Venous Lower Ext Complete, Bilateral (09.23.24 @ 07:39) >  FINDINGS:    RIGHT:  Normal compressibility of the RIGHT common femoral, femoral and popliteal   veins.  Doppler examination shows normal spontaneous and phasic flow.  No RIGHT calf vein thrombosis is detected.    LEFT:  Normal compressibility of the LEFT common femoral, femoral and popliteal   veins.  Doppler examination shows normal spontaneous and phasic flow.  No LEFT calf vein thrombosis is detected.    IMPRESSION:  No evidence of deep venous thrombosis in either lower extremity.    < end of copied text >      < from: Xray Chest 1 View- PORTABLE-Routine (Xray Chest 1 View- PORTABLE-Routine in AM.) (09.21.24 @ 10:46) >  FINDINGS/  IMPRESSION:    First study is from 9/20 and shows  The endotracheal tube is above the david. The nasogastric tube courses   below the left hemidiaphragm, tip is off the edge of film. Right sided   central line is in the region of the superior vena cava. Left axillary   midline  HEART: Obscured  LUNGS: There is opacity at the left base compatible with   effusion/infiltrate.  BONES: degenerative changes.    The follow-up is from 9/21 and shows stable left effusion/infiltrate..    < end of copied text >          CRITICAL CARE TIME SPENT: 36 mins  Time spent evaluating/treating patient with medical issues that pose a high risk for life threatening deterioration and/or end-organ damage, reviewing data/labs/imaging, discussing case with multidisciplinary team, discussing plan/goals of care with patient/family. Non-inclusive of procedure time. The date of entry of this note reflects the date of services rendered.   Patient is a 69y old  Female who presents with a chief complaint of Shortness of breath (23 Sep 2024 09:33)      BRIEF HOSPITAL COURSE: 70 y/o F with a h/o CAD NSTEMI in 2011 s/p ELIZABETH to LAD, s/p ELIZABETH Distal LCx (4/30/2024), HTN, HLD, hypothyroid, rheumatoid arthritis, admitted on 9/18 with left-sided chest pain associated with generalized weakness, nausea and vomiting. CT chest revealed LLL pneumonia. While in the ED developed worsening hypoxic respiratory failure and was emergently intubated. Hospital course complicated by progression to ARDS requiring deep sedation/IV paralytic and severe multifactorial shock state with stress cardiomyopathy requiring multiple IV vasopressors and inotrope support. Blood cultures grew pseudomonas aeruginosa.      Events last 24 hours: She remains on full mechanical ventilator support with improved FiO2/PEEP requirement. Off IV vasopressors, however did not tolerate stopping dobutamine yesterday- will try again.        PAST MEDICAL & SURGICAL HISTORY:  Hypothyroid      ADHD (attention deficit hyperactivity disorder)      HLD (hyperlipidemia)      Myocardial infarct  2012      Stented coronary artery  2012      Rheumatoid arthritis      Migraine      S/p bilateral carpal tunnel release      H/O cardiac catheterization      H/O laminectomy      H/O spinal fusion      S/P left knee arthroscopy      H/O total knee replacement, right          Review of Systems:  Unable to obtain secondary to sedation/intubation.          Medications:  meropenem Injectable 1000 milliGRAM(s) IV Push every 8 hours    hydrALAZINE Injectable 10 milliGRAM(s) IV Push every 4 hours PRN  losartan 50 milliGRAM(s) Oral daily      acetaminophen   IVPB .. 1000 milliGRAM(s) IV Intermittent once PRN      aspirin  chewable 81 milliGRAM(s) Oral daily  clopidogrel Tablet 75 milliGRAM(s) Oral daily  heparin   Injectable 5000 Unit(s) SubCutaneous every 8 hours    pantoprazole  Injectable 40 milliGRAM(s) IV Push daily  polyethylene glycol 3350 17 Gram(s) Oral daily PRN      dextrose 50% Injectable 25 Gram(s) IV Push once  dextrose 50% Injectable 12.5 Gram(s) IV Push once  dextrose 50% Injectable 25 Gram(s) IV Push once  dextrose Oral Gel 15 Gram(s) Oral once PRN  glucagon  Injectable 1 milliGRAM(s) IntraMuscular once  insulin glargine Injectable (LANTUS) 10 Unit(s) SubCutaneous every morning  insulin lispro (ADMELOG) corrective regimen sliding scale   SubCutaneous every 6 hours  levothyroxine 100 MICROGram(s) Oral daily    dextrose 5%. 1000 milliLiter(s) IV Continuous <Continuous>  dextrose 5%. 1000 milliLiter(s) IV Continuous <Continuous>  sodium phosphate 15 milliMole(s)/250 mL IVPB 15 milliMole(s) IV Intermittent once    influenza  Vaccine (HIGH DOSE) 0.5 milliLiter(s) IntraMuscular once    artificial  tears Solution 1 Drop(s) Both EYES every 4 hours  chlorhexidine 2% Cloths 1 Application(s) Topical daily        Mode: CPAP with PS  FiO2: 40  PEEP: 6  PS: 10  MAP: 11  PIP: 16      ICU Vital Signs Last 24 Hrs  T(C): 37.7 (24 Sep 2024 04:00), Max: 38.1 (23 Sep 2024 11:30)  T(F): 99.9 (24 Sep 2024 04:00), Max: 100.6 (23 Sep 2024 11:30)  HR: 88 (24 Sep 2024 04:00) (68 - 92)  BP: --  BP(mean): --  ABP: 160/81 (24 Sep 2024 04:00) (97/53 - 163/87)  ABP(mean): 114 (24 Sep 2024 04:00) (71 - 119)  RR: 27 (24 Sep 2024 04:00) (12 - 27)  SpO2: 96% (24 Sep 2024 04:00) (96% - 100%)    O2 Parameters below as of 24 Sep 2024 03:00  Patient On (Oxygen Delivery Method): mask, nonrebreather  O2 Flow (L/min): 8  O2 Concentration (%): 40        ABG - ( 23 Sep 2024 18:48 )  pH, Arterial: 7.500 pH, Blood: x     /  pCO2: 36    /  pO2: 135   / HCO3: 28    / Base Excess: 4.9   /  SaO2: 100.0               I&O's Detail    22 Sep 2024 07:01  -  23 Sep 2024 07:00  --------------------------------------------------------  IN:    Dexmedetomidine: 535 mL    DOBUTamine: 54.4 mL    DOBUTamine: 108.8 mL    Glucerna 1.5: 840 mL    HYRDOmorphone: 4 mL    IV PiggyBack: 20 mL    Midazolam: 18 mL    Propofol: 60 mL  Total IN: 1640.2 mL    OUT:    Indwelling Catheter - Urethral (mL): 1055 mL    Norepinephrine: 0 mL  Total OUT: 1055 mL    Total NET: 585.2 mL      23 Sep 2024 07:01  -  24 Sep 2024 04:12  --------------------------------------------------------  IN:    Dexmedetomidine: 361.2 mL    DOBUTamine: 81.6 mL    Glucerna 1.5: 540 mL    IV PiggyBack: 250 mL    IV PiggyBack: 300 mL    IV PiggyBack: 50 mL    Propofol: 16.2 mL    Propofol: 10.8 mL  Total IN: 1609.8 mL    OUT:    Indwelling Catheter - Urethral (mL): 1070 mL  Total OUT: 1070 mL    Total NET: 539.8 mL            LABS:                        11.5   14.90 )-----------( 70       ( 23 Sep 2024 19:00 )             35.5     09-23    143  |  109[H]  |  27.0[H]  ----------------------------<  175[H]  4.0   |  24.0  |  0.31[L]    Ca    8.2[L]      23 Sep 2024 19:00  Phos  2.2     09-23  Mg     2.2     09-23    TPro  4.9[L]  /  Alb  3.2[L]  /  TBili  0.4  /  DBili  x   /  AST  47[H]  /  ALT  36[H]  /  AlkPhos  115  09-23          CAPILLARY BLOOD GLUCOSE      POCT Blood Glucose.: 129 mg/dL (24 Sep 2024 00:28)      Urinalysis Basic - ( 23 Sep 2024 19:00 )    Color: x / Appearance: x / SG: x / pH: x  Gluc: 175 mg/dL / Ketone: x  / Bili: x / Urobili: x   Blood: x / Protein: x / Nitrite: x   Leuk Esterase: x / RBC: x / WBC x   Sq Epi: x / Non Sq Epi: x / Bacteria: x      CULTURES:  Rapid RVP Result: NotDetec (09-22-24 @ 16:04)  Culture Results:   Normal Respiratory Mary present (09-19-24 @ 00:35)  Culture Results:   No Legionella species isolated  Culture in progress (09-18-24 @ 16:57)  Culture Results:   No growth at 5 days (09-18-24 @ 16:57)  Rapid RVP Result: NotDetec (09-18-24 @ 15:50)  Culture Results:   No growth (09-18-24 @ 12:05)  Culture Results:   Growth in aerobic bottle: Pseudomonas aeruginosa  Direct identification is available within approximately 3-5  hours either by Blood Panel Multiplexed PCR or Direct  MALDI-TOF. Details: [email or web address] (09-18-24 @ 10:15)        Physical Examination:    General: No acute distress.  sedated/intubated    HEENT: Pupils equal, reactive to light.  Symmetric.    PULM: scattered rhonchi bilaterally bilaterally, no significant sputum production    CVS: Regular rate and rhythm, no murmurs, rubs, or gallops    ABD: Soft, nondistended, nontender, normoactive bowel sounds, no masses    EXT: No edema, nontender    SKIN: Warm and well perfused, no rashes noted.    NEURO: sedated, moves all extremities spontaneously      RADIOLOGY:       < from: US Duplex Venous Lower Ext Complete, Bilateral (09.23.24 @ 07:39) >  FINDINGS:    RIGHT:  Normal compressibility of the RIGHT common femoral, femoral and popliteal   veins.  Doppler examination shows normal spontaneous and phasic flow.  No RIGHT calf vein thrombosis is detected.    LEFT:  Normal compressibility of the LEFT common femoral, femoral and popliteal   veins.  Doppler examination shows normal spontaneous and phasic flow.  No LEFT calf vein thrombosis is detected.    IMPRESSION:  No evidence of deep venous thrombosis in either lower extremity.    < end of copied text >      < from: Xray Chest 1 View- PORTABLE-Routine (Xray Chest 1 View- PORTABLE-Routine in AM.) (09.21.24 @ 10:46) >  FINDINGS/  IMPRESSION:    First study is from 9/20 and shows  The endotracheal tube is above the david. The nasogastric tube courses   below the left hemidiaphragm, tip is off the edge of film. Right sided   central line is in the region of the superior vena cava. Left axillary   midline  HEART: Obscured  LUNGS: There is opacity at the left base compatible with   effusion/infiltrate.  BONES: degenerative changes.    The follow-up is from 9/21 and shows stable left effusion/infiltrate..    < end of copied text >          CRITICAL CARE TIME SPENT: 36 mins  Time spent evaluating/treating patient with medical issues that pose a high risk for life threatening deterioration and/or end-organ damage, reviewing data/labs/imaging, discussing case with multidisciplinary team, discussing plan/goals of care with patient/family. Non-inclusive of procedure time. The date of entry of this note reflects the date of services rendered.

## 2024-09-24 NOTE — SWALLOW BEDSIDE ASSESSMENT ADULT - SWALLOW EVAL: DIAGNOSIS
Oral stage generally functional for puree & all liquids. Cannot exclude pharyngeal dysphagia at the bedside, Pt with delayed cough following ALL trials, cannot safely rx PO diet at this time.

## 2024-09-24 NOTE — CHART NOTE - NSCHARTNOTEFT_GEN_A_CORE
Source: Patient [ ]  Family [ ]   other [x] EMR    Current Diet: No diet ordered, NPO    Enteral /Parenteral Nutrition: TF stopped today 2/2 extubation. Glucerna 1.5 TF previously @ 45 cc/hr x 24 hrs = 1080 ml, 1620 kcals, 89 g pro, 821 ml free water; met ~100% est energy/protein needs    Current Weight:   9/24 101.5 kg  9/22 102.5 kg  9/19 101.3 kg  +1 generalized, +2 R foot edema per documentation     Pertinent Medications: MEDICATIONS  (STANDING):  dextrose 5%. 1000 milliLiter(s) (50 mL/Hr) IV Continuous <Continuous>  insulin glargine Injectable (LANTUS) 10 Unit(s) SubCutaneous every morning  insulin lispro (ADMELOG) corrective regimen sliding scale   SubCutaneous every 6 hours  meropenem Injectable 1000 milliGRAM(s) IV Push every 8 hours  pantoprazole  Injectable 40 milliGRAM(s) IV Push daily  potassium chloride  10 mEq/50 mL IVPB 10 milliEquivalent(s) IV Intermittent once    MEDICATIONS  (PRN):  hydrALAZINE Injectable 10 milliGRAM(s) IV Push every 4 hours PRN systolic greater than 160  polyethylene glycol 3350 17 Gram(s) Oral daily PRN Constipation    Pertinent Labs: 09-24 Na145 mmol/L Glu 116 mg/dL[H] K+ 3.6 mmol/L Cr  0.36 mg/dL[L] BUN 21.6 mg/dL[H] Phos 2.1 mg/dL[L]  A1c 6.4%    Skin: No breakdown documented     Estimated Needs:   [x] no change since previous assessment  0833-9439 kcals/day (based on 25-30 kcal/kg IBW 56.6 kg)   g pro/day (based on 1.6-1.8 g/kg IBW 56.6 kg)    Clinical Course: 70 y/o F with a h/o CAD, NSTEMI in 2011 s/p ELIZABETH to LAD, s/p ELIZABETH Distal LCx (4/30/2024), HTN, HLD, hypothyroid, rheumatoid arthritis, admitted on 9/18 with left-sided chest pain associated with generalized weakness, nausea and vomiting. CT chest revealed LLL pneumonia. While in the ED pt developed worsening hypoxic respiratory failure and was emergently intubated. Hospital course complicated by progression to ARDS requiring deep sedation/IV paralytic and severe multifactorial shock state with stress cardiomyopathy requiring multiple IV vasopressors and inotrope support. Blood cultures grew pseudomonas aeruginosa. Extubated AM 9/24, pt having difficulty voicing concerns. Off dobutamine.    Current Nutrition Diagnosis: Inadequate oral intake related to admitted with hypoxic respiratory failure requiring mechanically ventilated as evidenced by pt previously intubated, extubated this AM, TF stopped, NPO.  Chart and events reviewed. Pt currently NPO pending dysphagia screen, extubated this AM, previously received tube feeds while intubated, c/o abdominal pain. Last BM today x 2 per documentation. Weights appear relatively stable. RD remains available. Recommendations below:    Recommendations:   1. Diet advancement as feasible / swallow evaluation (eventual oral diet as tolerated: DASH/TLC, Consistent CHO)  2. Monitor electrolytes, replete prn  3. Obtain daily weights and trends  4. Monitor BM trends, I/Os    Monitoring and Evaluation:   [x] PO intake [x] Tolerance to diet prescription [X] Weights  [X] Follow up per protocol [X] Labs: chem 8, phos, mg, BGM

## 2024-09-24 NOTE — PHYSICAL THERAPY INITIAL EVALUATION ADULT - GENERAL OBSERVATIONS, REHAB EVAL
Pt received in bed + left axillary a-line + tele//BP + NC O2 + cantu + IVs, in NAD, in 0/10 pain, son Chan present, agreeable to PT evaluation

## 2024-09-24 NOTE — SWALLOW BEDSIDE ASSESSMENT ADULT - SLP GENERAL OBSERVATIONS
Recd Pt awake/upright in bed, A&A Ox2, reduced cognition, ?word finding difficulties, 0/10 pain pre/post, tolerating 3L NC SpO2 95% throughout

## 2024-09-24 NOTE — PHYSICAL THERAPY INITIAL EVALUATION ADULT - ORIENTATION, REHAB EVAL
provided choices unable to correctly answer, pt with ? word finding difficulty & increased processing time required and forgetfulness noted. Pt repeatedly would ask what's the question/person

## 2024-09-24 NOTE — PROGRESS NOTE ADULT - SUBJECTIVE AND OBJECTIVE BOX
INTERVAL HISTORY:  Extubated and off pressors  Awake and alert  Denies chest pain and SOB  O2 Sat's stable on O2 N/C  	  MEDICATIONS:  carvedilol 6.25 milliGRAM(s) Oral every 12 hours  hydrALAZINE Injectable 10 milliGRAM(s) IV Push every 4 hours PRN  losartan 100 milliGRAM(s) Oral daily  meropenem Injectable 1000 milliGRAM(s) IV Push every 8 hours  pantoprazole  Injectable 40 milliGRAM(s) IV Push daily  polyethylene glycol 3350 17 Gram(s) Oral daily PRN  dextrose 50% Injectable 25 Gram(s) IV Push once  dextrose 50% Injectable 25 Gram(s) IV Push once  dextrose 50% Injectable 12.5 Gram(s) IV Push once  insulin glargine Injectable (LANTUS) 10 Unit(s) SubCutaneous every morning  insulin lispro (ADMELOG) corrective regimen sliding scale   SubCutaneous every 6 hours  levothyroxine 100 MICROGram(s) Oral daily  artificial  tears Solution 1 Drop(s) Both EYES every 4 hours  aspirin  chewable 81 milliGRAM(s) Oral daily  chlorhexidine 2% Cloths 1 Application(s) Topical daily  clopidogrel Tablet 75 milliGRAM(s) Oral daily  dextrose 5%. 1000 milliLiter(s) IV Continuous <Continuous>  dextrose 5%. 1000 milliLiter(s) IV Continuous <Continuous>  heparin   Injectable 5000 Unit(s) SubCutaneous every 8 hours  influenza  Vaccine (HIGH DOSE) 0.5 milliLiter(s) IntraMuscular once        PHYSICAL EXAM:    T(C): 37.8 (24 @ 12:30), Max: 37.9 (24 @ 00:00)  HR: 91 (24 @ 14:00) (68 - 95)  BP: --  RR: 29 (24 @ 14:00) (14 - 33)  SpO2: 96% (24 @ 14:00) (93% - 100%)  Wt(kg): --    I&O's Summary    23 Sep 2024 07:01  -  24 Sep 2024 07:00  --------------------------------------------------------  IN: 1923.8 mL / OUT:  mL / NET: -21.2 mL    24 Sep 2024 07:01  -  24 Sep 2024 15:54  --------------------------------------------------------  IN: 200 mL / OUT: 1720 mL / NET: -1520 mL        Daily     Daily Weight in k.5 (24 Sep 2024 03:11)    Appearance: Normal	  HEENT:   Normal oral mucosa, PERRL, EOMI	  Cardiovascular: Normal S1 S2, No JVD, No murmurs, No edema  Respiratory: Lungs clear to auscultation, diminished B/L bases  Psychiatry: A & O x 3, Mood & affect appropriate  Gastrointestinal:  Soft, Non-tender, + BS	  Skin: No rashes, No ecchymoses, No cyanosis  Neurologic: Non-focal  Extremities: Generalized weakness, No cyanosis or edema  Vascular: Peripheral pulses bilaterally        TELEMETRY: NSR HR 80-90's      	    LABS:	 	    CARDIAC MARKERS:                          12.3   17.28 )-----------( 72       ( 24 Sep 2024 05:10 )             37.4         145  |  110[H]  |  21.6[H]  ----------------------------<  116[H]  3.6   |  25.0  |  0.36[L]    Ca    8.5      24 Sep 2024 05:10  Phos  2.1       Mg     2.0         TPro  4.9[L]  /  Alb  3.2[L]  /  TBili  0.4  /  DBili  x   /  AST  47[H]  /  ALT  36[H]  /  AlkPhos  115        ASSESSMENT: 	  69-year-old female with  PMHx of CAD, HTN, HLD, NSTEMI in  s/p ELIZABETH to LAD, S/p ELIZABETH Distal LCx (2024), hypothyroid, rheumatoid arthritis, presented to ER with left-sided chest pain associated with generalized weakness, nausea and vomiting.  Patient ill-appearing, unable to provide much history.  History mostly supplied by patient's family member at bedside.  Symptoms started 2 days ago, however today she felt much worse with prompted her to come to the ED.  Patient noted to be tachycardic and hypotensive upon arrival, diaphoretic and ill-appearing.  She is noted to have  diminished breath sounds in the left lower lobe, CT scan Negative for PE, does show a left lower lobe pneumonia.  Patient's course complicated by multiple episodes of hypotension requiring vasopressor support and IV fluids.  Patient's labs reviewed, noted to have leukocytosis, elevated lactate 5.5.  EKG independently reviewed, normal sinus rhythm, no acute changes. Pt became hypoxic in ER on high flow O2 N/C and intubated.      Septic/cardiogenic shock -   leukocytosis, elevated lactate 5.5, hypotension requiring vasopressor support (Levo and Pit) and IV fluids.  - Pseudomonas bacteremia  - Acute on chronic HFpEF, elevated BNP 8471 (2024)   - s/p TTE (2024) LVEF - 70 to 75 %.  - s/p Repeat echo (2024) with drop in EF - 20 - 25% with global hypokinesis off of . - Likely stunning/stress induced CM, no evidence of ACS,   -Extubated 2024 and off pressors, O2 Sat's stable on O2 N/C  - NSR on CM HR 80-90's BP stable  - Repeat TTE 2024 shows Normal LVEF 55-60%, Normal RV function, LVEF has improved from TTE 2024    PLAN:   Extubated today and off pressors  NSR on CM, BP stable  O2 Sat's stable on O2 N/C thus far  Continue ASA and Plavix daily  Continue Carvedilol and Losartan  IV antibiotics continue  Plan as per MICU

## 2024-09-24 NOTE — PHYSICAL THERAPY INITIAL EVALUATION ADULT - MANUAL MUSCLE TESTING RESULTS, REHAB EVAL
BUE shoulder flexion 2/5, elbow flex/ext 3/5, hand  3/5. B/L Hip flexion 3-/5 knee extension/flexion 3-/5, ankle DF/PF 2/5

## 2024-09-24 NOTE — CHART NOTE - NSCHARTNOTEFT_GEN_A_CORE
Patient's L axillary line was removed. The line site was cleaned, sutures were removed, and the line was pulled out. Pressure was held for 10 minutes, and no bleeding was noted. Site was covered and taped. Patient tolerated the removal well.

## 2024-09-24 NOTE — PROGRESS NOTE ADULT - ASSESSMENT
68 y/o F with a h/o CAD NSTEMI in 2011 s/p ELIZABETH to LAD, s/p ELIZABETH Distal LCx (4/30/2024), HTN, HLD, hypothyroid, rheumatoid arthritis, with:    # Acute hypoxemic respiratory failure  # ARDS  # Lobar pneumonia  # Septic/cardiogenic shock  # Pseudomonas bacteremia  # Hyperglycemia    - actively titrating ventilator settings to maintain SpO2 > 92%  - repeat ABG indicates hyperventilation, RR reduced to 16 bpm and TV reduced to 350cc  - will perform spontaneous awakening and breathing trials this morning  - stress cardiomyopathy, unable to wean dobutamine yesterday, will trial off again as hemodynamics are robust  - TTE yesterday revealed improvement in LVEF 60-65% while on inotrope support, will need eventual repeat if able to tolerate cessation of dobutamine  - monitor laboratory and clinical evidence of end-organ perfusion  - monitor hemodynamics closely, maintain a MAP > 65  - continue empiric meropenem  - BG controlled on current regimen, goal < 180  - replete phosphorous    Case discussed with MICU physician, Dr. Cam.

## 2024-09-24 NOTE — PHYSICAL THERAPY INITIAL EVALUATION ADULT - PERTINENT HX OF CURRENT PROBLEM, REHAB EVAL
Per EMR: Pt admitted on 9/18 with left-sided chest pain associated with generalized weakness, nausea and vomiting. CT chest revealed LLL pneumonia. While in the ED developed worsening hypoxic respiratory failure and was emergently intubated. Hospital course complicated by progression to ARDS requiring deep sedation/IV paralytic and severe multifactorial shock state with stress cardiomyopathy requiring multiple IV vasopressors and inotrope support. Blood cultures grew pseudomonas aeruginosa.

## 2024-09-24 NOTE — CHART NOTE - NSCHARTNOTEFT_GEN_A_CORE
Patient is a 69y old  Female who presents with a chief complaint of Shortness of breath (24 Sep 2024 00:12)      BRIEF HOSPITAL COURSE: 70 y/o F with a h/o CAD NSTEMI in 2011 s/p ELIZABETH to LAD, s/p ELIZABETH Distal LCx (4/30/2024), HTN, HLD, hypothyroid, rheumatoid arthritis, admitted on 9/18 with left-sided chest pain associated with generalized weakness, nausea and vomiting. CT chest revealed LLL pneumonia. While in the ED developed worsening hypoxic respiratory failure and was emergently intubated. Hospital course complicated by progression to ARDS requiring deep sedation/IV paralytic and severe multifactorial shock state with stress cardiomyopathy requiring multiple IV vasopressors and inotrope support. Blood cultures grew pseudomonas aeruginosa.    Events last 24 hours: Patient was extubated. On O2 Flow (L/min): 8 O2 Concentration (%): 40        PAST MEDICAL & SURGICAL HISTORY:  Hypothyroid      ADHD (attention deficit hyperactivity disorder)      HLD (hyperlipidemia)      Myocardial infarct  2012      Stented coronary artery  2012      Rheumatoid arthritis      Migraine      S/p bilateral carpal tunnel release      H/O cardiac catheterization      H/O laminectomy      H/O spinal fusion      S/P left knee arthroscopy      H/O total knee replacement, right        Allergies    tetracycline (Hives)  penicillins (Hives)  Butazolactin, Prolaprin (Hives)  azithromycin (Hives)  Zyrtec (Hives)    Intolerances      FAMILY HISTORY:      Review of Systems:  CONSTITUTIONAL: No fever, chills, or fatigue  EYES: No eye pain, visual disturbances, or discharge  ENMT:  No difficulty hearing, tinnitus, vertigo; No sinus or throat pain  NECK: No pain or stiffness  RESPIRATORY: No cough, wheezing, chills or hemoptysis; No shortness of breath  CARDIOVASCULAR: No chest pain, palpitations, dizziness, or leg swelling  GASTROINTESTINAL: No abdominal or epigastric pain. No nausea, vomiting, or hematemesis; No diarrhea or constipation. No melena or hematochezia.  GENITOURINARY: No dysuria, frequency, hematuria, or incontinence  NEUROLOGICAL: No headaches, memory loss, loss of strength, numbness, or tremors  SKIN: No itching, burning, rashes, or lesions   MUSCULOSKELETAL: No joint pain or swelling; No muscle, back, or extremity pain  PSYCHIATRIC: No depression, anxiety, mood swings, or difficulty sleeping      Medications:  meropenem Injectable 1000 milliGRAM(s) IV Push every 8 hours    hydrALAZINE Injectable 10 milliGRAM(s) IV Push every 4 hours PRN  losartan 50 milliGRAM(s) Oral daily      acetaminophen   IVPB .. 1000 milliGRAM(s) IV Intermittent once PRN      aspirin  chewable 81 milliGRAM(s) Oral daily  clopidogrel Tablet 75 milliGRAM(s) Oral daily  heparin   Injectable 5000 Unit(s) SubCutaneous every 8 hours    pantoprazole  Injectable 40 milliGRAM(s) IV Push daily  polyethylene glycol 3350 17 Gram(s) Oral daily PRN      dextrose 50% Injectable 25 Gram(s) IV Push once  dextrose 50% Injectable 25 Gram(s) IV Push once  dextrose 50% Injectable 12.5 Gram(s) IV Push once  dextrose Oral Gel 15 Gram(s) Oral once PRN  glucagon  Injectable 1 milliGRAM(s) IntraMuscular once  insulin glargine Injectable (LANTUS) 10 Unit(s) SubCutaneous every morning  insulin lispro (ADMELOG) corrective regimen sliding scale   SubCutaneous every 6 hours  levothyroxine 100 MICROGram(s) Oral daily    dextrose 5%. 1000 milliLiter(s) IV Continuous <Continuous>  dextrose 5%. 1000 milliLiter(s) IV Continuous <Continuous>    influenza  Vaccine (HIGH DOSE) 0.5 milliLiter(s) IntraMuscular once    artificial  tears Solution 1 Drop(s) Both EYES every 4 hours  chlorhexidine 2% Cloths 1 Application(s) Topical daily        Mode: CPAP with PS  FiO2: 40  PEEP: 6  PS: 10  MAP: 11  PIP: 16      ICU Vital Signs Last 24 Hrs  T(C): 37.6 (24 Sep 2024 07:00), Max: 38.1 (23 Sep 2024 11:30)  T(F): 99.7 (24 Sep 2024 07:00), Max: 100.6 (23 Sep 2024 11:30)  HR: 85 (24 Sep 2024 07:00) (68 - 92)  BP: --  BP(mean): --  ABP: 157/80 (24 Sep 2024 07:00) (97/53 - 163/87)  ABP(mean): 112 (24 Sep 2024 07:00) (71 - 119)  RR: 21 (24 Sep 2024 07:00) (12 - 27)  SpO2: 98% (24 Sep 2024 07:00) (94% - 100%)    O2 Parameters below as of 24 Sep 2024 06:00  Patient On (Oxygen Delivery Method): mask, nonrebreather  O2 Flow (L/min): 8  O2 Concentration (%): 40      Vital Signs Last 24 Hrs  T(C): 37.6 (24 Sep 2024 07:00), Max: 38.1 (23 Sep 2024 11:30)  T(F): 99.7 (24 Sep 2024 07:00), Max: 100.6 (23 Sep 2024 11:30)  HR: 85 (24 Sep 2024 07:00) (68 - 92)  BP: --  BP(mean): --  RR: 21 (24 Sep 2024 07:00) (12 - 27)  SpO2: 98% (24 Sep 2024 07:00) (94% - 100%)    Parameters below as of 24 Sep 2024 06:00  Patient On (Oxygen Delivery Method): mask, nonrebreather  O2 Flow (L/min): 8  O2 Concentration (%): 40    ABG - ( 23 Sep 2024 18:48 )  pH, Arterial: 7.500 pH, Blood: x     /  pCO2: 36    /  pO2: 135   / HCO3: 28    / Base Excess: 4.9   /  SaO2: 100.0               I&O's Detail    23 Sep 2024 07:01  -  24 Sep 2024 07:00  --------------------------------------------------------  IN:    Dexmedetomidine: 442.4 mL    DOBUTamine: 102 mL    Glucerna 1.5: 720 mL    IV PiggyBack: 250 mL    IV PiggyBack: 300 mL    IV PiggyBack: 50 mL    Propofol: 16.2 mL    Propofol: 43.2 mL  Total IN: 1923.8 mL    OUT:    Indwelling Catheter - Urethral (mL): 1945 mL  Total OUT: 1945 mL    Total NET: -21.2 mL            LABS:                        12.3   17.28 )-----------( 72       ( 24 Sep 2024 05:10 )             37.4     09-24    145  |  110[H]  |  21.6[H]  ----------------------------<  116[H]  3.6   |  25.0  |  0.36[L]    Ca    8.5      24 Sep 2024 05:10  Phos  2.2     09-23  Mg     2.2     09-23    TPro  4.9[L]  /  Alb  3.2[L]  /  TBili  0.4  /  DBili  x   /  AST  47[H]  /  ALT  36[H]  /  AlkPhos  115  09-23          CAPILLARY BLOOD GLUCOSE      POCT Blood Glucose.: 103 mg/dL (24 Sep 2024 05:37)    PT/INR - ( 24 Sep 2024 04:30 )   PT: see note sec;   INR: see note ratio           Urinalysis Basic - ( 24 Sep 2024 05:10 )    Color: x / Appearance: x / SG: x / pH: x  Gluc: 116 mg/dL / Ketone: x  / Bili: x / Urobili: x   Blood: x / Protein: x / Nitrite: x   Leuk Esterase: x / RBC: x / WBC x   Sq Epi: x / Non Sq Epi: x / Bacteria: x      CULTURES:  Culture Results:   Normal Respiratory Mary present (09-19 @ 00:35)  Culture Results:   No Legionella species isolated  Culture in progress (09-18 @ 16:57)  Culture Results:   No growth at 5 days (09-18 @ 16:57)  Culture Results:   No growth (09-18 @ 12:05)  Culture Results:   Growth in aerobic bottle: Pseudomonas aeruginosa  Direct identification is available within approximately 3-5  hours either by Blood Panel Multiplexed PCR or Direct  MALDI-TOF. Details: [email or web address] (09-18 @ 10:15)      Physical Examination:    General: No acute distress.  sedated/intubated    HEENT: Pupils equal, reactive to light.  Symmetric.    PULM: scattered rhonchi bilaterally bilaterally, no significant sputum production    CVS: Regular rate and rhythm, no murmurs, rubs, or gallops    ABD: Soft, nondistended, nontender, normoactive bowel sounds, no masses    EXT: No edema, nontender    SKIN: Warm and well perfused, no rashes noted.    NEURO: sedated, moves all extremities spontaneously    RADIOLOGY: ***    CRITICAL CARE TIME SPENT: ***        Assessment and Plan:   · Assessment	  70 y/o F with a h/o CAD NSTEMI in 2011 s/p ELIZABETH to LAD, s/p ELIZABETH Distal LCx (4/30/2024), HTN, HLD, hypothyroid, rheumatoid arthritis, with:    # Acute hypoxemic respiratory failure  # ARDS  # Lobar pneumonia  # Septic/cardiogenic shock  # Pseudomonas bacteremia  # Hyperglycemia    - actively titrating ventilator settings to maintain SpO2 > 92%    - stress cardiomyopathy, unable to wean dobutamine yesterday, will trial off again as hemodynamics are robust  - TTE yesterday revealed improvement in LVEF 60-65% while on inotrope support, will need eventual repeat if able to tolerate cessation of dobutamine  - monitor laboratory and clinical evidence of end-organ perfusion  - monitor hemodynamics closely, maintain a MAP > 65  - continue empiric meropenem  - BG controlled on current regimen, goal < 180  - replete phosphorous    Case discussed with MICU physician, Dr. Fajardo Patient is a 69y old  Female who presents with a chief complaint of Shortness of breath (24 Sep 2024 00:12)      BRIEF HOSPITAL COURSE: 68 y/o F with a h/o CAD NSTEMI in 2011 s/p ELIZABETH to LAD, s/p ELIZABETH Distal LCx (4/30/2024), HTN, HLD, hypothyroid, rheumatoid arthritis, admitted on 9/18 with left-sided chest pain associated with generalized weakness, nausea and vomiting. CT chest revealed LLL pneumonia. While in the ED developed worsening hypoxic respiratory failure and was emergently intubated. Hospital course complicated by progression to ARDS requiring deep sedation/IV paralytic and severe multifactorial shock state with stress cardiomyopathy requiring multiple IV vasopressors and inotrope support. Blood cultures grew pseudomonas aeruginosa.    Events last 24 hours: Patient was extubated and now on 8L FiO2 40%. Patient having difficulty speaking this morning but has no acute concerns. States that there is abdominal pain, but having difficulty vocalizing concerns. Off the dobutamine.        PAST MEDICAL & SURGICAL HISTORY:  Hypothyroid      ADHD (attention deficit hyperactivity disorder)      HLD (hyperlipidemia)      Myocardial infarct  2012      Stented coronary artery  2012      Rheumatoid arthritis      Migraine      S/p bilateral carpal tunnel release      H/O cardiac catheterization      H/O laminectomy      H/O spinal fusion      S/P left knee arthroscopy      H/O total knee replacement, right        Allergies    tetracycline (Hives)  penicillins (Hives)  Butazolactin, Prolaprin (Hives)  azithromycin (Hives)  Zyrtec (Hives)    Intolerances      FAMILY HISTORY:      Review of Systems:  CONSTITUTIONAL: No fever, chills, or fatigue  EYES: No eye pain, visual disturbances, or discharge  ENMT:  No difficulty hearing, tinnitus, vertigo; No sinus or throat pain  NECK: No pain or stiffness  RESPIRATORY: No cough, wheezing, chills or hemoptysis; No shortness of breath  CARDIOVASCULAR: No chest pain, palpitations, dizziness, or leg swelling  GASTROINTESTINAL: No abdominal or epigastric pain. No nausea, vomiting, or hematemesis; No diarrhea or constipation. No melena or hematochezia.  GENITOURINARY: No dysuria, frequency, hematuria, or incontinence  NEUROLOGICAL: No headaches, memory loss, loss of strength, numbness, or tremors  SKIN: No itching, burning, rashes, or lesions   MUSCULOSKELETAL: No joint pain or swelling; No muscle, back, or extremity pain  PSYCHIATRIC: No depression, anxiety, mood swings, or difficulty sleeping      Medications:  meropenem Injectable 1000 milliGRAM(s) IV Push every 8 hours    hydrALAZINE Injectable 10 milliGRAM(s) IV Push every 4 hours PRN  losartan 50 milliGRAM(s) Oral daily      acetaminophen   IVPB .. 1000 milliGRAM(s) IV Intermittent once PRN      aspirin  chewable 81 milliGRAM(s) Oral daily  clopidogrel Tablet 75 milliGRAM(s) Oral daily  heparin   Injectable 5000 Unit(s) SubCutaneous every 8 hours    pantoprazole  Injectable 40 milliGRAM(s) IV Push daily  polyethylene glycol 3350 17 Gram(s) Oral daily PRN      dextrose 50% Injectable 25 Gram(s) IV Push once  dextrose 50% Injectable 25 Gram(s) IV Push once  dextrose 50% Injectable 12.5 Gram(s) IV Push once  dextrose Oral Gel 15 Gram(s) Oral once PRN  glucagon  Injectable 1 milliGRAM(s) IntraMuscular once  insulin glargine Injectable (LANTUS) 10 Unit(s) SubCutaneous every morning  insulin lispro (ADMELOG) corrective regimen sliding scale   SubCutaneous every 6 hours  levothyroxine 100 MICROGram(s) Oral daily    dextrose 5%. 1000 milliLiter(s) IV Continuous <Continuous>  dextrose 5%. 1000 milliLiter(s) IV Continuous <Continuous>    influenza  Vaccine (HIGH DOSE) 0.5 milliLiter(s) IntraMuscular once    artificial  tears Solution 1 Drop(s) Both EYES every 4 hours  chlorhexidine 2% Cloths 1 Application(s) Topical daily        Mode: CPAP with PS  FiO2: 40  PEEP: 6  PS: 10  MAP: 11  PIP: 16      ICU Vital Signs Last 24 Hrs  T(C): 37.6 (24 Sep 2024 07:00), Max: 38.1 (23 Sep 2024 11:30)  T(F): 99.7 (24 Sep 2024 07:00), Max: 100.6 (23 Sep 2024 11:30)  HR: 85 (24 Sep 2024 07:00) (68 - 92)  BP: --  BP(mean): --  ABP: 157/80 (24 Sep 2024 07:00) (97/53 - 163/87)  ABP(mean): 112 (24 Sep 2024 07:00) (71 - 119)  RR: 21 (24 Sep 2024 07:00) (12 - 27)  SpO2: 98% (24 Sep 2024 07:00) (94% - 100%)    O2 Parameters below as of 24 Sep 2024 06:00  Patient On (Oxygen Delivery Method): mask, nonrebreather  O2 Flow (L/min): 8  O2 Concentration (%): 40      Vital Signs Last 24 Hrs  T(C): 37.6 (24 Sep 2024 07:00), Max: 38.1 (23 Sep 2024 11:30)  T(F): 99.7 (24 Sep 2024 07:00), Max: 100.6 (23 Sep 2024 11:30)  HR: 85 (24 Sep 2024 07:00) (68 - 92)  BP: --  BP(mean): --  RR: 21 (24 Sep 2024 07:00) (12 - 27)  SpO2: 98% (24 Sep 2024 07:00) (94% - 100%)    Parameters below as of 24 Sep 2024 06:00  Patient On (Oxygen Delivery Method): mask, nonrebreather  O2 Flow (L/min): 8  O2 Concentration (%): 40    ABG - ( 23 Sep 2024 18:48 )  pH, Arterial: 7.500 pH, Blood: x     /  pCO2: 36    /  pO2: 135   / HCO3: 28    / Base Excess: 4.9   /  SaO2: 100.0               I&O's Detail    23 Sep 2024 07:01  -  24 Sep 2024 07:00  --------------------------------------------------------  IN:    Dexmedetomidine: 442.4 mL    DOBUTamine: 102 mL    Glucerna 1.5: 720 mL    IV PiggyBack: 250 mL    IV PiggyBack: 300 mL    IV PiggyBack: 50 mL    Propofol: 16.2 mL    Propofol: 43.2 mL  Total IN: 1923.8 mL    OUT:    Indwelling Catheter - Urethral (mL): 1945 mL  Total OUT: 1945 mL    Total NET: -21.2 mL            LABS:                        12.3   17.28 )-----------( 72       ( 24 Sep 2024 05:10 )             37.4     09-24    145  |  110[H]  |  21.6[H]  ----------------------------<  116[H]  3.6   |  25.0  |  0.36[L]    Ca    8.5      24 Sep 2024 05:10  Phos  2.2     09-23  Mg     2.2     09-23    TPro  4.9[L]  /  Alb  3.2[L]  /  TBili  0.4  /  DBili  x   /  AST  47[H]  /  ALT  36[H]  /  AlkPhos  115  09-23          CAPILLARY BLOOD GLUCOSE      POCT Blood Glucose.: 103 mg/dL (24 Sep 2024 05:37)    PT/INR - ( 24 Sep 2024 04:30 )   PT: see note sec;   INR: see note ratio           Urinalysis Basic - ( 24 Sep 2024 05:10 )    Color: x / Appearance: x / SG: x / pH: x  Gluc: 116 mg/dL / Ketone: x  / Bili: x / Urobili: x   Blood: x / Protein: x / Nitrite: x   Leuk Esterase: x / RBC: x / WBC x   Sq Epi: x / Non Sq Epi: x / Bacteria: x      CULTURES:  Culture Results:   Normal Respiratory Mary present (09-19 @ 00:35)  Culture Results:   No Legionella species isolated  Culture in progress (09-18 @ 16:57)  Culture Results:   No growth at 5 days (09-18 @ 16:57)  Culture Results:   No growth (09-18 @ 12:05)  Culture Results:   Growth in aerobic bottle: Pseudomonas aeruginosa  Direct identification is available within approximately 3-5  hours either by Blood Panel Multiplexed PCR or Direct  MALDI-TOF. Details: [email or web address] (09-18 @ 10:15)      Physical Examination:    General: No acute distress.  sedated/intubated    HEENT: Pupils equal, reactive to light.  Symmetric.    PULM: scattered rhonchi bilaterally bilaterally, no significant sputum production    CVS: Regular rate and rhythm, no murmurs, rubs, or gallops    ABD: Soft, nondistended, nontender, normoactive bowel sounds, no masses    EXT: No edema, nontender    SKIN: Warm and well perfused, no rashes noted.    NEURO: sedated, moves all extremities spontaneously    RADIOLOGY: ***    CRITICAL CARE TIME SPENT: ***        Assessment and Plan:   · Assessment	  68 y/o F with a h/o CAD NSTEMI in 2011 s/p ELIZABETH to LAD, s/p ELIZABETH Distal LCx (4/30/2024), HTN, HLD, hypothyroid, rheumatoid arthritis, with:    # Acute hypoxemic respiratory failure  # ARDS  # Lobar pneumonia  # Septic/cardiogenic shock  # Pseudomonas bacteremia  # Hyperglycemia    - actively titrating ventilator settings to maintain SpO2 > 92%    - stress cardiomyopathy, unable to wean dobutamine yesterday, will trial off again as hemodynamics are robust  - TTE yesterday revealed improvement in LVEF 60-65% while on inotrope support, will need eventual repeat if able to tolerate cessation of dobutamine  - monitor laboratory and clinical evidence of end-organ perfusion  - monitor hemodynamics closely, maintain a MAP > 65  - continue empiric meropenem  - BG controlled on current regimen, goal < 180  - replete phosphorous    Case discussed with MICU physician, Dr. Fajardo Patient is a 69y old  Female who presents with a chief complaint of Shortness of breath (24 Sep 2024 00:12)      BRIEF HOSPITAL COURSE: 70 y/o F with a h/o CAD NSTEMI in 2011 s/p ELIZABETH to LAD, s/p ELIZABETH Distal LCx (4/30/2024), HTN, HLD, hypothyroid, rheumatoid arthritis, admitted on 9/18 with left-sided chest pain associated with generalized weakness, nausea and vomiting. CT chest revealed LLL pneumonia. While in the ED developed worsening hypoxic respiratory failure and was emergently intubated. Hospital course complicated by progression to ARDS requiring deep sedation/IV paralytic and severe multifactorial shock state with stress cardiomyopathy requiring multiple IV vasopressors and inotrope support. Blood cultures grew pseudomonas aeruginosa.    Events last 24 hours: Patient was extubated and now on 8L FiO2 40%. Patient having difficulty speaking this morning but has no acute concerns. States that there is abdominal pain, but having difficulty vocalizing concerns. Off the dobutamine.        PAST MEDICAL & SURGICAL HISTORY:  Hypothyroid      ADHD (attention deficit hyperactivity disorder)      HLD (hyperlipidemia)      Myocardial infarct  2012      Stented coronary artery  2012      Rheumatoid arthritis      Migraine      S/p bilateral carpal tunnel release      H/O cardiac catheterization      H/O laminectomy      H/O spinal fusion      S/P left knee arthroscopy      H/O total knee replacement, right        Allergies    tetracycline (Hives)  penicillins (Hives)  Butazolactin, Prolaprin (Hives)  azithromycin (Hives)  Zyrtec (Hives)    Intolerances      FAMILY HISTORY:      Review of Systems:  CONSTITUTIONAL: No fever, chills, or fatigue  EYES: No eye pain, visual disturbances, or discharge  ENMT:  No difficulty hearing, tinnitus, vertigo; No sinus or throat pain  NECK: No pain or stiffness  RESPIRATORY: No cough, wheezing, chills or hemoptysis; No shortness of breath  CARDIOVASCULAR: No chest pain, palpitations, dizziness, or leg swelling  GASTROINTESTINAL: No abdominal or epigastric pain. No nausea, vomiting, or hematemesis; No diarrhea or constipation. No melena or hematochezia.  GENITOURINARY: No dysuria, frequency, hematuria, or incontinence  NEUROLOGICAL: No headaches, memory loss, loss of strength, numbness, or tremors  SKIN: No itching, burning, rashes, or lesions   MUSCULOSKELETAL: No joint pain or swelling; No muscle, back, or extremity pain  PSYCHIATRIC: No depression, anxiety, mood swings, or difficulty sleeping      Medications:  meropenem Injectable 1000 milliGRAM(s) IV Push every 8 hours    hydrALAZINE Injectable 10 milliGRAM(s) IV Push every 4 hours PRN  losartan 50 milliGRAM(s) Oral daily      acetaminophen   IVPB .. 1000 milliGRAM(s) IV Intermittent once PRN      aspirin  chewable 81 milliGRAM(s) Oral daily  clopidogrel Tablet 75 milliGRAM(s) Oral daily  heparin   Injectable 5000 Unit(s) SubCutaneous every 8 hours    pantoprazole  Injectable 40 milliGRAM(s) IV Push daily  polyethylene glycol 3350 17 Gram(s) Oral daily PRN      dextrose 50% Injectable 25 Gram(s) IV Push once  dextrose 50% Injectable 25 Gram(s) IV Push once  dextrose 50% Injectable 12.5 Gram(s) IV Push once  dextrose Oral Gel 15 Gram(s) Oral once PRN  glucagon  Injectable 1 milliGRAM(s) IntraMuscular once  insulin glargine Injectable (LANTUS) 10 Unit(s) SubCutaneous every morning  insulin lispro (ADMELOG) corrective regimen sliding scale   SubCutaneous every 6 hours  levothyroxine 100 MICROGram(s) Oral daily    dextrose 5%. 1000 milliLiter(s) IV Continuous <Continuous>  dextrose 5%. 1000 milliLiter(s) IV Continuous <Continuous>    influenza  Vaccine (HIGH DOSE) 0.5 milliLiter(s) IntraMuscular once    artificial  tears Solution 1 Drop(s) Both EYES every 4 hours  chlorhexidine 2% Cloths 1 Application(s) Topical daily        Mode: CPAP with PS  FiO2: 40  PEEP: 6  PS: 10  MAP: 11  PIP: 16      ICU Vital Signs Last 24 Hrs  T(C): 37.6 (24 Sep 2024 07:00), Max: 38.1 (23 Sep 2024 11:30)  T(F): 99.7 (24 Sep 2024 07:00), Max: 100.6 (23 Sep 2024 11:30)  HR: 85 (24 Sep 2024 07:00) (68 - 92)  BP: --  BP(mean): --  ABP: 157/80 (24 Sep 2024 07:00) (97/53 - 163/87)  ABP(mean): 112 (24 Sep 2024 07:00) (71 - 119)  RR: 21 (24 Sep 2024 07:00) (12 - 27)  SpO2: 98% (24 Sep 2024 07:00) (94% - 100%)    O2 Parameters below as of 24 Sep 2024 06:00  Patient On (Oxygen Delivery Method): mask, nonrebreather  O2 Flow (L/min): 8  O2 Concentration (%): 40      Vital Signs Last 24 Hrs  T(C): 37.6 (24 Sep 2024 07:00), Max: 38.1 (23 Sep 2024 11:30)  T(F): 99.7 (24 Sep 2024 07:00), Max: 100.6 (23 Sep 2024 11:30)  HR: 85 (24 Sep 2024 07:00) (68 - 92)  BP: --  BP(mean): --  RR: 21 (24 Sep 2024 07:00) (12 - 27)  SpO2: 98% (24 Sep 2024 07:00) (94% - 100%)    Parameters below as of 24 Sep 2024 06:00  Patient On (Oxygen Delivery Method): mask, nonrebreather  O2 Flow (L/min): 8  O2 Concentration (%): 40    ABG - ( 23 Sep 2024 18:48 )  pH, Arterial: 7.500 pH, Blood: x     /  pCO2: 36    /  pO2: 135   / HCO3: 28    / Base Excess: 4.9   /  SaO2: 100.0               I&O's Detail    23 Sep 2024 07:01  -  24 Sep 2024 07:00  --------------------------------------------------------  IN:    Dexmedetomidine: 442.4 mL    DOBUTamine: 102 mL    Glucerna 1.5: 720 mL    IV PiggyBack: 250 mL    IV PiggyBack: 300 mL    IV PiggyBack: 50 mL    Propofol: 16.2 mL    Propofol: 43.2 mL  Total IN: 1923.8 mL    OUT:    Indwelling Catheter - Urethral (mL): 1945 mL  Total OUT: 1945 mL    Total NET: -21.2 mL            LABS:                        12.3   17.28 )-----------( 72       ( 24 Sep 2024 05:10 )             37.4     09-24    145  |  110[H]  |  21.6[H]  ----------------------------<  116[H]  3.6   |  25.0  |  0.36[L]    Ca    8.5      24 Sep 2024 05:10  Phos  2.2     09-23  Mg     2.2     09-23    TPro  4.9[L]  /  Alb  3.2[L]  /  TBili  0.4  /  DBili  x   /  AST  47[H]  /  ALT  36[H]  /  AlkPhos  115  09-23          CAPILLARY BLOOD GLUCOSE      POCT Blood Glucose.: 103 mg/dL (24 Sep 2024 05:37)    PT/INR - ( 24 Sep 2024 04:30 )   PT: see note sec;   INR: see note ratio           Urinalysis Basic - ( 24 Sep 2024 05:10 )    Color: x / Appearance: x / SG: x / pH: x  Gluc: 116 mg/dL / Ketone: x  / Bili: x / Urobili: x   Blood: x / Protein: x / Nitrite: x   Leuk Esterase: x / RBC: x / WBC x   Sq Epi: x / Non Sq Epi: x / Bacteria: x      CULTURES:  Culture Results:   Normal Respiratory Mary present (09-19 @ 00:35)  Culture Results:   No Legionella species isolated  Culture in progress (09-18 @ 16:57)  Culture Results:   No growth at 5 days (09-18 @ 16:57)  Culture Results:   No growth (09-18 @ 12:05)  Culture Results:   Growth in aerobic bottle: Pseudomonas aeruginosa  Direct identification is available within approximately 3-5  hours either by Blood Panel Multiplexed PCR or Direct  MALDI-TOF. Details: [email or web address] (09-18 @ 10:15)      Physical Examination:    General: No acute distress.  sedated/intubated    HEENT: Pupils equal, reactive to light.  Symmetric.    PULM: scattered rhonchi bilaterally bilaterally, no significant sputum production    CVS: Regular rate and rhythm, no murmurs, rubs, or gallops    ABD: Soft, nondistended, nontender, normoactive bowel sounds, no masses    EXT: No edema, nontender    SKIN: Warm and well perfused, no rashes noted.    NEURO: sedated, moves all extremities spontaneously    RADIOLOGY: ***    CRITICAL CARE TIME SPENT: ***        Assessment and Plan:   · Assessment	  70 y/o F with a h/o CAD NSTEMI in 2011 s/p ELIZABETH to LAD, s/p ELIZABETH Distal LCx (4/30/2024), HTN, HLD, hypothyroid, rheumatoid arthritis, with:    # Acute hypoxemic respiratory failure  # ARDS  # Lobar pneumonia  # Septic/cardiogenic shock  # Pseudomonas bacteremia  # Hyperglycemia    NEURO:  - Recently extubated  - Slightly confused and having difficulty verbalizing    CV:  - Maintain MAP>65  - Cardiac monitor  - Off dobutamine  - Repeat TTE?    RESP:  - 8L FiO2 40% saturating well  - Aspiration precautions    GI:  - Protonix for GI ppx    RENAL:  - Monitor I/O  - Repleating K  - Mg Phos pending    ID:  - Empiric Meropenem    Heme:    Endo:  - BG controlled on current regimen, goal < 180    DVT: Platelet count low  Diet: Bedside swallow pending  Code Status: Full Code  Dispo:    Case discussed with MICU Attending: Dr. Fajardo Patient is a 69y old  Female who presents with a chief complaint of Shortness of breath (24 Sep 2024 00:12)      BRIEF HOSPITAL COURSE: 70 y/o F with a h/o CAD NSTEMI in 2011 s/p ELIZABETH to LAD, s/p ELIZABETH Distal LCx (4/30/2024), HTN, HLD, hypothyroid, rheumatoid arthritis, admitted on 9/18 with left-sided chest pain associated with generalized weakness, nausea and vomiting. CT chest revealed LLL pneumonia. While in the ED developed worsening hypoxic respiratory failure and was emergently intubated. Hospital course complicated by progression to ARDS requiring deep sedation/IV paralytic and severe multifactorial shock state with stress cardiomyopathy requiring multiple IV vasopressors and inotrope support. Blood cultures grew pseudomonas aeruginosa.    Events last 24 hours: Patient was extubated and now on 8L FiO2 40%. Patient having difficulty speaking this morning but has no acute concerns. States that there is abdominal pain, but having difficulty vocalizing concerns. Off the dobutamine.        PAST MEDICAL & SURGICAL HISTORY:  Hypothyroid      ADHD (attention deficit hyperactivity disorder)      HLD (hyperlipidemia)      Myocardial infarct  2012      Stented coronary artery  2012      Rheumatoid arthritis      Migraine      S/p bilateral carpal tunnel release      H/O cardiac catheterization      H/O laminectomy      H/O spinal fusion      S/P left knee arthroscopy      H/O total knee replacement, right        Allergies    tetracycline (Hives)  penicillins (Hives)  Butazolactin, Prolaprin (Hives)  azithromycin (Hives)  Zyrtec (Hives)    Intolerances      FAMILY HISTORY:      Review of Systems:  CONSTITUTIONAL: No fever, chills, or fatigue  EYES: No eye pain, visual disturbances, or discharge  ENMT:  No difficulty hearing, tinnitus, vertigo; No sinus or throat pain  NECK: No pain or stiffness  RESPIRATORY: No cough, wheezing, chills or hemoptysis; No shortness of breath  CARDIOVASCULAR: No chest pain, palpitations, dizziness, or leg swelling  GASTROINTESTINAL: No abdominal or epigastric pain. No nausea, vomiting, or hematemesis; No diarrhea or constipation. No melena or hematochezia.  GENITOURINARY: No dysuria, frequency, hematuria, or incontinence  NEUROLOGICAL: No headaches, memory loss, loss of strength, numbness, or tremors  SKIN: No itching, burning, rashes, or lesions   MUSCULOSKELETAL: No joint pain or swelling; No muscle, back, or extremity pain  PSYCHIATRIC: No depression, anxiety, mood swings, or difficulty sleeping      Medications:  meropenem Injectable 1000 milliGRAM(s) IV Push every 8 hours    hydrALAZINE Injectable 10 milliGRAM(s) IV Push every 4 hours PRN  losartan 50 milliGRAM(s) Oral daily      acetaminophen   IVPB .. 1000 milliGRAM(s) IV Intermittent once PRN      aspirin  chewable 81 milliGRAM(s) Oral daily  clopidogrel Tablet 75 milliGRAM(s) Oral daily  heparin   Injectable 5000 Unit(s) SubCutaneous every 8 hours    pantoprazole  Injectable 40 milliGRAM(s) IV Push daily  polyethylene glycol 3350 17 Gram(s) Oral daily PRN      dextrose 50% Injectable 25 Gram(s) IV Push once  dextrose 50% Injectable 25 Gram(s) IV Push once  dextrose 50% Injectable 12.5 Gram(s) IV Push once  dextrose Oral Gel 15 Gram(s) Oral once PRN  glucagon  Injectable 1 milliGRAM(s) IntraMuscular once  insulin glargine Injectable (LANTUS) 10 Unit(s) SubCutaneous every morning  insulin lispro (ADMELOG) corrective regimen sliding scale   SubCutaneous every 6 hours  levothyroxine 100 MICROGram(s) Oral daily    dextrose 5%. 1000 milliLiter(s) IV Continuous <Continuous>  dextrose 5%. 1000 milliLiter(s) IV Continuous <Continuous>    influenza  Vaccine (HIGH DOSE) 0.5 milliLiter(s) IntraMuscular once    artificial  tears Solution 1 Drop(s) Both EYES every 4 hours  chlorhexidine 2% Cloths 1 Application(s) Topical daily        Mode: CPAP with PS  FiO2: 40  PEEP: 6  PS: 10  MAP: 11  PIP: 16      ICU Vital Signs Last 24 Hrs  T(C): 37.6 (24 Sep 2024 07:00), Max: 38.1 (23 Sep 2024 11:30)  T(F): 99.7 (24 Sep 2024 07:00), Max: 100.6 (23 Sep 2024 11:30)  HR: 85 (24 Sep 2024 07:00) (68 - 92)  BP: --  BP(mean): --  ABP: 157/80 (24 Sep 2024 07:00) (97/53 - 163/87)  ABP(mean): 112 (24 Sep 2024 07:00) (71 - 119)  RR: 21 (24 Sep 2024 07:00) (12 - 27)  SpO2: 98% (24 Sep 2024 07:00) (94% - 100%)    O2 Parameters below as of 24 Sep 2024 06:00  Patient On (Oxygen Delivery Method): mask, nonrebreather  O2 Flow (L/min): 8  O2 Concentration (%): 40      Vital Signs Last 24 Hrs  T(C): 37.6 (24 Sep 2024 07:00), Max: 38.1 (23 Sep 2024 11:30)  T(F): 99.7 (24 Sep 2024 07:00), Max: 100.6 (23 Sep 2024 11:30)  HR: 85 (24 Sep 2024 07:00) (68 - 92)  BP: --  BP(mean): --  RR: 21 (24 Sep 2024 07:00) (12 - 27)  SpO2: 98% (24 Sep 2024 07:00) (94% - 100%)    Parameters below as of 24 Sep 2024 06:00  Patient On (Oxygen Delivery Method): mask, nonrebreather  O2 Flow (L/min): 8  O2 Concentration (%): 40    ABG - ( 23 Sep 2024 18:48 )  pH, Arterial: 7.500 pH, Blood: x     /  pCO2: 36    /  pO2: 135   / HCO3: 28    / Base Excess: 4.9   /  SaO2: 100.0               I&O's Detail    23 Sep 2024 07:01  -  24 Sep 2024 07:00  --------------------------------------------------------  IN:    Dexmedetomidine: 442.4 mL    DOBUTamine: 102 mL    Glucerna 1.5: 720 mL    IV PiggyBack: 250 mL    IV PiggyBack: 300 mL    IV PiggyBack: 50 mL    Propofol: 16.2 mL    Propofol: 43.2 mL  Total IN: 1923.8 mL    OUT:    Indwelling Catheter - Urethral (mL): 1945 mL  Total OUT: 1945 mL    Total NET: -21.2 mL            LABS:                        12.3   17.28 )-----------( 72       ( 24 Sep 2024 05:10 )             37.4     09-24    145  |  110[H]  |  21.6[H]  ----------------------------<  116[H]  3.6   |  25.0  |  0.36[L]    Ca    8.5      24 Sep 2024 05:10  Phos  2.2     09-23  Mg     2.2     09-23    TPro  4.9[L]  /  Alb  3.2[L]  /  TBili  0.4  /  DBili  x   /  AST  47[H]  /  ALT  36[H]  /  AlkPhos  115  09-23          CAPILLARY BLOOD GLUCOSE      POCT Blood Glucose.: 103 mg/dL (24 Sep 2024 05:37)    PT/INR - ( 24 Sep 2024 04:30 )   PT: see note sec;   INR: see note ratio           Urinalysis Basic - ( 24 Sep 2024 05:10 )    Color: x / Appearance: x / SG: x / pH: x  Gluc: 116 mg/dL / Ketone: x  / Bili: x / Urobili: x   Blood: x / Protein: x / Nitrite: x   Leuk Esterase: x / RBC: x / WBC x   Sq Epi: x / Non Sq Epi: x / Bacteria: x      CULTURES:  Culture Results:   Normal Respiratory Mary present (09-19 @ 00:35)  Culture Results:   No Legionella species isolated  Culture in progress (09-18 @ 16:57)  Culture Results:   No growth at 5 days (09-18 @ 16:57)  Culture Results:   No growth (09-18 @ 12:05)  Culture Results:   Growth in aerobic bottle: Pseudomonas aeruginosa  Direct identification is available within approximately 3-5  hours either by Blood Panel Multiplexed PCR or Direct  MALDI-TOF. Details: [email or web address] (09-18 @ 10:15)      Physical Examination:    General: No acute distress.    HEENT: Pupils equal, reactive to light.  Symmetric.    PULM: scattered rhonchi bilaterally, no significant sputum production    CVS: Regular rate and rhythm, no murmurs, rubs, or gallops    ABD: Soft, nondistended, +TTP in epigastric region, normoactive bowel sounds, no masses    EXT: No edema, nontender    SKIN: Warm and well perfused, no rashes noted.    NEURO: Moves all extremities spontaneously        Assessment and Plan: 	  70 y/o F with a h/o CAD NSTEMI in 2011 s/p ELIZABETH to LAD, s/p ELIZABETH Distal LCx (4/30/2024), HTN, HLD, hypothyroid, rheumatoid arthritis, with:    # Acute hypoxemic respiratory failure  # ARDS  # Lobar pneumonia  # Septic/cardiogenic shock  # Pseudomonas bacteremia  # Hyperglycemia    NEURO:  - Extubated this morning  - Slightly confused and having difficulty verbalizing  - PT consult    CV:  - Maintain MAP>65  - Cardiac monitor  - Off dobutamine  - Increase Losartan to 100  - Start coreg 6.25 BID  - Repeat limited TTE    RESP:  - 5L FiO2 40% saturating well  - Aspiration precautions    GI:  - Protonix for GI ppx    RENAL:  - Monitor I/O  - Repleating K  - Mg Phos pending    ID:  - Empiric Meropenem    Heme:    Endo:  - BG controlled on current regimen, goal < 180    DVT: heparin  Diet: Speech consult places  Code Status: Full Code  Dispo:    Case discussed with MICU Attending: Dr. Fajardo Patient is a 69y old  Female who presents with a chief complaint of Shortness of breath (24 Sep 2024 00:12)      BRIEF HOSPITAL COURSE: 70 y/o F with a h/o CAD NSTEMI in 2011 s/p ELIZABETH to LAD, s/p ELIZABETH Distal LCx (4/30/2024), HTN, HLD, hypothyroid, rheumatoid arthritis, admitted on 9/18 with left-sided chest pain associated with generalized weakness, nausea and vomiting. CT chest revealed LLL pneumonia. While in the ED developed worsening hypoxic respiratory failure and was emergently intubated. Hospital course complicated by progression to ARDS requiring deep sedation/IV paralytic and severe multifactorial shock state with stress cardiomyopathy requiring multiple IV vasopressors and inotrope support. Blood cultures grew pseudomonas aeruginosa.    Events last 24 hours: Patient was extubated and now on 8L FiO2 40%. Patient having difficulty speaking this morning but has no acute concerns. States that there is abdominal pain, but having difficulty vocalizing concerns. Off the dobutamine.        PAST MEDICAL & SURGICAL HISTORY:  Hypothyroid      ADHD (attention deficit hyperactivity disorder)      HLD (hyperlipidemia)      Myocardial infarct  2012      Stented coronary artery  2012      Rheumatoid arthritis      Migraine      S/p bilateral carpal tunnel release      H/O cardiac catheterization      H/O laminectomy      H/O spinal fusion      S/P left knee arthroscopy      H/O total knee replacement, right        Allergies    tetracycline (Hives)  penicillins (Hives)  Butazolactin, Prolaprin (Hives)  azithromycin (Hives)  Zyrtec (Hives)    Intolerances      FAMILY HISTORY:      Review of Systems:  CONSTITUTIONAL: No fever, chills, or fatigue  EYES: No eye pain, visual disturbances, or discharge  ENMT:  No difficulty hearing, tinnitus, vertigo; No sinus or throat pain  NECK: No pain or stiffness  RESPIRATORY: No cough, wheezing, chills or hemoptysis; No shortness of breath  CARDIOVASCULAR: No chest pain, palpitations, dizziness, or leg swelling  GASTROINTESTINAL: No abdominal or epigastric pain. No nausea, vomiting, or hematemesis; No diarrhea or constipation. No melena or hematochezia.  GENITOURINARY: No dysuria, frequency, hematuria, or incontinence  NEUROLOGICAL: No headaches, memory loss, loss of strength, numbness, or tremors  SKIN: No itching, burning, rashes, or lesions   MUSCULOSKELETAL: No joint pain or swelling; No muscle, back, or extremity pain  PSYCHIATRIC: No depression, anxiety, mood swings, or difficulty sleeping      Medications:  meropenem Injectable 1000 milliGRAM(s) IV Push every 8 hours    hydrALAZINE Injectable 10 milliGRAM(s) IV Push every 4 hours PRN  losartan 50 milliGRAM(s) Oral daily      acetaminophen   IVPB .. 1000 milliGRAM(s) IV Intermittent once PRN      aspirin  chewable 81 milliGRAM(s) Oral daily  clopidogrel Tablet 75 milliGRAM(s) Oral daily  heparin   Injectable 5000 Unit(s) SubCutaneous every 8 hours    pantoprazole  Injectable 40 milliGRAM(s) IV Push daily  polyethylene glycol 3350 17 Gram(s) Oral daily PRN      dextrose 50% Injectable 25 Gram(s) IV Push once  dextrose 50% Injectable 25 Gram(s) IV Push once  dextrose 50% Injectable 12.5 Gram(s) IV Push once  dextrose Oral Gel 15 Gram(s) Oral once PRN  glucagon  Injectable 1 milliGRAM(s) IntraMuscular once  insulin glargine Injectable (LANTUS) 10 Unit(s) SubCutaneous every morning  insulin lispro (ADMELOG) corrective regimen sliding scale   SubCutaneous every 6 hours  levothyroxine 100 MICROGram(s) Oral daily    dextrose 5%. 1000 milliLiter(s) IV Continuous <Continuous>  dextrose 5%. 1000 milliLiter(s) IV Continuous <Continuous>    influenza  Vaccine (HIGH DOSE) 0.5 milliLiter(s) IntraMuscular once    artificial  tears Solution 1 Drop(s) Both EYES every 4 hours  chlorhexidine 2% Cloths 1 Application(s) Topical daily        Mode: CPAP with PS  FiO2: 40  PEEP: 6  PS: 10  MAP: 11  PIP: 16      ICU Vital Signs Last 24 Hrs  T(C): 37.6 (24 Sep 2024 07:00), Max: 38.1 (23 Sep 2024 11:30)  T(F): 99.7 (24 Sep 2024 07:00), Max: 100.6 (23 Sep 2024 11:30)  HR: 85 (24 Sep 2024 07:00) (68 - 92)  BP: --  BP(mean): --  ABP: 157/80 (24 Sep 2024 07:00) (97/53 - 163/87)  ABP(mean): 112 (24 Sep 2024 07:00) (71 - 119)  RR: 21 (24 Sep 2024 07:00) (12 - 27)  SpO2: 98% (24 Sep 2024 07:00) (94% - 100%)    O2 Parameters below as of 24 Sep 2024 06:00  Patient On (Oxygen Delivery Method): mask, nonrebreather  O2 Flow (L/min): 8  O2 Concentration (%): 40      Vital Signs Last 24 Hrs  T(C): 37.6 (24 Sep 2024 07:00), Max: 38.1 (23 Sep 2024 11:30)  T(F): 99.7 (24 Sep 2024 07:00), Max: 100.6 (23 Sep 2024 11:30)  HR: 85 (24 Sep 2024 07:00) (68 - 92)  BP: --  BP(mean): --  RR: 21 (24 Sep 2024 07:00) (12 - 27)  SpO2: 98% (24 Sep 2024 07:00) (94% - 100%)    Parameters below as of 24 Sep 2024 06:00  Patient On (Oxygen Delivery Method): mask, nonrebreather  O2 Flow (L/min): 8  O2 Concentration (%): 40    ABG - ( 23 Sep 2024 18:48 )  pH, Arterial: 7.500 pH, Blood: x     /  pCO2: 36    /  pO2: 135   / HCO3: 28    / Base Excess: 4.9   /  SaO2: 100.0               I&O's Detail    23 Sep 2024 07:01  -  24 Sep 2024 07:00  --------------------------------------------------------  IN:    Dexmedetomidine: 442.4 mL    DOBUTamine: 102 mL    Glucerna 1.5: 720 mL    IV PiggyBack: 250 mL    IV PiggyBack: 300 mL    IV PiggyBack: 50 mL    Propofol: 16.2 mL    Propofol: 43.2 mL  Total IN: 1923.8 mL    OUT:    Indwelling Catheter - Urethral (mL): 1945 mL  Total OUT: 1945 mL    Total NET: -21.2 mL            LABS:                        12.3   17.28 )-----------( 72       ( 24 Sep 2024 05:10 )             37.4     09-24    145  |  110[H]  |  21.6[H]  ----------------------------<  116[H]  3.6   |  25.0  |  0.36[L]    Ca    8.5      24 Sep 2024 05:10  Phos  2.2     09-23  Mg     2.2     09-23    TPro  4.9[L]  /  Alb  3.2[L]  /  TBili  0.4  /  DBili  x   /  AST  47[H]  /  ALT  36[H]  /  AlkPhos  115  09-23          CAPILLARY BLOOD GLUCOSE      POCT Blood Glucose.: 103 mg/dL (24 Sep 2024 05:37)    PT/INR - ( 24 Sep 2024 04:30 )   PT: see note sec;   INR: see note ratio           Urinalysis Basic - ( 24 Sep 2024 05:10 )    Color: x / Appearance: x / SG: x / pH: x  Gluc: 116 mg/dL / Ketone: x  / Bili: x / Urobili: x   Blood: x / Protein: x / Nitrite: x   Leuk Esterase: x / RBC: x / WBC x   Sq Epi: x / Non Sq Epi: x / Bacteria: x      CULTURES:  Culture Results:   Normal Respiratory Mary present (09-19 @ 00:35)  Culture Results:   No Legionella species isolated  Culture in progress (09-18 @ 16:57)  Culture Results:   No growth at 5 days (09-18 @ 16:57)  Culture Results:   No growth (09-18 @ 12:05)  Culture Results:   Growth in aerobic bottle: Pseudomonas aeruginosa  Direct identification is available within approximately 3-5  hours either by Blood Panel Multiplexed PCR or Direct  MALDI-TOF. Details: [email or web address] (09-18 @ 10:15)      Physical Examination:    General: No acute distress.    HEENT: Pupils equal, reactive to light.  Symmetric.    PULM: scattered rhonchi bilaterally, no significant sputum production    CVS: Regular rate and rhythm, no murmurs, rubs, or gallops    ABD: Soft, nondistended, +TTP in epigastric region, normoactive bowel sounds, no masses    EXT: No edema, nontender    SKIN: Warm and well perfused, no rashes noted.    NEURO: Moves all extremities spontaneously        Assessment and Plan: 	  70 y/o F with a h/o CAD NSTEMI in 2011 s/p ELIZABETH to LAD, s/p ELIZABETH Distal LCx (4/30/2024), HTN, HLD, hypothyroid, rheumatoid arthritis, with:    # Acute hypoxemic respiratory failure  # ARDS  # Lobar pneumonia  # Septic/cardiogenic shock  # Pseudomonas bacteremia  # Hyperglycemia    NEURO:  - Extubated this morning  - Slightly confused and having difficulty verbalizing  - PT consult    CV:  - Maintain MAP>65  - Cardiac monitor  - Off dobutamine  - Increase Losartan to 100  - Start coreg 6.25 BID  - Repeat limited TTE    RESP:  - 5L FiO2 40% saturating well  - Aspiration precautions    GI:  - Protonix for GI ppx    RENAL:  - Monitor I/O  - Repleating K  - Mg Phos pending    ID:  - Empiric Meropenem    Heme:    Endo:  - BG controlled on current regimen, goal < 180    DVT: heparin  Diet: Speech consult placed  Code Status: Full Code  Dispo: Pending    Case discussed with MICU Attending: Dr. Fajardo Patient is a 69y old  Female who presents with a chief complaint of Shortness of breath (24 Sep 2024 00:12)      BRIEF HOSPITAL COURSE: 70 y/o F with a h/o CAD NSTEMI in 2011 s/p ELIZABETH to LAD, s/p ELIZABETH Distal LCx (4/30/2024), HTN, HLD, hypothyroid, rheumatoid arthritis, admitted on 9/18 with left-sided chest pain associated with generalized weakness, nausea and vomiting. CT chest revealed LLL pneumonia. While in the ED developed worsening hypoxic respiratory failure and was emergently intubated. Hospital course complicated by progression to ARDS requiring deep sedation/IV paralytic and severe multifactorial shock state with stress cardiomyopathy requiring multiple IV vasopressors and inotrope support. Blood cultures grew pseudomonas aeruginosa.    Events last 24 hours: Patient was extubated and now on 8L FiO2 40%. Patient having difficulty speaking this morning but has no acute concerns. States that there is abdominal pain, but having difficulty vocalizing concerns. Off the dobutamine.        PAST MEDICAL & SURGICAL HISTORY:  Hypothyroid      ADHD (attention deficit hyperactivity disorder)      HLD (hyperlipidemia)      Myocardial infarct  2012      Stented coronary artery  2012      Rheumatoid arthritis      Migraine      S/p bilateral carpal tunnel release      H/O cardiac catheterization      H/O laminectomy      H/O spinal fusion      S/P left knee arthroscopy      H/O total knee replacement, right        Allergies    tetracycline (Hives)  penicillins (Hives)  Butazolactin, Prolaprin (Hives)  azithromycin (Hives)  Zyrtec (Hives)    Intolerances      FAMILY HISTORY:      Review of Systems:  CONSTITUTIONAL: No fever, chills, or fatigue  EYES: No eye pain, visual disturbances, or discharge  ENMT:  No difficulty hearing, tinnitus, vertigo; No sinus or throat pain  NECK: No pain or stiffness  RESPIRATORY: No cough, wheezing, chills or hemoptysis; No shortness of breath  CARDIOVASCULAR: No chest pain, palpitations, dizziness, or leg swelling  GASTROINTESTINAL: No abdominal or epigastric pain. No nausea, vomiting, or hematemesis; No diarrhea or constipation. No melena or hematochezia.  GENITOURINARY: No dysuria, frequency, hematuria, or incontinence  NEUROLOGICAL: No headaches, memory loss, loss of strength, numbness, or tremors  SKIN: No itching, burning, rashes, or lesions   MUSCULOSKELETAL: No joint pain or swelling; No muscle, back, or extremity pain  PSYCHIATRIC: No depression, anxiety, mood swings, or difficulty sleeping      Medications:  meropenem Injectable 1000 milliGRAM(s) IV Push every 8 hours    hydrALAZINE Injectable 10 milliGRAM(s) IV Push every 4 hours PRN  losartan 50 milliGRAM(s) Oral daily      acetaminophen   IVPB .. 1000 milliGRAM(s) IV Intermittent once PRN      aspirin  chewable 81 milliGRAM(s) Oral daily  clopidogrel Tablet 75 milliGRAM(s) Oral daily  heparin   Injectable 5000 Unit(s) SubCutaneous every 8 hours    pantoprazole  Injectable 40 milliGRAM(s) IV Push daily  polyethylene glycol 3350 17 Gram(s) Oral daily PRN      dextrose 50% Injectable 25 Gram(s) IV Push once  dextrose 50% Injectable 25 Gram(s) IV Push once  dextrose 50% Injectable 12.5 Gram(s) IV Push once  dextrose Oral Gel 15 Gram(s) Oral once PRN  glucagon  Injectable 1 milliGRAM(s) IntraMuscular once  insulin glargine Injectable (LANTUS) 10 Unit(s) SubCutaneous every morning  insulin lispro (ADMELOG) corrective regimen sliding scale   SubCutaneous every 6 hours  levothyroxine 100 MICROGram(s) Oral daily    dextrose 5%. 1000 milliLiter(s) IV Continuous <Continuous>  dextrose 5%. 1000 milliLiter(s) IV Continuous <Continuous>    influenza  Vaccine (HIGH DOSE) 0.5 milliLiter(s) IntraMuscular once    artificial  tears Solution 1 Drop(s) Both EYES every 4 hours  chlorhexidine 2% Cloths 1 Application(s) Topical daily        Mode: CPAP with PS  FiO2: 40  PEEP: 6  PS: 10  MAP: 11  PIP: 16      ICU Vital Signs Last 24 Hrs  T(C): 37.6 (24 Sep 2024 07:00), Max: 38.1 (23 Sep 2024 11:30)  T(F): 99.7 (24 Sep 2024 07:00), Max: 100.6 (23 Sep 2024 11:30)  HR: 85 (24 Sep 2024 07:00) (68 - 92)  BP: --  BP(mean): --  ABP: 157/80 (24 Sep 2024 07:00) (97/53 - 163/87)  ABP(mean): 112 (24 Sep 2024 07:00) (71 - 119)  RR: 21 (24 Sep 2024 07:00) (12 - 27)  SpO2: 98% (24 Sep 2024 07:00) (94% - 100%)    O2 Parameters below as of 24 Sep 2024 06:00  Patient On (Oxygen Delivery Method): mask, nonrebreather  O2 Flow (L/min): 8  O2 Concentration (%): 40      Vital Signs Last 24 Hrs  T(C): 37.6 (24 Sep 2024 07:00), Max: 38.1 (23 Sep 2024 11:30)  T(F): 99.7 (24 Sep 2024 07:00), Max: 100.6 (23 Sep 2024 11:30)  HR: 85 (24 Sep 2024 07:00) (68 - 92)  BP: --  BP(mean): --  RR: 21 (24 Sep 2024 07:00) (12 - 27)  SpO2: 98% (24 Sep 2024 07:00) (94% - 100%)    Parameters below as of 24 Sep 2024 06:00  Patient On (Oxygen Delivery Method): mask, nonrebreather  O2 Flow (L/min): 8  O2 Concentration (%): 40    ABG - ( 23 Sep 2024 18:48 )  pH, Arterial: 7.500 pH, Blood: x     /  pCO2: 36    /  pO2: 135   / HCO3: 28    / Base Excess: 4.9   /  SaO2: 100.0               I&O's Detail    23 Sep 2024 07:01  -  24 Sep 2024 07:00  --------------------------------------------------------  IN:    Dexmedetomidine: 442.4 mL    DOBUTamine: 102 mL    Glucerna 1.5: 720 mL    IV PiggyBack: 250 mL    IV PiggyBack: 300 mL    IV PiggyBack: 50 mL    Propofol: 16.2 mL    Propofol: 43.2 mL  Total IN: 1923.8 mL    OUT:    Indwelling Catheter - Urethral (mL): 1945 mL  Total OUT: 1945 mL    Total NET: -21.2 mL            LABS:                        12.3   17.28 )-----------( 72       ( 24 Sep 2024 05:10 )             37.4     09-24    145  |  110[H]  |  21.6[H]  ----------------------------<  116[H]  3.6   |  25.0  |  0.36[L]    Ca    8.5      24 Sep 2024 05:10  Phos  2.2     09-23  Mg     2.2     09-23    TPro  4.9[L]  /  Alb  3.2[L]  /  TBili  0.4  /  DBili  x   /  AST  47[H]  /  ALT  36[H]  /  AlkPhos  115  09-23          CAPILLARY BLOOD GLUCOSE      POCT Blood Glucose.: 103 mg/dL (24 Sep 2024 05:37)    PT/INR - ( 24 Sep 2024 04:30 )   PT: see note sec;   INR: see note ratio           Urinalysis Basic - ( 24 Sep 2024 05:10 )    Color: x / Appearance: x / SG: x / pH: x  Gluc: 116 mg/dL / Ketone: x  / Bili: x / Urobili: x   Blood: x / Protein: x / Nitrite: x   Leuk Esterase: x / RBC: x / WBC x   Sq Epi: x / Non Sq Epi: x / Bacteria: x      CULTURES:  Culture Results:   Normal Respiratory Mary present (09-19 @ 00:35)  Culture Results:   No Legionella species isolated  Culture in progress (09-18 @ 16:57)  Culture Results:   No growth at 5 days (09-18 @ 16:57)  Culture Results:   No growth (09-18 @ 12:05)  Culture Results:   Growth in aerobic bottle: Pseudomonas aeruginosa  Direct identification is available within approximately 3-5  hours either by Blood Panel Multiplexed PCR or Direct  MALDI-TOF. Details: [email or web address] (09-18 @ 10:15)      Physical Examination:    General: No acute distress.    HEENT: Pupils equal, reactive to light.  Symmetric.    PULM: scattered rhonchi bilaterally, no significant sputum production    CVS: Regular rate and rhythm, no murmurs, rubs, or gallops    ABD: Soft, nondistended, +TTP in epigastric region, normoactive bowel sounds, no masses    EXT: No edema, nontender    SKIN: Warm and well perfused, no rashes noted.    NEURO: Moves all extremities spontaneously        Assessment and Plan: 	  70 y/o F with a h/o CAD NSTEMI in 2011 s/p ELIZABETH to LAD, s/p ELIZABETH Distal LCx (4/30/2024), HTN, HLD, hypothyroid, rheumatoid arthritis, with:    # Acute hypoxemic respiratory failure  # ARDS  # Lobar pneumonia  # Septic/cardiogenic shock  # Pseudomonas bacteremia  # Hyperglycemia    NEURO:  - Extubated this morning  - Slightly confused and having difficulty verbalizing  - PT consult    CV:  - Maintain MAP>65  - Cardiac monitor  - Off dobutamine  - Increase Losartan to 100  - Start coreg 6.25 BID  - Repeat limited TTE    RESP:  - 5L FiO2 40% saturating well  - Aspiration precautions    GI:  - Protonix for GI ppx    RENAL:  - Monitor I/O  - Repleating K  - Mg Phos pending    ID:  - Empiric Meropenem    Heme:    Endo:  - BG controlled on current regimen, goal < 180    DVT: heparin  Diet: Speech consult placed  Code Status: Full Code  Dispo: Pending    Case discussed with MICU Attending: Dr. Fajardo    ATTENDING ADDENDUM  68 yo female with CAD s/p PCI, hypothyroid, RA, admitted with chest pain, left lower lobe pneumonia, pseudomonas bacteremia, acute respiratory failure, found to have newly reduced EF/ systolic CHF, stress induced cardiomyopathy, cardiogenic shock requiring dobutamine.  Now off dobutamine, extubated on nasal cannula.  Starting GDMT.    Total time spent 55 min coordinating care with MICU PA/resident, MICU RN, reviewing labs and prior records, personally reviewing and interpreting imaging, documenting findings and assessment in the medical record.

## 2024-09-24 NOTE — PHYSICAL THERAPY INITIAL EVALUATION ADULT - ADDITIONAL COMMENTS
Obtained information from son at bedside with pt's permission as pt was not able to provide information. Son reports mom lives alone in private home he lives 5 minutes away and works hybrid only able to assist prn not 24/7. Pt has 1 JENNIE with no handrail and all needs met on the first floor except the basement has the laundry which is flight of 12 stairs with handrail. Independent prior to admission using no AD. Owns shower chair, cane and RW

## 2024-09-24 NOTE — SWALLOW BEDSIDE ASSESSMENT ADULT - SLP PERTINENT HISTORY OF CURRENT PROBLEM
70 y/o F with a h/o CAD NSTEMI in 2011 s/p ELIZABETH to LAD, s/p ELIZABETH Distal LCx (4/30/2024), HTN, HLD, hypothyroid, rheumatoid arthritis, admitted on 9/18 with left-sided chest pain associated with generalized weakness, nausea and vomiting. CT chest revealed LLL pneumonia. While in the ED developed worsening hypoxic respiratory failure and was emergently intubated. Hospital course complicated by progression to ARDS requiring deep sedation/IV paralytic and severe multifactorial shock state with stress cardiomyopathy requiring multiple IV vasopressors and inotrope support. Blood cultures grew pseudomonas aeruginosa.

## 2024-09-25 LAB
ALBUMIN SERPL ELPH-MCNC: 3.5 G/DL — SIGNIFICANT CHANGE UP (ref 3.3–5.2)
ALP SERPL-CCNC: 134 U/L — HIGH (ref 40–120)
ALT FLD-CCNC: 46 U/L — HIGH
ANION GAP SERPL CALC-SCNC: 13 MMOL/L — SIGNIFICANT CHANGE UP (ref 5–17)
APTT BLD: 27.3 SEC — SIGNIFICANT CHANGE UP (ref 24.5–35.6)
AST SERPL-CCNC: 47 U/L — HIGH
BILIRUB SERPL-MCNC: 0.7 MG/DL — SIGNIFICANT CHANGE UP (ref 0.4–2)
BLD GP AB SCN SERPL QL: SIGNIFICANT CHANGE UP
BUN SERPL-MCNC: 20.3 MG/DL — HIGH (ref 8–20)
CALCIUM SERPL-MCNC: 8.3 MG/DL — LOW (ref 8.4–10.5)
CHLORIDE SERPL-SCNC: 106 MMOL/L — SIGNIFICANT CHANGE UP (ref 96–108)
CO2 SERPL-SCNC: 22 MMOL/L — SIGNIFICANT CHANGE UP (ref 22–29)
CREAT SERPL-MCNC: 0.34 MG/DL — LOW (ref 0.5–1.3)
EGFR: 111 ML/MIN/1.73M2 — SIGNIFICANT CHANGE UP
EPEC DNA STL QL NAA+PROBE: DETECTED
GI PCR PANEL: DETECTED
GLUCOSE BLDC GLUCOMTR-MCNC: 103 MG/DL — HIGH (ref 70–99)
GLUCOSE BLDC GLUCOMTR-MCNC: 108 MG/DL — HIGH (ref 70–99)
GLUCOSE BLDC GLUCOMTR-MCNC: 114 MG/DL — HIGH (ref 70–99)
GLUCOSE BLDC GLUCOMTR-MCNC: 94 MG/DL — SIGNIFICANT CHANGE UP (ref 70–99)
GLUCOSE SERPL-MCNC: 113 MG/DL — HIGH (ref 70–99)
HCT VFR BLD CALC: 37.8 % — SIGNIFICANT CHANGE UP (ref 34.5–45)
HCT VFR BLD CALC: 38.9 % — SIGNIFICANT CHANGE UP (ref 34.5–45)
HGB BLD-MCNC: 12.7 G/DL — SIGNIFICANT CHANGE UP (ref 11.5–15.5)
HGB BLD-MCNC: 12.8 G/DL — SIGNIFICANT CHANGE UP (ref 11.5–15.5)
INR BLD: 1.04 RATIO — SIGNIFICANT CHANGE UP (ref 0.85–1.16)
MAGNESIUM SERPL-MCNC: 2 MG/DL — SIGNIFICANT CHANGE UP (ref 1.6–2.6)
MCHC RBC-ENTMCNC: 33.6 GM/DL — SIGNIFICANT CHANGE UP (ref 32–36)
MCHC RBC-ENTMCNC: 34 PG — SIGNIFICANT CHANGE UP (ref 27–34)
MCV RBC AUTO: 101.1 FL — HIGH (ref 80–100)
OB PNL STL: POSITIVE
PHOSPHATE SERPL-MCNC: 2.6 MG/DL — SIGNIFICANT CHANGE UP (ref 2.4–4.7)
PLATELET # BLD AUTO: 91 K/UL — LOW (ref 150–400)
POTASSIUM SERPL-MCNC: 3.6 MMOL/L — SIGNIFICANT CHANGE UP (ref 3.5–5.3)
POTASSIUM SERPL-SCNC: 3.6 MMOL/L — SIGNIFICANT CHANGE UP (ref 3.5–5.3)
PROT SERPL-MCNC: 5.5 G/DL — LOW (ref 6.6–8.7)
PROTHROM AB SERPL-ACNC: 12.1 SEC — SIGNIFICANT CHANGE UP (ref 9.9–13.4)
RBC # BLD: 3.74 M/UL — LOW (ref 3.8–5.2)
RBC # FLD: 13.8 % — SIGNIFICANT CHANGE UP (ref 10.3–14.5)
SODIUM SERPL-SCNC: 141 MMOL/L — SIGNIFICANT CHANGE UP (ref 135–145)
WBC # BLD: 15.87 K/UL — HIGH (ref 3.8–10.5)
WBC # FLD AUTO: 15.87 K/UL — HIGH (ref 3.8–10.5)

## 2024-09-25 PROCEDURE — 99233 SBSQ HOSP IP/OBS HIGH 50: CPT

## 2024-09-25 PROCEDURE — 99223 1ST HOSP IP/OBS HIGH 75: CPT

## 2024-09-25 PROCEDURE — 71045 X-RAY EXAM CHEST 1 VIEW: CPT | Mod: 26

## 2024-09-25 RX ORDER — ACETAMINOPHEN 325 MG
1000 TABLET ORAL ONCE
Refills: 0 | Status: COMPLETED | OUTPATIENT
Start: 2024-09-25 | End: 2024-09-25

## 2024-09-25 RX ORDER — PANTOPRAZOLE SODIUM 40 MG/1
40 TABLET, DELAYED RELEASE ORAL
Refills: 0 | Status: DISCONTINUED | OUTPATIENT
Start: 2024-09-25 | End: 2024-09-26

## 2024-09-25 RX ADMIN — PANTOPRAZOLE SODIUM 40 MILLIGRAM(S): 40 TABLET, DELAYED RELEASE ORAL at 17:23

## 2024-09-25 RX ADMIN — LOSARTAN POTASSIUM 100 MILLIGRAM(S): 100 TABLET, FILM COATED ORAL at 05:37

## 2024-09-25 RX ADMIN — Medication 1000 MILLIGRAM(S): at 12:13

## 2024-09-25 RX ADMIN — Medication 6.25 MILLIGRAM(S): at 17:23

## 2024-09-25 RX ADMIN — Medication 75 MILLIGRAM(S): at 12:57

## 2024-09-25 RX ADMIN — Medication 400 MILLIGRAM(S): at 11:13

## 2024-09-25 RX ADMIN — Medication 100 MICROGRAM(S): at 05:38

## 2024-09-25 RX ADMIN — HYDRALAZINE HYDROCHLORIDE 10 MILLIGRAM(S): 100 TABLET ORAL at 01:04

## 2024-09-25 RX ADMIN — Medication 40 MILLIEQUIVALENT(S): at 09:54

## 2024-09-25 RX ADMIN — Medication 1000 MILLIGRAM(S): at 07:34

## 2024-09-25 RX ADMIN — Medication 6.25 MILLIGRAM(S): at 05:38

## 2024-09-25 RX ADMIN — Medication 400 MILLIGRAM(S): at 06:34

## 2024-09-25 RX ADMIN — HYDRALAZINE HYDROCHLORIDE 10 MILLIGRAM(S): 100 TABLET ORAL at 16:57

## 2024-09-25 RX ADMIN — Medication 81 MILLIGRAM(S): at 12:57

## 2024-09-25 RX ADMIN — Medication 5000 UNIT(S): at 05:38

## 2024-09-25 RX ADMIN — MEROPENEM 1000 MILLIGRAM(S): 500 INJECTION INTRAVENOUS at 05:37

## 2024-09-25 RX ADMIN — MEROPENEM 1000 MILLIGRAM(S): 500 INJECTION INTRAVENOUS at 22:07

## 2024-09-25 RX ADMIN — CHLORHEXIDINE GLUCONATE ORAL RINSE 1 APPLICATION(S): 1.2 SOLUTION DENTAL at 12:58

## 2024-09-25 RX ADMIN — FUROSEMIDE 40 MILLIGRAM(S): 10 INJECTION INTRAVENOUS at 05:38

## 2024-09-25 RX ADMIN — MEROPENEM 1000 MILLIGRAM(S): 500 INJECTION INTRAVENOUS at 13:01

## 2024-09-25 NOTE — CONSULT NOTE ADULT - SUBJECTIVE AND OBJECTIVE BOX
[facility]  INFECTIOUS DISEASES at [place] and [place]  =====================================================         Jorje Baugh MD                                                        Diplomates American Board of Internal Medicine & Infectious Diseases                * [place] Office - Appt - Tel  [phone] Fax [phone]                * [place] Office - Appt - Tel [phone] Fax [phone]                                  Hospital Consult line:  [phone]  =====================================================      [id number]  GOOD BILL        CC: Patient is a 69y old  Female who presents with a chief complaint of Shortness of breath (25 Sep 2024 13:12)    HPI: 69-year-old female with past medical history of CAD status post multiple stents,  hypothyroid, rheumatoid arthritis, presenting with left-sided chest pain associated with generalized weakness, nausea and vomiting.  Patient ill-appearing, unable to provide much history.  History mostly supplied by patient's family member at bedside.  Symptoms started 2 days ago, however today she felt much worse with prompted her to come to the ED.  (18 Sep 2024 13:37)    ID consulted on hospital day 8 for Pseudomonas aeruginosa bacteremia     antibiotics history:  vancomycin 9/18 and 9/22  meropenem 9/18 - 9/20, 9/22 - TD   cefepime 9/18, 9/20 - 9/22     Hospital course notable for septic/cardiogenic shock, ARDS, VDRF (extubated on 9/23)   ______________________________________________________  PAST MEDICAL & SURGICAL HISTORY:  Hypothyroid    ADHD (attention deficit hyperactivity disorder)    HLD (hyperlipidemia)    Myocardial infarct  2012    Stented coronary artery  2012    Rheumatoid arthritis    Migraine    S/p bilateral carpal tunnel release    H/O cardiac catheterization    H/O laminectomy    H/O spinal fusion    S/P left knee arthroscopy    H/O total knee replacement, right        Social History:  Unable to obtain due to medical condition - confusion     FAMILY HISTORY:  Unable to obtain due to medical condition - confusion     ______________________________________________________  Allergies    tetracycline (Hives)  penicillins (Hives)  Butazolactin, Prolaprin (Hives)  azithromycin (Hives)  Zyrtec (Hives)    Intolerances        ______________________________________________________  MEDICATIONS:  Antibiotics:  meropenem Injectable 1000 milliGRAM(s) IV Push every 8 hours    Other medications:  aspirin  chewable 81 milliGRAM(s) Oral daily  carvedilol 6.25 milliGRAM(s) Oral every 12 hours  chlorhexidine 2% Cloths 1 Application(s) Topical daily  clopidogrel Tablet 75 milliGRAM(s) Oral daily  dextrose 5%. 1000 milliLiter(s) IV Continuous <Continuous>  dextrose 5%. 1000 milliLiter(s) IV Continuous <Continuous>  dextrose 50% Injectable 25 Gram(s) IV Push once  dextrose 50% Injectable 25 Gram(s) IV Push once  dextrose 50% Injectable 12.5 Gram(s) IV Push once  furosemide    Tablet 40 milliGRAM(s) Oral daily  heparin   Injectable 5000 Unit(s) SubCutaneous every 8 hours  influenza  Vaccine (HIGH DOSE) 0.5 milliLiter(s) IntraMuscular once  insulin lispro (ADMELOG) corrective regimen sliding scale   SubCutaneous every 6 hours  levothyroxine 100 MICROGram(s) Oral daily  losartan 100 milliGRAM(s) Oral daily    ______________________________________________________  REVIEW OF SYSTEMS:  ROS limited by confusion   Denies pain or SOB, but unable to elaborate further     _____________________________________________________  PHYSICAL EXAM:  GEN: AAOx1-2, in NAD. Obese  HEENT: normocephalic and atraumatic.  PERRL.  Anicteric sclerae. Moist mucous membranes. No mucosal lesions.  NECK: Supple. No palpable neck masses or LN  LUNGS: eupneic, CTA B/L, no adventitious sounds  HEART: RRR, no m/r/g  ABDOMEN: Soft, NT, ND, n, no palpable masses.  +BS.    : Lindo catheter  EXTREMITIES: mild edema.  MSK: No joint deformity or swelling  LYMPH: no palpable cervical, supraclavicular lymph nodes  NEUROLOGIC: Generalized weakness but moving all extremities   PSYCHIATRIC: calm and cooperative   SKIN: multiple bruises in legs up to thigh.   LINES: PIV, no phlebitis     ______________________________________________________      Vitals:  T(F): 98.6 (25 Sep 2024 12:00), Max: 99.9 (24 Sep 2024 22:00)  HR: 82 (25 Sep 2024 13:00)  BP: 146/88 (25 Sep 2024 13:00)  RR: 22 (25 Sep 2024 13:00)  SpO2: 100% (25 Sep 2024 13:00) (92% - 100%)  temp max in last 48H T(F): , Max: 100.2 (09-24-24 @ 00:00)    Current Antibiotics:  meropenem Injectable 1000 milliGRAM(s) IV Push every 8 hours    Other medications:  aspirin  chewable 81 milliGRAM(s) Oral daily  carvedilol 6.25 milliGRAM(s) Oral every 12 hours  chlorhexidine 2% Cloths 1 Application(s) Topical daily  clopidogrel Tablet 75 milliGRAM(s) Oral daily  dextrose 5%. 1000 milliLiter(s) IV Continuous <Continuous>  dextrose 5%. 1000 milliLiter(s) IV Continuous <Continuous>  dextrose 50% Injectable 25 Gram(s) IV Push once  dextrose 50% Injectable 25 Gram(s) IV Push once  dextrose 50% Injectable 12.5 Gram(s) IV Push once  furosemide    Tablet 40 milliGRAM(s) Oral daily  heparin   Injectable 5000 Unit(s) SubCutaneous every 8 hours  influenza  Vaccine (HIGH DOSE) 0.5 milliLiter(s) IntraMuscular once  insulin lispro (ADMELOG) corrective regimen sliding scale   SubCutaneous every 6 hours  levothyroxine 100 MICROGram(s) Oral daily  losartan 100 milliGRAM(s) Oral daily                            12.7   15.87 )-----------( 91       ( 25 Sep 2024 04:02 )             37.8     09-25    141  |  106  |  20.3[H]  ----------------------------<  113[H]  3.6   |  22.0  |  0.34[L]    Ca    8.3[L]      25 Sep 2024 04:02  Phos  2.6     09-25  Mg     2.0     09-25    TPro  5.5[L]  /  Alb  3.5  /  TBili  0.7  /  DBili  x   /  AST  47[H]  /  ALT  46[H]  /  AlkPhos  134[H]  09-25    RECENT CULTURES:  09-22 @ 16:04    RVP with SARS-CoV-2   NotDetec      09-19 @ 00:35 Trach Asp Tracheal Aspirate     Normal Respiratory Mary present    Rare polymorphonuclear leukocytes seen per low power field  No squamous epithelial cells seen per low power field  No organisms seen per oil power field      09-18 @ 16:57 Legionella     No Legionella species isolated  Culture in progress        09-18 @ 15:50    RVP with SARS-CoV-2   NotDetec      09-18 @ 12:05 Clean Catch Clean Catch (Midstream)     No growth    09-18 @ 10:15 .Blood Blood-Peripheral Blood Culture PCR  Pseudomonas aeruginosa    - Aztreonam: S 8  - Cefepime: S <=2  - Ceftazidime: S 4  - Ciprofloxacin: S <=0.25  - Imipenem: S 2  - Levofloxacin: S <=0.5  - Meropenem: S <=1  - Piperacillin/Tazobactam: S <=8      WBC Count: 15.87 K/uL (09-25-24 @ 04:02)  WBC Count: 17.48 K/uL (09-24-24 @ 16:53)  WBC Count: 17.28 K/uL (09-24-24 @ 05:10)  WBC Count: 14.90 K/uL (09-23-24 @ 19:00)  WBC Count: 13.27 K/uL (09-23-24 @ 12:00)  WBC Count: 10.97 K/uL (09-23-24 @ 04:12)  WBC Count: 11.79 K/uL (09-22-24 @ 03:00)  WBC Count: 14.34 K/uL (09-21-24 @ 02:00)  WBC Count: 12.13 K/uL (09-20-24 @ 16:40)    Creatinine: 0.34 mg/dL (09-25-24 @ 04:02)  Creatinine: 0.36 mg/dL (09-24-24 @ 16:53)  Creatinine: 0.36 mg/dL (09-24-24 @ 05:10)  Creatinine: 0.31 mg/dL (09-23-24 @ 19:00)  Creatinine: 0.36 mg/dL (09-23-24 @ 12:00)  Creatinine: 0.50 mg/dL (09-23-24 @ 05:14)  Creatinine: 0.76 mg/dL (09-22-24 @ 14:50)  Creatinine: 0.73 mg/dL (09-22-24 @ 12:00)  Creatinine: 0.72 mg/dL (09-22-24 @ 03:00)  Creatinine: 0.72 mg/dL (09-21-24 @ 02:00)  Creatinine: 0.61 mg/dL (09-20-24 @ 16:40)    Procalcitonin: 8.05 ng/mL (09-23-24 @ 05:14)  Procalcitonin: >100.00 ng/mL (09-18-24 @ 19:45)     SARS-CoV-2: NotDetec (09-22-24 @ 16:04)  SARS-CoV-2: NotDetec (09-18-24 @ 15:50)  SARS-CoV-2 Result: NotDetec (09-18-24 @ 12:05)    ______________________________________________________  CARDIOLOGY / VASCULAR     < from: US Duplex Venous Lower Ext Complete, Bilateral (09.23.24 @ 07:39) >  FINDINGS:    RIGHT:  Normal compressibility of the RIGHT common femoral, femoral and popliteal   veins.  Doppler examination shows normal spontaneous and phasic flow.  No RIGHT calf vein thrombosis is detected.    LEFT:  Normal compressibility of the LEFT common femoral, femoral and popliteal   veins.  Doppler examination shows normal spontaneous and phasic flow.  No LEFT calf vein thrombosis is detected.    IMPRESSION:  No evidence of deep venous thrombosis in either lower extremity.    < end of copied text >      ______________________________________________________  RADIOLOGY  < from: Xray Chest 1 View- PORTABLE-Urgent (Xray Chest 1 View- PORTABLE-Urgent .) (09.23.24 @ 09:01) >  INTERPRETATION:    Heart size and the mediastinum cannot be accurately evaluated on this   projection.  ET tube tip is approximately 4.8 cm above the david.  Enteric tube extends into the left hemiabdomen. Tip not included on image.  Previous right IJ line not seen.  There is a left midline catheter.  Mild, predominantly left-sided, pulmonary vascular congestion again seen.  The right lung is clear.  Unchanged left lower/retrocardiac opacity.  No pneumothorax.  There is osteoarthritic degenerative change of the spine.  Right shoulder orthopedic anchors are seen.    IMPRESSION:  Tubes as above.    Mild, predominantly left-sided, pulmonary vascular congestion again seen.    Unchanged leftlower/retrocardiac opacity that could be due to a left   pleural effusion with associated passive atelectasis, atelectasis of   other cause, and/or other pathology including, but not limited to,   pneumonia.    < end of copied text >    < from: CT Angio Chest PE Protocol w/ IV Cont (09.18.24 @ 10:55) >    PROCEDURE:  CT Angiography of the Chest, Abdomen and Pelvis.  Precontrast imaging was performed through the chest followed by arterial   phase imaging of the chest, abdomen and pelvis.  Sagittal and coronal reformats were performed as well as 3D (MIP)   reconstructions.    FINDINGS:  CHEST:  LUNGS AND LARGE AIRWAYS: No endobronchial lesions. Left lower lobe   consolidation. There is subsegmental atelectasis in right lower lobe.  PLEURA: Small left pleural effusion with fissural tracking.  VESSELS: No pulmonary embolism through the proximal segmental level.   Limited evaluation of the distal branches secondary to respiratory motion   an suboptimal timing of contrast administration. Aortic and coronary   artery stenting/calcifications.  HEART: Heart size is normal. No pericardial effusion. There is   subendocardial fatty dysplasia in the periapical region, compatible with   prior myocardial infarction  MEDIASTINUM AND ALEXIS: No lymphadenopathy.  CHEST WALL AND LOWER NECK: Patient status post right rotator cuff repair.    ABDOMEN AND PELVIS:  LIVER: Within normal limits.  BILE DUCTS: Normal caliber.  GALLBLADDER: Within normal limits.  SPLEEN: Within normal limits.  PANCREAS: Within normal limits.  ADRENALS: Within normal limits.  KIDNEYS/URETERS: Right kidney is unremarkable. There is a subcentimeter   low-attenuation lesion in the left kidney, too small to characterize,   likely a cyst.    BLADDER: Within normal limits.  REPRODUCTIVE ORGANS: Uterus has a globular contour and heterogeneous   attenuation which may be suggestive of adenomyosis versus myomatous   uterus.    BOWEL: Evaluation of the bowel is limited due to inadequate distention.   Within this limitation, there is no bowel obstruction. Residual oral   contrast in the cecum and terminal ileum. Appendix is normal.  PERITONEUM/RETROPERITONEUM: Within normal limits.  VESSELS: Atherosclerotic changes.  LYMPH NODES: No lymphadenopathy.  ABDOMINAL WALL: Small fat-containing left inguinal hernia.  BONES: Degenerative changes. L4-S1 laminectomy and posterior spinal   fusion .    IMPRESSION:  *  No pulmonary embolism through the proximal segmental level. Limited   evaluation of the distal branches secondary to respiratory motion an   suboptimal timing for contrast administration.  *  Left lower lobe pneumonia with associated small pleural effusion.  *  No acute pathology in the abdomen and pelvis.  *  Mildly enlarged and globular uterus suggestive of adenomyosis versus   myomatous uterus. If clinically indicated, pelvic ultrasound is   recommended for further evaluation.    < end of copied text >

## 2024-09-25 NOTE — CONSULT NOTE ADULT - ASSESSMENT
69F with RA, CAD, hypothyroidism presented with left sided chest pain, weakness and SOB. Intubated in the ED.  Hospital course notable for septic/cardiogenic shock, ARDS, VDRF (extubated on 9/23)     ID consulted on hospital day 8 for Pseudomonas aeruginosa bacteremia     antibiotics history:  vancomycin 9/18 and 9/22  meropenem 9/18 - 9/20, 9/22 - TD   cefepime 9/18, 9/20 - 9/22     Admission 9/18 BCx with pansensitive Pseudomonas aeruginosa   Repeat BCx negative   Trach aspirate culture - normal resp heidi   RVP negative   CTA C/AP with LLL consolidation with small pleural effusion   Duplex US of LEs - no DVT   PNC allergy noted in chart, but patient unable to provide any information about it   Diarrhea - would check C. diff PCR and GI PCR   Leukocytosis noted - trend WBC   Extubated 9/24. Presently on RA   Off pressors and dobutamine gtt     Per notes, patient defervesce when antibiotics changed to meropenem. However, fever resolution unlikely to be related to antibiotic choice but simply natural evolution of disease.   Patient is about to complete treatment course for bacteremia and pneumonia     Currently on meropenem   Can change treatment to cefepime 2g IV q8h OR levofloxacin 750 mg daily through 9/26     Please call with questions

## 2024-09-25 NOTE — PROGRESS NOTE ADULT - SUBJECTIVE AND OBJECTIVE BOX
[facility] PALLIATIVE MEDICINE     CC: FOLLOW UP VISIT + GOC    INTERVAL HPI/OVERNIGHT EVENTS:  Source if other than patient:  []Family   [x]Team     Chart reviewed and discussed with MICU   S/P medical extubation 9/24, currently on room air  Remains off IV vasopressor  Pt awake and interactive, sons at bedside.     PRESENT SYMPTOMS:     Dyspnea: no  Nausea/Vomiting:  No  Anxiety:   No  Depression: appears depressed  Fatigue:  No  Loss of appetite:  No  Constipation: Yes No    Pain:             Character-            Duration-            Effect-            Factors-            Frequency-            Location-            Severity-    Pain AD Score:  [email or web address] (press ctrl + left click to view)    Review of Systems: Reviewed       Unable to obtain due to poor mentation/encephalopathy                      Negative:                     Positive:  All others negative    MEDICATIONS  (STANDING):  aspirin  chewable 81 milliGRAM(s) Oral daily  carvedilol 6.25 milliGRAM(s) Oral every 12 hours  chlorhexidine 2% Cloths 1 Application(s) Topical daily  clopidogrel Tablet 75 milliGRAM(s) Oral daily  dextrose 5%. 1000 milliLiter(s) (50 mL/Hr) IV Continuous <Continuous>  dextrose 5%. 1000 milliLiter(s) (100 mL/Hr) IV Continuous <Continuous>  dextrose 50% Injectable 25 Gram(s) IV Push once  dextrose 50% Injectable 25 Gram(s) IV Push once  dextrose 50% Injectable 12.5 Gram(s) IV Push once  furosemide    Tablet 40 milliGRAM(s) Oral daily  heparin   Injectable 5000 Unit(s) SubCutaneous every 8 hours  influenza  Vaccine (HIGH DOSE) 0.5 milliLiter(s) IntraMuscular once  insulin lispro (ADMELOG) corrective regimen sliding scale   SubCutaneous every 6 hours  levothyroxine 100 MICROGram(s) Oral daily  losartan 100 milliGRAM(s) Oral daily  meropenem Injectable 1000 milliGRAM(s) IV Push every 8 hours    MEDICATIONS  (PRN):  hydrALAZINE Injectable 10 milliGRAM(s) IV Push every 4 hours PRN systolic greater than 160  polyethylene glycol 3350 17 Gram(s) Oral daily PRN Constipation      PHYSICAL EXAM:    Vital Signs Last 24 Hrs  T(C): 37.3 (25 Sep 2024 09:00), Max: 37.7 (24 Sep 2024 22:00)  T(F): 99.1 (25 Sep 2024 09:00), Max: 99.9 (24 Sep 2024 22:00)  HR: 83 (25 Sep 2024 10:00) (77 - 94)  BP: 152/87 (25 Sep 2024 10:00) (149/81 - 163/92)  BP(mean): 106 (25 Sep 2024 10:00) (92 - 116)  RR: 21 (25 Sep 2024 10:00) (14 - 32)  SpO2: 92% (25 Sep 2024 10:00) (92% - 100%)    Parameters below as of 25 Sep 2024 08:00  Patient On (Oxygen Delivery Method): room air           Karnofsky:  %    General: resting comfortably. NAD.   HEENT: mmm  ETT / trach  Lungs: comfortable nonlabored    CV:  rrr  GI: +BS abdomen soft, NTND   PEG  : normal  incontinent  oliguria/anuria  cantu  MSK:   no cyanosis or edema,  weakness    bedbound/wheelchair bound  Neuro: nonfocal. alert  oriented x   lethargic   poor insight into hospitalization  Skin: warm and dry. no rash. ecchymosis. wounds.    LABS: Reviewed                          12.7   15.87 )-----------( 91       ( 25 Sep 2024 04:02 )             37.8     09-25    141  |  106  |  20.3[H]  ----------------------------<  113[H]  3.6   |  22.0  |  0.34[L]    Ca    8.3[L]      25 Sep 2024 04:02  Phos  2.6     09-25  Mg     2.0     09-25    TPro  5.5[L]  /  Alb  3.5  /  TBili  0.7  /  DBili  x   /  AST  47[H]  /  ALT  46[H]  /  AlkPhos  134[H]  09-25    PT/INR - ( 25 Sep 2024 04:02 )   PT: 12.1 sec;   INR: 1.04 ratio         PTT - ( 25 Sep 2024 04:02 )  PTT:27.3 sec  Urinalysis Basic - ( 25 Sep 2024 04:02 )    Color: x / Appearance: x / SG: x / pH: x  Gluc: 113 mg/dL / Ketone: x  / Bili: x / Urobili: x   Blood: x / Protein: x / Nitrite: x   Leuk Esterase: x / RBC: x / WBC x   Sq Epi: x / Non Sq Epi: x / Bacteria: x      I&O's Summary    24 Sep 2024 07:01  -  25 Sep 2024 07:00  --------------------------------------------------------  IN: 400 mL / OUT: 2920 mL / NET: -2520 mL        RADIOLOGY & ADDITIONAL STUDIES: Reviewed     ADVANCE DIRECTIVES/TREATMENT PREFERENCES:  DNR YES NO  Completed on:                     MOLST  YES NO   Completed on:  Living Will  YES NO   Completed on:    NEUROLOGICAL MEDICATIONS/OPIOIDS/BENZODIAZEPINE IN PAST 24 HOURS:    acetaminophen   IVPB ..   400 mL/Hr IV Intermittent (09-25-24 @ 06:34)    acetaminophen   IVPB ..   400 mL/Hr IV Intermittent (09-24-24 @ 18:55)    acetaminophen   IVPB ..   400 mL/Hr IV Intermittent (09-25-24 @ 11:13)     [facility] PALLIATIVE MEDICINE     CC: FOLLOW UP VISIT + GOC    INTERVAL HPI/OVERNIGHT EVENTS:  Source if other than patient:  []Family   [x]Team     Chart reviewed and discussed with MICU   S/P medical extubation 9/24, currently on room air  Remains off IV vasopressor  Pt awake and interactive, sons at bedside.     PRESENT SYMPTOMS:     Dyspnea: no  Nausea/Vomiting:  No  Anxiety:   No  Depression: appears depressed  Fatigue:  No  Loss of appetite:  passed repeat SLP eval today, diet advanced   Constipation:  No    Pain: No            Character-            Duration-            Effect-            Factors-            Frequency-            Location-            Severity-    Pain AD Score:  [email or web address] (press ctrl + left click to view)    Review of Systems: Reviewed                          Negative: denies any acute dyspnea or chest pain                     Positive: see above   All others negative    MEDICATIONS  (STANDING):  aspirin  chewable 81 milliGRAM(s) Oral daily  carvedilol 6.25 milliGRAM(s) Oral every 12 hours  chlorhexidine 2% Cloths 1 Application(s) Topical daily  clopidogrel Tablet 75 milliGRAM(s) Oral daily  dextrose 5%. 1000 milliLiter(s) (50 mL/Hr) IV Continuous <Continuous>  dextrose 5%. 1000 milliLiter(s) (100 mL/Hr) IV Continuous <Continuous>  dextrose 50% Injectable 25 Gram(s) IV Push once  dextrose 50% Injectable 25 Gram(s) IV Push once  dextrose 50% Injectable 12.5 Gram(s) IV Push once  furosemide    Tablet 40 milliGRAM(s) Oral daily  heparin   Injectable 5000 Unit(s) SubCutaneous every 8 hours  influenza  Vaccine (HIGH DOSE) 0.5 milliLiter(s) IntraMuscular once  insulin lispro (ADMELOG) corrective regimen sliding scale   SubCutaneous every 6 hours  levothyroxine 100 MICROGram(s) Oral daily  losartan 100 milliGRAM(s) Oral daily  meropenem Injectable 1000 milliGRAM(s) IV Push every 8 hours    MEDICATIONS  (PRN):  hydrALAZINE Injectable 10 milliGRAM(s) IV Push every 4 hours PRN systolic greater than 160  polyethylene glycol 3350 17 Gram(s) Oral daily PRN Constipation      PHYSICAL EXAM:    Vital Signs Last 24 Hrs  T(C): 37.3 (25 Sep 2024 09:00), Max: 37.7 (24 Sep 2024 22:00)  T(F): 99.1 (25 Sep 2024 09:00), Max: 99.9 (24 Sep 2024 22:00)  HR: 83 (25 Sep 2024 10:00) (77 - 94)  BP: 152/87 (25 Sep 2024 10:00) (149/81 - 163/92)  BP(mean): 106 (25 Sep 2024 10:00) (92 - 116)  RR: 21 (25 Sep 2024 10:00) (14 - 32)  SpO2: 92% (25 Sep 2024 10:00) (92% - 100%)    Parameters below as of 25 Sep 2024 08:00  Patient On (Oxygen Delivery Method): room air    Karnofsky:  10%    General: resting comfortably   HEENT: mmm     Lungs: comfortable nonlabored    CV:  rrr  GI: +BS abdomen soft, NTND     MSK:  no cyanosis or edema,  weakness    bedbound/wheelchair bound  Neuro: nonfocal. awake and interactive   Skin: warm and dry   Psych: flat affect, delay thought processing    LABS: Reviewed                          12.7   15.87 )-----------( 91       ( 25 Sep 2024 04:02 )             37.8     09-25    141  |  106  |  20.3[H]  ----------------------------<  113[H]  3.6   |  22.0  |  0.34[L]    Ca    8.3[L]      25 Sep 2024 04:02  Phos  2.6     09-25  Mg     2.0     09-25    TPro  5.5[L]  /  Alb  3.5  /  TBili  0.7  /  DBili  x   /  AST  47[H]  /  ALT  46[H]  /  AlkPhos  134[H]  09-25    PT/INR - ( 25 Sep 2024 04:02 )   PT: 12.1 sec;   INR: 1.04 ratio         PTT - ( 25 Sep 2024 04:02 )  PTT:27.3 sec  Urinalysis Basic - ( 25 Sep 2024 04:02 )    Color: x / Appearance: x / SG: x / pH: x  Gluc: 113 mg/dL / Ketone: x  / Bili: x / Urobili: x   Blood: x / Protein: x / Nitrite: x   Leuk Esterase: x / RBC: x / WBC x   Sq Epi: x / Non Sq Epi: x / Bacteria: x      I&O's Summary    24 Sep 2024 07:01  -  25 Sep 2024 07:00  --------------------------------------------------------  IN: 400 mL / OUT: 2920 mL / NET: -2520 mL      RADIOLOGY & ADDITIONAL STUDIES: Reviewed     CXR    ACC: [id number] EXAM:  XR CHEST PORTABLE URGENT 1V   ORDERED BY: ALEXANDRA M GOLDMANN     PROCEDURE DATE:  09/23/2024          INTERPRETATION:  TIME OF EXAM: September 23, 2024 at 8:30 AM.    CLINICAL INFORMATION: Intubated.    COMPARISON:  September 21, 2024.    TECHNIQUE:   AP Portable chest x-ray.    INTERPRETATION:    Heart size and the mediastinum cannot be accurately evaluated on this   projection.  ET tube tip is approximately 4.8 cm above the david.  Enteric tube extends into the left hemiabdomen. Tip not included on image.  Previous right IJ line not seen.  There is a left midline catheter.  Mild, predominantly left-sided, pulmonary vascular congestion again seen.  The right lung is clear.  Unchanged left lower/retrocardiac opacity.  No pneumothorax.  There is osteoarthritic degenerative change of the spine.  Right shoulder orthopedic anchors are seen.    IMPRESSION:  Tubes as above.    Mild, predominantly left-sided, pulmonary vascular congestion again seen.    Unchanged left lower/retrocardiac opacity that could be due to a left   pleural effusion with associated passive atelectasis, atelectasis of   other cause, and/or other pathology including, but not limited to,   pneumonia.    --- End of Report --    FRANNIE GARCIA MD; Attending Radiologist  This document has been electronically signed. Sep 24 2024 12:47PM    BLE Doppler    ACC: [id number] EXAM:  US DPLX LWR EXT VEINS COMPL BI   ORDERED BY:   DARIAN SHORT     PROCEDURE DATE:  09/23/2024          INTERPRETATION:  CLINICAL INFORMATION: BODY ACHES    COMPARISON: None available.    TECHNIQUE: Duplex sonography of the BILATERAL LOWER extremity veins with   color and spectral Doppler, with and without compression.    FINDINGS:    RIGHT:  Normal compressibility of the RIGHT common femoral, femoral and popliteal   veins.  Doppler examination shows normal spontaneous and phasic flow.  No RIGHT calf vein thrombosis is detected.    LEFT:  Normal compressibility of the LEFT common femoral, femoral and popliteal   veins.  Doppler examination shows normal spontaneous and phasic flow.  No LEFT calf vein thrombosis is detected.    IMPRESSION:  No evidence of deep venous thrombosis in either lower extremity.  --- End of Report ---    HORTENCIA CARLISLE MD; Attending Radiologist  This document has been electronically signed. Sep 23 2024  7:47AM    ADVANCE DIRECTIVES/TREATMENT PREFERENCES: FULL CODE    NEUROLOGICAL MEDICATIONS/OPIOIDS/BENZODIAZEPINE IN PAST 24 HOURS:    acetaminophen   IVPB ..   400 mL/Hr IV Intermittent (09-25-24 @ 06:34)    acetaminophen   IVPB ..   400 mL/Hr IV Intermittent (09-24-24 @ 18:55)    acetaminophen   IVPB ..   400 mL/Hr IV Intermittent (09-25-24 @ 11:13)

## 2024-09-25 NOTE — CONSULT NOTE ADULT - TIME BILLING
Chart review, interpretation of laboratory and imaging results,  patient evaluation, medication review, documentation, coordination with medical team. Interpretation  and review of microbiologic data. Overseeing antibiotic therapy.
D/W RN, ICU DR Mckenzie, 2 rosalind Nicole and Tor, resident Dr LITTLE Neri  Total time also includes discussion during interdisciplinary team rounds, chart review including but not limited to prior admissions/ GOC discussions,  review of medications/ labs/ imaging, examination, care coordination with other health care professionals, documentation EXCLUDING  advance care planning discussions.

## 2024-09-25 NOTE — CONSULT NOTE ADULT - REASON FOR ADMISSION
Shortness of breath
Shortness of breath
Pt has much improved and is more organized, less irritable, and future oriented. Pt will be discharged tomorrow 5/12. 
Shortness of breath

## 2024-09-25 NOTE — PROGRESS NOTE ADULT - SUBJECTIVE AND OBJECTIVE BOX
INTERVAL HISTORY:  Sitting up in bed awake and alert  Denies chest pain and SOB   O2 Sat's stable on RA    	  MEDICATIONS:  carvedilol 6.25 milliGRAM(s) Oral every 12 hours  furosemide    Tablet 40 milliGRAM(s) Oral daily  hydrALAZINE Injectable 10 milliGRAM(s) IV Push every 4 hours PRN  losartan 100 milliGRAM(s) Oral daily  meropenem Injectable 1000 milliGRAM(s) IV Push every 8 hours  pantoprazole  Injectable 40 milliGRAM(s) IV Push two times a day  polyethylene glycol 3350 17 Gram(s) Oral daily PRN  dextrose 50% Injectable 25 Gram(s) IV Push once  dextrose 50% Injectable 25 Gram(s) IV Push once  dextrose 50% Injectable 12.5 Gram(s) IV Push once  insulin lispro (ADMELOG) corrective regimen sliding scale   SubCutaneous every 6 hours  levothyroxine 100 MICROGram(s) Oral daily  aspirin  chewable 81 milliGRAM(s) Oral daily  chlorhexidine 2% Cloths 1 Application(s) Topical daily  clopidogrel Tablet 75 milliGRAM(s) Oral daily  dextrose 5%. 1000 milliLiter(s) IV Continuous <Continuous>  dextrose 5%. 1000 milliLiter(s) IV Continuous <Continuous>  influenza  Vaccine (HIGH DOSE) 0.5 milliLiter(s) IntraMuscular once        PHYSICAL EXAM:    T(C): 36.6 (24 @ 20:14), Max: 37.7 (24 @ 15:00)  HR: 92 (24 @ 20:00) (77 - 92)  BP: 141/84 (24 @ 20:00) (141/84 - 163/94)  RR: 25 (24 @ 20:00) (20 - 28)  SpO2: 99% (24 @ 20:00) (92% - 100%)  Wt(kg): --    I&O's Summary    24 Sep 2024 07:01  -  25 Sep 2024 07:00  --------------------------------------------------------  IN: 400 mL / OUT: 2920 mL / NET: -2520 mL    25 Sep 2024 07:01  -  25 Sep 2024 22:21  --------------------------------------------------------  IN: 0 mL / OUT: 900 mL / NET: -900 mL        Daily     Daily Weight in k.6 (25 Sep 2024 03:18)    Appearance: Normal	  HEENT:   Normal oral mucosa, PERRL, EOMI	  Cardiovascular: Normal S1 S2, No JVD, No murmurs, Generalized mild edema  Respiratory: Lungs clear to auscultation, diminished B/L bases	  Psychiatry: A & O x 3, Mood & affect appropriate  Gastrointestinal:  Soft, Non-tender, + BS	  Skin: No rashes, No ecchymoses, No cyanosis  Neurologic: Non-focal  Extremities: Generalized weakness, No clubbing or cyanosis  Vascular: Peripheral pulses bilaterally      TELEMETRY: NSR	      	    LABS:	 	    CARDIAC MARKERS:                          12.8   x     )-----------( x        ( 25 Sep 2024 15:30 )             38.9         141  |  106  |  20.3[H]  ----------------------------<  113[H]  3.6   |  22.0  |  0.34[L]    Ca    8.3[L]      25 Sep 2024 04:02  Phos  2.6       Mg     2.0         TPro  5.5[L]  /  Alb  3.5  /  TBili  0.7  /  DBili  x   /  AST  47[H]  /  ALT  46[H]  /  AlkPhos  134[H]        ASSESSMENT: 	  69-year-old female with  PMHx of CAD, HTN, HLD, NSTEMI in  s/p ELIZABETH to LAD, S/p ELIZABETH Distal LCx (2024), hypothyroid, rheumatoid arthritis, presented to ER with left-sided chest pain associated with generalized weakness, nausea and vomiting.  Patient ill-appearing, unable to provide much history.  History mostly supplied by patient's family member at bedside.  Symptoms started 2 days ago, however today she felt much worse with prompted her to come to the ED.  Patient noted to be tachycardic and hypotensive upon arrival, diaphoretic and ill-appearing.  She is noted to have  diminished breath sounds in the left lower lobe, CT scan Negative for PE, does show a left lower lobe pneumonia.  Patient's course complicated by multiple episodes of hypotension requiring vasopressor support and IV fluids.  Patient's labs reviewed, noted to have leukocytosis, elevated lactate 5.5.  EKG independently reviewed, normal sinus rhythm, no acute changes. Pt became hypoxic in ER on high flow O2 N/C and intubated.    Septic/cardiogenic shock -   leukocytosis, elevated lactate 5.5, hypotension requiring vasopressor support (Levo and Pit) and IV fluids.  - Pseudomonas bacteremia  - Acute on chronic HFpEF, elevated BNP 8471 (2024)   - s/p TTE (2024) LVEF - 70 to 75 %.  - s/p Repeat echo (2024) with drop in EF - 20 - 25% with global hypokinesis off of . - Likely stunning/stress induced CM, no evidence of ACS,   -Extubated 2024 and off pressors,  - O2 Sat's stable on RA  - NSR on CM HR 80's  BP stable  - Repeat TTE 2024 shows Normal LVEF 55-60%, Normal RV function, LVEF has improved from TTE 2024    PLAN:   Extubated and off pressors  NSR on CM, BP stable  O2 Sat's stable on RA  Continue ASA and Plavix daily  Continue Carvedilol and Losartan  IV antibiotics continue  Downgraded from MICU to Telemetry

## 2024-09-25 NOTE — CHART NOTE - NSCHARTNOTEFT_GEN_A_CORE
68 yo female with PMHx CAD NSTEMI in 2011 s/p ELIZABETH to LAD, s/p ELIZABETH Distal LCx (4/30/2024), HTN, HLD, hypothyroid, rheumatoid arthritis, who presented to ER with left-sided chest pain associated with generalized weakness, nausea and vomiting. Ruled in for severe sepsis secondary to LLL pneumonia POA, while in the ED developed worsening hypoxic respiratory failure and was emergently intubated. She had profound hypoxemia, ARDS requiring deep sedation and paralysis. Patient had uptrending lactate levels, worsening shock state on three vasopressors with concerns for acute systolic heart failure, cardiogenic shock.      Neuro:   Confused overnight, likely ICU acquired delirium. Encourage sleep hygiene, frequent reorientation, limiting deliriogenic medications. PT consult for debility.    Pulm:   # Acute hypoxic respiratory failure  # Lobar pneumonia  # Severe ARDS  Extubated, supplemental O2 PRN to keep SpO2 >92%. Incentive spirometry. Goal OOB as tolerated to optimize oxygenation.    CV:   # Acute systolic heart failure  # Undifferentiated shock, distributive and cardiogenic  Both distributive and cardiogenic shock resolved. Now off all vasopressors and inotropes. TTE with EF 25-30%, biventricular failure, suspected stress cardiomyopathy in the face of severe sepsis. Overall overloaded, started on diuretics to keep goal euvolemic to net negative fluid balance. Monitoring dynamic end-points of perfusion. Stress steroids tapered off.    GI:   NPO, failed SLP evaluation post-extubation however more alert, passed repeat bedside dysphagia per RN. Will repeat SLP eval today and advance diet per recommendations.    Renal:   # Metabolic lactic acidosis  CLEMENTINA likely ATN due to hypoperfusion during shock state, resolved. Diuresis as above with improvement in UOP. Supplementing electrolytes to keep K >4, Mg >2, Phos >3.     ID:   # Severe sepsis  BCx +Pseudomonas aeruginosa, became febrile when abx narrowed to Cefepime, back on Meropenem and has since defervesced.     Heme:   Heparin SC DVT ppx     Endo:   # Hypergylcemia  Holding Lantus this morning while NPO, cover with ISS to keep goal -200 while critically ill. Continue levothyroxine for hypothyroidism. 70 yo female with PMHx CAD NSTEMI in 2011 s/p ELIZABETH to LAD, s/p ELIZABETH Distal LCx (4/30/2024), HTN, HLD, hypothyroid, rheumatoid arthritis, who presented to ER with left-sided chest pain associated with generalized weakness, nausea and vomiting. Ruled in for severe sepsis secondary to LLL pneumonia POA, while in the ED developed worsening hypoxic respiratory failure and was emergently intubated. She had profound hypoxemia, ARDS requiring deep sedation and paralysis. Patient had uptrending lactate levels, worsening shock state on three vasopressors with concerns for acute systolic heart failure, cardiogenic shock.      Neuro:   Confused overnight, likely ICU acquired delirium. Encourage sleep hygiene, frequent reorientation, limiting deliriogenic medications.   PT consult for debility.    Pulm:   # Acute hypoxic respiratory failure  # Lobar pneumonia  # Severe ARDS  Extubated, supplemental O2 PRN to keep SpO2 >92%. Currently on RA   Incentive spirometry. Goal OOB as tolerated to optimize oxygenation.    CV:   # Acute systolic heart failure  # Undifferentiated shock, distributive and cardiogenic  Both distributive and cardiogenic shock resolved.   Off all vasopressors and inotropes.   TTE with EF 25-30%, biventricular failure, suspected stress cardiomyopathy in the face of severe sepsis.   Repeat echo 9/24 EF 55 to 60 %.  Overall overloaded, started on diuretics to keep goal euvolemic to net negative fluid balance.   Monitoring dynamic end-points of perfusion. Stress steroids tapered off.    GI:   Passed S&S consult.   Diet advanced per orders     Renal:   # Metabolic lactic acidosis  CLEMENTINA likely ATN due to hypoperfusion during shock state, resolved.   Diuresis as above with improvement in UOP.   Supplementing electrolytes to keep K >4, Mg >2, Phos >3.     ID:   # Severe sepsis  BCx +Pseudomonas aeruginosa, became febrile when abx narrowed to Cefepime, back on Meropenem and has since defervesced.   ID consulted for pseudomonal bacteremia     Heme:   Heparin SC DVT ppx     Endo:   # Hypergylcemia  Holding Lantus this morning while NPO, cover with ISS to keep goal -200 while critically ill. Continue levothyroxine for hypothyroidism.    Plan of care discussed with MD Fajardo. PRANEETH

## 2024-09-25 NOTE — PROGRESS NOTE ADULT - SUBJECTIVE AND OBJECTIVE BOX
Acceptance Note     chart reviewed. pt is seen   she is awake slow responses   does not know where she ism denies abdominal pain , no chest pain , on room air     + loose BM dark color mixed with urine in the bed ( incontinent )     d.w ICU NP and nurse at the bedside     ALLERGIES:  tetracycline (Hives)  penicillins (Hives)  Butazolactin, Prolaprin (Hives)  azithromycin (Hives)  Zyrtec (Hives)    MEDICATIONS  (STANDING):  aspirin  chewable 81 milliGRAM(s) Oral daily  carvedilol 6.25 milliGRAM(s) Oral every 12 hours  chlorhexidine 2% Cloths 1 Application(s) Topical daily  clopidogrel Tablet 75 milliGRAM(s) Oral daily  dextrose 5%. 1000 milliLiter(s) (100 mL/Hr) IV Continuous <Continuous>  dextrose 5%. 1000 milliLiter(s) (50 mL/Hr) IV Continuous <Continuous>  dextrose 50% Injectable 25 Gram(s) IV Push once  dextrose 50% Injectable 25 Gram(s) IV Push once  dextrose 50% Injectable 12.5 Gram(s) IV Push once  furosemide    Tablet 40 milliGRAM(s) Oral daily  heparin   Injectable 5000 Unit(s) SubCutaneous every 8 hours  influenza  Vaccine (HIGH DOSE) 0.5 milliLiter(s) IntraMuscular once  insulin lispro (ADMELOG) corrective regimen sliding scale   SubCutaneous every 6 hours  levothyroxine 100 MICROGram(s) Oral daily  losartan 100 milliGRAM(s) Oral daily  meropenem Injectable 1000 milliGRAM(s) IV Push every 8 hours    MEDICATIONS  (PRN):  hydrALAZINE Injectable 10 milliGRAM(s) IV Push every 4 hours PRN systolic greater than 160  polyethylene glycol 3350 17 Gram(s) Oral daily PRN Constipation    Vital Signs Last 24 Hrs  T(F): 99.1 (25 Sep 2024 09:00), Max: 99.9 (24 Sep 2024 22:00)  HR: 83 (25 Sep 2024 10:00) (77 - 94)  BP: 152/87 (25 Sep 2024 10:00) (149/81 - 163/92)  RR: 21 (25 Sep 2024 10:00) (14 - 32)  SpO2: 92% (25 Sep 2024 10:00) (92% - 100%)  I&O's Summary    24 Sep 2024 07:01  -  25 Sep 2024 07:00  --------------------------------------------------------  IN: 400 mL / OUT: 2920 mL / NET: -2520 mL        PHYSICAL EXAM:  General: awake alert   Neck: supple . No JVD   Lungs: CTA   Cardio: RRR, S1/S2, No murmur  Abdomen: Soft, Nontender, Nondistended; Bowel sounds present  Extremities: No calf tenderness, No pitting edema  Skin lower leg multiple bilateral bruises healing   no rash       LABS:                        12.7   15.87 )-----------( 91       ( 25 Sep 2024 04:02 )             37.8     09-25    141  |  106  |  20.3  ----------------------------<  113  3.6   |  22.0  |  0.34    Ca    8.3      25 Sep 2024 04:02  Phos  2.6     09-25  Mg     2.0     09-25    TPro  5.5  /  Alb  3.5  /  TBili  0.7  /  DBili  x   /  AST  47  /  ALT  46  /  AlkPhos  134  09-25          PT/INR - ( 25 Sep 2024 04:02 )   PT: 12.1 sec;   INR: 1.04 ratio         PTT - ( 25 Sep 2024 04:02 )  PTT:27.3 sec    Procalcitonin: 8.05 ng/mL (09-23-24 @ 05:14)            14:50 - VBG - pH: 7.470 | pCO2: 42    | pO2: 46    | Lactate: 1.90     ABG - ( 23 Sep 2024 18:48 )  pH, Arterial: 7.500 pH, Blood: x     /  pCO2: 36    /  pO2: 135   / HCO3: 28    / Base Excess: 4.9   /  SaO2: 100.0                   POCT Blood Glucose.: 103 mg/dL (25 Sep 2024 12:56)  POCT Blood Glucose.: 94 mg/dL (25 Sep 2024 05:35)  POCT Blood Glucose.: 108 mg/dL (25 Sep 2024 00:34)  POCT Blood Glucose.: 122 mg/dL (24 Sep 2024 20:57)      Urinalysis Basic - ( 25 Sep 2024 04:02 )    Color: x / Appearance: x / SG: x / pH: x  Gluc: 113 mg/dL / Ketone: x  / Bili: x / Urobili: x   Blood: x / Protein: x / Nitrite: x   Leuk Esterase: x / RBC: x / WBC x   Sq Epi: x / Non Sq Epi: x / Bacteria: x        Culture - Sputum (collected 19 Sep 2024 00:35)  Source: Trach Asp Tracheal Aspirate  Gram Stain (19 Sep 2024 14:48):    Rare polymorphonuclear leukocytes seen per low power field    No squamous epithelial cells seen per low power field    No organisms seen per oil power field  Final Report (20 Sep 2024 21:36):    Normal Respiratory Mary present    Culture - Blood (collected 18 Sep 2024 16:57)  Source: .Blood Blood  Final Report (23 Sep 2024 23:00):    No growth at 5 days    Culture - Legionella (collected 18 Sep 2024 16:57)  Source: Legionella  Preliminary Report (20 Sep 2024 12:53):    No Legionella species isolated    Culture in progress

## 2024-09-25 NOTE — PROGRESS NOTE ADULT - ASSESSMENT
70 yo female with PMHx CAD NSTEMI in 2011 s/p ELIZABETH to LAD, s/p ELIZABETH Distal LCx (4/30/2024), HTN, HLD, hypothyroid, rheumatoid arthritis, who presented to ER with left-sided chest pain associated with generalized weakness, nausea and vomiting. Ruled in for severe sepsis secondary to LLL pneumonia POA, while in the ED developed worsening hypoxic respiratory failure and was emergently intubated. She had profound hypoxemia, ARDS requiring deep sedation and paralysis. Patient had uptrending lactate levels, worsening shock state on three vasopressors with concerns for acute systolic heart failure, cardiogenic shock.    # Acute hypoxic respiratory failure  # Undifferentiated shock, distributive and cardiogenic  # Lobar pneumonia  # Severe ARDS  # Severe sepsis  # Acute systolic heart failure  # Metabolic lactic acidosis  # Hypergylcemia    Neuro: Confused overnight, likely ICU acquired delirium. Encourage sleep hygiene, frequent reorientation, limiting deliriogenic medications. PT consult for debility.  Pulm: Extubated, supplemental O2 PRN to keep SpO2 >92%. Incentive spirometry. Goal OOB as tolerated to optimize oxygenation.  CV: Both distributive and cardiogenic shock resolved. Now off all vasopressors and inotropes. TTE with EF 25-30%, biventricular failure, suspected stress cardiomyopathy in the face of severe sepsis. Overall overloaded, started on diuretics to keep goal euvolemic to net negative fluid balance. Monitoring dynamic end-points of perfusion. Stress steroids tapered off.  GI: NPO, failed SLP evaluation post-extubation however more alert, passed repeat bedside dysphagia per RN. Will repeat SLP eval today and advance diet per recommendations.  Renal: CLEMENTINA likely ATN due to hypoperfusion during shock state, resolved. Diuresis as above with improvement in UOP. Supplementing electrolytes to keep K >4, Mg >2, Phos >3.   ID: BCx +Pseudomonas aeruginosa, became febrile when abx narrowed to Cefepime, back on Meropenem and has since defervesced.   Heme: Heparin SC DVT ppx   Endo: Holding Lantus this morning while NPO, cover with ISS to keep goal -200 while critically ill. Continue levothyroxine for hypothyroidism.

## 2024-09-25 NOTE — PROGRESS NOTE ADULT - SUBJECTIVE AND OBJECTIVE BOX
Patient is a 69y old  Female who presents with a chief complaint of Shortness of breath (24 Sep 2024 14:53)      BRIEF HOSPITAL COURSE: 68 yo female with PMHx CAD NSTEMI in 2011 s/p ELIZABETH to LAD, s/p ELIZABETH Distal LCx (4/30/2024), HTN, HLD, hypothyroid, rheumatoid arthritis, who presented to ER with left-sided chest pain associated with generalized weakness, nausea and vomiting.  Patient ill-appearing, unable to provide much history. History mostly supplied by patient's family member at bedside. Symptoms started 2 days ago, however today she felt much worse with prompted her to come to the ED. CT chest revealed LLL pneumonia. While in the ED developed worsening hypoxic respiratory failure and was emergently intubated. She had profound hypoxemia, ARDS requiring deep sedation and paralysis. Patient had uptrending lactate levels, worsening shock state on three vasopressors. Serial TTEs concerning for acute systolic heart failure, started on Dobutamine.       Events last 24 hours: Extubated yesterday, off pressors, confused overnight.        PAST MEDICAL & SURGICAL HISTORY:  Hypothyroid      ADHD (attention deficit hyperactivity disorder)      HLD (hyperlipidemia)      Myocardial infarct  2012      Stented coronary artery  2012      Rheumatoid arthritis      Migraine      S/p bilateral carpal tunnel release      H/O cardiac catheterization      H/O laminectomy      H/O spinal fusion      S/P left knee arthroscopy      H/O total knee replacement, right            Review of Systems:  CONSTITUTIONAL: No fever, chills, or fatigue  EYES: No visual disturbances  ENMT:  No difficulty hearing  NECK: No pain  RESPIRATORY: No cough. No shortness of breath  CARDIOVASCULAR: No chest pain, palpitations, or leg swelling  GASTROINTESTINAL: No abdominal pain. No nausea, vomiting, diarrhea, or constipation. No hematemesis, melena or hematochezia  GENITOURINARY: No dysuria, frequency, hematuria, or incontinence  NEUROLOGICAL: No headaches, loss of strength, numbness, or tremors  SKIN: No rashes  MUSCULOSKELETAL: No back or extremity pain. No calf pain  PSYCHIATRIC: No depression, anxiety, or difficulty sleeping        Medications:  meropenem Injectable 1000 milliGRAM(s) IV Push every 8 hours    carvedilol 6.25 milliGRAM(s) Oral every 12 hours  furosemide    Tablet 40 milliGRAM(s) Oral daily  hydrALAZINE Injectable 10 milliGRAM(s) IV Push every 4 hours PRN  losartan 100 milliGRAM(s) Oral daily          aspirin  chewable 81 milliGRAM(s) Oral daily  clopidogrel Tablet 75 milliGRAM(s) Oral daily  heparin   Injectable 5000 Unit(s) SubCutaneous every 8 hours    polyethylene glycol 3350 17 Gram(s) Oral daily PRN      dextrose 50% Injectable 25 Gram(s) IV Push once  dextrose 50% Injectable 12.5 Gram(s) IV Push once  dextrose 50% Injectable 25 Gram(s) IV Push once  insulin lispro (ADMELOG) corrective regimen sliding scale   SubCutaneous every 6 hours  levothyroxine 100 MICROGram(s) Oral daily    dextrose 5%. 1000 milliLiter(s) IV Continuous <Continuous>  dextrose 5%. 1000 milliLiter(s) IV Continuous <Continuous>    influenza  Vaccine (HIGH DOSE) 0.5 milliLiter(s) IntraMuscular once    chlorhexidine 2% Cloths 1 Application(s) Topical daily            ICU Vital Signs Last 24 Hrs  T(C): 37.3 (25 Sep 2024 04:00), Max: 37.9 (24 Sep 2024 10:30)  T(F): 99.1 (25 Sep 2024 04:00), Max: 100.2 (24 Sep 2024 10:30)  HR: 87 (25 Sep 2024 04:00) (77 - 95)  BP: 149/81 (25 Sep 2024 04:00) (149/81 - 163/92)  BP(mean): 101 (25 Sep 2024 04:00) (92 - 116)  ABP: 160/86 (24 Sep 2024 16:30) (145/78 - 166/85)  ABP(mean): 115 (24 Sep 2024 16:30) (104 - 121)  RR: 25 (25 Sep 2024 04:00) (14 - 33)  SpO2: 97% (25 Sep 2024 04:00) (93% - 100%)    O2 Parameters below as of 25 Sep 2024 04:00  Patient On (Oxygen Delivery Method): room air            ABG - ( 23 Sep 2024 18:48 )  pH, Arterial: 7.500 pH, Blood: x     /  pCO2: 36    /  pO2: 135   / HCO3: 28    / Base Excess: 4.9   /  SaO2: 100.0               I&O's Detail    23 Sep 2024 07:01  -  24 Sep 2024 07:00  --------------------------------------------------------  IN:    Dexmedetomidine: 442.4 mL    DOBUTamine: 102 mL    Glucerna 1.5: 720 mL    IV PiggyBack: 250 mL    IV PiggyBack: 300 mL    IV PiggyBack: 50 mL    Propofol: 16.2 mL    Propofol: 43.2 mL  Total IN: 1923.8 mL    OUT:    Indwelling Catheter - Urethral (mL): 1945 mL  Total OUT: 1945 mL    Total NET: -21.2 mL      24 Sep 2024 07:01  -  25 Sep 2024 04:19  --------------------------------------------------------  IN:    IV PiggyBack: 300 mL  Total IN: 300 mL    OUT:    Indwelling Catheter - Urethral (mL): 1720 mL    Voided (mL): 850 mL  Total OUT: 2570 mL    Total NET: -2270 mL            LABS:                        12.7   15.87 )-----------( 91       ( 25 Sep 2024 04:02 )             37.8     09-24    140  |  103  |  19.8  ----------------------------<  107[H]  3.9   |  25.0  |  0.36[L]    Ca    8.3[L]      24 Sep 2024 16:53  Phos  3.6     09-24  Mg     2.0     09-24    TPro  5.6[L]  /  Alb  3.5  /  TBili  0.7  /  DBili  x   /  AST  49[H]  /  ALT  42[H]  /  AlkPhos  142[H]  09-24          CAPILLARY BLOOD GLUCOSE      POCT Blood Glucose.: 108 mg/dL (25 Sep 2024 00:34)    PT/INR - ( 25 Sep 2024 04:02 )   PT: 12.1 sec;   INR: 1.04 ratio         PTT - ( 25 Sep 2024 04:02 )  PTT:27.3 sec  Urinalysis Basic - ( 24 Sep 2024 16:53 )    Color: x / Appearance: x / SG: x / pH: x  Gluc: 107 mg/dL / Ketone: x  / Bili: x / Urobili: x   Blood: x / Protein: x / Nitrite: x   Leuk Esterase: x / RBC: x / WBC x   Sq Epi: x / Non Sq Epi: x / Bacteria: x      CULTURES:  Rapid RVP Result: NotDetec (09-22 @ 16:04)  Culture Results:   Normal Respiratory Mary present (09-19 @ 00:35)  Culture Results:   No Legionella species isolated  Culture in progress (09-18 @ 16:57)  Culture Results:   No growth at 5 days (09-18 @ 16:57)  Rapid RVP Result: NotDetec (09-18 @ 15:50)  Culture Results:   No growth (09-18 @ 12:05)  Culture Results:   Growth in aerobic bottle: Pseudomonas aeruginosa  Direct identification is available within approximately 3-5  hours either by Blood Panel Multiplexed PCR or Direct  MALDI-TOF. Details: [email or web address] (09-18 @ 10:15)            Physical Examination:    General: No acute distress.      HEENT: Pupils equal, reactive to light. Symmetric.    PULM: Clear to auscultation bilaterally.    CVS: Regular rate and rhythm, no murmurs.    ABD: Soft, nondistended, nontender, normoactive bowel sounds.    EXT: Mild generalized anasarca    SKIN: Warm and well perfused. Scattered ecchymoses.    NEURO: Alert, disoriented, interactive, nonfocal        RADIOLOGY: < from: Xray Chest 1 View- PORTABLE-Urgent (Xray Chest 1 View- PORTABLE-Urgent .) (09.23.24 @ 09:01) >  IMPRESSION:  Tubes as above.    Mild, predominantly left-sided, pulmonary vascular congestion again seen.    Unchanged leftlower/retrocardiac opacity that could be due to a left   pleural effusion with associated passive atelectasis, atelectasis of   other cause, and/or other pathology including, but not limited to,   pneumonia.    --- End of Report ---    FRANNIE GARCIA MD; Attending Radiologist  This document has been electronically signed. Sep 24 2024 12:47PM        INVASIVE LINES: X   INDWELLING STEVENSON: X   VTE PROPHYLAXIS: Heparin SC   CAM ICU: +  CODE STATUS: FULL      Time spent on this patient encounter was 85 minutes, which including documenting this note in the EMR, assessing the problems of acute illness with associated risks, reviewing the medical record to prepare for the encounter, and meeting face to face with the patient to obtain additional history. I have also performed an appropriate physical exam, made interventions listed, and ordered and interpreted appropriate diagnostic studies as documented. To improve communication and patient safety, I have coordinated care with the multidisciplinary team including the bedside nurse, appropriate attending of record, and consultants as needed. Date of note entry represents date of services rendered.

## 2024-09-25 NOTE — PROGRESS NOTE ADULT - ASSESSMENT
68 y/o female with hx of CAD s/p PCI and stents, RA on tocilizumab admitted for acute respiratory failure / ARDS s/p intubation, LLL PNA, lactic acidosis complicated further by refractory shock requiring IV vasopressors and cardiomyopathy suspected stress induced. Palliative consulted for support and to assist with goals of care.     #Acute Respiratory Failure/ ARDS  #Community Acquired Pneumonia  - s/p successful medical extubation 9/24, currently maintaining adequate sats on room air  - initial blood cx 9/18 +pseudomonas    - imaging reviewed  - IV antibiotic, supplemental oxygen PRN  - trend wbc and fever curve    #Cardiomyopathy, Likely Stress Induced  #Mixed Shock, Resolved   - remains off IV vasopressors  - repeat TTE 9/24 with improved EF 55-60%  - Seen by cardio this admission, input appreciated     #Rheumatoid Arthritis  #Chronic Pain Syndrome  - son reports pt has been on oxy IR 10mg as needed   - istop checked, see above - on oxy ir10mg and zolpidem 10mg for the past 1 year  - monitor closely for acute pain --> if symptoms develop, would recommend oxy IR 5-10mg PRN moderate and severe pain with parameters to hold for sedation and SBP< 100.   - was on tocilizumab for RA (on hold while inpatient)    #Advance Care Planning  #Palliative Care Encounter  - hcp is son Corey --> he has form at home, his brother Tor also confirms this and defers to brother; both make decisions together  - Pt continues to be medically optimized but is making slow clinical improvement.  - Support provided to pt and son at bedside, they have no further questions at this time. They are taking it day by day to monitor for further improvement.   - Palliative team will continue to support and follow course peripherally

## 2024-09-25 NOTE — PROGRESS NOTE ADULT - ASSESSMENT
68 yo female with PMHx CAD NSTEMI in 2011 s/p ELIZABETH to LAD, s/p ELIZABETH Distal LCx (4/30/2024), HTN, HLD, hypothyroid, rheumatoid arthritis, who presented to ER with left-sided chest pain associated with generalized weakness, nausea and vomiting. Ruled in for severe sepsis secondary to LLL pneumonia  while in the ED developed worsening hypoxic respiratory failure and was emergently intubated. She had profound hypoxemia, ARDS requiring deep sedation and paralysis. Patient had uptrending lactate levels, worsening shock state on three vasopressors with concerns for acute systolic heart failure, cardiogenic shock. Repeat TTE improved EF       1-Acute hypoxic respiratory failure ARDS   due to sepsis , Lobar pneumonia   cont merrem for pseudomonas pneumonia   oxygen as needed   nebulizer treatment     2- Acute metabolic encephalopathy   liekly due to infection , hospital delirium   will get Ct of brain     3- CAD h/o MI   s//p PCI in april 2024   cont asa, plavix   coreg     4- HTn   cont losartan . coreg     5- Hypothroidsm   on levothyroxine   check TSH     6- Diarrhea   dark color stool   send stool occult blood   start PPI   monitor HB       d/w nurse ICU team

## 2024-09-26 ENCOUNTER — APPOINTMENT (OUTPATIENT)
Age: 69
End: 2024-09-26

## 2024-09-26 LAB
ALBUMIN SERPL ELPH-MCNC: 3.7 G/DL — SIGNIFICANT CHANGE UP (ref 3.3–5.2)
ALP SERPL-CCNC: 139 U/L — HIGH (ref 40–120)
ALT FLD-CCNC: 50 U/L — HIGH
ANION GAP SERPL CALC-SCNC: 14 MMOL/L — SIGNIFICANT CHANGE UP (ref 5–17)
ANISOCYTOSIS BLD QL: SLIGHT — SIGNIFICANT CHANGE UP
AST SERPL-CCNC: 36 U/L — HIGH
BASOPHILS # BLD AUTO: 0.24 K/UL — HIGH (ref 0–0.2)
BASOPHILS NFR BLD AUTO: 1.8 % — SIGNIFICANT CHANGE UP (ref 0–2)
BILIRUB SERPL-MCNC: 0.8 MG/DL — SIGNIFICANT CHANGE UP (ref 0.4–2)
BUN SERPL-MCNC: 24.3 MG/DL — HIGH (ref 8–20)
CALCIUM SERPL-MCNC: 8.2 MG/DL — LOW (ref 8.4–10.5)
CHLORIDE SERPL-SCNC: 107 MMOL/L — SIGNIFICANT CHANGE UP (ref 96–108)
CO2 SERPL-SCNC: 20 MMOL/L — LOW (ref 22–29)
CREAT SERPL-MCNC: 0.45 MG/DL — LOW (ref 0.5–1.3)
CULTURE RESULTS: SIGNIFICANT CHANGE UP
EGFR: 103 ML/MIN/1.73M2 — SIGNIFICANT CHANGE UP
ELLIPTOCYTES BLD QL SMEAR: SLIGHT — SIGNIFICANT CHANGE UP
EOSINOPHIL # BLD AUTO: 0.23 K/UL — SIGNIFICANT CHANGE UP (ref 0–0.5)
EOSINOPHIL NFR BLD AUTO: 1.7 % — SIGNIFICANT CHANGE UP (ref 0–6)
GIANT PLATELETS BLD QL SMEAR: PRESENT — SIGNIFICANT CHANGE UP
GLUCOSE BLDC GLUCOMTR-MCNC: 109 MG/DL — HIGH (ref 70–99)
GLUCOSE BLDC GLUCOMTR-MCNC: 113 MG/DL — HIGH (ref 70–99)
GLUCOSE BLDC GLUCOMTR-MCNC: 115 MG/DL — HIGH (ref 70–99)
GLUCOSE BLDC GLUCOMTR-MCNC: 137 MG/DL — HIGH (ref 70–99)
GLUCOSE SERPL-MCNC: 130 MG/DL — HIGH (ref 70–99)
HCT VFR BLD CALC: 39.4 % — SIGNIFICANT CHANGE UP (ref 34.5–45)
HGB BLD-MCNC: 12.7 G/DL — SIGNIFICANT CHANGE UP (ref 11.5–15.5)
LYMPHOCYTES # BLD AUTO: 1.65 K/UL — SIGNIFICANT CHANGE UP (ref 1–3.3)
LYMPHOCYTES # BLD AUTO: 12.3 % — LOW (ref 13–44)
MACROCYTES BLD QL: SLIGHT — SIGNIFICANT CHANGE UP
MAGNESIUM SERPL-MCNC: 2 MG/DL — SIGNIFICANT CHANGE UP (ref 1.6–2.6)
MANUAL SMEAR VERIFICATION: SIGNIFICANT CHANGE UP
MCHC RBC-ENTMCNC: 32.2 GM/DL — SIGNIFICANT CHANGE UP (ref 32–36)
MCHC RBC-ENTMCNC: 33.4 PG — SIGNIFICANT CHANGE UP (ref 27–34)
MCV RBC AUTO: 103.7 FL — HIGH (ref 80–100)
METAMYELOCYTES # FLD: 0.9 % — HIGH (ref 0–0)
MICROCYTES BLD QL: SLIGHT — SIGNIFICANT CHANGE UP
MONOCYTES # BLD AUTO: 0.47 K/UL — SIGNIFICANT CHANGE UP (ref 0–0.9)
MONOCYTES NFR BLD AUTO: 3.5 % — SIGNIFICANT CHANGE UP (ref 2–14)
NEUTROPHILS # BLD AUTO: 10.7 K/UL — HIGH (ref 1.8–7.4)
NEUTROPHILS NFR BLD AUTO: 79.8 % — HIGH (ref 43–77)
OVALOCYTES BLD QL SMEAR: SLIGHT — SIGNIFICANT CHANGE UP
PHOSPHATE SERPL-MCNC: 2.4 MG/DL — SIGNIFICANT CHANGE UP (ref 2.4–4.7)
PLAT MORPH BLD: NORMAL — SIGNIFICANT CHANGE UP
PLATELET # BLD AUTO: 150 K/UL — SIGNIFICANT CHANGE UP (ref 150–400)
POIKILOCYTOSIS BLD QL AUTO: SLIGHT — SIGNIFICANT CHANGE UP
POLYCHROMASIA BLD QL SMEAR: SLIGHT — SIGNIFICANT CHANGE UP
POTASSIUM SERPL-MCNC: 3.6 MMOL/L — SIGNIFICANT CHANGE UP (ref 3.5–5.3)
POTASSIUM SERPL-SCNC: 3.6 MMOL/L — SIGNIFICANT CHANGE UP (ref 3.5–5.3)
PROT SERPL-MCNC: 5.6 G/DL — LOW (ref 6.6–8.7)
RBC # BLD: 3.8 M/UL — SIGNIFICANT CHANGE UP (ref 3.8–5.2)
RBC # FLD: 14 % — SIGNIFICANT CHANGE UP (ref 10.3–14.5)
RBC BLD AUTO: ABNORMAL
SMUDGE CELLS # BLD: PRESENT — SIGNIFICANT CHANGE UP
SODIUM SERPL-SCNC: 141 MMOL/L — SIGNIFICANT CHANGE UP (ref 135–145)
SPECIMEN SOURCE: SIGNIFICANT CHANGE UP
TSH SERPL-MCNC: 57 UIU/ML — HIGH (ref 0.27–4.2)
WBC # BLD: 13.41 K/UL — HIGH (ref 3.8–10.5)
WBC # FLD AUTO: 13.41 K/UL — HIGH (ref 3.8–10.5)

## 2024-09-26 PROCEDURE — 99233 SBSQ HOSP IP/OBS HIGH 50: CPT

## 2024-09-26 PROCEDURE — 70450 CT HEAD/BRAIN W/O DYE: CPT | Mod: 26

## 2024-09-26 PROCEDURE — 99232 SBSQ HOSP IP/OBS MODERATE 35: CPT

## 2024-09-26 RX ORDER — HYDRALAZINE HYDROCHLORIDE 100 MG/1
25 TABLET ORAL ONCE
Refills: 0 | Status: COMPLETED | OUTPATIENT
Start: 2024-09-26 | End: 2024-09-26

## 2024-09-26 RX ORDER — PANTOPRAZOLE SODIUM 40 MG/1
40 TABLET, DELAYED RELEASE ORAL
Refills: 0 | Status: DISCONTINUED | OUTPATIENT
Start: 2024-09-26 | End: 2024-09-30

## 2024-09-26 RX ORDER — ACETAMINOPHEN 325 MG
650 TABLET ORAL ONCE
Refills: 0 | Status: COMPLETED | OUTPATIENT
Start: 2024-09-26 | End: 2024-09-26

## 2024-09-26 RX ORDER — ONDANSETRON HCL/PF 4 MG/2 ML
4 VIAL (ML) INJECTION EVERY 6 HOURS
Refills: 0 | Status: DISCONTINUED | OUTPATIENT
Start: 2024-09-26 | End: 2024-09-30

## 2024-09-26 RX ORDER — CARVEDILOL 3.125 MG
12.5 TABLET ORAL EVERY 12 HOURS
Refills: 0 | Status: DISCONTINUED | OUTPATIENT
Start: 2024-09-26 | End: 2024-09-30

## 2024-09-26 RX ORDER — ACETAMINOPHEN 325 MG
650 TABLET ORAL EVERY 6 HOURS
Refills: 0 | Status: DISCONTINUED | OUTPATIENT
Start: 2024-09-26 | End: 2024-09-30

## 2024-09-26 RX ORDER — ROSUVASTATIN CALCIUM 20 MG/1
20 TABLET, COATED ORAL AT BEDTIME
Refills: 0 | Status: DISCONTINUED | OUTPATIENT
Start: 2024-09-26 | End: 2024-09-30

## 2024-09-26 RX ORDER — AZITHROMYCIN 250 MG/1
1000 TABLET, FILM COATED ORAL ONCE
Refills: 0 | Status: DISCONTINUED | OUTPATIENT
Start: 2024-09-26 | End: 2024-09-26

## 2024-09-26 RX ORDER — ENOXAPARIN SODIUM 150 MG/ML
40 INJECTION SUBCUTANEOUS EVERY 24 HOURS
Refills: 0 | Status: DISCONTINUED | OUTPATIENT
Start: 2024-09-26 | End: 2024-09-30

## 2024-09-26 RX ADMIN — PANTOPRAZOLE SODIUM 40 MILLIGRAM(S): 40 TABLET, DELAYED RELEASE ORAL at 18:33

## 2024-09-26 RX ADMIN — Medication 650 MILLIGRAM(S): at 03:19

## 2024-09-26 RX ADMIN — ROSUVASTATIN CALCIUM 20 MILLIGRAM(S): 20 TABLET, COATED ORAL at 21:40

## 2024-09-26 RX ADMIN — Medication 12.5 MILLIGRAM(S): at 18:33

## 2024-09-26 RX ADMIN — CHLORHEXIDINE GLUCONATE ORAL RINSE 1 APPLICATION(S): 1.2 SOLUTION DENTAL at 12:08

## 2024-09-26 RX ADMIN — FUROSEMIDE 40 MILLIGRAM(S): 10 INJECTION INTRAVENOUS at 05:31

## 2024-09-26 RX ADMIN — Medication 100 MICROGRAM(S): at 05:31

## 2024-09-26 RX ADMIN — MEROPENEM 1000 MILLIGRAM(S): 500 INJECTION INTRAVENOUS at 21:41

## 2024-09-26 RX ADMIN — Medication 75 MILLIGRAM(S): at 12:08

## 2024-09-26 RX ADMIN — MEROPENEM 1000 MILLIGRAM(S): 500 INJECTION INTRAVENOUS at 05:31

## 2024-09-26 RX ADMIN — HYDRALAZINE HYDROCHLORIDE 25 MILLIGRAM(S): 100 TABLET ORAL at 12:08

## 2024-09-26 RX ADMIN — Medication 6.25 MILLIGRAM(S): at 05:31

## 2024-09-26 RX ADMIN — Medication 750 MILLIGRAM(S): at 12:54

## 2024-09-26 RX ADMIN — LOSARTAN POTASSIUM 100 MILLIGRAM(S): 100 TABLET, FILM COATED ORAL at 05:35

## 2024-09-26 RX ADMIN — Medication 81 MILLIGRAM(S): at 12:08

## 2024-09-26 RX ADMIN — Medication 650 MILLIGRAM(S): at 04:19

## 2024-09-26 RX ADMIN — MEROPENEM 1000 MILLIGRAM(S): 500 INJECTION INTRAVENOUS at 12:08

## 2024-09-26 RX ADMIN — ENOXAPARIN SODIUM 40 MILLIGRAM(S): 150 INJECTION SUBCUTANEOUS at 18:34

## 2024-09-26 RX ADMIN — PANTOPRAZOLE SODIUM 40 MILLIGRAM(S): 40 TABLET, DELAYED RELEASE ORAL at 05:31

## 2024-09-26 NOTE — PROGRESS NOTE ADULT - ASSESSMENT
70 y/o female with hx of CAD s/p PCI and stents, RA on tocilizumab admitted for acute respiratory failure / ARDS s/p intubation, LLL PNA, lactic acidosis complicated further by refractory shock requiring IV vasopressors and cardiomyopathy suspected stress induced. Palliative consulted for support and to assist with goals of care.     #Acute Respiratory Failure/ ARDS  #Community Acquired Pneumonia  - s/p successful medical extubation 9/24, currently maintaining adequate sats on room air  - initial blood cx 9/18 +pseudomonas    - imaging reviewed  - IV antibiotic, supplemental oxygen PRN  - trend wbc and fever curve    #Cardiomyopathy, Likely Stress Induced  #Mixed Shock, Resolved   - s/p IV vasopressors in ICU  - repeat TTE 9/24 with improved EF 55-60%  - Seen by cardio this admission, input appreciated     #Rheumatoid Arthritis  #Chronic Pain Syndrome  - son reports pt has been on oxy IR 10mg as needed   - istop checked, see above - on oxy ir10mg and zolpidem 10mg for the past 1 year  - monitor closely for acute pain --> if symptoms develop, would recommend oxy IR 5-10mg PRN moderate and severe pain with parameters to hold for sedation and SBP< 100.   - was on tocilizumab for RA (on hold while inpatient)    #Advance Care Planning  #Palliative Care Encounter  - hcp is son Corey --> he has form at home, his brother Tor also confirms this and defers to brother; both make decisions together  - GOC: continued medical optimization without limitation  - Eventual PT and may benefit from DESTINY due to deconditioning  - Encourage ongoing advance care planning to ensure pt's wishes are known for down the road    No further recommendations from a palliative standpoint. Will sign off. Please reconsult PRN if goals of care should change and assistance needed in further collaborative family discussions. Ongoing medical mgnt per primary team.

## 2024-09-26 NOTE — PROGRESS NOTE ADULT - SUBJECTIVE AND OBJECTIVE BOX
Sepsis    HPI:  69-year-old female with past medical history of CAD status post multiple stents,  hypothyroid, rheumatoid arthritis, presenting with left-sided chest pain associated with generalized weakness, nausea and vomiting.  Patient ill-appearing, unable to provide much history.  History mostly supplied by patient's family member at bedside.  Symptoms started 2 days ago, however today she felt much worse with prompted her to come to the ED. (18 Sep 2024 13:37)    Interval History:  Patient was seen and examined at bedside around 10 am.  Feels OK.  Has chronic diarrhea.   Denies abdominal pain, nausea or vomiting.   Denies chest pain, palpitations or shortness of breath.     ROS:  As per interval history otherwise unremarkable.    PHYSICAL EXAM:  Vital Signs   T(C): 36.8 (26 Sep 2024 08:13), Max: 37.7 (25 Sep 2024 15:00)  T(F): 98.2 (26 Sep 2024 08:13), Max: 99.9 (25 Sep 2024 15:00)  HR: 95 (26 Sep 2024 12:00) (73 - 95)  BP: 149/91 (26 Sep 2024 12:00) (132/76 - 171/91)  BP(mean): 104 (26 Sep 2024 12:00) (82 - 114)  RR: 18 (26 Sep 2024 12:00) (18 - 27)  SpO2: 98% (26 Sep 2024 12:00) (92% - 99%)  Parameters below as of 26 Sep 2024 12:00  Patient On (Oxygen Delivery Method): room air  General: Elderly female sitting in bed comfortably. No acute distress  HEENT: EOMI. Clear conjunctivae. Moist mucus membrane  Neck: Supple.   Chest: Good air entry. No wheezing, rales or rhonchi.   Heart: Normal S1 & S2. RRR.   Abdomen: Obese. Soft. Non-tender. + BS  Ext: 2+ pedal edema. No calf tenderness. Left knee with ecchymosis and tenderness on medial aspect. Old healed scars on both knees.   Neuro: Awake and alert. Moves all extremities. Speaks slowly.   Skin: Warm and Dry  Psychiatry: Normal mood and affect    I&O's Summary    25 Sep 2024 07:01  -  26 Sep 2024 07:00  --------------------------------------------------------  IN: 0 mL / OUT: 1975 mL / NET: -1975 mL    LABS:  CAPILLARY BLOOD GLUCOSE  POCT Blood Glucose.: 115 mg/dL (26 Sep 2024 12:06)  POCT Blood Glucose.: 137 mg/dL (26 Sep 2024 05:30)  POCT Blood Glucose.: 113 mg/dL (26 Sep 2024 00:16)  POCT Blood Glucose.: 114 mg/dL (25 Sep 2024 17:21)                      12.7   13.41 )-----------( 150      ( 26 Sep 2024 05:22 )             39.4     09-26    141  |  107  |  24.3[H]  ----------------------------<  130[H]  3.6   |  20.0[L]  |  0.45[L]    Ca    8.2[L]      26 Sep 2024 05:22  Phos  2.4     09-26  Mg     2.0     09-26    TPro  5.6[L]  /  Alb  3.7  /  TBili  0.8  /  DBili  x   /  AST  36[H]  /  ALT  50[H]  /  AlkPhos  139[H]  09-26    PT/INR - ( 25 Sep 2024 04:02 )   PT: 12.1 sec;   INR: 1.04 ratio       PTT - ( 25 Sep 2024 04:02 )  PTT:27.3 sec  Urinalysis Basic - ( 26 Sep 2024 05:22 )    Color: x / Appearance: x / SG: x / pH: x  Gluc: 130 mg/dL / Ketone: x  / Bili: x / Urobili: x   Blood: x / Protein: x / Nitrite: x   Leuk Esterase: x / RBC: x / WBC x   Sq Epi: x / Non Sq Epi: x / Bacteria: x    RADIOLOGY & ADDITIONAL STUDIES:  Reviewed     MEDICATIONS  (STANDING):  aspirin  chewable 81 milliGRAM(s) Oral daily  carvedilol 12.5 milliGRAM(s) Oral every 12 hours  chlorhexidine 2% Cloths 1 Application(s) Topical daily  clopidogrel Tablet 75 milliGRAM(s) Oral daily  dextrose 5%. 1000 milliLiter(s) (50 mL/Hr) IV Continuous <Continuous>  dextrose 5%. 1000 milliLiter(s) (100 mL/Hr) IV Continuous <Continuous>  dextrose 50% Injectable 12.5 Gram(s) IV Push once  dextrose 50% Injectable 25 Gram(s) IV Push once  dextrose 50% Injectable 25 Gram(s) IV Push once  furosemide    Tablet 40 milliGRAM(s) Oral daily  influenza  Vaccine (HIGH DOSE) 0.5 milliLiter(s) IntraMuscular once  insulin lispro (ADMELOG) corrective regimen sliding scale   SubCutaneous every 6 hours  levothyroxine 100 MICROGram(s) Oral daily  losartan 100 milliGRAM(s) Oral daily  meropenem Injectable 1000 milliGRAM(s) IV Push every 8 hours  pantoprazole    Tablet 40 milliGRAM(s) Oral two times a day    MEDICATIONS  (PRN):  hydrALAZINE Injectable 10 milliGRAM(s) IV Push every 4 hours PRN systolic greater than 160  polyethylene glycol 3350 17 Gram(s) Oral daily PRN Constipation

## 2024-09-26 NOTE — PROGRESS NOTE ADULT - ASSESSMENT
69F with RA, CAD, hypothyroidism presented with left sided chest pain, weakness and SOB. Intubated in the ED.  Hospital course notable for septic/cardiogenic shock, ARDS, VDRF (extubated on 9/23)     ID consulted on hospital day 8 for Pseudomonas aeruginosa bacteremia     antibiotics history:  vancomycin 9/18 and 9/22  meropenem 9/18 - 9/20, 9/22 - TD   cefepime 9/18, 9/20 - 9/22     Admission 9/18 BCx with pansensitive Pseudomonas aeruginosa   Repeat BCx negative   Trach aspirate culture - normal resp heidi   RVP negative   CTA C/AP with LLL consolidation with small pleural effusion   Duplex US of LEs - no DVT   PNC allergy noted in chart, but patient unable to provide any information about it   Leukocytosis noted - trend WBC   Extubated 9/24. REmains on RA   hemodynamically stable   afebrile     Per notes, patient defervesce when antibiotics changed to meropenem. However, fever resolution unlikely to be related to antibiotic choice but simply natural evolution of disease.   completing treatment with meropenem today     Diarrhea - GI PCR +EPEC. Supportive treatment with hydration, electrolytes replacements. May give one dose of levo 750 (alternate treatment azithromycin 1g PO once, but allergy documented on chart - patient unable to elaborate).     D/w Dr. June     Will not actively follow. Please call with questions.

## 2024-09-26 NOTE — PROGRESS NOTE ADULT - ASSESSMENT
68 yo female with PMHx CAD NSTEMI in 2011 s/p ELIZABETH to LAD, s/p ELIZABETH Distal LCx (4/30/2024), HTN, HLD, hypothyroid, rheumatoid arthritis, who presented to ER with left-sided chest pain associated with generalized weakness, nausea and vomiting. Ruled in for severe sepsis secondary to LLL pneumonia  while in the ED developed worsening hypoxic respiratory failure and was emergently intubated. She had profound hypoxemia, ARDS requiring deep sedation and paralysis. Patient had uptrending lactate levels, worsening shock state on three vasopressors with concerns for acute systolic heart failure, cardiogenic shock. Now extubated and off pressors. Repeat ECHO with improved EF. Downgraded to Medicine on 9/25/24.    1) Acute hypoxic respiratory failure / ARDS   Likely due to Severe Sepsis / LLL Pneumonia (likely due to GNR) / Pseudomonas Bacteremia   s/p extubation   Currently satting well on room air   On Meropenem (last day today)   ID recs appreciated     2) Shock (Distributive vs Cardiogenic)  Off pressors   Resolved     3) Diarrhea  GI PCR with Enteropathogenic E. Coli  Will treat with Levofloxacin 750 mg x 1  Discussed with ID     4) Acute metabolic encephalopathy   CT Head with no acute findings  Likely due to shock / infection   Improving     5) Left Knee Swelling   s/p Fall  CT to rule out fracture as she has a history of TKR  Ortho consult     6) CAD / HLD / HTN  Continue Aspirin, Plavix, Losartan and Coreg (increase to 12.5 mg BID)  Resume Rosuvastatin     7) Hypothyroidism   TSH 57  Increase Levothyroxine to 150 mcg daily  Repeat TFTs in 1 week    8) Prediabetes  A1c 6.4  Carb Consistent Diet  D/C accu checks and ISS    DVT Prophylaxis -- Lovenox SQ    Dispo: DESTINY once stable.     Updated patient's son at bedside.

## 2024-09-26 NOTE — PROGRESS NOTE ADULT - SUBJECTIVE AND OBJECTIVE BOX
[facility]  INFECTIOUS DISEASES at [facility]  ===============================================================                  Jorje Baugh MD               Diplomates American Board of Internal Medicine & Infectious Diseases                * [place] Office - Appt - Tel  [phone] Fax [phone]                * [place] Office - Appt - Tel [phone] Fax [phone]                                  Hospital Consult line:  [phone]  ==============================================================    GOOD BILL [id number]    Follow up: Psa bacteremia    GI PCR + EPEC   No acute events overnight   Afebrile   hemodynamically stable   on RA     Per RN - ongoing watery diarrhea.     I have personally reviewed the labs and data; pertinent labs and data are listed in this note; please see below.     _______________________________________________________________  REVIEW OF SYSTEMS  ROS limited - more alert but still confused. Denies pain or SOB.   ________________________________________________________________  Allergies:  tetracycline (Hives)  penicillins (Hives)  Butazolactin, Prolaprin (Hives)  azithromycin (Hives)  Zyrtec (Hives)        ________________________________________________________________  PHYSICAL EXAM  GEN: in NAD, lying in bed.   HEENT: Anicteric sclerae. Moist mucous membranes. No mucosal lesions.   LUNGS: eupneic. CTA B/L.  HEART: RRR, no m/r/g  ABDOMEN: Soft, NT, ND  +BS.    :  catheter  NEUROLOGIC: moving all extremities. AAOx2  PSYCHIATRIC: calm, cooperative   SKIN: multiple bruises in LE   LINES: PIV   ________________________________________________________________  Vitals:  T(F): 98.2 (26 Sep 2024 08:13), Max: 99.9 (25 Sep 2024 15:00)  HR: 74 (26 Sep 2024 08:00)  BP: 132/76 (26 Sep 2024 08:00)  RR: 20 (26 Sep 2024 08:00)  SpO2: 95% (26 Sep 2024 08:00) (92% - 100%)  temp max in last 48H T(F): , Max: 100 (09-24-24 @ 12:30)    Current Antibiotics:  meropenem Injectable 1000 milliGRAM(s) IV Push every 8 hours    Other medications:  aspirin  chewable 81 milliGRAM(s) Oral daily  carvedilol 12.5 milliGRAM(s) Oral every 12 hours  chlorhexidine 2% Cloths 1 Application(s) Topical daily  clopidogrel Tablet 75 milliGRAM(s) Oral daily  dextrose 5%. 1000 milliLiter(s) IV Continuous <Continuous>  dextrose 5%. 1000 milliLiter(s) IV Continuous <Continuous>  dextrose 50% Injectable 25 Gram(s) IV Push once  dextrose 50% Injectable 25 Gram(s) IV Push once  dextrose 50% Injectable 12.5 Gram(s) IV Push once  furosemide    Tablet 40 milliGRAM(s) Oral daily  hydrALAZINE 25 milliGRAM(s) Oral once  influenza  Vaccine (HIGH DOSE) 0.5 milliLiter(s) IntraMuscular once  insulin lispro (ADMELOG) corrective regimen sliding scale   SubCutaneous every 6 hours  levothyroxine 100 MICROGram(s) Oral daily  losartan 100 milliGRAM(s) Oral daily  pantoprazole  Injectable 40 milliGRAM(s) IV Push two times a day                            12.7   13.41 )-----------( 150      ( 26 Sep 2024 05:22 )             39.4     09-26    141  |  107  |  24.3[H]  ----------------------------<  130[H]  3.6   |  20.0[L]  |  0.45[L]    Ca    8.2[L]      26 Sep 2024 05:22  Phos  2.4     09-26  Mg     2.0     09-26    TPro  5.6[L]  /  Alb  3.7  /  TBili  0.8  /  DBili  x   /  AST  36[H]  /  ALT  50[H]  /  AlkPhos  139[H]  09-26    RECENT CULTURES:  09-22 @ 16:04    RVP with SARS-CoV-2   NotDetec      09-19 @ 00:35 Trach Asp Tracheal Aspirate     Normal Respiratory Mary present    Rare polymorphonuclear leukocytes seen per low power field  No squamous epithelial cells seen per low power field  No organisms seen per oil power field      09-18 @ 16:57 Legionella     No Legionella species isolated  Culture in progress      09-18 @ 15:50   RVP with SARS-CoV-2   NotDetec      09-18 @ 12:05 Clean Catch Clean Catch (Midstream)     No growth      09-18 @ 10:15 .Blood Blood-Peripheral Blood Culture PCR  Pseudomonas aeruginosa    - Aztreonam: S 8  - Cefepime: S <=2  - Ceftazidime: S 4  - Ciprofloxacin: S <=0.25  - Imipenem: S 2  - Levofloxacin: S <=0.5  - Meropenem: S <=1  - Piperacillin/Tazobactam: S <=8    Growth in aerobic bottle: Gram Negative Rods      WBC Count: 13.41 K/uL (09-26-24 @ 05:22)  WBC Count: 15.87 K/uL (09-25-24 @ 04:02)  WBC Count: 17.48 K/uL (09-24-24 @ 16:53)  WBC Count: 17.28 K/uL (09-24-24 @ 05:10)  WBC Count: 14.90 K/uL (09-23-24 @ 19:00)  WBC Count: 13.27 K/uL (09-23-24 @ 12:00)  WBC Count: 10.97 K/uL (09-23-24 @ 04:12)  WBC Count: 11.79 K/uL (09-22-24 @ 03:00)    Creatinine: 0.45 mg/dL (09-26-24 @ 05:22)  Creatinine: 0.34 mg/dL (09-25-24 @ 04:02)  Creatinine: 0.36 mg/dL (09-24-24 @ 16:53)  Creatinine: 0.36 mg/dL (09-24-24 @ 05:10)  Creatinine: 0.31 mg/dL (09-23-24 @ 19:00)  Creatinine: 0.36 mg/dL (09-23-24 @ 12:00)  Creatinine: 0.50 mg/dL (09-23-24 @ 05:14)  Creatinine: 0.76 mg/dL (09-22-24 @ 14:50)  Creatinine: 0.73 mg/dL (09-22-24 @ 12:00)  Creatinine: 0.72 mg/dL (09-22-24 @ 03:00)    Procalcitonin: 8.05 ng/mL (09-23-24 @ 05:14)  Procalcitonin: >100.00 ng/mL (09-18-24 @ 19:45)     SARS-CoV-2: NotDetec (09-22-24 @ 16:04)  SARS-CoV-2: NotDetec (09-18-24 @ 15:50)  SARS-CoV-2 Result: NotDetec (09-18-24 @ 12:05)    ________________________________________________________________  CARDIOLOGY   ________________________________________________________________  RADIOLOGY  < from: CT Angio Chest PE Protocol w/ IV Cont (09.18.24 @ 10:55) >  FINDINGS:  CHEST:  LUNGS AND LARGE AIRWAYS: No endobronchial lesions. Left lower lobe   consolidation. There is subsegmental atelectasis in right lower lobe.  PLEURA: Small left pleural effusion with fissural tracking.  VESSELS: No pulmonary embolism through the proximal segmental level.   Limited evaluation of the distal branches secondary to respiratory motion   an suboptimal timing of contrast administration. Aortic and coronary   artery stenting/calcifications.  HEART: Heart size is normal. No pericardial effusion. There is   subendocardial fatty dysplasia in the periapical region, compatible with   prior myocardial infarction  MEDIASTINUM AND ALEXIS: No lymphadenopathy.  CHEST WALL AND LOWER NECK: Patient status post right rotator cuff repair.    ABDOMEN AND PELVIS:  LIVER: Within normal limits.  BILE DUCTS: Normal caliber.  GALLBLADDER: Within normal limits.  SPLEEN: Within normal limits.  PANCREAS: Within normal limits.  ADRENALS: Within normal limits.  KIDNEYS/URETERS: Right kidney is unremarkable. There is a subcentimeter   low-attenuation lesion in the left kidney, too small to characterize,   likely a cyst.    BLADDER: Within normal limits.  REPRODUCTIVE ORGANS: Uterus has a globular contour and heterogeneous   attenuation which may be suggestive of adenomyosis versus myomatous   uterus.    BOWEL: Evaluation of the bowel is limited due to inadequate distention.   Within this limitation, there is no bowel obstruction. Residual oral   contrast in the cecum and terminal ileum. Appendix is normal.  PERITONEUM/RETROPERITONEUM: Within normal limits.  VESSELS: Atherosclerotic changes.  LYMPH NODES: No lymphadenopathy.  ABDOMINAL WALL: Small fat-containing left inguinal hernia.  BONES: Degenerative changes. L4-S1 laminectomy and posterior spinal   fusion .    IMPRESSION:  *  No pulmonary embolism through the proximal segmental level. Limited   evaluation of the distal branches secondary to respiratory motion an   suboptimal timing for contrast administration.  *  Left lower lobe pneumonia with associated small pleural effusion.  *  No acute pathology in the abdomen and pelvis.  *  Mildly enlarged and globular uterus suggestive of adenomyosis versus   myomatous uterus. If clinically indicated, pelvic ultrasound is   recommended for further evaluation.    < end of copied text >

## 2024-09-26 NOTE — PROGRESS NOTE ADULT - SUBJECTIVE AND OBJECTIVE BOX
S: Patient denies chest pain or dyspnea.     TELEMETRY: sr    MEDICATIONS  (STANDING):  aspirin  chewable 81 milliGRAM(s) Oral daily  carvedilol 12.5 milliGRAM(s) Oral every 12 hours  chlorhexidine 2% Cloths 1 Application(s) Topical daily  clopidogrel Tablet 75 milliGRAM(s) Oral daily  dextrose 5%. 1000 milliLiter(s) (50 mL/Hr) IV Continuous <Continuous>  dextrose 5%. 1000 milliLiter(s) (100 mL/Hr) IV Continuous <Continuous>  dextrose 50% Injectable 12.5 Gram(s) IV Push once  dextrose 50% Injectable 25 Gram(s) IV Push once  dextrose 50% Injectable 25 Gram(s) IV Push once  furosemide    Tablet 40 milliGRAM(s) Oral daily  hydrALAZINE 25 milliGRAM(s) Oral once  influenza  Vaccine (HIGH DOSE) 0.5 milliLiter(s) IntraMuscular once  insulin lispro (ADMELOG) corrective regimen sliding scale   SubCutaneous every 6 hours  levothyroxine 100 MICROGram(s) Oral daily  losartan 100 milliGRAM(s) Oral daily  meropenem Injectable 1000 milliGRAM(s) IV Push every 8 hours  pantoprazole  Injectable 40 milliGRAM(s) IV Push two times a day    MEDICATIONS  (PRN):  hydrALAZINE Injectable 10 milliGRAM(s) IV Push every 4 hours PRN systolic greater than 160  polyethylene glycol 3350 17 Gram(s) Oral daily PRN Constipation        Vital Signs Last 24 Hrs  T(C): 36.8 (26 Sep 2024 04:02), Max: 37.7 (25 Sep 2024 15:00)  T(F): 98.3 (26 Sep 2024 04:02), Max: 99.9 (25 Sep 2024 15:00)  HR: 90 (26 Sep 2024 06:00) (73 - 92)  BP: 159/92 (26 Sep 2024 06:00) (137/74 - 171/91)  BP(mean): 99 (26 Sep 2024 06:00) (82 - 114)  RR: 20 (26 Sep 2024 06:00) (19 - 28)  SpO2: 95% (26 Sep 2024 06:00) (92% - 100%)    Parameters below as of 26 Sep 2024 06:00  Patient On (Oxygen Delivery Method): room air        Daily     Daily     I&O's Detail    25 Sep 2024 07:01  -  26 Sep 2024 07:00  --------------------------------------------------------  IN:  Total IN: 0 mL    OUT:    Voided (mL): 1975 mL  Total OUT: 1975 mL    Total NET: -1975 mL          PHYSICAL EXAM:  Appearance: In NAD  Neck: No JVD,   Cardiovascular: Normal S1 S2  Respiratory: Lungs clear to auscultation	  Gastrointestinal:  Soft, Non-tender, + BS, no bruits	  Neurologic: Grossly non-focal.  Extremities: No edema    LABS:                        12.7   13.41 )-----------( 150      ( 26 Sep 2024 05:22 )             39.4     09-26    141  |  107  |  24.3[H]  ----------------------------<  130[H]  3.6   |  20.0[L]  |  0.45[L]    Ca    8.2[L]      26 Sep 2024 05:22  Phos  2.4     09-26  Mg     2.0     09-26    TPro  5.6[L]  /  Alb  3.7  /  TBili  0.8  /  DBili  x   /  AST  36[H]  /  ALT  50[H]  /  AlkPhos  139[H]  09-26        PT/INR - ( 25 Sep 2024 04:02 )   PT: 12.1 sec;   INR: 1.04 ratio         PTT - ( 25 Sep 2024 04:02 )  PTT:27.3 sec  Urinalysis Basic - ( 26 Sep 2024 05:22 )    Color: x / Appearance: x / SG: x / pH: x  Gluc: 130 mg/dL / Ketone: x  / Bili: x / Urobili: x   Blood: x / Protein: x / Nitrite: x   Leuk Esterase: x / RBC: x / WBC x   Sq Epi: x / Non Sq Epi: x / Bacteria: x      I&O's Summary    25 Sep 2024 07:01  -  26 Sep 2024 07:00  --------------------------------------------------------  IN: 0 mL / OUT: 1975 mL / NET: -1975 mL

## 2024-09-26 NOTE — PROGRESS NOTE ADULT - ASSESSMENT
69-year-old female with CAD, HTN, HLD, NSTEMI in 2011 s/p ELIZABETH to LAD, S/p ELIZABETH Distal LCx (4/30/2024), hypothyroid, rheumatoid arthritis, admitted 09/18/2024  with left-sided chest pain associated with generalized weakness, nausea and vomiting.    - Acute hypoxemic respiratory failure,  intubated, sedated on ventilator with Nimbex for paralytic therapy  - ARDS  - Lobar pneumonia s/p CT scan Negative for PE, does show a left lower lobe pneumonia.    - Septic/cardiogenic shock -   leukocytosis, elevated lactate 5.5, hypotension requiring vasopressor support (Levo and Pit) and IV fluids.  - Pseudomonas bacteremia  - Acute on chronic HFpEF, elevated BNP [id number] (09/18/2024)   - s/p TTE (09/19/2024) LVEF - 70 to 75 %.  - s/p Repeat echo (09/20/2024) with drop in EF - 20 - 25% with global hypokinesis off of . - Likely stunning/stress induced CM, no evidence of ACS,   - s/p repeat portable TTE (09/22/2024) done by me with ICU team at bedside. LVEF - 60 - 65% on  - 5. Pericardial fat pad in anterior pericardial space.   - 09/23/2024 -  weaned      plan:  Continue aspirin and plavix if there is no bleeding or platelet dyscrasia.   Case discussed with HF team, pt likely with stress induced CM.   Continue coreg, losartan and furosemide. .   Patient's primary cardiologist, Dr. Snyder

## 2024-09-26 NOTE — PROGRESS NOTE ADULT - SUBJECTIVE AND OBJECTIVE BOX
[facility] PALLIATIVE MEDICINE     CC: FOLLOW UP VISIT + GOC    INTERVAL HPI/OVERNIGHT EVENTS:  Source if other than patient:  []Family   [x]Team     Downgraded to SDU  Pt awake and conversant. Son at bedside    PRESENT SYMPTOMS:     Dyspnea: no  Nausea/Vomiting:  No  Anxiety:   No  Depression: appears depressed +flat affect  Fatigue:  No  Loss of appetite:  No  Constipation:  No +diarrhea     Pain: No            Character-            Duration-            Effect-            Factors-            Frequency-            Location-            Severity-    Pain AD Score:  [email or web address] (press ctrl + left click to view)    Review of Systems: Reviewed                          Negative: denies any acute dyspnea or chest pain                     Positive: see above   All others negative     MEDICATIONS  (STANDING):  aspirin  chewable 81 milliGRAM(s) Oral daily  carvedilol 12.5 milliGRAM(s) Oral every 12 hours  chlorhexidine 2% Cloths 1 Application(s) Topical daily  clopidogrel Tablet 75 milliGRAM(s) Oral daily  dextrose 5%. 1000 milliLiter(s) (50 mL/Hr) IV Continuous <Continuous>  dextrose 5%. 1000 milliLiter(s) (100 mL/Hr) IV Continuous <Continuous>  dextrose 50% Injectable 25 Gram(s) IV Push once  dextrose 50% Injectable 12.5 Gram(s) IV Push once  dextrose 50% Injectable 25 Gram(s) IV Push once  enoxaparin Injectable 40 milliGRAM(s) SubCutaneous every 24 hours  furosemide    Tablet 40 milliGRAM(s) Oral daily  influenza  Vaccine (HIGH DOSE) 0.5 milliLiter(s) IntraMuscular once  levothyroxine 150 MICROGram(s) Oral daily  losartan 100 milliGRAM(s) Oral daily  meropenem Injectable 1000 milliGRAM(s) IV Push every 8 hours  pantoprazole    Tablet 40 milliGRAM(s) Oral two times a day  rosuvastatin 20 milliGRAM(s) Oral at bedtime    MEDICATIONS  (PRN):  acetaminophen     Tablet .. 650 milliGRAM(s) Oral every 6 hours PRN Temp greater or equal to 38C (100.4F), Mild Pain (1 - 3)  hydrALAZINE Injectable 10 milliGRAM(s) IV Push every 4 hours PRN systolic greater than 160  ondansetron Injectable 4 milliGRAM(s) IV Push every 6 hours PRN Nausea and/or Vomiting  polyethylene glycol 3350 17 Gram(s) Oral daily PRN Constipation    PHYSICAL EXAM:    Vital Signs Last 24 Hrs  T(C): 36.5 (26 Sep 2024 15:32), Max: 37.7 (25 Sep 2024 16:00)  T(F): 97.7 (26 Sep 2024 15:32), Max: 99.9 (25 Sep 2024 16:00)  HR: 95 (26 Sep 2024 12:00) (73 - 95)  BP: 149/91 (26 Sep 2024 12:00) (132/76 - 171/91)  BP(mean): 104 (26 Sep 2024 12:00) (82 - 114)  RR: 18 (26 Sep 2024 12:00) (18 - 26)  SpO2: 98% (26 Sep 2024 12:00) (92% - 99%)    Parameters below as of 26 Sep 2024 12:00  Patient On (Oxygen Delivery Method): room air    Karnofsky:  50%    General: resting comfortably   HEENT: mmm     Lungs: comfortable nonlabored    CV:  rrr  GI: +BS abdomen soft, NTND     MSK:  no cyanosis +edema +  weakness     Neuro: nonfocal. awake and interactive  Skin: warm and dry   Psych: flat affect    LABS: Reviewed                           12.7   13.41 )-----------( 150      ( 26 Sep 2024 05:22 )             39.4     09-26    141  |  107  |  24.3[H]  ----------------------------<  130[H]  3.6   |  20.0[L]  |  0.45[L]    Ca    8.2[L]      26 Sep 2024 05:22  Phos  2.4     09-26  Mg     2.0     09-26    TPro  5.6[L]  /  Alb  3.7  /  TBili  0.8  /  DBili  x   /  AST  36[H]  /  ALT  50[H]  /  AlkPhos  139[H]  09-26    RADIOLOGY & ADDITIONAL STUDIES: Reviewed     [facility]    ACC: [id number] EXAM:  CT BRAIN   ORDERED BY: SUSAN BLEVINS     PROCEDURE DATE:  09/26/2024          INTERPRETATION:  CLINICAL INFORMATION: Hypoxia and sepsis.    COMPARISON: CT head of 7/3/2017.    CONTRAST:  IV Contrast: NONE  Complications: None reported at time of study completion    TECHNIQUE:  Serial axial images were obtained from the skull base to the   vertex using multi-slice helical technique. Sagittal and coronal   reformats were obtained.    FINDINGS:    VENTRICLES AND SULCI: Age appropriate involutional changes.  INTRA-AXIAL: No mass effect, acute hemorrhage, or midline shift.  There   are periventricular and subcortical white matter hypodensities,   consistent with microvascular type changes.  EXTRA-AXIAL: No mass or fluid collection. Basal cisterns are normal in   appearance.    VISUALIZED SINUSES:  Clear.  TYMPANOMASTOID CAVITIES:  Clear.  VISUALIZED ORBITS: Normal.  CALVARIUM: Intact.    MISCELLANEOUS: None.      IMPRESSION:  No evidence of acute intracranial pathology. If clinical symptoms persist   or worsen, more sensitive evaluation with brain MRI may be obtained, if   no contraindications exist.    --- End of Report ---    DEBRA HCEN MD; Attending Radiologist  This document has been electronically signed. Sep 26 2024  6:15AM    ADVANCE DIRECTIVES/TREATMENT PREFERENCES: FULL CODE     NEUROLOGICAL MEDICATIONS/OPIOIDS/BENZODIAZEPINE IN PAST 24 HOURS:    acetaminophen     Tablet ..   650 milliGRAM(s) Oral (09-26-24 @ 03:19)

## 2024-09-27 LAB
ALBUMIN SERPL ELPH-MCNC: 3.4 G/DL — SIGNIFICANT CHANGE UP (ref 3.3–5.2)
ALP SERPL-CCNC: 116 U/L — SIGNIFICANT CHANGE UP (ref 40–120)
ALT FLD-CCNC: 38 U/L — HIGH
ANION GAP SERPL CALC-SCNC: 15 MMOL/L — SIGNIFICANT CHANGE UP (ref 5–17)
AST SERPL-CCNC: 28 U/L — SIGNIFICANT CHANGE UP
BASOPHILS # BLD AUTO: 0 K/UL — SIGNIFICANT CHANGE UP (ref 0–0.2)
BASOPHILS NFR BLD AUTO: 0 % — SIGNIFICANT CHANGE UP (ref 0–2)
BILIRUB SERPL-MCNC: 0.7 MG/DL — SIGNIFICANT CHANGE UP (ref 0.4–2)
BUN SERPL-MCNC: 27.3 MG/DL — HIGH (ref 8–20)
CALCIUM SERPL-MCNC: 8.3 MG/DL — LOW (ref 8.4–10.5)
CHLORIDE SERPL-SCNC: 108 MMOL/L — SIGNIFICANT CHANGE UP (ref 96–108)
CO2 SERPL-SCNC: 21 MMOL/L — LOW (ref 22–29)
CREAT SERPL-MCNC: 0.53 MG/DL — SIGNIFICANT CHANGE UP (ref 0.5–1.3)
EGFR: 99 ML/MIN/1.73M2 — SIGNIFICANT CHANGE UP
EOSINOPHIL # BLD AUTO: 0.1 K/UL — SIGNIFICANT CHANGE UP (ref 0–0.5)
EOSINOPHIL NFR BLD AUTO: 0.9 % — SIGNIFICANT CHANGE UP (ref 0–6)
GIANT PLATELETS BLD QL SMEAR: PRESENT — SIGNIFICANT CHANGE UP
GLUCOSE BLDC GLUCOMTR-MCNC: 132 MG/DL — HIGH (ref 70–99)
GLUCOSE BLDC GLUCOMTR-MCNC: 96 MG/DL — SIGNIFICANT CHANGE UP (ref 70–99)
GLUCOSE BLDC GLUCOMTR-MCNC: 96 MG/DL — SIGNIFICANT CHANGE UP (ref 70–99)
GLUCOSE SERPL-MCNC: 127 MG/DL — HIGH (ref 70–99)
HCT VFR BLD CALC: 37.1 % — SIGNIFICANT CHANGE UP (ref 34.5–45)
HGB BLD-MCNC: 12.4 G/DL — SIGNIFICANT CHANGE UP (ref 11.5–15.5)
LYMPHOCYTES # BLD AUTO: 1.98 K/UL — SIGNIFICANT CHANGE UP (ref 1–3.3)
LYMPHOCYTES # BLD AUTO: 18.6 % — SIGNIFICANT CHANGE UP (ref 13–44)
MACROCYTES BLD QL: SLIGHT — SIGNIFICANT CHANGE UP
MAGNESIUM SERPL-MCNC: 2.1 MG/DL — SIGNIFICANT CHANGE UP (ref 1.6–2.6)
MANUAL SMEAR VERIFICATION: SIGNIFICANT CHANGE UP
MCHC RBC-ENTMCNC: 33.4 GM/DL — SIGNIFICANT CHANGE UP (ref 32–36)
MCHC RBC-ENTMCNC: 34.2 PG — HIGH (ref 27–34)
MCV RBC AUTO: 102.2 FL — HIGH (ref 80–100)
METAMYELOCYTES # FLD: 0.9 % — HIGH (ref 0–0)
MONOCYTES # BLD AUTO: 0.47 K/UL — SIGNIFICANT CHANGE UP (ref 0–0.9)
MONOCYTES NFR BLD AUTO: 4.4 % — SIGNIFICANT CHANGE UP (ref 2–14)
NEUTROPHILS # BLD AUTO: 7.81 K/UL — HIGH (ref 1.8–7.4)
NEUTROPHILS NFR BLD AUTO: 72.5 % — SIGNIFICANT CHANGE UP (ref 43–77)
NEUTS BAND # BLD: 0.9 % — SIGNIFICANT CHANGE UP (ref 0–8)
NRBC # BLD: 1 /100 WBCS — HIGH (ref 0–0)
OVALOCYTES BLD QL SMEAR: SLIGHT — SIGNIFICANT CHANGE UP
PLAT MORPH BLD: NORMAL — SIGNIFICANT CHANGE UP
PLATELET # BLD AUTO: 201 K/UL — SIGNIFICANT CHANGE UP (ref 150–400)
POTASSIUM SERPL-MCNC: 3.5 MMOL/L — SIGNIFICANT CHANGE UP (ref 3.5–5.3)
POTASSIUM SERPL-SCNC: 3.5 MMOL/L — SIGNIFICANT CHANGE UP (ref 3.5–5.3)
PROT SERPL-MCNC: 5.3 G/DL — LOW (ref 6.6–8.7)
RBC # BLD: 3.63 M/UL — LOW (ref 3.8–5.2)
RBC # FLD: 14 % — SIGNIFICANT CHANGE UP (ref 10.3–14.5)
RBC BLD AUTO: ABNORMAL
SODIUM SERPL-SCNC: 144 MMOL/L — SIGNIFICANT CHANGE UP (ref 135–145)
VARIANT LYMPHS # BLD: 1.8 % — SIGNIFICANT CHANGE UP (ref 0–6)
WBC # BLD: 10.64 K/UL — HIGH (ref 3.8–10.5)
WBC # FLD AUTO: 10.64 K/UL — HIGH (ref 3.8–10.5)

## 2024-09-27 PROCEDURE — 99233 SBSQ HOSP IP/OBS HIGH 50: CPT

## 2024-09-27 RX ADMIN — PANTOPRAZOLE SODIUM 40 MILLIGRAM(S): 40 TABLET, DELAYED RELEASE ORAL at 05:05

## 2024-09-27 RX ADMIN — Medication 12.5 MILLIGRAM(S): at 18:14

## 2024-09-27 RX ADMIN — Medication 81 MILLIGRAM(S): at 13:33

## 2024-09-27 RX ADMIN — LOSARTAN POTASSIUM 100 MILLIGRAM(S): 100 TABLET, FILM COATED ORAL at 05:06

## 2024-09-27 RX ADMIN — Medication 12.5 MILLIGRAM(S): at 05:05

## 2024-09-27 RX ADMIN — MEROPENEM 1000 MILLIGRAM(S): 500 INJECTION INTRAVENOUS at 13:33

## 2024-09-27 RX ADMIN — ENOXAPARIN SODIUM 40 MILLIGRAM(S): 150 INJECTION SUBCUTANEOUS at 18:14

## 2024-09-27 RX ADMIN — Medication 75 MILLIGRAM(S): at 13:33

## 2024-09-27 RX ADMIN — CHLORHEXIDINE GLUCONATE ORAL RINSE 1 APPLICATION(S): 1.2 SOLUTION DENTAL at 13:33

## 2024-09-27 RX ADMIN — MEROPENEM 1000 MILLIGRAM(S): 500 INJECTION INTRAVENOUS at 22:27

## 2024-09-27 RX ADMIN — Medication 150 MICROGRAM(S): at 05:05

## 2024-09-27 RX ADMIN — PANTOPRAZOLE SODIUM 40 MILLIGRAM(S): 40 TABLET, DELAYED RELEASE ORAL at 18:14

## 2024-09-27 RX ADMIN — MEROPENEM 1000 MILLIGRAM(S): 500 INJECTION INTRAVENOUS at 05:05

## 2024-09-27 RX ADMIN — ROSUVASTATIN CALCIUM 20 MILLIGRAM(S): 20 TABLET, COATED ORAL at 22:26

## 2024-09-27 RX ADMIN — FUROSEMIDE 40 MILLIGRAM(S): 10 INJECTION INTRAVENOUS at 05:05

## 2024-09-27 NOTE — PROGRESS NOTE ADULT - ASSESSMENT
Assessment   70 yo female with PMHx CAD NSTEMI in 2011 s/p ELIZABETH to LAD, s/p ELIZABETH Distal LCx (4/30/2024), HTN, HLD, hypothyroid, rheumatoid arthritis, who presented to ER with left-sided chest pain associated with generalized weakness, nausea and vomiting. ED developed worsening hypoxic respiratory failure and was emergently intubated.She was admitted for ARDS ARDS requiring deep sedation and paralysis,severe sepsis secondary to LLL pneumonia. Patient had uptrending lactate levels, worsening shock state on three vasopressors with concerns for acute systolic heart failure, cardiogenic shock. Now extubated and off pressors. Repeat ECHO with improved EF. Downgraded to Medicine on 9/25/24.      1) Acute hypoxic respiratory failure / ARDS   Likely due to Severe Sepsis / LLL Pneumonia (likely due to GNR) / Pseudomonas Bacteremia   s/p extubation   Currently satting well on room air   S/P Meropenem (last day 9/26)   ID recs appreciated     2) Shock (Distributive vs Cardiogenic)  Off pressors   Resolved     3) Diarrhea  GI PCR with Enteropathogenic E. Coli  S/p treat with Levofloxacin 750 mg x 1  ID consult appreciated      4) Acute metabolic encephalopathy   CT Head with no acute findings  Likely due to shock / infection   Improving     5) Left Knee Swelling   s/p Fall  CT to rule out fracture as she has a history of TKR  Ortho consult     6) CAD / HLD / HTN  Continue Aspirin, Plavix, Losartan and Coreg (increase to 12.5 mg BID)  Resume Rosuvastatin     7) Hypothyroidism   TSH 57  Increase Levothyroxine to 150 mcg daily  Repeat TFTs in 1 week    8) Prediabetes  A1c 6.4  Carb Consistent Diet  D/C accu checks and ISS    DVT Prophylaxis -- Lovenox SQ    Dispo: DESTINY once stable.              Assessment   68 yo female with PMHx CAD NSTEMI in 2011 s/p ELIZABETH to LAD, s/p ELIZABETH Distal LCx (4/30/2024), HTN, HLD, hypothyroid, rheumatoid arthritis, who presented to ER with left-sided chest pain associated with generalized weakness, nausea and vomiting. ED developed worsening hypoxic respiratory failure and was emergently intubated.She was admitted for ARDS ARDS requiring deep sedation and paralysis,severe sepsis secondary to LLL pneumonia. Patient had uptrending lactate levels, worsening shock state on three vasopressors with concerns for acute systolic heart failure, cardiogenic shock. Now extubated and off pressors. Repeat ECHO with improved EF. Downgraded to Medicine on 9/25/24.      1) Acute hypoxic respiratory failure / ARDS   Likely due to Severe Sepsis / LLL Pneumonia (likely due to GNR) / Pseudomonas Bacteremia   s/p extubation   Currently satting well on room air   c/w  Meropenem   ID recs appreciated     2) Shock (Distributive vs Cardiogenic)  Off pressors   Resolved     3) Diarrhea  GI PCR with Enteropathogenic E. Coli  S/p treat with Levofloxacin 750 mg x 1  ID consult appreciated      4) Acute metabolic encephalopathy   CT Head with no acute findings  Likely due to shock / infection   Improving     5) Left Knee Swelling   s/p Fall  CT to rule out fracture as she has a history of TKR  Ortho consult     6) CAD / HLD / HTN  Continue Aspirin, Plavix, Losartan and Coreg (increase to 12.5 mg BID)  Resume Rosuvastatin     7) Hypothyroidism   TSH 57  Increase Levothyroxine to 150 mcg daily  Repeat TFTs in 1 week    8) Prediabetes  A1c 6.4  Carb Consistent Diet  D/C accu checks and ISS    DVT Prophylaxis -- Lovenox SQ    Dispo: DESTINY once stable.

## 2024-09-27 NOTE — PROGRESS NOTE ADULT - SUBJECTIVE AND OBJECTIVE BOX
SUBJECTIVE:    Chief Complaint: Patient is a 69y old  Female who presents with a chief complaint of Shortness of breath (26 Sep 2024 15:47)      Hospital Course:     INTERVAL HPI/OVERNIGHT EVENTS: Patient seen and examined at bedside at AM. No clinically acute event overnight.   Denies any chest pain, palpitations, SOB, leg edema, N/V/D, or any other complaints.     MEDICATIONS  (STANDING):  aspirin  chewable 81 milliGRAM(s) Oral daily  carvedilol 12.5 milliGRAM(s) Oral every 12 hours  chlorhexidine 2% Cloths 1 Application(s) Topical daily  clopidogrel Tablet 75 milliGRAM(s) Oral daily  dextrose 5%. 1000 milliLiter(s) (100 mL/Hr) IV Continuous <Continuous>  dextrose 5%. 1000 milliLiter(s) (50 mL/Hr) IV Continuous <Continuous>  dextrose 50% Injectable 25 Gram(s) IV Push once  dextrose 50% Injectable 12.5 Gram(s) IV Push once  dextrose 50% Injectable 25 Gram(s) IV Push once  enoxaparin Injectable 40 milliGRAM(s) SubCutaneous every 24 hours  furosemide    Tablet 40 milliGRAM(s) Oral daily  influenza  Vaccine (HIGH DOSE) 0.5 milliLiter(s) IntraMuscular once  levothyroxine 150 MICROGram(s) Oral daily  losartan 100 milliGRAM(s) Oral daily  meropenem Injectable 1000 milliGRAM(s) IV Push every 8 hours  pantoprazole    Tablet 40 milliGRAM(s) Oral two times a day  rosuvastatin 20 milliGRAM(s) Oral at bedtime    MEDICATIONS  (PRN):  acetaminophen     Tablet .. 650 milliGRAM(s) Oral every 6 hours PRN Temp greater or equal to 38C (100.4F), Mild Pain (1 - 3)  hydrALAZINE Injectable 10 milliGRAM(s) IV Push every 4 hours PRN systolic greater than 160  ondansetron Injectable 4 milliGRAM(s) IV Push every 6 hours PRN Nausea and/or Vomiting  polyethylene glycol 3350 17 Gram(s) Oral daily PRN Constipation      Allergies    tetracycline (Hives)  penicillins (Hives)  Butazolactin, Prolaprin (Hives)  azithromycin (Hives)  Zyrtec (Hives)    Intolerances        REVIEW OF SYSTEMS:  All other review of systems negative, except as noted in HPI    OBJECTIVE:    Vital Signs Last 24 Hrs  T(C): 36.8 (27 Sep 2024 04:00), Max: 36.8 (26 Sep 2024 08:13)  T(F): 98.2 (27 Sep 2024 04:00), Max: 98.2 (26 Sep 2024 08:13)  HR: 75 (27 Sep 2024 04:00) (74 - 95)  BP: 153/92 (27 Sep 2024 04:00) (132/76 - 153/92)  BP(mean): 104 (26 Sep 2024 12:00) (104 - 104)  RR: 18 (27 Sep 2024 04:00) (18 - 20)  SpO2: 97% (27 Sep 2024 04:00) (94% - 98%)    Parameters below as of 27 Sep 2024 04:00  Patient On (Oxygen Delivery Method): room air        PHYSICAL EXAM:      Lab/ Imaging:    LABS:                        12.7   13.41 )-----------( 150      ( 26 Sep 2024 05:22 )             39.4     09-26    141  |  107  |  24.3[H]  ----------------------------<  130[H]  3.6   |  20.0[L]  |  0.45[L]    Ca    8.2[L]      26 Sep 2024 05:22  Phos  2.4     09-26  Mg     2.0     09-26    TPro  5.6[L]  /  Alb  3.7  /  TBili  0.8  /  DBili  x   /  AST  36[H]  /  ALT  50[H]  /  AlkPhos  139[H]  09-26      Urinalysis Basic - ( 26 Sep 2024 05:22 )    Color: x / Appearance: x / SG: x / pH: x  Gluc: 130 mg/dL / Ketone: x  / Bili: x / Urobili: x   Blood: x / Protein: x / Nitrite: x   Leuk Esterase: x / RBC: x / WBC x   Sq Epi: x / Non Sq Epi: x / Bacteria: x      CAPILLARY BLOOD GLUCOSE      POCT Blood Glucose.: 109 mg/dL (26 Sep 2024 21:37)  POCT Blood Glucose.: 115 mg/dL (26 Sep 2024 12:06)       SUBJECTIVE:    Chief Complaint: Patient is a 69y old  Female who presents with a chief complaint of Shortness of breath (26 Sep 2024 15:47)      Hospital Course:     INTERVAL HPI/OVERNIGHT EVENTS: Patient seen and examined at bedside at AM. No clinically acute event overnight. Pt denies n/v/d. Lat BM was yesterday, w/ soft stool (unsure color)   Denies any chest pain, palpitations, SOB, leg edema, N/V/D, or any other complaints.     MEDICATIONS  (STANDING):  aspirin  chewable 81 milliGRAM(s) Oral daily  carvedilol 12.5 milliGRAM(s) Oral every 12 hours  chlorhexidine 2% Cloths 1 Application(s) Topical daily  clopidogrel Tablet 75 milliGRAM(s) Oral daily  dextrose 5%. 1000 milliLiter(s) (100 mL/Hr) IV Continuous <Continuous>  dextrose 5%. 1000 milliLiter(s) (50 mL/Hr) IV Continuous <Continuous>  dextrose 50% Injectable 25 Gram(s) IV Push once  dextrose 50% Injectable 12.5 Gram(s) IV Push once  dextrose 50% Injectable 25 Gram(s) IV Push once  enoxaparin Injectable 40 milliGRAM(s) SubCutaneous every 24 hours  furosemide    Tablet 40 milliGRAM(s) Oral daily  influenza  Vaccine (HIGH DOSE) 0.5 milliLiter(s) IntraMuscular once  levothyroxine 150 MICROGram(s) Oral daily  losartan 100 milliGRAM(s) Oral daily  meropenem Injectable 1000 milliGRAM(s) IV Push every 8 hours  pantoprazole    Tablet 40 milliGRAM(s) Oral two times a day  rosuvastatin 20 milliGRAM(s) Oral at bedtime    MEDICATIONS  (PRN):  acetaminophen     Tablet .. 650 milliGRAM(s) Oral every 6 hours PRN Temp greater or equal to 38C (100.4F), Mild Pain (1 - 3)  hydrALAZINE Injectable 10 milliGRAM(s) IV Push every 4 hours PRN systolic greater than 160  ondansetron Injectable 4 milliGRAM(s) IV Push every 6 hours PRN Nausea and/or Vomiting  polyethylene glycol 3350 17 Gram(s) Oral daily PRN Constipation      Allergies    tetracycline (Hives)  penicillins (Hives)  Butazolactin, Prolaprin (Hives)  azithromycin (Hives)  Zyrtec (Hives)    Intolerances        REVIEW OF SYSTEMS:  All other review of systems negative, except as noted in HPI    OBJECTIVE:    Vital Signs Last 24 Hrs  T(C): 36.8 (27 Sep 2024 04:00), Max: 36.8 (26 Sep 2024 08:13)  T(F): 98.2 (27 Sep 2024 04:00), Max: 98.2 (26 Sep 2024 08:13)  HR: 75 (27 Sep 2024 04:00) (74 - 95)  BP: 153/92 (27 Sep 2024 04:00) (132/76 - 153/92)  BP(mean): 104 (26 Sep 2024 12:00) (104 - 104)  RR: 18 (27 Sep 2024 04:00) (18 - 20)  SpO2: 97% (27 Sep 2024 04:00) (94% - 98%)    Parameters below as of 27 Sep 2024 04:00  Patient On (Oxygen Delivery Method): room air        PHYSICAL EXAM:  General: Elderly female sitting in bed comfortably. No acute distress  HEENT: EOMI. Clear conjunctivae. Moist mucus membrane  Neck: Supple.   Chest: Good air entry. No wheezing, rales or rhonchi.   Heart: Normal S1 & S2. RRR.   Abdomen: Obese. Soft. Non-tender.   Ext: 1+ pedal edema. No calf tenderness. Left knee with ecchymosis and tenderness on medial aspect. Old healed scars on both knees.   Neuro: Awake and alert. Moves all extremities. Speaks slowly.   Skin: Warm and Dry  Psychiatry: Normal mood and affect      Lab/ Imaging:    LABS:                        12.7   13.41 )-----------( 150      ( 26 Sep 2024 05:22 )             39.4     09-26    141  |  107  |  24.3[H]  ----------------------------<  130[H]  3.6   |  20.0[L]  |  0.45[L]    Ca    8.2[L]      26 Sep 2024 05:22  Phos  2.4     09-26  Mg     2.0     09-26    TPro  5.6[L]  /  Alb  3.7  /  TBili  0.8  /  DBili  x   /  AST  36[H]  /  ALT  50[H]  /  AlkPhos  139[H]  09-26      Urinalysis Basic - ( 26 Sep 2024 05:22 )    Color: x / Appearance: x / SG: x / pH: x  Gluc: 130 mg/dL / Ketone: x  / Bili: x / Urobili: x   Blood: x / Protein: x / Nitrite: x   Leuk Esterase: x / RBC: x / WBC x   Sq Epi: x / Non Sq Epi: x / Bacteria: x      CAPILLARY BLOOD GLUCOSE      POCT Blood Glucose.: 109 mg/dL (26 Sep 2024 21:37)  POCT Blood Glucose.: 115 mg/dL (26 Sep 2024 12:06)

## 2024-09-27 NOTE — CHART NOTE - NSCHARTNOTESELECT_GEN_ALL_CORE
Cardiology/Event Note
[facility]
Nutrition Services
Event Note
[facility]
Nutrition Services
Progress Note/Event Note

## 2024-09-27 NOTE — DIETITIAN NUTRITION RISK NOTIFICATION - TREATMENT: THE FOLLOWING DIET HAS BEEN RECOMMENDED
Diet, Soft and Bite Sized:   Consistent Carbohydrate {Evening Snack} (CSTCHOSN) (09-25-24 @ 12:28) [Active]

## 2024-09-27 NOTE — CHART NOTE - NSCHARTNOTEFT_GEN_A_CORE
Source: Patient [ ]  Family [ ]   other [x ]EMR    Current Diet: soft and bite sized, cons CHO    Patient reports [ ] nausea  [ ] vomiting [ ] diarrhea [ ] constipation  [ ]chewing problems [ ] swallowing issues  [ ] other:     PO intake:  < 50% [ ]   50-75%  [ ]   %  [ ]  other :    Source for PO intake [ ] Patient [ ] family [x ] chart [ ] staff [ ] other    Enteral /Parenteral Nutrition:     Current Weight: 215.1# 9/25, 199.9# 9/18  15# weight gain  1+ left arm, right arm    % Weight Change     Pertinent Medications: MEDICATIONS  (STANDING):  aspirin  chewable 81 milliGRAM(s) Oral daily  carvedilol 12.5 milliGRAM(s) Oral every 12 hours  chlorhexidine 2% Cloths 1 Application(s) Topical daily  clopidogrel Tablet 75 milliGRAM(s) Oral daily  dextrose 5%. 1000 milliLiter(s) (50 mL/Hr) IV Continuous <Continuous>  dextrose 5%. 1000 milliLiter(s) (100 mL/Hr) IV Continuous <Continuous>  dextrose 50% Injectable 25 Gram(s) IV Push once  dextrose 50% Injectable 25 Gram(s) IV Push once  dextrose 50% Injectable 12.5 Gram(s) IV Push once  enoxaparin Injectable 40 milliGRAM(s) SubCutaneous every 24 hours  furosemide    Tablet 40 milliGRAM(s) Oral daily  influenza  Vaccine (HIGH DOSE) 0.5 milliLiter(s) IntraMuscular once  levothyroxine 150 MICROGram(s) Oral daily  losartan 100 milliGRAM(s) Oral daily  meropenem Injectable 1000 milliGRAM(s) IV Push every 8 hours  pantoprazole    Tablet 40 milliGRAM(s) Oral two times a day  rosuvastatin 20 milliGRAM(s) Oral at bedtime    MEDICATIONS  (PRN):  acetaminophen     Tablet .. 650 milliGRAM(s) Oral every 6 hours PRN Temp greater or equal to 38C (100.4F), Mild Pain (1 - 3)  hydrALAZINE Injectable 10 milliGRAM(s) IV Push every 4 hours PRN systolic greater than 160  ondansetron Injectable 4 milliGRAM(s) IV Push every 6 hours PRN Nausea and/or Vomiting  polyethylene glycol 3350 17 Gram(s) Oral daily PRN Constipation    Pertinent Labs: CBC Full  -  ( 27 Sep 2024 06:49 )  WBC Count : 10.64 K/uL  RBC Count : 3.63 M/uL  Hemoglobin : 12.4 g/dL  Hematocrit : 37.1 %  Platelet Count - Automated : 201 K/uL  Mean Cell Volume : 102.2 fl  Mean Cell Hemoglobin : 34.2 pg  Mean Cell Hemoglobin Concentration : 33.4 gm/dL  Auto Neutrophil # : 7.81 K/uL  Auto Lymphocyte # : 1.98 K/uL  Auto Monocyte # : 0.47 K/uL  Auto Eosinophil # : 0.10 K/uL  Auto Basophil # : 0.00 K/uL  Auto Neutrophil % : 72.5 %  Auto Lymphocyte % : 18.6 %  Auto Monocyte % : 4.4 %  Auto Eosinophil % : 0.9 %  Auto Basophil % : 0.0 %      09-27 Na144 mmol/L Glu 127 mg/dL[H] K+ 3.5 mmol/L Cr  0.53 mg/dL BUN 27.3 mg/dL[H] Phos n/a   Alb 3.4 g/dL PAB n/a           Skin:     Nutrition focused physical exam not conducted - found signs of malnutrition [ ]absent [ ]present    Subcutaneous fat loss: [ ] Orbital fat pads region, [ ]Buccal fat region, [ ]Triceps region,  [ ]Ribs region    Muscle wasting: [ ]Temples region, [ ]Clavicle region, [ ]Shoulder region, [ ]Scapula region, [ ]Interosseous region,  [ ]thigh region, [ ]Calf region    Estimated Needs:   [x ] no change since previous assessment  [ ] recalculated:     Current Nutrition Diagnosis: Pt now meets criteria for moderate acute malnutrition related to inadequate energy intake since admission in setting of ARDS, sepsis as evidenced by mild edema, meets <75%EER> 7days. Palliative following.       Recommendations:   1. Continue diet as ordered as per SLP, add Dash diet  2. Monitor electrolytes, replete prn  3. Obtain daily weights and trends  4. Monitor BM trends, I/Os      Monitoring and Evaluation:   [x ] PO intake [x ] Tolerance to diet prescription [X] Weights  [X] Follow up per protocol [X] Labs: Source: Patient [ ]  Family [ ]   other [x ]EMR    Current Diet: soft and bite sized, cons CHO    Patient reports [ ] nausea  [ ] vomiting [ ] diarrhea [ ] constipation  [ ]chewing problems [ ] swallowing issues  [ ] other:     PO intake:  < 50% [ ]   50-75%  [ ]   %  [ ]  other :    Source for PO intake [ ] Patient [ ] family [x ] chart [ ] staff [ ] other    Enteral /Parenteral Nutrition:     Current Weight: 215.1# 9/25, 199.9# 9/18  15# weight gain  1+ left arm, right arm    % Weight Change     Pertinent Medications: MEDICATIONS  (STANDING):  aspirin  chewable 81 milliGRAM(s) Oral daily  carvedilol 12.5 milliGRAM(s) Oral every 12 hours  chlorhexidine 2% Cloths 1 Application(s) Topical daily  clopidogrel Tablet 75 milliGRAM(s) Oral daily  dextrose 5%. 1000 milliLiter(s) (50 mL/Hr) IV Continuous <Continuous>  dextrose 5%. 1000 milliLiter(s) (100 mL/Hr) IV Continuous <Continuous>  dextrose 50% Injectable 25 Gram(s) IV Push once  dextrose 50% Injectable 25 Gram(s) IV Push once  dextrose 50% Injectable 12.5 Gram(s) IV Push once  enoxaparin Injectable 40 milliGRAM(s) SubCutaneous every 24 hours  furosemide    Tablet 40 milliGRAM(s) Oral daily  influenza  Vaccine (HIGH DOSE) 0.5 milliLiter(s) IntraMuscular once  levothyroxine 150 MICROGram(s) Oral daily  losartan 100 milliGRAM(s) Oral daily  meropenem Injectable 1000 milliGRAM(s) IV Push every 8 hours  pantoprazole    Tablet 40 milliGRAM(s) Oral two times a day  rosuvastatin 20 milliGRAM(s) Oral at bedtime    MEDICATIONS  (PRN):  acetaminophen     Tablet .. 650 milliGRAM(s) Oral every 6 hours PRN Temp greater or equal to 38C (100.4F), Mild Pain (1 - 3)  hydrALAZINE Injectable 10 milliGRAM(s) IV Push every 4 hours PRN systolic greater than 160  ondansetron Injectable 4 milliGRAM(s) IV Push every 6 hours PRN Nausea and/or Vomiting  polyethylene glycol 3350 17 Gram(s) Oral daily PRN Constipation    Pertinent Labs: CBC Full  -  ( 27 Sep 2024 06:49 )  WBC Count : 10.64 K/uL  RBC Count : 3.63 M/uL  Hemoglobin : 12.4 g/dL  Hematocrit : 37.1 %  Platelet Count - Automated : 201 K/uL  Mean Cell Volume : 102.2 fl  Mean Cell Hemoglobin : 34.2 pg  Mean Cell Hemoglobin Concentration : 33.4 gm/dL  Auto Neutrophil # : 7.81 K/uL  Auto Lymphocyte # : 1.98 K/uL  Auto Monocyte # : 0.47 K/uL  Auto Eosinophil # : 0.10 K/uL  Auto Basophil # : 0.00 K/uL  Auto Neutrophil % : 72.5 %  Auto Lymphocyte % : 18.6 %  Auto Monocyte % : 4.4 %  Auto Eosinophil % : 0.9 %  Auto Basophil % : 0.0 %      09-27 Na144 mmol/L Glu 127 mg/dL[H] K+ 3.5 mmol/L Cr  0.53 mg/dL BUN 27.3 mg/dL[H] Phos n/a   Alb 3.4 g/dL PAB n/a           Skin:     Nutrition focused physical exam not conducted - found signs of malnutrition [ ]absent [ ]present    Subcutaneous fat loss: [ ] Orbital fat pads region, [ ]Buccal fat region, [ ]Triceps region,  [ ]Ribs region    Muscle wasting: [ ]Temples region, [ ]Clavicle region, [ ]Shoulder region, [ ]Scapula region, [ ]Interosseous region,  [ ]thigh region, [ ]Calf region    Estimated Needs:   [x ] no change since previous assessment  [ ] recalculated:     Current Nutrition Diagnosis: Pt now meets criteria for moderate acute malnutrition related to inadequate energy intake since admission in setting of ARDS, sepsis as evidenced by mild edema, meets <75%EER> 7days. Palliative following.       Recommendations:   Rec Glucerna shake BID to optimize po intake and provide an additional 220 kcal, 10g protein per serving   Continue diet as ordered as per SLP, add Dash diet  Monitor electrolytes, replete prn  Obtain daily weights and trends  Monitor BM trends, I/Os      Monitoring and Evaluation:   [x ] PO intake [x ] Tolerance to diet prescription [X] Weights  [X] Follow up per protocol [X] Labs:

## 2024-09-27 NOTE — DIETITIAN NUTRITION RISK NOTIFICATION - ADDITIONAL COMMENTS/DIETITIAN RECOMMENDATIONS
1. Rec add Glucerna shake BID to optimize po intake and provide an additional 220 kcal, 10g protein per serving   2.continue diet as ordered as per SLP, add Dash diet  3. Monitor electrolytes, replete prn  4. Obtain daily weights and trends  5. Monitor BM trends, I/Os

## 2024-09-27 NOTE — PROGRESS NOTE ADULT - SUBJECTIVE AND OBJECTIVE BOX
INTERVAL HISTORY:  Awake and alert  Denies chest pain and SOB  O2 Sat's stable on RA  	  MEDICATIONS:  carvedilol 12.5 milliGRAM(s) Oral every 12 hours  furosemide    Tablet 40 milliGRAM(s) Oral daily  hydrALAZINE Injectable 10 milliGRAM(s) IV Push every 4 hours PRN  losartan 100 milliGRAM(s) Oral daily  meropenem Injectable 1000 milliGRAM(s) IV Push every 8 hours  acetaminophen     Tablet .. 650 milliGRAM(s) Oral every 6 hours PRN  ondansetron Injectable 4 milliGRAM(s) IV Push every 6 hours PRN  pantoprazole    Tablet 40 milliGRAM(s) Oral two times a day  polyethylene glycol 3350 17 Gram(s) Oral daily PRN  dextrose 50% Injectable 25 Gram(s) IV Push once  dextrose 50% Injectable 25 Gram(s) IV Push once  dextrose 50% Injectable 12.5 Gram(s) IV Push once  levothyroxine 150 MICROGram(s) Oral daily  rosuvastatin 20 milliGRAM(s) Oral at bedtime  aspirin  chewable 81 milliGRAM(s) Oral daily  chlorhexidine 2% Cloths 1 Application(s) Topical daily  clopidogrel Tablet 75 milliGRAM(s) Oral daily  dextrose 5%. 1000 milliLiter(s) IV Continuous <Continuous>  dextrose 5%. 1000 milliLiter(s) IV Continuous <Continuous>  enoxaparin Injectable 40 milliGRAM(s) SubCutaneous every 24 hours  influenza  Vaccine (HIGH DOSE) 0.5 milliLiter(s) IntraMuscular once        PHYSICAL EXAM:    T(C): 36.4 (09-27-24 @ 08:10), Max: 36.8 (09-26-24 @ 18:00)  HR: 66 (09-27-24 @ 08:10) (66 - 80)  BP: 101/62 (09-27-24 @ 08:10) (101/62 - 153/92)  RR: 17 (09-27-24 @ 08:10) (17 - 18)  SpO2: 95% (09-27-24 @ 08:10) (94% - 97%)  Wt(kg): --    I&O's Summary    26 Sep 2024 07:01  -  27 Sep 2024 07:00  --------------------------------------------------------  IN: 200 mL / OUT: 700 mL / NET: -500 mL        Daily     Daily     Appearance: Normal	  HEENT:   Normal oral mucosa, PERRL, EOMI	  Cardiovascular: Normal S1 S2, No JVD, No murmurs, Generalized mild edema  Respiratory: Lungs clear to auscultation, diminished B/L bases	  Psychiatry: A & O x 2, Mood & affect appropriate  Gastrointestinal:  Soft, Non-tender, + BS	  Skin: No rashes, No ecchymoses, No cyanosis  Neurologic: Non-focal  Extremities: Generalized weakness, No clubbing or cyanosis  Vascular: Peripheral pulses bilaterally        TELEMETRY: NSR	    	    LABS:	 	    CARDIAC MARKERS:                          12.4   10.64 )-----------( 201      ( 27 Sep 2024 06:49 )             37.1     09-27    144  |  108  |  27.3[H]  ----------------------------<  127[H]  3.5   |  21.0[L]  |  0.53    Ca    8.3[L]      27 Sep 2024 06:49  Phos  2.4     09-26  Mg     2.1     09-27    TPro  5.3[L]  /  Alb  3.4  /  TBili  0.7  /  DBili  x   /  AST  28  /  ALT  38[H]  /  AlkPhos  116  09-27      ASSESSMENT: 	  69-year-old female with  PMHx of CAD, HTN, HLD, NSTEMI in 2011 s/p ELIZABETH to LAD, S/p ELIZABETH Distal LCx (4/30/2024), hypothyroid, rheumatoid arthritis, presented to ER with left-sided chest pain associated with generalized weakness, nausea and vomiting.  Patient ill-appearing, unable to provide much history.  History mostly supplied by patient's family member at bedside.  Symptoms started 2 days ago, however today she felt much worse with prompted her to come to the ED.  Patient noted to be tachycardic and hypotensive upon arrival, diaphoretic and ill-appearing.  She is noted to have  diminished breath sounds in the left lower lobe, CT scan Negative for PE, does show a left lower lobe pneumonia.  Patient's course complicated by multiple episodes of hypotension requiring vasopressor support and IV fluids.  Patient's labs reviewed, noted to have leukocytosis, elevated lactate 5.5.  EKG independently reviewed, normal sinus rhythm, no acute changes. Pt became hypoxic in ER on high flow O2 N/C and intubated.    Septic/cardiogenic shock -   leukocytosis, elevated lactate 5.5, hypotension requiring vasopressor support (Levo and Pit) and IV fluids.  - Pseudomonas bacteremia  - Acute on chronic HFpEF, elevated BNP 8471 (09/18/2024)   - s/p TTE (09/19/2024) LVEF - 70 to 75 %.  - s/p Repeat echo (09/20/2024) with drop in EF - 20 - 25% with global hypokinesis off of . - Likely stunning/stress induced CM, no evidence of ACS,   -Extubated 9/24/2024 and off pressors,  - O2 Sat's stable on RA  - NSR on CM HR 80's  BP stable  - Repeat TTE 9/24/2024 shows Normal LVEF 55-60%, Normal RV function, LVEF has improved from TTE 9/20/2024    PLAN:   Extubated and off pressors  NSR on CM, BP stable  O2 Sat's stable on RA  Continue ASA and Plavix daily  Continue Carvedilol and Losartan  Statin resumed  IV antibiotics continue  Telemetry monitoring

## 2024-09-27 NOTE — PROGRESS NOTE ADULT - TIME BILLING
D/W pt, liang, RN, Hospitalist Dr June, FELICITY Dozier  Total time also includes discussion during interdisciplinary team rounds, chart review including but not limited to prior admissions/ GOC discussions,  review of medications/ labs/ imaging, examination, care coordination with other health care professionals, documentation EXCLUDING  advance care planning discussions.
Chart review, interpretation of laboratory and imaging results,  patient evaluation, medication review, documentation, coordination with medical team. Interpretation  and review of microbiologic data. Overseeing antibiotic therapy.
D/W pt and liang Nicole RN, MICU DR Fajardo  Total time also includes discussion during interdisciplinary team rounds, chart review including but not limited to prior admissions/ GOC discussions,  review of medications/ labs/ imaging, examination, care coordination with other health care professionals, documentation EXCLUDING  advance care planning discussions.
Reviewing charts and coordinating care.  Discussed with ID and patient's son.
Time spent reviewing the chart documentation, reviewing labs and imaging studies, evaluating the patient, discussing the plan of care with the consultants & medical team, and documenting.

## 2024-09-28 LAB
ALBUMIN SERPL ELPH-MCNC: 3.3 G/DL — SIGNIFICANT CHANGE UP (ref 3.3–5.2)
ALP SERPL-CCNC: 116 U/L — SIGNIFICANT CHANGE UP (ref 40–120)
ALT FLD-CCNC: 36 U/L — HIGH
ANION GAP SERPL CALC-SCNC: 12 MMOL/L — SIGNIFICANT CHANGE UP (ref 5–17)
AST SERPL-CCNC: 35 U/L — HIGH
BILIRUB SERPL-MCNC: 0.7 MG/DL — SIGNIFICANT CHANGE UP (ref 0.4–2)
BUN SERPL-MCNC: 26.6 MG/DL — HIGH (ref 8–20)
CALCIUM SERPL-MCNC: 8.1 MG/DL — LOW (ref 8.4–10.5)
CHLORIDE SERPL-SCNC: 107 MMOL/L — SIGNIFICANT CHANGE UP (ref 96–108)
CO2 SERPL-SCNC: 20 MMOL/L — LOW (ref 22–29)
CREAT SERPL-MCNC: 0.5 MG/DL — SIGNIFICANT CHANGE UP (ref 0.5–1.3)
EGFR: 101 ML/MIN/1.73M2 — SIGNIFICANT CHANGE UP
GLUCOSE BLDC GLUCOMTR-MCNC: 137 MG/DL — HIGH (ref 70–99)
GLUCOSE BLDC GLUCOMTR-MCNC: 75 MG/DL — SIGNIFICANT CHANGE UP (ref 70–99)
GLUCOSE BLDC GLUCOMTR-MCNC: 88 MG/DL — SIGNIFICANT CHANGE UP (ref 70–99)
GLUCOSE BLDC GLUCOMTR-MCNC: 91 MG/DL — SIGNIFICANT CHANGE UP (ref 70–99)
GLUCOSE SERPL-MCNC: 94 MG/DL — SIGNIFICANT CHANGE UP (ref 70–99)
HCT VFR BLD CALC: 37.6 % — SIGNIFICANT CHANGE UP (ref 34.5–45)
HGB BLD-MCNC: 11.9 G/DL — SIGNIFICANT CHANGE UP (ref 11.5–15.5)
MAGNESIUM SERPL-MCNC: 2.1 MG/DL — SIGNIFICANT CHANGE UP (ref 1.6–2.6)
MCHC RBC-ENTMCNC: 31.6 GM/DL — LOW (ref 32–36)
MCHC RBC-ENTMCNC: 33.9 PG — SIGNIFICANT CHANGE UP (ref 27–34)
MCV RBC AUTO: 107.1 FL — HIGH (ref 80–100)
PHOSPHATE SERPL-MCNC: 3 MG/DL — SIGNIFICANT CHANGE UP (ref 2.4–4.7)
PLATELET # BLD AUTO: 189 K/UL — SIGNIFICANT CHANGE UP (ref 150–400)
POTASSIUM SERPL-MCNC: 4 MMOL/L — SIGNIFICANT CHANGE UP (ref 3.5–5.3)
POTASSIUM SERPL-SCNC: 4 MMOL/L — SIGNIFICANT CHANGE UP (ref 3.5–5.3)
PROT SERPL-MCNC: 5.4 G/DL — LOW (ref 6.6–8.7)
RBC # BLD: 3.51 M/UL — LOW (ref 3.8–5.2)
RBC # FLD: 13.9 % — SIGNIFICANT CHANGE UP (ref 10.3–14.5)
SODIUM SERPL-SCNC: 139 MMOL/L — SIGNIFICANT CHANGE UP (ref 135–145)
WBC # BLD: 8.21 K/UL — SIGNIFICANT CHANGE UP (ref 3.8–10.5)
WBC # FLD AUTO: 8.21 K/UL — SIGNIFICANT CHANGE UP (ref 3.8–10.5)

## 2024-09-28 PROCEDURE — 73700 CT LOWER EXTREMITY W/O DYE: CPT | Mod: 26,LT

## 2024-09-28 PROCEDURE — 99233 SBSQ HOSP IP/OBS HIGH 50: CPT | Mod: GC

## 2024-09-28 RX ADMIN — PANTOPRAZOLE SODIUM 40 MILLIGRAM(S): 40 TABLET, DELAYED RELEASE ORAL at 17:18

## 2024-09-28 RX ADMIN — Medication 75 MILLIGRAM(S): at 13:26

## 2024-09-28 RX ADMIN — MEROPENEM 1000 MILLIGRAM(S): 500 INJECTION INTRAVENOUS at 13:26

## 2024-09-28 RX ADMIN — Medication 12.5 MILLIGRAM(S): at 17:18

## 2024-09-28 RX ADMIN — ENOXAPARIN SODIUM 40 MILLIGRAM(S): 150 INJECTION SUBCUTANEOUS at 17:20

## 2024-09-28 RX ADMIN — Medication 150 MICROGRAM(S): at 06:07

## 2024-09-28 RX ADMIN — MEROPENEM 1000 MILLIGRAM(S): 500 INJECTION INTRAVENOUS at 21:46

## 2024-09-28 RX ADMIN — CHLORHEXIDINE GLUCONATE ORAL RINSE 1 APPLICATION(S): 1.2 SOLUTION DENTAL at 17:19

## 2024-09-28 RX ADMIN — PANTOPRAZOLE SODIUM 40 MILLIGRAM(S): 40 TABLET, DELAYED RELEASE ORAL at 06:07

## 2024-09-28 RX ADMIN — FUROSEMIDE 40 MILLIGRAM(S): 10 INJECTION INTRAVENOUS at 06:07

## 2024-09-28 RX ADMIN — ROSUVASTATIN CALCIUM 20 MILLIGRAM(S): 20 TABLET, COATED ORAL at 21:46

## 2024-09-28 RX ADMIN — Medication 81 MILLIGRAM(S): at 13:27

## 2024-09-28 RX ADMIN — MEROPENEM 1000 MILLIGRAM(S): 500 INJECTION INTRAVENOUS at 06:06

## 2024-09-28 NOTE — PROGRESS NOTE ADULT - ATTENDING COMMENTS
Agree with above   Patient seen and examined together with medical residents   Vital signs,  labs and imaging  reviewed   Assesment and plan discussed     70 yo female with PMHx CAD NSTEMI in 2011 s/p ELIZABETH to LAD, s/p ELIZABETH Distal LCx (4/30/2024), HTN, HLD, hypothyroid, rheumatoid arthritis, who presented to ER with left-sided chest pain associated with generalized weakness, nausea and vomiting. ED developed worsening hypoxic respiratory failure and was emergently intubated.She was admitted for ARDS ARDS requiring deep sedation and paralysis,severe sepsis secondary to LLL pneumonia. Patient had uptrending lactate levels, worsening shock state on three vasopressors with concerns for acute systolic heart failure, cardiogenic shock. Now extubated and off pressors. Repeat ECHO with improved EF. Downgraded to Medicine on 9/25/24.      #Acute hypoxic respiratory failure / ARDS   #LLL PNA  #Cardiogenic shock  S/p vent support now extubated now on RA  s/p pressor support now off  C/w Merrem  ID following  Cards following    #Left Knee Swelling   s/p Fall  Has not ambulated since adm  CT to rule out fracture as she has a history of TKR  Ortho consult    #Dispo- pending ortho/pt eval, completion of abx 2 more days
admitted with severe shock  s/p resp failure--> intubation--> ARDS--> pressors---> extubated  EF < 20%---> 60%    cont antibx through 9/29  c/s now neg  cont CHF meds    source w/u CT knee ordered    uncontrolled hypothyroid  synthroid dosed per endo now    PT eval to [facility]    Dispo- to [facility]  likely Monday if CT knee benign    High Acquity and Spent at least 50 mins in evaluating, assessing and medical decision making in providing patient care separate from teaching.
I have personally seen and examined the patient. I fully participated in the care of this patient. I have made amendments to the documentation where necessary, and agree with the history, physical exam, and plan as documented by the Resident.    69 F w/ CAD/NSTEMI 2011 s/p LAD stent, Distal LCx stent 4/20/24), RA (on tocilizumab infusions), admitted to MICU for acute hypoxemic respiratory failure requiring intubation and mechanical ventilation secondary to LLL pneumonia and pseudomonas bacteremia presumed secondary to pneumonia (CT A/P negative for acute pathology). She was significantly hypoxemic and asynchronous with the ventilator requiring rapid uptitration of sedation and pressors, and was started on cisatracurium overnight. Critical care TTE done overnight showed evidence of LV dysfunction for which dobutamine was started. This morning, I performed critical care TTE which showed normal LV systolic function and LVOT VTI of 16 cm. We titrated down dobutamine to 2.5. We will reassess LVOT VTI and titrate off dobutamine if she can maintain VTI above 15. Will diurese with bumex. While on dobutamine, check end-organ markers q 6 H (AST/ALT, Cr, hourly UOP, lactate). Meropenem for pseudomonas bacteremia, will await sensitivities.     Discussed w/ family at bedside, patient lives independently but has been "slowing" in terms of her cognitive function and physical activity. They know she would not want a "machine-dependent life" including tracheostomy and long-term mechanical ventilation.

## 2024-09-28 NOTE — PROGRESS NOTE ADULT - SUBJECTIVE AND OBJECTIVE BOX
INTERVAL HISTORY:  Feeling weak  Denies any chest pian or shortness of breath  	  MEDICATIONS:  carvedilol 12.5 milliGRAM(s) Oral every 12 hours  furosemide    Tablet 40 milliGRAM(s) Oral daily  hydrALAZINE Injectable 10 milliGRAM(s) IV Push every 4 hours PRN  losartan 100 milliGRAM(s) Oral daily    meropenem Injectable 1000 milliGRAM(s) IV Push every 8 hours      acetaminophen     Tablet .. 650 milliGRAM(s) Oral every 6 hours PRN  ondansetron Injectable 4 milliGRAM(s) IV Push every 6 hours PRN    pantoprazole    Tablet 40 milliGRAM(s) Oral two times a day  polyethylene glycol 3350 17 Gram(s) Oral daily PRN    dextrose 50% Injectable 25 Gram(s) IV Push once  dextrose 50% Injectable 12.5 Gram(s) IV Push once  dextrose 50% Injectable 25 Gram(s) IV Push once  levothyroxine 150 MICROGram(s) Oral daily  rosuvastatin 20 milliGRAM(s) Oral at bedtime    aspirin  chewable 81 milliGRAM(s) Oral daily  chlorhexidine 2% Cloths 1 Application(s) Topical daily  clopidogrel Tablet 75 milliGRAM(s) Oral daily  dextrose 5%. 1000 milliLiter(s) IV Continuous <Continuous>  dextrose 5%. 1000 milliLiter(s) IV Continuous <Continuous>  enoxaparin Injectable 40 milliGRAM(s) SubCutaneous every 24 hours  influenza  Vaccine (HIGH DOSE) 0.5 milliLiter(s) IntraMuscular once        PHYSICAL EXAM:    T(C): 36.6 (09-28-24 @ 08:03), Max: 37.1 (09-27-24 @ 20:23)  HR: 69 (09-28-24 @ 08:03) (64 - 71)  BP: 103/66 (09-28-24 @ 08:03) (103/66 - 132/75)  RR: 18 (09-28-24 @ 08:03) (17 - 18)  SpO2: 94% (09-28-24 @ 08:03) (94% - 98%)  Wt(kg): --    I&O's Summary      Daily     Daily     Appearance: Normal	  HEENT:   Normal oral mucosa, PERRL, EOMI	  Cardiovascular: Normal S1 S2, No JVD, No murmurs, No edema  Respiratory: Diminished bases	  Psychiatry: A & O x 3, Mood & affect appropriate  Gastrointestinal:  Soft, Non-tender, + BS	  Skin: No rashes, No ecchymoses, No cyanosis  Neurologic: Non-focal  Extremities: No edema    LABS:	 	    CARDIAC MARKERS:                                  11.9   8.21  )-----------( 189      ( 28 Sep 2024 05:37 )             37.6     09-28    139  |  107  |  26.6[H]  ----------------------------<  94  4.0   |  20.0[L]  |  0.50    Ca    8.1[L]      28 Sep 2024 05:37  Phos  3.0     09-28  Mg     2.1     09-28    TPro  5.4[L]  /  Alb  3.3  /  TBili  0.7  /  DBili  x   /  AST  35[H]  /  ALT  36[H]  /  AlkPhos  116  09-28    proBNP:   Lipid Profile:   HgA1c:     ASSESSMENT/PLAN: 	    69-year-old female with  PMHx of CAD, HTN, HLD, NSTEMI in 2011 s/p ELIZABETH to LAD, S/p ELIZABETH Distal LCx (4/30/2024), hypothyroid, rheumatoid arthritis, presented to ER with left-sided chest pain associated with generalized weakness, nausea and vomiting.  Patient noted to be tachycardic and hypotensive upon arrival, diaphoretic and ill-appearing.  She is noted to have  diminished breath sounds in the left lower lobe, CT scan Negative for PE, does show a left lower lobe pneumonia.  Patient's course complicated by multiple episodes of hypotension requiring vasopressor support and IV fluids.  Patient's labs reviewed, noted to have leukocytosis, elevated lactate 5.5.  EKG independently reviewed, normal sinus rhythm, no acute changes. Pt became hypoxic in ER on high flow O2 N/C and intubated.    Septic/cardiogenic shock -   leukocytosis, elevated lactate 5.5, hypotension requiring vasopressor support (Levo and Pit) and IV fluids.  - Pseudomonas bacteremia  - Acute on chronic HFpEF, elevated BNP 8471 (09/18/2024)   - s/p TTE (09/19/2024) LVEF - 70 to 75 %.  - s/p Repeat echo (09/20/2024) with drop in EF - 20 - 25% with global hypokinesis off of . - Likely   stunning/stress induced CM, no evidence of ACS,   -Extubated 9/24/2024 and off pressors,  - O2 Sat's stable on RA  - NSR on CM HR 80's  BP stable  - Repeat TTE 9/24/2024 shows Normal LVEF 55-60%, Normal RV function, LVEF has improved from TTE 9/20/2024    PLAN:   Extubated and off pressors  NSR on CM, BP stable  O2 Sat's stable on RA  Continue ASA and Plavix daily  Continue Carvedilol and Losartan  Statin resumed  IV antibiotics continue

## 2024-09-28 NOTE — PROGRESS NOTE ADULT - ASSESSMENT
· Assessment	  Assessment   68 yo female with PMHx CAD NSTEMI in 2011 s/p ELIZABETH to LAD, s/p ELIZABETH Distal LCx (4/30/2024), HTN, HLD, hypothyroid, rheumatoid arthritis, who presented to ER with left-sided chest pain associated with generalized weakness, nausea and vomiting. ED developed worsening hypoxic respiratory failure and was emergently intubated.She was admitted for ARDS ARDS requiring deep sedation and paralysis,severe sepsis secondary to LLL pneumonia. Patient had uptrending lactate levels, worsening shock state on three vasopressors with concerns for acute systolic heart failure, cardiogenic shock. Now extubated and off pressors. Repeat ECHO with improved EF. Downgraded to Medicine on 9/25/24.      1) Acute hypoxic respiratory failure / ARDS   Likely due to Severe Sepsis / LLL Pneumonia (likely due to GNR) / Pseudomonas Bacteremia   s/p extubation   Currently satting well on room air   c/w  Meropenem until 9/29   ID recs appreciated     2) Shock (Distributive vs Cardiogenic)  Off pressors   Resolved     3) Diarrhea  GI PCR with Enteropathogenic E. Coli  S/p treat with Levofloxacin 750 mg x 1  ID consult appreciated      4) Acute metabolic encephalopathy (resolved)  CT Head with no acute findings  Likely due to shock / infection       5) Left Knee Swelling   s/p Fall  CT to rule out fracture as she has a history of TKR  CT Knee pending   Ortho will follow after CT Knee back      6) CAD / HLD / HTN  Continue Aspirin, Plavix, Losartan and Coreg (increase to 12.5 mg BID)  Resume Rosuvastatin     7) Hypothyroidism   TSH 57  Increase Levothyroxine to 150 mcg daily  Repeat TFTs in 1 week (9/3)    8) Prediabetes  A1c 6.4  Carb Consistent Diet  D/C accu checks and ISS    DVT Prophylaxis -- Lovenox SQ    Dispo: [facility] once stable.      68 yo female with PMHx CAD NSTEMI in 2011 s/p ELIZABETH to LAD, s/p ELIZABETH Distal LCx (4/30/2024), HTN, HLD, hypothyroid, rheumatoid arthritis, who presented to ER with left-sided chest pain associated with generalized weakness, nausea and vomiting. ED developed worsening hypoxic respiratory failure and was emergently intubated.She was admitted for ARDS ARDS requiring deep sedation and paralysis,severe sepsis secondary to LLL pneumonia. Patient had uptrending lactate levels, worsening shock state on three vasopressors with concerns for acute systolic heart failure, cardiogenic shock. Now extubated and off pressors. Repeat ECHO with improved EF. Downgraded to Medicine on 9/25/24.      # s/p Acute hypoxic respiratory failure / ARDS    #Likely due to Severe Sepsis /   # LLL Pneumonia (likely due to GNR) /   # Pseudomonas Bacteremia   s/p extubation   Currently satting well on room air   c/w  Meropenem until 9/29   ID recs appreciated     5) Left Knee Swelling   s/p Fall  CT to rule out fracture as she has a history of TKR  CT Knee ordered   Ortho will follow after CT Knee back    # s/p Shock (Distributive vs Cardiogenic)  Off pressors   Resolved     # Stress induced CMP  Initial EF <20% has now improved to 60% with resolution of shock  Cont ARB, BB, DAPT    #) s/p Diarrhea  GI PCR with Enteropathogenic E. Coli  S/p treat with Levofloxacin 750 mg x 1  ID consult appreciated      4) Acute metabolic encephalopathy (resolved)  CT Head with no acute findings  Likely due to shock / infection       6) CAD / HLD / HTN  Continue Aspirin, Plavix, Losartan and Coreg (increase to 12.5 mg BID)  Resume Rosuvastatin     7) Hypothyroidism Uncontrolled  TSH 57  Increase Levothyroxine to 150 mcg daily  Repeat TFTs in 1 week (9/3)    8) Prediabetes  A1c 6.4  Carb Consistent Diet  D/C accu checks and ISS    DVT Prophylaxis -- Lovenox SQ    Dispo: [facility] once knee w/u completed     70 yo female with PMHx CAD NSTEMI in 2011 s/p ELIZABETH to LAD, s/p ELIZABETH Distal LCx (4/30/2024), HTN, HLD, hypothyroid, rheumatoid arthritis, who presented to ER with left-sided chest pain associated with generalized weakness, nausea and vomiting. ED developed worsening hypoxic respiratory failure and was emergently intubated.She was admitted for ARDS ARDS requiring deep sedation and paralysis,severe sepsis secondary to LLL pneumonia. Patient had uptrending lactate levels, worsening shock state on three vasopressors with concerns for acute systolic heart failure, cardiogenic shock. Now extubated and off pressors. Repeat ECHO with improved EF. Downgraded to Medicine on 9/25/24.      # s/p Acute hypoxic respiratory failure / ARDS (resolved)    #Likely due to Severe Sepsis /   # LLL Pneumonia (likely due to GNR) / (improving)  # Pseudomonas Bacteremia (improving)   s/p extubation   Currently satting well on room air   c/w  Meropenem until 9/29   ID recs appreciated     # Left Knee Swelling (improved)   s/p Fall  CT to rule out fracture or other cause as she has a history of TKR  CT Knee ordered   Ortho following after CT Knee back    # s/p Shock (Distributive vs Cardiogenic) (resolved)   Off pressors   Resolved     # Stress induced CMP (resolved)   Initial EF <20% has now improved to 60% with resolution of shock  Cont ARB, BB, DAPT    # s/p Diarrhea (resolved)   GI PCR with Enteropathogenic E. Coli  S/p treat with Levofloxacin 750 mg x 1  ID consult appreciated      # Acute metabolic encephalopathy (resolved)  CT Head with no acute findings  Likely due to shock / infection       # CAD / HLD / HTN (stable)   Continue Aspirin, Plavix, Losartan and Coreg (increase to 12.5 mg BID)  Resume Rosuvastatin     # Hypothyroidism Uncontrolled  TSH 57  Increase Levothyroxine to 150 mcg daily  Repeat TFTs in 1 week (9/3)    # Prediabetes  A1c 6.4  Carb Consistent Diet  D/C accu checks and ISS    DVT Prophylaxis -- Lovenox SQ    Dispo: [facility] once knee w/u completed

## 2024-09-28 NOTE — PROGRESS NOTE ADULT - SUBJECTIVE AND OBJECTIVE BOX
SUBJECTIVE:    Chief Complaint: Patient is a 69y old  Female who presents with a chief complaint of Shortness of breath (27 Sep 2024 12:17)      Hospital Course:   70 yo female with PMHx CAD NSTEMI in 2011 s/p ELIZABETH to LAD, s/p ELIZABETH Distal LCx (4/30/2024), HTN, HLD, hypothyroid, rheumatoid arthritis, who presented to ER with left-sided chest pain associated with generalized weakness, nausea and vomiting. ED developed worsening hypoxic respiratory failure and was emergently intubated.She was admitted for ARDS requiring deep sedation and paralysis,severe sepsis secondary to LLL pneumonia. Patient had uptrending lactate levels, worsening shock state on three vasopressors with concerns for acute systolic heart failure, cardiogenic shock. Now extubated and off pressors. Repeat ECHO with improved EF. Downgraded to Medicine on 9/25/24.    INTERVAL HPI/OVERNIGHT EVENTS: Patient seen and examined at bedside at AM. No clinically acute event overnight.   Denies any chest pain, palpitations, SOB, leg edema, N/V/D, or any other complaints.     MEDICATIONS  (STANDING):  aspirin  chewable 81 milliGRAM(s) Oral daily  carvedilol 12.5 milliGRAM(s) Oral every 12 hours  chlorhexidine 2% Cloths 1 Application(s) Topical daily  clopidogrel Tablet 75 milliGRAM(s) Oral daily  dextrose 5%. 1000 milliLiter(s) (100 mL/Hr) IV Continuous <Continuous>  dextrose 5%. 1000 milliLiter(s) (50 mL/Hr) IV Continuous <Continuous>  dextrose 50% Injectable 25 Gram(s) IV Push once  dextrose 50% Injectable 25 Gram(s) IV Push once  dextrose 50% Injectable 12.5 Gram(s) IV Push once  enoxaparin Injectable 40 milliGRAM(s) SubCutaneous every 24 hours  furosemide    Tablet 40 milliGRAM(s) Oral daily  influenza  Vaccine (HIGH DOSE) 0.5 milliLiter(s) IntraMuscular once  levothyroxine 150 MICROGram(s) Oral daily  losartan 100 milliGRAM(s) Oral daily  meropenem Injectable 1000 milliGRAM(s) IV Push every 8 hours  pantoprazole    Tablet 40 milliGRAM(s) Oral two times a day  rosuvastatin 20 milliGRAM(s) Oral at bedtime    MEDICATIONS  (PRN):  acetaminophen     Tablet .. 650 milliGRAM(s) Oral every 6 hours PRN Temp greater or equal to 38C (100.4F), Mild Pain (1 - 3)  hydrALAZINE Injectable 10 milliGRAM(s) IV Push every 4 hours PRN systolic greater than 160  ondansetron Injectable 4 milliGRAM(s) IV Push every 6 hours PRN Nausea and/or Vomiting  polyethylene glycol 3350 17 Gram(s) Oral daily PRN Constipation      Allergies    tetracycline (Hives)  penicillins (Hives)  Butazolactin, Prolaprin (Hives)  azithromycin (Hives)  Zyrtec (Hives)    Intolerances        REVIEW OF SYSTEMS:  All other review of systems negative, except as noted in HPI    OBJECTIVE:    Vital Signs Last 24 Hrs  T(C): 36.6 (28 Sep 2024 08:03), Max: 37.1 (27 Sep 2024 20:23)  T(F): 97.8 (28 Sep 2024 08:03), Max: 98.8 (27 Sep 2024 20:23)  HR: 69 (28 Sep 2024 08:03) (64 - 71)  BP: 103/66 (28 Sep 2024 08:03) (103/66 - 132/75)  BP(mean): --  RR: 18 (28 Sep 2024 08:03) (17 - 18)  SpO2: 94% (28 Sep 2024 08:03) (94% - 98%)    Parameters below as of 28 Sep 2024 08:03  Patient On (Oxygen Delivery Method): room air      PHYSICAL EXAM:  General: Elderly female sitting in bed comfortably. No acute distress  HEENT: EOMI. Clear conjunctivae. Moist mucus membrane  Neck: Supple.   Chest: Good air entry. No wheezing, rales or rhonchi.   Heart: Normal S1 & S2. RRR.   Abdomen: Obese. Soft. Non-tender.   Ext: 1+ pedal edema. No calf tenderness. Left knee with ecchymosis and tenderness on medial aspect. Old healed scars on both knees.   Neuro: Awake and alert. Moves all extremities. Speaks slowly.   Skin: Warm and Dry  Psychiatry: Normal mood and affect        Lab/ Imaging:    LABS:                        11.9   8.21  )-----------( 189      ( 28 Sep 2024 05:37 )             37.6     09-28    139  |  107  |  26.6[H]  ----------------------------<  94  4.0   |  20.0[L]  |  0.50    Ca    8.1[L]      28 Sep 2024 05:37  Phos  3.0     09-28  Mg     2.1     09-28    TPro  5.4[L]  /  Alb  3.3  /  TBili  0.7  /  DBili  x   /  AST  35[H]  /  ALT  36[H]  /  AlkPhos  116  09-28      Urinalysis Basic - ( 28 Sep 2024 05:37 )    Color: x / Appearance: x / SG: x / pH: x  Gluc: 94 mg/dL / Ketone: x  / Bili: x / Urobili: x   Blood: x / Protein: x / Nitrite: x   Leuk Esterase: x / RBC: x / WBC x   Sq Epi: x / Non Sq Epi: x / Bacteria: x      CAPILLARY BLOOD GLUCOSE      POCT Blood Glucose.: 91 mg/dL (28 Sep 2024 06:04)  POCT Blood Glucose.: 96 mg/dL (27 Sep 2024 22:53)  POCT Blood Glucose.: 96 mg/dL (27 Sep 2024 13:31)       Chief Complaint: Patient is a 69y old  Female who presents with a chief complaint of Shortness of breath (27 Sep 2024 12:17)    Hospital Course:   68 yo female with PMHx CAD NSTEMI in 2011 s/p ELIZABETH to LAD, s/p ELIZABETH Distal LCx (4/30/2024), HTN, HLD, hypothyroid, rheumatoid arthritis, who presented to ER with left-sided chest pain associated with generalized weakness, nausea and vomiting. ED developed worsening hypoxic respiratory failure and was emergently intubated. She was admitted for ARDS requiring deep sedation and paralysis, severe sepsis secondary to LLL pneumonia. Patient had uptrending lactate levels, worsening shock state on three vasopressors with concerns for acute systolic heart failure, cardiogenic shock. Now extubated and off pressors. Repeat ECHO with improved EF. Downgraded to Medicine on 9/25/24.    INTERVAL HPI/ OVERNIGHT EVENTS: Patient seen and examined at bedside at AM. No clinically acute event overnight.   Denies any chest pain, palpitations, SOB, leg edema, N/V/D, or any other complaints.     MEDICATIONS  (STANDING):  aspirin  chewable 81 milliGRAM(s) Oral daily  carvedilol 12.5 milliGRAM(s) Oral every 12 hours  chlorhexidine 2% Cloths 1 Application(s) Topical daily  clopidogrel Tablet 75 milliGRAM(s) Oral daily  dextrose 5%. 1000 milliLiter(s) (100 mL/Hr) IV Continuous <Continuous>  dextrose 5%. 1000 milliLiter(s) (50 mL/Hr) IV Continuous <Continuous>  dextrose 50% Injectable 25 Gram(s) IV Push once  dextrose 50% Injectable 25 Gram(s) IV Push once  dextrose 50% Injectable 12.5 Gram(s) IV Push once  enoxaparin Injectable 40 milliGRAM(s) SubCutaneous every 24 hours  furosemide    Tablet 40 milliGRAM(s) Oral daily  influenza  Vaccine (HIGH DOSE) 0.5 milliLiter(s) IntraMuscular once  levothyroxine 150 MICROGram(s) Oral daily  losartan 100 milliGRAM(s) Oral daily  meropenem Injectable 1000 milliGRAM(s) IV Push every 8 hours  pantoprazole    Tablet 40 milliGRAM(s) Oral two times a day  rosuvastatin 20 milliGRAM(s) Oral at bedtime    MEDICATIONS  (PRN):  acetaminophen     Tablet .. 650 milliGRAM(s) Oral every 6 hours PRN Temp greater or equal to 38C (100.4F), Mild Pain (1 - 3)  hydrALAZINE Injectable 10 milliGRAM(s) IV Push every 4 hours PRN systolic greater than 160  ondansetron Injectable 4 milliGRAM(s) IV Push every 6 hours PRN Nausea and/or Vomiting  polyethylene glycol 3350 17 Gram(s) Oral daily PRN Constipation      Allergies    tetracycline (Hives)  penicillins (Hives)  Butazolactin, Prolaprin (Hives)  azithromycin (Hives)  Zyrtec (Hives)    Intolerances      REVIEW OF SYSTEMS:  All other review of systems negative, except as noted in HPI    OBJECTIVE:    Vital Signs Last 24 Hrs  T(C): 36.6 (28 Sep 2024 08:03), Max: 37.1 (27 Sep 2024 20:23)  T(F): 97.8 (28 Sep 2024 08:03), Max: 98.8 (27 Sep 2024 20:23)  HR: 69 (28 Sep 2024 08:03) (64 - 71)  BP: 103/66 (28 Sep 2024 08:03) (103/66 - 132/75)  BP(mean): --  RR: 18 (28 Sep 2024 08:03) (17 - 18)  SpO2: 94% (28 Sep 2024 08:03) (94% - 98%)    Parameters below as of 28 Sep 2024 08:03  Patient On (Oxygen Delivery Method): room air      PHYSICAL EXAM:  General: Elderly female sitting in bed comfortably. No acute distress  HEENT: EOMI. Clear conjunctivae. Moist mucus membrane  Neck: Supple.   Chest: Good air entry. No wheezing, rales or rhonchi.   Heart: Normal S1 & S2. RRR.   Abdomen: Obese. Soft. Non-tender.   Ext: 1+ pedal edema. No calf tenderness. Left knee with ecchymosis and tenderness on medial aspect. Old healed scars on both knees.   Neuro: Awake and alert. Moves all extremities. Speaks slowly.   Skin: Warm and Dry  Psychiatry: Normal mood and affect        Lab/ Imaging:    LABS:                        11.9   8.21  )-----------( 189      ( 28 Sep 2024 05:37 )             37.6     09-28    139  |  107  |  26.6[H]  ----------------------------<  94  4.0   |  20.0[L]  |  0.50    Ca    8.1[L]      28 Sep 2024 05:37  Phos  3.0     09-28  Mg     2.1     09-28    TPro  5.4[L]  /  Alb  3.3  /  TBili  0.7  /  DBili  x   /  AST  35[H]  /  ALT  36[H]  /  AlkPhos  116  09-28      Urinalysis Basic - ( 28 Sep 2024 05:37 )    Color: x / Appearance: x / SG: x / pH: x  Gluc: 94 mg/dL / Ketone: x  / Bili: x / Urobili: x   Blood: x / Protein: x / Nitrite: x   Leuk Esterase: x / RBC: x / WBC x   Sq Epi: x / Non Sq Epi: x / Bacteria: x      CAPILLARY BLOOD GLUCOSE      POCT Blood Glucose.: 91 mg/dL (28 Sep 2024 06:04)  POCT Blood Glucose.: 96 mg/dL (27 Sep 2024 22:53)  POCT Blood Glucose.: 96 mg/dL (27 Sep 2024 13:31)       Chief Complaint: Patient is a 69y old  Female who presents with a chief complaint of Shortness of breath (27 Sep 2024 12:17)    Hospital Course:   70 yo female with PMHx CAD NSTEMI in 2011 s/p ELIZABETH to LAD, s/p ELIZABETH Distal LCx (4/30/2024), HTN, HLD, hypothyroid, rheumatoid arthritis, who presented to ER with left-sided chest pain associated with generalized weakness, nausea and vomiting. ED developed worsening hypoxic respiratory failure and was emergently intubated. She was admitted for ARDS requiring deep sedation and paralysis, severe sepsis secondary to LLL pneumonia. Patient had uptrending lactate levels, worsening shock state on three vasopressors with concerns for acute systolic heart failure, cardiogenic shock. Now extubated and off pressors. Repeat ECHO with improved EF. Downgraded to Medicine on 9/25/24.    INTERVAL HPI/ OVERNIGHT EVENTS: Patient seen and examined at bedside at AM. No clinically acute event overnight. Pt initially refused CT knee due to pt felt she is okay. Convinced pt that she had fall, CT needed to rule out fracture as she has a history of TKR. Pt agreed to have CT knee today.       MEDICATIONS  (STANDING):  aspirin  chewable 81 milliGRAM(s) Oral daily  carvedilol 12.5 milliGRAM(s) Oral every 12 hours  chlorhexidine 2% Cloths 1 Application(s) Topical daily  clopidogrel Tablet 75 milliGRAM(s) Oral daily  dextrose 5%. 1000 milliLiter(s) (100 mL/Hr) IV Continuous <Continuous>  dextrose 5%. 1000 milliLiter(s) (50 mL/Hr) IV Continuous <Continuous>  dextrose 50% Injectable 25 Gram(s) IV Push once  dextrose 50% Injectable 25 Gram(s) IV Push once  dextrose 50% Injectable 12.5 Gram(s) IV Push once  enoxaparin Injectable 40 milliGRAM(s) SubCutaneous every 24 hours  furosemide    Tablet 40 milliGRAM(s) Oral daily  influenza  Vaccine (HIGH DOSE) 0.5 milliLiter(s) IntraMuscular once  levothyroxine 150 MICROGram(s) Oral daily  losartan 100 milliGRAM(s) Oral daily  meropenem Injectable 1000 milliGRAM(s) IV Push every 8 hours  pantoprazole    Tablet 40 milliGRAM(s) Oral two times a day  rosuvastatin 20 milliGRAM(s) Oral at bedtime    MEDICATIONS  (PRN):  acetaminophen     Tablet .. 650 milliGRAM(s) Oral every 6 hours PRN Temp greater or equal to 38C (100.4F), Mild Pain (1 - 3)  hydrALAZINE Injectable 10 milliGRAM(s) IV Push every 4 hours PRN systolic greater than 160  ondansetron Injectable 4 milliGRAM(s) IV Push every 6 hours PRN Nausea and/or Vomiting  polyethylene glycol 3350 17 Gram(s) Oral daily PRN Constipation      Allergies    tetracycline (Hives)  penicillins (Hives)  Butazolactin, Prolaprin (Hives)  azithromycin (Hives)  Zyrtec (Hives)    Intolerances      REVIEW OF SYSTEMS:  All other review of systems negative, except as noted in HPI    OBJECTIVE:    Vital Signs Last 24 Hrs  T(C): 36.6 (28 Sep 2024 08:03), Max: 37.1 (27 Sep 2024 20:23)  T(F): 97.8 (28 Sep 2024 08:03), Max: 98.8 (27 Sep 2024 20:23)  HR: 69 (28 Sep 2024 08:03) (64 - 71)  BP: 103/66 (28 Sep 2024 08:03) (103/66 - 132/75)  BP(mean): --  RR: 18 (28 Sep 2024 08:03) (17 - 18)  SpO2: 94% (28 Sep 2024 08:03) (94% - 98%)    Parameters below as of 28 Sep 2024 08:03  Patient On (Oxygen Delivery Method): room air      PHYSICAL EXAM:  General: Elderly female sitting in bed comfortably. No acute distress  HEENT: EOMI. Clear conjunctivae. Moist mucus membrane  Neck: Supple.   Chest: Good air entry. No wheezing, rales or rhonchi.   Heart: Normal S1 & S2. RRR.   Abdomen: Obese. Soft. Non-tender.   Ext: 1+ pedal edema. No calf tenderness. Left knee with ecchymosis and tenderness on medial aspect. Old healed scars on both knees.   Neuro: Awake and alert. Moves all extremities. Speaks slowly.   Skin: Warm and Dry  Psychiatry: Normal mood and affect        Lab/ Imaging:    LABS:                        11.9   8.21  )-----------( 189      ( 28 Sep 2024 05:37 )             37.6     09-28    139  |  107  |  26.6[H]  ----------------------------<  94  4.0   |  20.0[L]  |  0.50    Ca    8.1[L]      28 Sep 2024 05:37  Phos  3.0     09-28  Mg     2.1     09-28    TPro  5.4[L]  /  Alb  3.3  /  TBili  0.7  /  DBili  x   /  AST  35[H]  /  ALT  36[H]  /  AlkPhos  116  09-28      Urinalysis Basic - ( 28 Sep 2024 05:37 )    Color: x / Appearance: x / SG: x / pH: x  Gluc: 94 mg/dL / Ketone: x  / Bili: x / Urobili: x   Blood: x / Protein: x / Nitrite: x   Leuk Esterase: x / RBC: x / WBC x   Sq Epi: x / Non Sq Epi: x / Bacteria: x      CAPILLARY BLOOD GLUCOSE      POCT Blood Glucose.: 91 mg/dL (28 Sep 2024 06:04)  POCT Blood Glucose.: 96 mg/dL (27 Sep 2024 22:53)  POCT Blood Glucose.: 96 mg/dL (27 Sep 2024 13:31)

## 2024-09-29 LAB
ALBUMIN SERPL ELPH-MCNC: 3.3 G/DL — SIGNIFICANT CHANGE UP (ref 3.3–5.2)
ALP SERPL-CCNC: 101 U/L — SIGNIFICANT CHANGE UP (ref 40–120)
ALT FLD-CCNC: 38 U/L — HIGH
ANION GAP SERPL CALC-SCNC: 12 MMOL/L — SIGNIFICANT CHANGE UP (ref 5–17)
AST SERPL-CCNC: 51 U/L — HIGH
BILIRUB SERPL-MCNC: 0.7 MG/DL — SIGNIFICANT CHANGE UP (ref 0.4–2)
BUN SERPL-MCNC: 22.1 MG/DL — HIGH (ref 8–20)
CALCIUM SERPL-MCNC: 8.5 MG/DL — SIGNIFICANT CHANGE UP (ref 8.4–10.5)
CHLORIDE SERPL-SCNC: 105 MMOL/L — SIGNIFICANT CHANGE UP (ref 96–108)
CO2 SERPL-SCNC: 20 MMOL/L — LOW (ref 22–29)
CREAT SERPL-MCNC: 0.44 MG/DL — LOW (ref 0.5–1.3)
EGFR: 104 ML/MIN/1.73M2 — SIGNIFICANT CHANGE UP
GLUCOSE SERPL-MCNC: 81 MG/DL — SIGNIFICANT CHANGE UP (ref 70–99)
HCT VFR BLD CALC: 39 % — SIGNIFICANT CHANGE UP (ref 34.5–45)
HGB BLD-MCNC: 12.2 G/DL — SIGNIFICANT CHANGE UP (ref 11.5–15.5)
MAGNESIUM SERPL-MCNC: 2.1 MG/DL — SIGNIFICANT CHANGE UP (ref 1.6–2.6)
MCHC RBC-ENTMCNC: 31.3 GM/DL — LOW (ref 32–36)
MCHC RBC-ENTMCNC: 34.4 PG — HIGH (ref 27–34)
MCV RBC AUTO: 109.9 FL — HIGH (ref 80–100)
PHOSPHATE SERPL-MCNC: 2.6 MG/DL — SIGNIFICANT CHANGE UP (ref 2.4–4.7)
PLATELET # BLD AUTO: 198 K/UL — SIGNIFICANT CHANGE UP (ref 150–400)
POTASSIUM SERPL-MCNC: 4.4 MMOL/L — SIGNIFICANT CHANGE UP (ref 3.5–5.3)
POTASSIUM SERPL-SCNC: 4.4 MMOL/L — SIGNIFICANT CHANGE UP (ref 3.5–5.3)
PROT SERPL-MCNC: 5.2 G/DL — LOW (ref 6.6–8.7)
RBC # BLD: 3.55 M/UL — LOW (ref 3.8–5.2)
RBC # FLD: 13.7 % — SIGNIFICANT CHANGE UP (ref 10.3–14.5)
SODIUM SERPL-SCNC: 137 MMOL/L — SIGNIFICANT CHANGE UP (ref 135–145)
WBC # BLD: 7.38 K/UL — SIGNIFICANT CHANGE UP (ref 3.8–10.5)
WBC # FLD AUTO: 7.38 K/UL — SIGNIFICANT CHANGE UP (ref 3.8–10.5)

## 2024-09-29 PROCEDURE — 99232 SBSQ HOSP IP/OBS MODERATE 35: CPT | Mod: GC

## 2024-09-29 RX ORDER — 5-HYDROXYTRYPTOPHAN (5-HTP) 100 MG
3 TABLET,DISINTEGRATING ORAL AT BEDTIME
Refills: 0 | Status: DISCONTINUED | OUTPATIENT
Start: 2024-09-29 | End: 2024-09-30

## 2024-09-29 RX ADMIN — FUROSEMIDE 40 MILLIGRAM(S): 10 INJECTION INTRAVENOUS at 06:53

## 2024-09-29 RX ADMIN — PANTOPRAZOLE SODIUM 40 MILLIGRAM(S): 40 TABLET, DELAYED RELEASE ORAL at 06:53

## 2024-09-29 RX ADMIN — Medication 75 MILLIGRAM(S): at 11:08

## 2024-09-29 RX ADMIN — ROSUVASTATIN CALCIUM 20 MILLIGRAM(S): 20 TABLET, COATED ORAL at 21:26

## 2024-09-29 RX ADMIN — Medication 150 MICROGRAM(S): at 06:53

## 2024-09-29 RX ADMIN — Medication 81 MILLIGRAM(S): at 11:07

## 2024-09-29 RX ADMIN — ENOXAPARIN SODIUM 40 MILLIGRAM(S): 150 INJECTION SUBCUTANEOUS at 17:58

## 2024-09-29 RX ADMIN — Medication 12.5 MILLIGRAM(S): at 06:53

## 2024-09-29 RX ADMIN — MEROPENEM 1000 MILLIGRAM(S): 500 INJECTION INTRAVENOUS at 14:53

## 2024-09-29 RX ADMIN — LOSARTAN POTASSIUM 100 MILLIGRAM(S): 100 TABLET, FILM COATED ORAL at 06:53

## 2024-09-29 RX ADMIN — MEROPENEM 1000 MILLIGRAM(S): 500 INJECTION INTRAVENOUS at 06:52

## 2024-09-29 RX ADMIN — Medication 3 MILLIGRAM(S): at 21:26

## 2024-09-29 RX ADMIN — CHLORHEXIDINE GLUCONATE ORAL RINSE 1 APPLICATION(S): 1.2 SOLUTION DENTAL at 11:09

## 2024-09-29 RX ADMIN — PANTOPRAZOLE SODIUM 40 MILLIGRAM(S): 40 TABLET, DELAYED RELEASE ORAL at 17:59

## 2024-09-29 NOTE — PROGRESS NOTE ADULT - ASSESSMENT
68 yo female with PMHx CAD NSTEMI in 2011 s/p ELIZABETH to LAD, s/p ELIZABETH Distal LCx (4/30/2024), HTN, HLD, hypothyroid, rheumatoid arthritis, who presented to ER with left-sided chest pain associated with generalized weakness, nausea and vomiting. ED developed worsening hypoxic respiratory failure and was emergently intubated.She was admitted for ARDS ARDS requiring deep sedation and paralysis,severe sepsis secondary to LLL pneumonia. Patient had uptrending lactate levels, worsening shock state on three vasopressors with concerns for acute systolic heart failure, cardiogenic shock. Now extubated and off pressors. Repeat ECHO with improved EF. Downgraded to Medicine on 9/25/24.      # s/p Acute hypoxic respiratory failure / ARDS (resolved)   #due to Severe Sepsis /   # LLL Pneumonia (likely due to GNR) / (improving)  # Pseudomonas Bacteremia (improving) . rpt c/s negative  s/p extubation   Currently satting well on room air   c/w  Meropenem until 9/29   ID recs appreciated     # Left Knee Swelling (improved)   s/p Fall  CT to rule out fracture or other cause as she has a history of TKR  CT Knee reviewed- no acute findings    # s/p Shock (Distributive vs Cardiogenic) (resolved)   s/p pressors   Resolved     # Stress induced CMP (resolved)   Initial EF <20% has now improved to 60% with resolution of shock  Cont ARB, BB, DAPT    # s/p Diarrhea (resolved)   GI PCR with Enteropathogenic E. Coli  S/p treat with Levofloxacin 750 mg x 1    # Acute metabolic encephalopathy (resolved)  CT Head with no acute findings  Likely due to shock / infection       # CAD / HLD / HTN (stable)   Continue Aspirin, Plavix, Losartan and Coreg (increase to 12.5 mg BID)  Resume Rosuvastatin     # Hypothyroidism Uncontrolled  TSH 57  Increase Levothyroxine to 150 mcg daily  Repeat TFTs in 1 week (9/3)    # Prediabetes  A1c 6.4  Carb Consistent Diet  D/C accu checks and ISS    DVT Prophylaxis -- Lovenox SQ    Dispo: [facility] likely tomorrow

## 2024-09-29 NOTE — PROGRESS NOTE ADULT - PROVIDER SPECIALTY LIST ADULT
Critical Care
Hospitalist
Cardiology
Critical Care
Hospitalist
Internal Medicine
Intervent Cardiology
Palliative Care
Cardiology
Cardiology
Critical Care
Internal Medicine
Intervent Cardiology
Critical Care
Critical Care
Infectious Disease
Internal Medicine
MICU
MICU
Palliative Care
Critical Care

## 2024-09-29 NOTE — PROGRESS NOTE ADULT - SUBJECTIVE AND OBJECTIVE BOX
INTERVAL HISTORY:  Feels weak  Denies any chest pain or shortness of breath laying flat in bed.  	  MEDICATIONS:  carvedilol 12.5 milliGRAM(s) Oral every 12 hours  furosemide    Tablet 40 milliGRAM(s) Oral daily  hydrALAZINE Injectable 10 milliGRAM(s) IV Push every 4 hours PRN  losartan 100 milliGRAM(s) Oral daily    meropenem Injectable 1000 milliGRAM(s) IV Push every 8 hours      acetaminophen     Tablet .. 650 milliGRAM(s) Oral every 6 hours PRN  ondansetron Injectable 4 milliGRAM(s) IV Push every 6 hours PRN    pantoprazole    Tablet 40 milliGRAM(s) Oral two times a day  polyethylene glycol 3350 17 Gram(s) Oral daily PRN    dextrose 50% Injectable 25 Gram(s) IV Push once  dextrose 50% Injectable 12.5 Gram(s) IV Push once  dextrose 50% Injectable 25 Gram(s) IV Push once  levothyroxine 150 MICROGram(s) Oral daily  rosuvastatin 20 milliGRAM(s) Oral at bedtime    aspirin  chewable 81 milliGRAM(s) Oral daily  chlorhexidine 2% Cloths 1 Application(s) Topical daily  clopidogrel Tablet 75 milliGRAM(s) Oral daily  dextrose 5%. 1000 milliLiter(s) IV Continuous <Continuous>  dextrose 5%. 1000 milliLiter(s) IV Continuous <Continuous>  enoxaparin Injectable 40 milliGRAM(s) SubCutaneous every 24 hours  influenza  Vaccine (HIGH DOSE) 0.5 milliLiter(s) IntraMuscular once        PHYSICAL EXAM:    T(C): 36.3 (09-29-24 @ 08:00), Max: 36.7 (09-28-24 @ 17:19)  HR: 73 (09-29-24 @ 08:00) (69 - 73)  BP: 100/67 (09-29-24 @ 08:00) (100/67 - 140/60)  RR: 18 (09-29-24 @ 08:00) (18 - 18)  SpO2: 92% (09-29-24 @ 08:00) (92% - 98%)  Wt(kg): --    I&O's Summary      Daily     Daily     Appearance: Normal	  HEENT:   Normal oral mucosa, PERRL, EOMI	  Cardiovascular: Normal S1 S2, No JVD, No murmurs, No edema  Respiratory: Lungs clear to auscultation	  Psychiatry: A & O x 3, Mood & affect appropriate  Gastrointestinal:  Soft, Non-tender, + BS	  Skin: No rashes, No ecchymoses, No cyanosis  Neurologic: Non-focal  Extremities: No edema                                12.2   7.38  )-----------( 198      ( 29 Sep 2024 06:10 )             39.0     09-29    137  |  105  |  22.1[H]  ----------------------------<  81  4.4   |  20.0[L]  |  0.44[L]    Ca    8.5      29 Sep 2024 06:10  Phos  2.6     09-29  Mg     2.1     09-29    TPro  5.2[L]  /  Alb  3.3  /  TBili  0.7  /  DBili  x   /  AST  51[H]  /  ALT  38[H]  /  AlkPhos  101  09-29    proBNP:   Lipid Profile:   HgA1c:     ASSESSMENT/PLAN: 	  69-year-old female with  PMHx of CAD, HTN, HLD, NSTEMI in 2011 s/p ELIZABETH to LAD, S/p ELIZABETH Distal LCx (4/30/2024), hypothyroid, rheumatoid arthritis, presented to ER with left-sided chest pain associated with generalized weakness, nausea and vomiting.  Patient noted to be tachycardic and hypotensive upon arrival, diaphoretic and ill-appearing.  She is noted to have  diminished breath sounds in the left lower lobe, CT scan Negative for PE, does show a left lower lobe pneumonia.  Patient's course complicated by multiple episodes of hypotension requiring vasopressor support and IV fluids.  Patient's labs reviewed, noted to have leukocytosis, elevated lactate 5.5.  EKG independently reviewed, normal sinus rhythm, no acute changes. Pt became hypoxic in ER on high flow O2 N/C and intubated.    Septic/cardiogenic shock -   leukocytosis, elevated lactate 5.5, hypotension requiring vasopressor support (Levo and Pit) and IV fluids.  - Pseudomonas bacteremia  - Acute on chronic HFpEF, elevated BNP [id number] (09/18/2024)   - s/p TTE (09/19/2024) LVEF - 70 to 75 %.  - s/p Repeat echo (09/20/2024) with drop in EF - 20 - 25% with global hypokinesis off of . - Likely   stunning/stress induced CM, no evidence of ACS,   -Extubated 9/24/2024 and off pressors,  - O2 Sat's stable on RA  - NSR on CM HR 80's  BP stable  - Repeat TTE 9/24/2024 shows Normal LVEF 55-60%, Normal RV function, LVEF has improved from TTE 9/20/2024    PLAN:   Remains on IV antibiotics  Cardiac status has been stable  Continue same  Discharge planning  Please reconsult if any further questions arise.

## 2024-09-29 NOTE — PROGRESS NOTE ADULT - NUTRITIONAL ASSESSMENT
This patient has been assessed with a concern for Malnutrition and has been determined to have a diagnosis/diagnoses of Moderate protein-calorie malnutrition.    This patient is being managed with:   Diet Soft and Bite Sized-  Consistent Carbohydrate {Evening Snack} (CSTCHOSN)  Entered: Sep 25 2024 12:28PM

## 2024-09-29 NOTE — PROGRESS NOTE ADULT - REASON FOR ADMISSION

## 2024-09-29 NOTE — PROGRESS NOTE ADULT - SUBJECTIVE AND OBJECTIVE BOX
HEALTH ISSUES - PROBLEM Dx:    CC- LLL PNA, Pseudomonas bacteremia    INTERVAL HPI/ OVERNIGHT EVENTS:    comfortable is aware of antibx plans  CT knee findings discussed with her  offers no new complaints  is upset that lab draws are difficult and she cannot tolerate all the poking    REVIEW OF SYSTEMS:    as above    Vital Signs Last 24 Hrs  T(C): 36.3 (29 Sep 2024 08:00), Max: 36.7 (28 Sep 2024 17:19)  T(F): 97.4 (29 Sep 2024 08:00), Max: 98 (28 Sep 2024 17:19)  HR: 73 (29 Sep 2024 08:00) (69 - 73)  BP: 100/67 (29 Sep 2024 08:00) (100/67 - 140/60)  BP(mean): --  RR: 18 (29 Sep 2024 08:00) (18 - 18)  SpO2: 92% (29 Sep 2024 08:00) (92% - 98%)    Parameters below as of 29 Sep 2024 08:00  Patient On (Oxygen Delivery Method): room air      PHYSICAL EXAM-  General: Elderly female sitting in bed comfortably. No acute distress  HEENT: EOMI. Clear conjunctivae. Moist mucus membrane  Neck: Supple.   Chest: Good air entry. No wheezing, rales or rhonchi.   Heart: Normal S1 & S2. RRR.   Abdomen: Obese. Soft. Non-tender.   Ext:  Left knee with ecchymosis and tenderness on medial aspect. Improving. Old healed surgical scars on both knees.   Neuro: Awake and alert. Moves all extremities. Speaks slowly.   Skin: Warm and Dry  Psychiatry: Normal mood and affect    MEDICATIONS  (STANDING):  aspirin  chewable 81 milliGRAM(s) Oral daily  carvedilol 12.5 milliGRAM(s) Oral every 12 hours  chlorhexidine 2% Cloths 1 Application(s) Topical daily  clopidogrel Tablet 75 milliGRAM(s) Oral daily  dextrose 5%. 1000 milliLiter(s) (50 mL/Hr) IV Continuous <Continuous>  dextrose 5%. 1000 milliLiter(s) (100 mL/Hr) IV Continuous <Continuous>  dextrose 50% Injectable 25 Gram(s) IV Push once  dextrose 50% Injectable 25 Gram(s) IV Push once  dextrose 50% Injectable 12.5 Gram(s) IV Push once  enoxaparin Injectable 40 milliGRAM(s) SubCutaneous every 24 hours  furosemide    Tablet 40 milliGRAM(s) Oral daily  influenza  Vaccine (HIGH DOSE) 0.5 milliLiter(s) IntraMuscular once  levothyroxine 150 MICROGram(s) Oral daily  losartan 100 milliGRAM(s) Oral daily  meropenem Injectable 1000 milliGRAM(s) IV Push every 8 hours  pantoprazole    Tablet 40 milliGRAM(s) Oral two times a day  rosuvastatin 20 milliGRAM(s) Oral at bedtime    MEDICATIONS  (PRN):  acetaminophen     Tablet .. 650 milliGRAM(s) Oral every 6 hours PRN Temp greater or equal to 38C (100.4F), Mild Pain (1 - 3)  hydrALAZINE Injectable 10 milliGRAM(s) IV Push every 4 hours PRN systolic greater than 160  ondansetron Injectable 4 milliGRAM(s) IV Push every 6 hours PRN Nausea and/or Vomiting  polyethylene glycol 3350 17 Gram(s) Oral daily PRN Constipation      LABS:                        12.2   7.38  )-----------( 198      ( 29 Sep 2024 06:10 )             39.0     09-29    137  |  105  |  22.1[H]  ----------------------------<  81  4.4   |  20.0[L]  |  0.44[L]    Ca    8.5      29 Sep 2024 06:10  Phos  2.6     09-29  Mg     2.1     09-29    TPro  5.2[L]  /  Alb  3.3  /  TBili  0.7  /  DBili  x   /  AST  51[H]  /  ALT  38[H]  /  AlkPhos  101  09-29      Urinalysis Basic - ( 29 Sep 2024 06:10 )    Color: x / Appearance: x / SG: x / pH: x  Gluc: 81 mg/dL / Ketone: x  / Bili: x / Urobili: x   Blood: x / Protein: x / Nitrite: x   Leuk Esterase: x / RBC: x / WBC x   Sq Epi: x / Non Sq Epi: x / Bacteria: x      Xray Chest 1 View- PORTABLE-Urgent:   ACC: [id number] EXAM:  XR CHEST PORTABLE URGENT 1V   ORDERED BY: ALEXANDRA M GOLDMANN     PROCEDURE DATE:  09/25/2024          INTERPRETATION:  Exam:XR CHEST URGENT    clinical history:Pneumonia    Removal of nasogastric and endotracheal tube. Leftbasilar consolidation   not significantly changed.    IMPRESSION: Stable left basilar consolidation.    --- End of Report ---      UNA DELVALLE MD; Attending Radiologist  This document has been electronically signed. Sep 25 2024  7:33PM (09-25-24 @ 09:15)      Radiology personally reviewed.

## 2024-09-30 ENCOUNTER — TRANSCRIPTION ENCOUNTER (OUTPATIENT)
Age: 69
End: 2024-09-30

## 2024-09-30 VITALS
SYSTOLIC BLOOD PRESSURE: 100 MMHG | TEMPERATURE: 98 F | DIASTOLIC BLOOD PRESSURE: 62 MMHG | OXYGEN SATURATION: 97 % | RESPIRATION RATE: 18 BRPM | HEART RATE: 68 BPM

## 2024-09-30 PROCEDURE — 99239 HOSP IP/OBS DSCHRG MGMT >30: CPT | Mod: GC

## 2024-09-30 RX ORDER — LOSARTAN POTASSIUM 100 MG/1
1 TABLET, FILM COATED ORAL
Qty: 0 | Refills: 0 | DISCHARGE
Start: 2024-09-30

## 2024-09-30 RX ORDER — FUROSEMIDE 10 MG/ML
1 INJECTION INTRAVENOUS
Qty: 0 | Refills: 0 | DISCHARGE
Start: 2024-09-30

## 2024-09-30 RX ORDER — METOPROLOL TARTRATE 50 MG
1 TABLET ORAL
Refills: 0 | DISCHARGE

## 2024-09-30 RX ORDER — CARVEDILOL 3.125 MG
1 TABLET ORAL
Qty: 0 | Refills: 0 | DISCHARGE
Start: 2024-09-30

## 2024-09-30 RX ORDER — ROSUVASTATIN CALCIUM 20 MG/1
1 TABLET, COATED ORAL
Qty: 0 | Refills: 0 | DISCHARGE
Start: 2024-09-30

## 2024-09-30 RX ADMIN — PANTOPRAZOLE SODIUM 40 MILLIGRAM(S): 40 TABLET, DELAYED RELEASE ORAL at 06:31

## 2024-09-30 RX ADMIN — Medication 12.5 MILLIGRAM(S): at 06:32

## 2024-09-30 RX ADMIN — Medication 81 MILLIGRAM(S): at 11:10

## 2024-09-30 RX ADMIN — CHLORHEXIDINE GLUCONATE ORAL RINSE 1 APPLICATION(S): 1.2 SOLUTION DENTAL at 11:11

## 2024-09-30 RX ADMIN — PANTOPRAZOLE SODIUM 40 MILLIGRAM(S): 40 TABLET, DELAYED RELEASE ORAL at 17:17

## 2024-09-30 RX ADMIN — LOSARTAN POTASSIUM 100 MILLIGRAM(S): 100 TABLET, FILM COATED ORAL at 06:31

## 2024-09-30 RX ADMIN — FUROSEMIDE 40 MILLIGRAM(S): 10 INJECTION INTRAVENOUS at 06:31

## 2024-09-30 RX ADMIN — Medication 150 MICROGRAM(S): at 06:32

## 2024-09-30 RX ADMIN — Medication 12.5 MILLIGRAM(S): at 17:17

## 2024-09-30 RX ADMIN — Medication 75 MILLIGRAM(S): at 11:10

## 2024-09-30 NOTE — DISCHARGE NOTE PROVIDER - NSDCMRMEDTOKEN_GEN_ALL_CORE_FT
Ambien 10 mg oral tablet: 1 tab(s) orally once a day (at bedtime), As Needed  aspirin 81 mg oral delayed release tablet: 1 tab(s) orally once a day  carvedilol 12.5 mg oral tablet: 1 tab(s) orally every 12 hours  clopidogrel 75 mg oral tablet: 1 tab(s) orally once a day  Effexor  mg oral capsule, extended release: 1 cap(s) orally once a day  furosemide 40 mg oral tablet: 1 tab(s) orally once a day  levothyroxine 100 mcg (0.1 mg) oral tablet: 1 tab(s) orally once a day  losartan 100 mg oral tablet: 1 tab(s) orally once a day  MetFORMIN (Eqv-Fortamet) 500 mg oral tablet, extended release: 1 tab(s) orally 2 times a day  Neurontin 300 mg oral capsule: 1 cap(s) orally every 6 hours  oxyCODONE 10 mg oral tablet, extended release: 1 tab(s) orally every 8 hours as needed for pain q8-q12 prn filled 9/6/24  pantoprazole 40 mg oral delayed release tablet: 1 tab(s) orally once a day  rosuvastatin 20 mg oral tablet: 1 tab(s) orally once a day (at bedtime)  Zetia 10 mg oral tablet: 1 tab(s) orally once a day

## 2024-09-30 NOTE — DISCHARGE NOTE PROVIDER - ATTENDING DISCHARGE PHYSICAL EXAMINATION:
Vital Signs Last 24 Hrs  T(C): 36.6 (30 Sep 2024 09:30), Max: 36.7 (29 Sep 2024 20:52)  T(F): 97.9 (30 Sep 2024 09:30), Max: 98 (29 Sep 2024 20:52)  HR: 67 (30 Sep 2024 09:30) (64 - 70)  BP: 81/53 (30 Sep 2024 09:30) (81/53 - 119/67)  BP(mean): --  RR: 18 (30 Sep 2024 09:30) (17 - 18)  SpO2: 96% (30 Sep 2024 09:30) (93% - 97%)    Parameters below as of 30 Sep 2024 09:30  Patient On (Oxygen Delivery Method): room air  PHYSICAL EXAM-  General: Elderly female sitting in bed comfortably. No acute distress  HEENT: EOMI. Clear conjunctivae. Moist mucus membrane  Neck: Supple.   Chest: Good air entry. No wheezing, rales or rhonchi.   Heart: Normal S1 & S2. RRR.   Abdomen: Obese. Soft. Non-tender.   Ext:  Left knee with ecchymosis and tenderness on medial aspect. Improving. Old healed surgical scars on both knees.   Neuro: Awake and alert. Moves all extremities. Speaks slowly.   Skin: Warm and Dry  Psychiatry: Normal mood and affect     Vital Signs Last 24 Hrs  T(C): 36.6 (30 Sep 2024 09:30), Max: 36.7 (29 Sep 2024 20:52)  T(F): 97.9 (30 Sep 2024 09:30), Max: 98 (29 Sep 2024 20:52)  HR: 67 (30 Sep 2024 09:30) (64 - 70)  BP: 81/53 (30 Sep 2024 09:30) (81/53 - 119/67)  BP(mean): --  RR: 18 (30 Sep 2024 09:30) (17 - 18)  SpO2: 96% (30 Sep 2024 09:30) (93% - 97%)    Parameters below as of 30 Sep 2024 09:30  Patient On (Oxygen Delivery Method): room air  PHYSICAL EXAM-  General: Elderly female sitting in bed comfortably. No acute distress  HEENT: EOMI. Clear conjunctivae. Moist mucus membrane  Neck: Supple.   Chest: Good air entry. No wheezing, rales or rhonchi.   Heart: Normal S1 & S2. RRR.   Abdomen: Obese. Soft. Non-tender.   Ext:  Left knee chr swelling improved. Old healed surgical scars on both knees.   Neuro: Awake and alert. Moves all extremities. Speaks slowly.   Skin: Warm and Dry  Psychiatry: Normal mood and affect

## 2024-09-30 NOTE — DISCHARGE NOTE NURSING/CASE MANAGEMENT/SOCIAL WORK - PATIENT PORTAL LINK FT
You can access the FollowMyHealth Patient Portal offered by [facility] by registering at the following website: [email or web address]. By joining [facility] [employer]’s FollowMyHealth portal, you will also be able to view your health information using other applications (apps) compatible with our system.

## 2024-09-30 NOTE — DISCHARGE NOTE NURSING/CASE MANAGEMENT/SOCIAL WORK - NSDCPEFALRISK_GEN_ALL_CORE
For information on Fall & Injury Prevention, visit: [email or web address] OR  [email or web address] OR  https://www.cdc.gov/steadi/patient.html

## 2024-09-30 NOTE — DISCHARGE NOTE PROVIDER - CARE PROVIDERS DIRECT ADDRESSES
,DirectAddress_Unknown ,DirectAddress_Unknown,[email or web address]

## 2024-09-30 NOTE — DISCHARGE NOTE PROVIDER - PROVIDER TOKENS
FREE:[LAST:[Primary care doctor],PHONE:[(   )    -],FAX:[(   )    -]] PROVIDER:[TOKEN:[[id number]]],PROVIDER:[TOKEN:[[id number]]]

## 2024-09-30 NOTE — DISCHARGE NOTE PROVIDER - CARE PROVIDER_API CALL
Primary care doctor,   Phone: (   )    -  Fax: (   )    -  Follow Up Time:    Jesus Snyder  Interventional Cardiology  [address], [address]  [place] [address]  Phone: [phone]  Fax: [phone]  Follow Up Time:     Susan Salgado  Infectious Disease  [address] [place] [address]  Phone: [phone]  Fax: [phone]  Follow Up Time:

## 2024-09-30 NOTE — DISCHARGE NOTE PROVIDER - HOSPITAL COURSE
68 yo female with PMHx CAD NSTEMI in 2011 s/p ELIZABETH to LAD, s/p ELIZABETH Distal LCx (4/30/2024), HTN, HLD, hypothyroid, rheumatoid arthritis, who presented to ER with left-sided chest pain associated with generalized weakness, nausea and vomiting. ED developed worsening hypoxic respiratory failure and was emergently intubated. She was admitted for ARDS requiring deep sedation and paralysis, severe sepsis secondary to LLL pneumonia. Patient had uptrending lactate levels, worsening shock state on three vasopressors with concerns for acute systolic heart failure, cardiogenic shock. Pt was extubated and off pressors. Repeat ECHO with improved EF. Downgraded to Medicine on 9/25/24.    On Floor, pt continued with meropenem until 9/29 for pseudomonas bacteremia. She had left knee swelling with Hx of TKR. CT knee does not show any evidence of hardware complication. Pt had episodes of diarrhea, GI panel showed  Enteropathogenic E. Coli, S/p treat with Levofloxacin 750 mg x 1, Diarrhea has been resolved. Pt is medically stable, can be discharged to [facility] for further management.       # s/p Acute hypoxic respiratory failure / ARDS (resolved)    #Likely due to Severe Sepsis /   # LLL Pneumonia (likely due to GNR) / (improving)  # Pseudomonas Bacteremia (resolved)    # Left Knee Swelling (resolved)     # s/p Shock (Distributive vs Cardiogenic) (resolved)     # Stress induced CMP (resolved)     # s/p Diarrhea (resolved)     # Acute metabolic encephalopathy (resolved)    # CAD / HLD / HTN (stable)     # Hypothyroidism Uncontrolled    # Prediabetes

## 2024-09-30 NOTE — DISCHARGE NOTE PROVIDER - NSDCFUSCHEDAPPT_GEN_ALL_CORE_FT
[facility]  [address]  Scheduled Appointment: 10/02/2024    Kadi Cohen  [facility]  [address]  Scheduled Appointment: 10/25/2024    [facility]  [address]  Scheduled Appointment: 10/31/2024

## 2024-09-30 NOTE — DISCHARGE NOTE PROVIDER - NSDCCPCAREPLAN_GEN_ALL_CORE_FT
PRINCIPAL DISCHARGE DIAGNOSIS  Diagnosis: Acute hypoxic respiratory failure  Assessment and Plan of Treatment: # s/p Acute hypoxic respiratory failure / ARDS (resolved)    #Likely due to Severe Sepsis /   # LLL Pneumonia (likely due to GNR) / (resolved)  # Pseudomonas Bacteremia (resolved)   -s/p extubation   - Currently satting well on room air   - s/p  Meropenem         SECONDARY DISCHARGE DIAGNOSES  Diagnosis: Swelling of left knee joint  Assessment and Plan of Treatment: # Left Knee Swelling (resolved)   - s/p Fall  - CT to rule out fracture based on Hx of knee replacement.    Diagnosis: Cardiogenic shock  Assessment and Plan of Treatment: # s/p Shock (Distributive vs Cardiogenic) (resolved)   # Stress induced CMP (resolved)   - Initial EF <20% has now improved to 60% with resolution of shock  Cont ARB, BB, DAPT    Diagnosis: Diarrhea  Assessment and Plan of Treatment: # s/p Diarrhea (resolved)   GI PCR with Enteropathogenic E. Coli  S/p treat with Levofloxacin 750 mg x 1      Diagnosis: Acute metabolic encephalopathy  Assessment and Plan of Treatment: # Acute metabolic encephalopathy (resolved)  CT Head with no acute findings  Likely due to shock / infection    Diagnosis: CAD (coronary artery disease)  Assessment and Plan of Treatment: Continue home medication    Diagnosis: HLD (hyperlipidemia)  Assessment and Plan of Treatment: Continue home medication    Diagnosis: HTN (hypertension)  Assessment and Plan of Treatment: Continue home medication    Diagnosis: Hypothyroidism  Assessment and Plan of Treatment: TSH 57  Increase Levothyroxine to 150 mcg daily  Repeat TFTs in 1 week (9/3)    Diagnosis: Prediabetes  Assessment and Plan of Treatment: A1c 6.4  Carb Consistent Diet  follow up primary care doctor

## 2024-10-02 ENCOUNTER — APPOINTMENT (OUTPATIENT)
Dept: RHEUMATOLOGY | Facility: CLINIC | Age: 69
End: 2024-10-02

## 2024-10-02 ENCOUNTER — NON-APPOINTMENT (OUTPATIENT)
Age: 69
End: 2024-10-02

## 2024-10-07 ENCOUNTER — APPOINTMENT (OUTPATIENT)
Dept: FAMILY MEDICINE | Facility: CLINIC | Age: 69
End: 2024-10-07

## 2024-10-15 PROCEDURE — 96374 THER/PROPH/DIAG INJ IV PUSH: CPT

## 2024-10-15 PROCEDURE — 87186 SC STD MICRODIL/AGAR DIL: CPT

## 2024-10-15 PROCEDURE — 71275 CT ANGIOGRAPHY CHEST: CPT | Mod: MC

## 2024-10-15 PROCEDURE — 96375 TX/PRO/DX INJ NEW DRUG ADDON: CPT

## 2024-10-15 PROCEDURE — 87637 SARSCOV2&INF A&B&RSV AMP PRB: CPT

## 2024-10-15 PROCEDURE — 82550 ASSAY OF CK (CPK): CPT

## 2024-10-15 PROCEDURE — 83735 ASSAY OF MAGNESIUM: CPT

## 2024-10-15 PROCEDURE — 73700 CT LOWER EXTREMITY W/O DYE: CPT | Mod: MC

## 2024-10-15 PROCEDURE — 84100 ASSAY OF PHOSPHORUS: CPT

## 2024-10-15 PROCEDURE — 80053 COMPREHEN METABOLIC PANEL: CPT

## 2024-10-15 PROCEDURE — 82009 KETONE BODYS QUAL: CPT

## 2024-10-15 PROCEDURE — 71045 X-RAY EXAM CHEST 1 VIEW: CPT

## 2024-10-15 PROCEDURE — 93325 DOPPLER ECHO COLOR FLOW MAPG: CPT

## 2024-10-15 PROCEDURE — 83935 ASSAY OF URINE OSMOLALITY: CPT

## 2024-10-15 PROCEDURE — 94002 VENT MGMT INPAT INIT DAY: CPT

## 2024-10-15 PROCEDURE — 87641 MR-STAPH DNA AMP PROBE: CPT

## 2024-10-15 PROCEDURE — 85025 COMPLETE CBC W/AUTO DIFF WBC: CPT

## 2024-10-15 PROCEDURE — 80048 BASIC METABOLIC PNL TOTAL CA: CPT

## 2024-10-15 PROCEDURE — 87150 DNA/RNA AMPLIFIED PROBE: CPT

## 2024-10-15 PROCEDURE — 84132 ASSAY OF SERUM POTASSIUM: CPT

## 2024-10-15 PROCEDURE — 70450 CT HEAD/BRAIN W/O DYE: CPT | Mod: MC

## 2024-10-15 PROCEDURE — 85018 HEMOGLOBIN: CPT

## 2024-10-15 PROCEDURE — 85014 HEMATOCRIT: CPT

## 2024-10-15 PROCEDURE — 85027 COMPLETE CBC AUTOMATED: CPT

## 2024-10-15 PROCEDURE — 83880 ASSAY OF NATRIURETIC PEPTIDE: CPT

## 2024-10-15 PROCEDURE — 86901 BLOOD TYPING SEROLOGIC RH(D): CPT

## 2024-10-15 PROCEDURE — 82435 ASSAY OF BLOOD CHLORIDE: CPT

## 2024-10-15 PROCEDURE — C8929: CPT

## 2024-10-15 PROCEDURE — 80202 ASSAY OF VANCOMYCIN: CPT

## 2024-10-15 PROCEDURE — 87449 NOS EACH ORGANISM AG IA: CPT

## 2024-10-15 PROCEDURE — 81001 URINALYSIS AUTO W/SCOPE: CPT

## 2024-10-15 PROCEDURE — 74177 CT ABD & PELVIS W/CONTRAST: CPT | Mod: MC

## 2024-10-15 PROCEDURE — 82330 ASSAY OF CALCIUM: CPT

## 2024-10-15 PROCEDURE — 84443 ASSAY THYROID STIM HORMONE: CPT

## 2024-10-15 PROCEDURE — 87070 CULTURE OTHR SPECIMN AEROBIC: CPT

## 2024-10-15 PROCEDURE — 86850 RBC ANTIBODY SCREEN: CPT

## 2024-10-15 PROCEDURE — 94003 VENT MGMT INPAT SUBQ DAY: CPT

## 2024-10-15 PROCEDURE — 36415 COLL VENOUS BLD VENIPUNCTURE: CPT

## 2024-10-15 PROCEDURE — 82962 GLUCOSE BLOOD TEST: CPT

## 2024-10-15 PROCEDURE — 87205 SMEAR GRAM STAIN: CPT

## 2024-10-15 PROCEDURE — 94760 N-INVAS EAR/PLS OXIMETRY 1: CPT

## 2024-10-15 PROCEDURE — 83036 HEMOGLOBIN GLYCOSYLATED A1C: CPT

## 2024-10-15 PROCEDURE — 93970 EXTREMITY STUDY: CPT

## 2024-10-15 PROCEDURE — 87899 AGENT NOS ASSAY W/OPTIC: CPT

## 2024-10-15 PROCEDURE — 84145 PROCALCITONIN (PCT): CPT

## 2024-10-15 PROCEDURE — 0225U NFCT DS DNA&RNA 21 SARSCOV2: CPT

## 2024-10-15 PROCEDURE — 83605 ASSAY OF LACTIC ACID: CPT

## 2024-10-15 PROCEDURE — 93306 TTE W/DOPPLER COMPLETE: CPT

## 2024-10-15 PROCEDURE — 83690 ASSAY OF LIPASE: CPT

## 2024-10-15 PROCEDURE — 82272 OCCULT BLD FECES 1-3 TESTS: CPT

## 2024-10-15 PROCEDURE — 87081 CULTURE SCREEN ONLY: CPT

## 2024-10-15 PROCEDURE — 84295 ASSAY OF SERUM SODIUM: CPT

## 2024-10-15 PROCEDURE — C8924: CPT

## 2024-10-15 PROCEDURE — 82947 ASSAY GLUCOSE BLOOD QUANT: CPT

## 2024-10-15 PROCEDURE — 87086 URINE CULTURE/COLONY COUNT: CPT

## 2024-10-15 PROCEDURE — 85384 FIBRINOGEN ACTIVITY: CPT

## 2024-10-15 PROCEDURE — 99285 EMERGENCY DEPT VISIT HI MDM: CPT | Mod: 25

## 2024-10-15 PROCEDURE — 87040 BLOOD CULTURE FOR BACTERIA: CPT

## 2024-10-15 PROCEDURE — 87640 STAPH A DNA AMP PROBE: CPT

## 2024-10-15 PROCEDURE — 82803 BLOOD GASES ANY COMBINATION: CPT

## 2024-10-15 PROCEDURE — 84484 ASSAY OF TROPONIN QUANT: CPT

## 2024-10-15 PROCEDURE — 93005 ELECTROCARDIOGRAM TRACING: CPT

## 2024-10-15 PROCEDURE — 85610 PROTHROMBIN TIME: CPT

## 2024-10-15 PROCEDURE — 86900 BLOOD TYPING SEROLOGIC ABO: CPT

## 2024-10-15 PROCEDURE — 85730 THROMBOPLASTIN TIME PARTIAL: CPT

## 2024-10-15 PROCEDURE — 87507 IADNA-DNA/RNA PROBE TQ 12-25: CPT

## 2024-10-24 RX ORDER — EZETIMIBE 10 MG/1
10 TABLET ORAL
Refills: 0 | Status: ACTIVE | COMMUNITY
Start: 2024-10-24

## 2024-10-24 RX ORDER — ASPIRIN ENTERIC COATED TABLETS 81 MG 81 MG/1
81 TABLET, DELAYED RELEASE ORAL DAILY
Refills: 0 | Status: ACTIVE | COMMUNITY
Start: 2024-10-24

## 2024-10-24 RX ORDER — FUROSEMIDE 40 MG/1
40 TABLET ORAL DAILY
Refills: 0 | Status: ACTIVE | COMMUNITY
Start: 2024-10-24

## 2024-10-25 ENCOUNTER — APPOINTMENT (OUTPATIENT)
Dept: RHEUMATOLOGY | Facility: CLINIC | Age: 69
End: 2024-10-25

## 2024-10-25 VITALS
DIASTOLIC BLOOD PRESSURE: 80 MMHG | RESPIRATION RATE: 17 BRPM | WEIGHT: 206 LBS | HEART RATE: 87 BPM | TEMPERATURE: 98 F | SYSTOLIC BLOOD PRESSURE: 122 MMHG | HEIGHT: 65 IN | OXYGEN SATURATION: 95 % | BODY MASS INDEX: 34.32 KG/M2

## 2024-10-25 DIAGNOSIS — J18.9 PNEUMONIA, UNSPECIFIED ORGANISM: ICD-10-CM

## 2024-10-25 DIAGNOSIS — M06.9 RHEUMATOID ARTHRITIS, UNSPECIFIED: ICD-10-CM

## 2024-10-25 PROCEDURE — 99214 OFFICE O/P EST MOD 30 MIN: CPT

## 2024-10-25 RX ORDER — TOCILIZUMAB 20 MG/ML
400 INJECTION, SOLUTION, CONCENTRATE INTRAVENOUS
Qty: 1 | Refills: 0 | Status: ACTIVE | COMMUNITY
Start: 2024-10-25 | End: 1900-01-01

## 2024-10-25 RX ADMIN — TOCILIZUMAB 400 MG/20ML: 20 INJECTION, SOLUTION, CONCENTRATE INTRAVENOUS at 00:00

## 2024-10-31 ENCOUNTER — APPOINTMENT (OUTPATIENT)
Dept: RHEUMATOLOGY | Facility: CLINIC | Age: 69
End: 2024-10-31
Payer: MEDICARE

## 2024-10-31 ENCOUNTER — APPOINTMENT (OUTPATIENT)
Dept: FAMILY MEDICINE | Facility: CLINIC | Age: 69
End: 2024-10-31
Payer: MEDICARE

## 2024-10-31 VITALS
WEIGHT: 196 LBS | DIASTOLIC BLOOD PRESSURE: 66 MMHG | HEIGHT: 65 IN | BODY MASS INDEX: 32.65 KG/M2 | HEART RATE: 103 BPM | OXYGEN SATURATION: 91 % | SYSTOLIC BLOOD PRESSURE: 102 MMHG

## 2024-10-31 VITALS
SYSTOLIC BLOOD PRESSURE: 97 MMHG | DIASTOLIC BLOOD PRESSURE: 61 MMHG | TEMPERATURE: 97.7 F | OXYGEN SATURATION: 95 % | RESPIRATION RATE: 15 BRPM | HEART RATE: 90 BPM

## 2024-10-31 VITALS
DIASTOLIC BLOOD PRESSURE: 62 MMHG | HEART RATE: 95 BPM | SYSTOLIC BLOOD PRESSURE: 97 MMHG | RESPIRATION RATE: 17 BRPM | OXYGEN SATURATION: 95 %

## 2024-10-31 DIAGNOSIS — E03.9 HYPOTHYROIDISM, UNSPECIFIED: ICD-10-CM

## 2024-10-31 DIAGNOSIS — R78.81 BACTEREMIA: ICD-10-CM

## 2024-10-31 DIAGNOSIS — R53.1 WEAKNESS: ICD-10-CM

## 2024-10-31 DIAGNOSIS — B96.5 BACTEREMIA: ICD-10-CM

## 2024-10-31 DIAGNOSIS — J80 ACUTE RESPIRATORY DISTRESS SYNDROME: ICD-10-CM

## 2024-10-31 PROCEDURE — 36415 COLL VENOUS BLD VENIPUNCTURE: CPT

## 2024-10-31 PROCEDURE — 99495 TRANSJ CARE MGMT MOD F2F 14D: CPT

## 2024-10-31 PROCEDURE — 99496 TRANSJ CARE MGMT HIGH F2F 7D: CPT

## 2024-10-31 PROCEDURE — 96413 CHEMO IV INFUSION 1 HR: CPT

## 2024-10-31 RX ORDER — GABAPENTIN 300 MG/1
300 CAPSULE ORAL
Refills: 0 | Status: ACTIVE | COMMUNITY
Start: 2024-10-24

## 2024-10-31 RX ORDER — TOCILIZUMAB 20 MG/ML
400 INJECTION, SOLUTION, CONCENTRATE INTRAVENOUS
Qty: 1 | Refills: 0 | Status: COMPLETED | OUTPATIENT
Start: 2024-10-25

## 2024-11-01 LAB
ALBUMIN SERPL ELPH-MCNC: 3.9 G/DL
ALP BLD-CCNC: 93 U/L
ALT SERPL-CCNC: 13 U/L
ANION GAP SERPL CALC-SCNC: 13 MMOL/L
AST SERPL-CCNC: 19 U/L
BASOPHILS # BLD AUTO: 0.07 K/UL
BASOPHILS # BLD AUTO: 0.07 K/UL
BASOPHILS NFR BLD AUTO: 0.9 %
BASOPHILS NFR BLD AUTO: 0.9 %
BILIRUB SERPL-MCNC: 0.3 MG/DL
BUN SERPL-MCNC: 13 MG/DL
CALCIUM SERPL-MCNC: 9.5 MG/DL
CHLORIDE SERPL-SCNC: 101 MMOL/L
CO2 SERPL-SCNC: 24 MMOL/L
CREAT SERPL-MCNC: 0.8 MG/DL
CRP SERPL-MCNC: 22 MG/L
EGFR: 79 ML/MIN/1.73M2
EOSINOPHIL # BLD AUTO: 0.42 K/UL
EOSINOPHIL # BLD AUTO: 0.43 K/UL
EOSINOPHIL NFR BLD AUTO: 5.1 %
EOSINOPHIL NFR BLD AUTO: 5.7 %
ERYTHROCYTE [SEDIMENTATION RATE] IN BLOOD BY WESTERGREN METHOD: 51 MM/HR
GLUCOSE SERPL-MCNC: 168 MG/DL
HCT VFR BLD CALC: 32.8 %
HCT VFR BLD CALC: 34.2 %
HGB BLD-MCNC: 10.1 G/DL
HGB BLD-MCNC: 10.5 G/DL
IMM GRANULOCYTES NFR BLD AUTO: 0.2 %
IMM GRANULOCYTES NFR BLD AUTO: 0.3 %
LYMPHOCYTES # BLD AUTO: 1.05 K/UL
LYMPHOCYTES # BLD AUTO: 1.46 K/UL
LYMPHOCYTES NFR BLD AUTO: 13.9 %
LYMPHOCYTES NFR BLD AUTO: 17.8 %
MAN DIFF?: NORMAL
MAN DIFF?: NORMAL
MCHC RBC-ENTMCNC: 30.7 G/DL
MCHC RBC-ENTMCNC: 30.8 G/DL
MCHC RBC-ENTMCNC: 32.2 PG
MCHC RBC-ENTMCNC: 32.4 PG
MCV RBC AUTO: 104.9 FL
MCV RBC AUTO: 105.1 FL
MONOCYTES # BLD AUTO: 0.42 K/UL
MONOCYTES # BLD AUTO: 0.43 K/UL
MONOCYTES NFR BLD AUTO: 5.2 %
MONOCYTES NFR BLD AUTO: 5.6 %
NEUTROPHILS # BLD AUTO: 5.57 K/UL
NEUTROPHILS # BLD AUTO: 5.82 K/UL
NEUTROPHILS NFR BLD AUTO: 70.8 %
NEUTROPHILS NFR BLD AUTO: 73.6 %
PLATELET # BLD AUTO: 587 K/UL
PLATELET # BLD AUTO: 603 K/UL
POTASSIUM SERPL-SCNC: 4.8 MMOL/L
PROT SERPL-MCNC: 6 G/DL
RBC # BLD: 3.12 M/UL
RBC # BLD: 3.26 M/UL
RBC # FLD: 12 %
RBC # FLD: 12 %
SODIUM SERPL-SCNC: 138 MMOL/L
TSH SERPL-ACNC: 2.54 UIU/ML
WBC # FLD AUTO: 7.56 K/UL
WBC # FLD AUTO: 8.22 K/UL

## 2024-11-04 PROBLEM — R78.81 BACTEREMIA DUE TO PSEUDOMONAS: Status: ACTIVE | Noted: 2024-11-04

## 2024-11-04 PROBLEM — R53.1 WEAKNESS: Status: ACTIVE | Noted: 2024-11-04

## 2024-11-08 DIAGNOSIS — D53.1 OTHER MEGALOBLASTIC ANEMIAS, NOT ELSEWHERE CLASSIFIED: ICD-10-CM

## 2024-11-14 NOTE — ED ADULT NURSE NOTE - NS ED NURSE LEVEL OF CONSCIOUSNESS MENTAL STATUS
The patient is Watcher - Medium risk of patient condition declining or worsening    Shift Goals  Clinical Goals: Hemodynamic stability, neuro improvement,  Patient Goals: VALERIO  Family Goals: Updates    Progress made toward(s) clinical / shift goals:    Problem: Skin Integrity  Goal: Skin integrity is maintained or improved  Description: Target End Date:  Prior to discharge or change in level of care    Document interventions on Skin Risk/Jarrod flowsheet groups and corresponding LDA    1.  Assess and monitor skin integrity, appearance and/or temperature  2.  Assess risk factors for impaired skin integrity and/or pressures ulcers  3.  Implement precautions to protect skin integrity in collaboration with interdisciplinary team  4.  Implement pressure ulcer prevention protocol if at risk for skin breakdown  5.  Confirm wound care consult if at risk for skin breakdown  6.  Ensure patient use of pressure relieving devices  (Low air loss bed, waffle overlay, heel protectors, ROHO cushion, etc)  Outcome: Progressing     Problem: Fall Risk  Goal: Patient will remain free from falls  Description: Target End Date:  Prior to discharge or change in level of care    Document interventions on the Pe[place] Tristan Fall Risk Assessment    1.  Assess for fall risk factors  2.  Implement fall precautions  Outcome: Progressing     Problem: Nutrition  Goal: Patient's nutritional and fluid intake will be adequate or improve  Description: Target End Date:  Prior to discharge or change in level of care    Document on I/O flowsheet    1.  Monitor nutritional intake  2.  Monitor weight per provider order  3.  Assess patient's ability to take oral nutrition  4.  Collaborate with Speech Therapy, Dietitian and interdisciplinary team for appropriate feeding and fluid intake  5.  Assist with feeding  Outcome: Progressing     Problem: Hemodynamics  Goal: Patient's hemodynamics, fluid balance and neurologic status will be stable or  improve  Description: Target End Date:  Prior to discharge or change in level of care    Document on Assessment and I/O flowsheet templates    1.  Monitor vital signs, pulse oximetry and cardiac monitor per provider order and/or policy  2.  Maintain blood pressure per provider order  3.  Hemodynamic monitoring per provider order  4.  Manage IV fluids and IV infusions  5.  Monitor intake and output  6.  Daily weights per unit policy or provider order  7.  Assess peripheral pulses and capillary refill  8.  Assess color and body temperature  9.  Position patient for maximum circulation/cardiac output  10. Monitor for signs/symptoms of excessive bleeding  11. Assess mental status, restlessness and changes in level of consciousness  12. Monitor temperature and report fever or hypothermia to provider immediately. Consideration of targeted temperature management.  Outcome: Progressing     Problem: Infection - Standard  Goal: Patient will remain free from infection  Description: Target End Date:  Prior to discharge or change in level of care    1.  Utilize Standard Precautions at all times to reduce the risk of transmission of microorganisms from both recognized and  unrecognized sources of infection  2.  Infection prevention handouts provided (general/device/diagnosis specific) and documented in Patient Education  3.  Educate patient and family/caregiver on isolation precautions if applicable  Outcome: Progressing     Problem: Pain - Standard  Goal: Alleviation of pain or a reduction in pain to the patient’s comfort goal  Description: Target End Date:  Prior to discharge or change in level of care    Document on Vitals flowsheet    1.  Document pain using the appropriate pain scale per order or unit policy  2.  Educate and implement non-pharmacologic comfort measures (i.e. relaxation, distraction, massage, cold/heat therapy, etc.)  3.  Pain management medications as ordered  4.  Reassess pain after pain med administration  per policy  5.  If opiods administered assess patient's response to pain medication is appropriate per POSS sedation scale  6.  Follow pain management plan developed in collaboration with patient and interdisciplinary team (including palliative care or pain specialists if applicable)  Outcome: Progressing       Patient is not progressing towards the following goals:      Problem: Knowledge Deficit - Standard  Goal: Patient and family/care givers will demonstrate understanding of plan of care, disease process/condition, diagnostic tests and medications  Description: Target End Date:  1-3 days or as soon as patient condition allows    Document in Patient Education    1.  Patient and family/caregiver oriented to unit, equipment, visitation policy and means for communicating concern  2.  Complete/review Learning Assessment  3.  Assess knowledge level of disease process/condition, treatment plan, diagnostic tests and medications  4.  Explain disease process/condition, treatment plan, diagnostic tests and medications  Outcome: Not Progressing     Problem: Respiratory:  Goal: Respiratory status will improve  Outcome: Not Progressing     Problem: Mobility  Goal: Risk for activity intolerance will decrease  Outcome: Not Progressing     Problem: Risk for Aspiration  Goal: Patient's risk for aspiration will be absent or decrease  Description: Target End Date:  Prior to discharge or change in level of care    1.   Complete dysphagia screening on admission  2.   NPO until dysphagia screening complete or medically cleared  3.   Collaborate with Speech Therapy, Clinical Dietitian and interdisciplinary team  4.   Implement aspiration precautions  5.   Assist patient up to chair for meals  6.   Elevate head of bed 90 degrees if patient is unable to get out of bed  7.   Encourage small bites  8.   Ensure foods/liquids are of appropriate consistency  9.   Assess for any signs/symptoms of aspiration  10. Assess breath sounds and vital  signs after oral intake  Outcome: Not Progressing      Awake

## 2024-11-18 NOTE — DISCHARGE NOTE PROVIDER - DETAILS OF MALNUTRITION DIAGNOSIS/DIAGNOSES
This patient has been assessed with a concern for Malnutrition and was treated during this hospitalization for the following Nutrition diagnosis/diagnoses:     -  09/27/2024: Moderate protein-calorie malnutrition   Hyperglycemia

## 2024-11-22 ENCOUNTER — INPATIENT (INPATIENT)
Facility: HOSPITAL | Age: 69
LOS: 6 days | Discharge: HOME | DRG: 871 | End: 2024-11-29
Attending: STUDENT IN AN ORGANIZED HEALTH CARE EDUCATION/TRAINING PROGRAM | Admitting: INTERNAL MEDICINE
Payer: MEDICARE

## 2024-11-22 VITALS
DIASTOLIC BLOOD PRESSURE: 39 MMHG | HEIGHT: 64 IN | OXYGEN SATURATION: 100 % | HEART RATE: 91 BPM | WEIGHT: 189.16 LBS | RESPIRATION RATE: 16 BRPM | SYSTOLIC BLOOD PRESSURE: 68 MMHG | TEMPERATURE: 97 F

## 2024-11-22 DIAGNOSIS — Z98.890 OTHER SPECIFIED POSTPROCEDURAL STATES: Chronic | ICD-10-CM

## 2024-11-22 DIAGNOSIS — A41.9 SEPSIS, UNSPECIFIED ORGANISM: ICD-10-CM

## 2024-11-22 DIAGNOSIS — Z98.1 ARTHRODESIS STATUS: Chronic | ICD-10-CM

## 2024-11-22 DIAGNOSIS — Z96.651 PRESENCE OF RIGHT ARTIFICIAL KNEE JOINT: Chronic | ICD-10-CM

## 2024-11-22 LAB
ALBUMIN SERPL ELPH-MCNC: 4.1 G/DL — SIGNIFICANT CHANGE UP (ref 3.3–5.2)
ALP SERPL-CCNC: 90 U/L — SIGNIFICANT CHANGE UP (ref 40–120)
ALT FLD-CCNC: 8 U/L — SIGNIFICANT CHANGE UP
ANION GAP SERPL CALC-SCNC: 20 MMOL/L — HIGH (ref 5–17)
APPEARANCE UR: ABNORMAL
APTT BLD: 28.9 SEC — SIGNIFICANT CHANGE UP (ref 24.5–35.6)
AST SERPL-CCNC: 18 U/L — SIGNIFICANT CHANGE UP
BACTERIA # UR AUTO: ABNORMAL /HPF
BASE EXCESS BLDV CALC-SCNC: -11.4 MMOL/L — LOW (ref -2–3)
BASOPHILS # BLD AUTO: 0.06 K/UL — SIGNIFICANT CHANGE UP (ref 0–0.2)
BASOPHILS NFR BLD AUTO: 0.6 % — SIGNIFICANT CHANGE UP (ref 0–2)
BILIRUB SERPL-MCNC: 0.4 MG/DL — SIGNIFICANT CHANGE UP (ref 0.4–2)
BILIRUB UR-MCNC: NEGATIVE — SIGNIFICANT CHANGE UP
BUN SERPL-MCNC: 62.8 MG/DL — HIGH (ref 8–20)
CALCIUM SERPL-MCNC: 9.1 MG/DL — SIGNIFICANT CHANGE UP (ref 8.4–10.5)
CAST: 25 /LPF — HIGH (ref 0–4)
CHLORIDE SERPL-SCNC: 97 MMOL/L — SIGNIFICANT CHANGE UP (ref 96–108)
CO2 SERPL-SCNC: 16 MMOL/L — LOW (ref 22–29)
COLOR SPEC: YELLOW — SIGNIFICANT CHANGE UP
CREAT SERPL-MCNC: 5.87 MG/DL — HIGH (ref 0.5–1.3)
DIFF PNL FLD: ABNORMAL
EGFR: 7 ML/MIN/1.73M2 — LOW
EOSINOPHIL # BLD AUTO: 0.24 K/UL — SIGNIFICANT CHANGE UP (ref 0–0.5)
EOSINOPHIL NFR BLD AUTO: 2.6 % — SIGNIFICANT CHANGE UP (ref 0–6)
GAS PNL BLDV: SIGNIFICANT CHANGE UP
GLUCOSE SERPL-MCNC: 109 MG/DL — HIGH (ref 70–99)
GLUCOSE UR QL: NEGATIVE MG/DL — SIGNIFICANT CHANGE UP
HCO3 BLDV-SCNC: 16 MMOL/L — LOW (ref 22–29)
HCT VFR BLD CALC: 34 % — LOW (ref 34.5–45)
HGB BLD-MCNC: 11.7 G/DL — SIGNIFICANT CHANGE UP (ref 11.5–15.5)
IMM GRANULOCYTES NFR BLD AUTO: 0.5 % — SIGNIFICANT CHANGE UP (ref 0–0.9)
INR BLD: 0.97 RATIO — SIGNIFICANT CHANGE UP (ref 0.85–1.16)
KETONES UR-MCNC: NEGATIVE MG/DL — SIGNIFICANT CHANGE UP
LACTATE BLDV-MCNC: 1.2 MMOL/L — SIGNIFICANT CHANGE UP (ref 0.5–2)
LEUKOCYTE ESTERASE UR-ACNC: ABNORMAL
LYMPHOCYTES # BLD AUTO: 1.11 K/UL — SIGNIFICANT CHANGE UP (ref 1–3.3)
LYMPHOCYTES # BLD AUTO: 12 % — LOW (ref 13–44)
MCHC RBC-ENTMCNC: 31.9 PG — SIGNIFICANT CHANGE UP (ref 27–34)
MCHC RBC-ENTMCNC: 34.4 G/DL — SIGNIFICANT CHANGE UP (ref 32–36)
MCV RBC AUTO: 92.6 FL — SIGNIFICANT CHANGE UP (ref 80–100)
MONOCYTES # BLD AUTO: 0.66 K/UL — SIGNIFICANT CHANGE UP (ref 0–0.9)
MONOCYTES NFR BLD AUTO: 7.1 % — SIGNIFICANT CHANGE UP (ref 2–14)
NEUTROPHILS # BLD AUTO: 7.13 K/UL — SIGNIFICANT CHANGE UP (ref 1.8–7.4)
NEUTROPHILS NFR BLD AUTO: 77.2 % — HIGH (ref 43–77)
NITRITE UR-MCNC: NEGATIVE — SIGNIFICANT CHANGE UP
PCO2 BLDV: 36 MMHG — LOW (ref 39–42)
PH BLDV: 7.25 — LOW (ref 7.32–7.43)
PH UR: 6 — SIGNIFICANT CHANGE UP (ref 5–8)
PLATELET # BLD AUTO: 186 K/UL — SIGNIFICANT CHANGE UP (ref 150–400)
PO2 BLDV: 137 MMHG — HIGH (ref 25–45)
POTASSIUM SERPL-MCNC: 4.5 MMOL/L — SIGNIFICANT CHANGE UP (ref 3.5–5.3)
POTASSIUM SERPL-SCNC: 4.5 MMOL/L — SIGNIFICANT CHANGE UP (ref 3.5–5.3)
PROT SERPL-MCNC: 6.7 G/DL — SIGNIFICANT CHANGE UP (ref 6.6–8.7)
PROT UR-MCNC: 300 MG/DL
PROTHROM AB SERPL-ACNC: 11 SEC — SIGNIFICANT CHANGE UP (ref 9.9–13.4)
RBC # BLD: 3.67 M/UL — LOW (ref 3.8–5.2)
RBC # FLD: 13.7 % — SIGNIFICANT CHANGE UP (ref 10.3–14.5)
RBC CASTS # UR COMP ASSIST: 3 /HPF — SIGNIFICANT CHANGE UP (ref 0–4)
SAO2 % BLDV: 99.5 % — SIGNIFICANT CHANGE UP
SODIUM SERPL-SCNC: 133 MMOL/L — LOW (ref 135–145)
SP GR SPEC: 1.01 — SIGNIFICANT CHANGE UP (ref 1–1.03)
SQUAMOUS # UR AUTO: 5 /HPF — SIGNIFICANT CHANGE UP (ref 0–5)
UROBILINOGEN FLD QL: 0.2 MG/DL — SIGNIFICANT CHANGE UP (ref 0.2–1)
WBC # BLD: 9.25 K/UL — SIGNIFICANT CHANGE UP (ref 3.8–10.5)
WBC # FLD AUTO: 9.25 K/UL — SIGNIFICANT CHANGE UP (ref 3.8–10.5)
WBC UR QL: 738 /HPF — HIGH (ref 0–5)

## 2024-11-22 PROCEDURE — 99291 CRITICAL CARE FIRST HOUR: CPT

## 2024-11-22 PROCEDURE — 70450 CT HEAD/BRAIN W/O DYE: CPT | Mod: 26,MC

## 2024-11-22 PROCEDURE — 71250 CT THORAX DX C-: CPT | Mod: 26,MC

## 2024-11-22 PROCEDURE — 74176 CT ABD & PELVIS W/O CONTRAST: CPT | Mod: 26,MC

## 2024-11-22 PROCEDURE — 71045 X-RAY EXAM CHEST 1 VIEW: CPT | Mod: 26

## 2024-11-22 RX ORDER — VENLAFAXINE HYDROCHLORIDE 75 MG/1
150 CAPSULE, EXTENDED RELEASE ORAL DAILY
Refills: 0 | Status: DISCONTINUED | OUTPATIENT
Start: 2024-11-23 | End: 2024-11-23

## 2024-11-22 RX ORDER — 0.9 % SODIUM CHLORIDE 0.9 %
1000 INTRAVENOUS SOLUTION INTRAVENOUS
Refills: 0 | Status: DISCONTINUED | OUTPATIENT
Start: 2024-11-22 | End: 2024-11-29

## 2024-11-22 RX ORDER — CHLORHEXIDINE GLUCONATE 1.2 MG/ML
1 RINSE ORAL
Refills: 0 | Status: DISCONTINUED | OUTPATIENT
Start: 2024-11-22 | End: 2024-11-29

## 2024-11-22 RX ORDER — CEFEPIME 2 G/1
2000 INJECTION, POWDER, FOR SOLUTION INTRAVENOUS EVERY 8 HOURS
Refills: 0 | Status: COMPLETED | OUTPATIENT
Start: 2024-11-22 | End: 2024-11-22

## 2024-11-22 RX ORDER — ROSUVASTATIN CALCIUM 5 MG/1
20 TABLET, FILM COATED ORAL AT BEDTIME
Refills: 0 | Status: DISCONTINUED | OUTPATIENT
Start: 2024-11-22 | End: 2024-11-29

## 2024-11-22 RX ORDER — CEFEPIME 2 G/1
INJECTION, POWDER, FOR SOLUTION INTRAVENOUS
Refills: 0 | Status: COMPLETED | OUTPATIENT
Start: 2024-11-22 | End: 2024-11-22

## 2024-11-22 RX ORDER — CEFEPIME 2 G/1
INJECTION, POWDER, FOR SOLUTION INTRAVENOUS
Refills: 0 | Status: DISCONTINUED | OUTPATIENT
Start: 2024-11-22 | End: 2024-11-22

## 2024-11-22 RX ORDER — ACETAMINOPHEN, DIPHENHYDRAMINE HCL, PHENYLEPHRINE HCL 325; 25; 5 MG/1; MG/1; MG/1
5 TABLET ORAL AT BEDTIME
Refills: 0 | Status: DISCONTINUED | OUTPATIENT
Start: 2024-11-22 | End: 2024-11-29

## 2024-11-22 RX ORDER — 0.9 % SODIUM CHLORIDE 0.9 %
500 INTRAVENOUS SOLUTION INTRAVENOUS ONCE
Refills: 0 | Status: COMPLETED | OUTPATIENT
Start: 2024-11-22 | End: 2024-11-22

## 2024-11-22 RX ORDER — CEFEPIME 2 G/1
2000 INJECTION, POWDER, FOR SOLUTION INTRAVENOUS ONCE
Refills: 0 | Status: COMPLETED | OUTPATIENT
Start: 2024-11-22 | End: 2024-11-22

## 2024-11-22 RX ORDER — GABAPENTIN 300 MG/1
300 CAPSULE ORAL EVERY 6 HOURS
Refills: 0 | Status: DISCONTINUED | OUTPATIENT
Start: 2024-11-22 | End: 2024-11-22

## 2024-11-22 RX ORDER — CLOPIDOGREL 75 MG/1
75 TABLET, FILM COATED ORAL DAILY
Refills: 0 | Status: DISCONTINUED | OUTPATIENT
Start: 2024-11-22 | End: 2024-11-29

## 2024-11-22 RX ORDER — LEVOTHYROXINE SODIUM 150 MCG
100 TABLET ORAL DAILY
Refills: 0 | Status: DISCONTINUED | OUTPATIENT
Start: 2024-11-22 | End: 2024-11-29

## 2024-11-22 RX ORDER — VENLAFAXINE HYDROCHLORIDE 75 MG/1
100 CAPSULE, EXTENDED RELEASE ORAL DAILY
Refills: 0 | Status: DISCONTINUED | OUTPATIENT
Start: 2024-11-22 | End: 2024-11-22

## 2024-11-22 RX ORDER — GABAPENTIN 300 MG/1
100 CAPSULE ORAL EVERY 12 HOURS
Refills: 0 | Status: DISCONTINUED | OUTPATIENT
Start: 2024-11-22 | End: 2024-11-29

## 2024-11-22 RX ORDER — GLUCAGON INJECTION, SOLUTION 0.5 MG/.1ML
1 INJECTION, SOLUTION SUBCUTANEOUS ONCE
Refills: 0 | Status: DISCONTINUED | OUTPATIENT
Start: 2024-11-22 | End: 2024-11-29

## 2024-11-22 RX ORDER — EZETIMIBE 10 MG
10 TABLET ORAL DAILY
Refills: 0 | Status: DISCONTINUED | OUTPATIENT
Start: 2024-11-22 | End: 2024-11-29

## 2024-11-22 RX ORDER — SODIUM CHLORIDE 9 MG/ML
500 INJECTION, SOLUTION INTRAMUSCULAR; INTRAVENOUS; SUBCUTANEOUS ONCE
Refills: 0 | Status: COMPLETED | OUTPATIENT
Start: 2024-11-22 | End: 2024-11-22

## 2024-11-22 RX ORDER — NOREPINEPHRINE BITARTRATE 1 MG/ML
0.05 INJECTION, SOLUTION, CONCENTRATE INTRAVENOUS
Qty: 8 | Refills: 0 | Status: DISCONTINUED | OUTPATIENT
Start: 2024-11-22 | End: 2024-11-23

## 2024-11-22 RX ORDER — SODIUM CHLORIDE 9 MG/ML
2000 INJECTION, SOLUTION INTRAMUSCULAR; INTRAVENOUS; SUBCUTANEOUS ONCE
Refills: 0 | Status: COMPLETED | OUTPATIENT
Start: 2024-11-22 | End: 2024-11-22

## 2024-11-22 RX ADMIN — Medication 500 MILLILITER(S): at 21:22

## 2024-11-22 RX ADMIN — Medication 81 MILLIGRAM(S): at 23:59

## 2024-11-22 RX ADMIN — SODIUM CHLORIDE 2000 MILLILITER(S): 9 INJECTION, SOLUTION INTRAMUSCULAR; INTRAVENOUS; SUBCUTANEOUS at 19:05

## 2024-11-22 RX ADMIN — SODIUM CHLORIDE 2000 MILLILITER(S): 9 INJECTION, SOLUTION INTRAMUSCULAR; INTRAVENOUS; SUBCUTANEOUS at 17:34

## 2024-11-22 RX ADMIN — CEFEPIME 2000 MILLIGRAM(S): 2 INJECTION, POWDER, FOR SOLUTION INTRAVENOUS at 22:28

## 2024-11-22 RX ADMIN — CEFEPIME 2000 MILLIGRAM(S): 2 INJECTION, POWDER, FOR SOLUTION INTRAVENOUS at 17:35

## 2024-11-22 RX ADMIN — ACETAMINOPHEN, DIPHENHYDRAMINE HCL, PHENYLEPHRINE HCL 5 MILLIGRAM(S): 325; 25; 5 TABLET ORAL at 23:59

## 2024-11-22 RX ADMIN — Medication 10 MILLIGRAM(S): at 23:59

## 2024-11-22 RX ADMIN — CLOPIDOGREL 75 MILLIGRAM(S): 75 TABLET, FILM COATED ORAL at 23:59

## 2024-11-22 RX ADMIN — ROSUVASTATIN CALCIUM 20 MILLIGRAM(S): 5 TABLET, FILM COATED ORAL at 22:28

## 2024-11-22 RX ADMIN — NOREPINEPHRINE BITARTRATE 8.04 MICROGRAM(S)/KG/MIN: 1 INJECTION, SOLUTION, CONCENTRATE INTRAVENOUS at 20:11

## 2024-11-22 RX ADMIN — SODIUM CHLORIDE 1000 MILLILITER(S): 9 INJECTION, SOLUTION INTRAMUSCULAR; INTRAVENOUS; SUBCUTANEOUS at 19:17

## 2024-11-22 NOTE — H&P ADULT - HISTORY OF PRESENT ILLNESS
Patient is a 69y old  Female who presents with a chief complaint of     BRIEF HOSPITAL COURSE:   69F PMHx CAD, NSTEMI in  s/p ELIZABETH to LAD, s/p ELIZABETH Distal LCx (2024), HTN, HLD, hypothyroid, rheumatoid arthritis, recent admission to MICU here in September for hypoxic respiratory failure, ARDS requiring intubation, Septic Shock secondary to LLL pneumonia with concerns for acute systolic heart failure, cardiogenic shock (repeat ECHO with improved EF), now presents with AMS x1-2 days. Patient diagnosed with UTI in outpatient setting and finished Levaquin course, however started to become confused.   In ER, CT head negative for acute intracranial pathology. CT C/A/P negative. Labs significant for BUN/SCr 62.8/5.87, pH 7.25, HCO3 16, UA positive.   Noted to be hypotensive s/p sepsis protocol IVF and started on Levophed Infusion. MICU consulted for further management.     PAST MEDICAL & SURGICAL HISTORY:  Hypothyroid  ADHD (attention deficit hyperactivity disorder)  HLD (hyperlipidemia)  Myocardial infarct    Stented coronary artery    Rheumatoid arthritis  Migraine  S/p bilateral carpal tunnel release  H/O cardiac catheterization  H/O laminectomy  H/O spinal fusion  S/P left knee arthroscopy  H/O total knee replacement, right    Review of Systems:  CONSTITUTIONAL: No fever, chills, or fatigue  EYES: No eye pain, visual disturbances, or discharge  ENMT:  No difficulty hearing, tinnitus, vertigo; No sinus or throat pain  NECK: No pain or stiffness  RESPIRATORY: No cough, wheezing, chills or hemoptysis; No shortness of breath  CARDIOVASCULAR: No chest pain, palpitations, dizziness, or leg swelling  GASTROINTESTINAL: No abdominal or epigastric pain. No nausea, vomiting, or hematemesis; No diarrhea or constipation. No melena or hematochezia.  GENITOURINARY: No dysuria, frequency, hematuria, or incontinence  NEUROLOGICAL: No headaches, memory loss, loss of strength, numbness, or tremors  SKIN: No itching, burning, rashes, or lesions   MUSCULOSKELETAL: No joint pain or swelling; No muscle, back, or extremity pain  PSYCHIATRIC: No depression, anxiety, mood swings, or difficulty sleeping    Medications:  cefepime  Injectable.      cefepime  Injectable. 2000 milliGRAM(s) IV Push every 8 hours  norepinephrine Infusion 0.05 MICROgram(s)/kG/Min IV Continuous <Continuous>  lactated ringers Bolus 500 milliLiter(s) IV Bolus once  chlorhexidine 2% Cloths 1 Application(s) Topical <User Schedule>    ICU Vital Signs Last 24 Hrs  T(C): 36.7 (2024 17:49), Max: 36.7 (2024 17:49)  T(F): 98 (2024 17:49), Max: 98 (2024 17:49)  HR: 75 (2024 20:52) (71 - 91)  BP: 105/54 (2024 20:52) (68/39 - 137/72)  BP(mean): 69 (2024 20:52) (60 - 88)  ABP: --  ABP(mean): --  RR: 18 (2024 20:52) (16 - 18)  SpO2: 100% (:52) (94% - 100%)  O2 Parameters below as of 2024 20:52  Patient On (Oxygen Delivery Method): nasal cannula  O2 Flow (L/min): 2    I&O's Detail    LABS:                      11.7   9.25  )-----------( 186      ( 2024 17:29 )             34.0     11-  133[L]  |  97  |  62.8[H]  ----------------------------<  109[H]  4.5   |  16.0[L]  |  5.87[H]  Ca    9.1      2024 17:29  TPro  6.7  /  Alb  4.1  /  TBili  0.4  /  DBili  x   /  AST  18  /  ALT  8   /  AlkPhos  90  11-22    CAPILLARY BLOOD GLUCOSE    PT/INR - ( 2024 17:29 )   PT: 11.0 sec;   INR: 0.97 ratio    PTT - ( 2024 17:29 )  PTT:28.9 sec    Urinalysis Basic - ( 2024 18:21 )  Color: Yellow / Appearance: Turbid / S.014 / pH: x  Gluc: x / Ketone: Negative mg/dL  / Bili: Negative / Urobili: 0.2 mg/dL   Blood: x / Protein: 300 mg/dL / Nitrite: Negative   Leuk Esterase: Large / RBC: 3 /HPF /  /HPF   Sq Epi: x / Non Sq Epi: 5 /HPF / Bacteria: Many /HPF    CULTURES:    Physical Examination:  General: Alert, oriented, interactive, nonfocal.  HEENT: PERRL.  NECK: Supple.   PULM: Clear to auscultation bilaterally.  CVS: s1/s2.  ABD: Soft, nondistended, nontender, normoactive bowel sounds.  EXT: Mild LE edema, nontender.  SKIN: Warm.    RADIOLOGY:   < from: CT Chest No Cont (24 @ 19:04) >  ACC: [id number] EXAM:  CT ABDOMEN AND PELVIS   ORDERED BY: CELINA MARES   ACC: [id number] EXAM:  CT CHEST   ORDERED BY: CELINA MARES   PROCEDURE DATE:  2024    IMPRESSION: No acute abnormality.    < from: CT Head No Cont (24 @ 18:57) >  ACC: [id number] EXAM:  CT BRAIN   ORDERED BY: VISHAL HINKLE   PROCEDURE DATE:  2024    IMPRESSION:  Stable head CT. No acute intracranial hemorrhage,vasogenic edema or   extra-axial collection.    CRITICAL CARE TIME SPENT:  38 minutes of critical care time spent providing medical care for patient's acute illness/conditions that impairs at least one vital organ system and/or poses a high risk of imminent or life threatening deterioration in the patient's condition. It includes time spent evaluating and treating the patient's acute illness as well as time spent reviewing labs, radiology, discussing goals of care with patient and/or patient's family, and discussing the case with a multidisciplinary team, in an effort to prevent further life threatening deterioration or end organ damage. This time is independent of any procedures performed.    Date of entry of this note is equal to the date of services rendered.

## 2024-11-22 NOTE — H&P ADULT - ASSESSMENT
69F PMHx CAD, NSTEMI in 2011 s/p ELIZABETH to LAD, s/p ELIZABETH Distal LCx (4/30/2024), HTN, HLD, hypothyroid, rheumatoid arthritis, recent admission to MICU here in September for hypoxic respiratory failure, ARDS requiring intubation, Septic Shock secondary to LLL pneumonia with concerns for acute systolic heart failure, cardiogenic shock (repeat ECHO with improved EF), now presents with AMS x1-2 days.  Assessment:  1. Septic Shock  2. UTI  3. CLEMENTINA  4. Metabolic Encephalopathy    Plan:  NEURO:   -CT head negative for acute intracranial pathology.   -Monitor mental status closely, avoid neurosuppresants.   -Serial neurologic assessments.     CV:   -Started on Levophed infusion for shock state - actively titrating to maintain goal MAP >65 monitoring end points of organ perfusion; lactate wnl.   -Keep K~4 and Mg>2 for optimal arrhythmia suppression.  -Official TTE in AM.     PULM:  -Utilize supplemental O2 PRN to maintain goal SpO2 >88%.   -Incentive spirometry, Chest PT/Pulmonary toilet, HOB >30'.     GI:   -DASH/TLC Diet.    RENAL:   -Renal function currently with CLEMENTINA, likely ATN iso shock state. Lindo placed, nonoliguric. CT A/P without hydronephrosis.   -trend lytes/Scr daily with BMP  -I's and O's, goal UOP 0.5 cc/kg/hr  -renal dose meds and avoid nephrotoxins     ENDO:   -Aggressive glycemic control to limit FS glucose to <180mg/dl.     ID:   -Afebrile. UA positive. BloodCx/UCx sent - pending. Will place on empiric Cefepime course and give x1 Vancomycin dose pending Cx results.   -ABX use and/or discontinuation based on discussion with ID in conjunction with clinical features, culture data, and judicious procalcitonin monitoring.    HEME:   -VTE ppx with SQH.     GOC: Full Code.  DISPO: Case and plan discussed with MICU attending, Dr. Luz. Admit to MICU.  69F PMHx CAD, NSTEMI in 2011 s/p ELIZABETH to LAD, s/p ELIZABETH Distal LCx (4/30/2024), HTN, HLD, hypothyroid, rheumatoid arthritis, recent admission to MICU here in September for hypoxic respiratory failure, ARDS requiring intubation, Septic Shock secondary to LLL pneumonia with concerns for acute systolic heart failure, cardiogenic shock (repeat ECHO with improved EF), now presents with AMS x1-2 days.  Assessment:  1. Septic Shock  2. UTI  3. CLEMENTINA  4. Metabolic Encephalopathy    Plan:  NEURO:   -CT head negative for acute intracranial pathology.   -Monitor mental status closely, avoid neurosuppresants.   -Serial neurologic assessments.     CV:   -Started on Levophed infusion for shock state - actively titrating to maintain goal MAP >65 monitoring end points of organ perfusion; lactate wnl.   -Keep K~4 and Mg>2 for optimal arrhythmia suppression.  -Official TTE in AM.     PULM:  -Utilize supplemental O2 PRN to maintain goal SpO2 >88%.   -Incentive spirometry, Chest PT/Pulmonary toilet, HOB >30'.     GI:   -DASH/TLC Diet.    RENAL:   -Renal function currently with CLEMENTINA, likely ATN iso shock state. Lindo placed, nonoliguric. CT A/P without hydronephrosis.   -trend lytes/Scr daily with BMP  -I's and O's, goal UOP 0.5 cc/kg/hr  -renal dose meds and avoid nephrotoxins     ENDO:   -Aggressive glycemic control to limit FS glucose to <180mg/dl; ISS.     ID:   -Afebrile. UA positive. BloodCx/UCx sent - pending. Will place on empiric Cefepime course and give x1 Vancomycin dose pending Cx results.   -ABX use and/or discontinuation based on discussion with ID in conjunction with clinical features, culture data, and judicious procalcitonin monitoring.    HEME:   -VTE ppx with SQH.     GOC: Full Code.  DISPO: Case and plan discussed with MICU attending, Dr. Luz. Admit to MICU.

## 2024-11-22 NOTE — ED PROVIDER NOTE - CARE PLAN
1 Principal Discharge DX:	Sepsis   Principal Discharge DX:	Sepsis  Secondary Diagnosis:	Acute renal failure  Secondary Diagnosis:	Septic shock  Secondary Diagnosis:	Acute UTI

## 2024-11-22 NOTE — ED ADULT NURSE NOTE - OBJECTIVE STATEMENT
Pt a&ox3 at this time but complains of intermittent periods of confusion, as per pt and family member pt finished Levaquin 2 days ago for tx of uti. Pt hypotensive at triage, pt placed on /cm, blood cultures sent, IVF and antibiotics administered, MD Mendez at bedside, no distress noted.

## 2024-11-22 NOTE — ED PROVIDER NOTE - OBJECTIVE STATEMENT
Per result note message from Dr. Andrews, \"Message  Received: Yesterday  MD Ivis Huizar, MIKE; MD Ivis Weldon, I am copying this message to Dr. Celaya as well since he was wanting to see the CT results.  Please let this patient know that the CT scan only showed mild blockages.  There was not any severe blockage to explain the stress test finding which may very well have been a false positive.             Previous Messages       ----- Message -----   From: Adm Ernestog Incoming Radiant Results And Orders   Sent: 8/8/2022  11:35 AM CDT   To: Best Andrews MD              [facility] HE[facility] CORONARY ARTERIES     see mdm

## 2024-11-22 NOTE — ED ADULT NURSE NOTE - NSFALLHARMRISKINTERV_ED_ALL_ED

## 2024-11-22 NOTE — PHARMACOTHERAPY INTERVENTION NOTE - COMMENTS
Pt with documented history to PCN. Received cefepime during 9/2024 admission with no ADR
Hypotension

## 2024-11-22 NOTE — ED ADULT TRIAGE NOTE - CHIEF COMPLAINT QUOTE
confusion x 2 days. just finished levaquin for uti. a and o to self and place. hypotensive in triage.

## 2024-11-22 NOTE — H&P ADULT - NS PANP COMMENT GEN_ALL_CORE FT
69F with hx of CAD s/p PCI, HTN, HLD, hypothyroidism, rheumatoid arthritis, recent admission 9/2024 for hypoxic respiratory failure secondary to LLL PNA w/ ARDS, shock, stress cardiomyopathy, pseudomonas bacteremia presented to the ED 11/2 by son due to confusion over the last few days as well as tremors. Pt had been recently diagnosed with UTI and given course of Augmentin with no improvement. Pt was found to have hypotensive to 68/39 on presentation. Labs notable for HCO3 16, CLEMENTINA w/ BUN/Cr 62.8/5.87 and VBG 7.20/42, lactate 2.5. Pt was given 2.5L of fluids but had persistent hypotension requiring initiation of levophed. CT head negative for acute pathology. CT chest/abdomen/pelvis with no acute pathology. UA noted positive. Pt also reports NSAID use but reports typically at most twice daily. Started on empiric cefepime pending cultures. AMS likely in setting of infection/medication related in setting of CLEMENTINA and gabapentin. Renally dose medications. Pt with adequate urine output post cantu placement. Will give additional fluid bolus and trend chem 8. If CLEMENTINA persists, will need nephrology evaluation. Wean levophed as tolerated. Will start midodrine and stress dose steroids. check partial Echo. Lactate resolved. Son updated at the bedside.

## 2024-11-22 NOTE — ED PROVIDER NOTE - CRITICAL CARE ATTENDING CONTRIBUTION TO CARE
Upon my evaluation, this patient has a high probability of imminent or life-threatening deterioration which required my direct attention, intervention and personal management.  I have personally provided the amount of critical care time documented above excluding time spent on separate procedures. Elements for critical care include direct patient care (not related to procedure), additional history taking, interpretation of diagnostic studies, documentation, consultation with other physicians.   I, Daniel Mendez MD, personally saw the patient with the resident, and completed the key components of the history and physical exam. I then discussed the management plan with the resident.  Patient with a past medical history of prior coronary artery disease, hypertension, hyperlipidemia, recent admission for pneumonia is presenting with concern of confusion and dysuria.  History obtained from patient and son at bedside.  She has been worsening over the past few days.  No reported fevers at home.  On exam here she is hypotensive, not tachycardic.  She was alert and oriented though does appear to be confused during the exam.  No significant abdominal tenderness.  Concern for potential infectious etiology versus hypovolemia as a cause of her shock.  No reported bleeding to suggest hemorrhagic shock.  Will check for pneumonia, renal stone, cystitis.  Given AMS, will check CT head as well 2.    Given patient found to have CLEMENTINA, Lindo catheter placed with minimal urine output, likely prerenal CLEMENTINA in the setting of her UTI.  ICU was consulted, will accept patient.

## 2024-11-23 ENCOUNTER — RESULT REVIEW (OUTPATIENT)
Age: 69
End: 2024-11-23

## 2024-11-23 LAB
A1C WITH ESTIMATED AVERAGE GLUCOSE RESULT: 5.4 % — SIGNIFICANT CHANGE UP (ref 4–5.6)
ALBUMIN SERPL ELPH-MCNC: 3.6 G/DL — SIGNIFICANT CHANGE UP (ref 3.3–5.2)
ALP SERPL-CCNC: 76 U/L — SIGNIFICANT CHANGE UP (ref 40–120)
ALT FLD-CCNC: 6 U/L — SIGNIFICANT CHANGE UP
ANION GAP SERPL CALC-SCNC: 15 MMOL/L — SIGNIFICANT CHANGE UP (ref 5–17)
AST SERPL-CCNC: 19 U/L — SIGNIFICANT CHANGE UP
BASOPHILS # BLD AUTO: 0.04 K/UL — SIGNIFICANT CHANGE UP (ref 0–0.2)
BASOPHILS NFR BLD AUTO: 0.6 % — SIGNIFICANT CHANGE UP (ref 0–2)
BILIRUB SERPL-MCNC: 0.4 MG/DL — SIGNIFICANT CHANGE UP (ref 0.4–2)
BUN SERPL-MCNC: 57.4 MG/DL — HIGH (ref 8–20)
CALCIUM SERPL-MCNC: 8.4 MG/DL — SIGNIFICANT CHANGE UP (ref 8.4–10.5)
CHLORIDE SERPL-SCNC: 104 MMOL/L — SIGNIFICANT CHANGE UP (ref 96–108)
CO2 SERPL-SCNC: 17 MMOL/L — LOW (ref 22–29)
CREAT SERPL-MCNC: 5.19 MG/DL — HIGH (ref 0.5–1.3)
EGFR: 8 ML/MIN/1.73M2 — LOW
EOSINOPHIL # BLD AUTO: 0.26 K/UL — SIGNIFICANT CHANGE UP (ref 0–0.5)
EOSINOPHIL NFR BLD AUTO: 4.2 % — SIGNIFICANT CHANGE UP (ref 0–6)
ESTIMATED AVERAGE GLUCOSE: 108 MG/DL — SIGNIFICANT CHANGE UP (ref 68–114)
GLUCOSE SERPL-MCNC: 166 MG/DL — HIGH (ref 70–99)
HCT VFR BLD CALC: 29.9 % — LOW (ref 34.5–45)
HGB BLD-MCNC: 10.1 G/DL — LOW (ref 11.5–15.5)
IMM GRANULOCYTES NFR BLD AUTO: 0.3 % — SIGNIFICANT CHANGE UP (ref 0–0.9)
LYMPHOCYTES # BLD AUTO: 1.28 K/UL — SIGNIFICANT CHANGE UP (ref 1–3.3)
LYMPHOCYTES # BLD AUTO: 20.7 % — SIGNIFICANT CHANGE UP (ref 13–44)
MAGNESIUM SERPL-MCNC: 2.2 MG/DL — SIGNIFICANT CHANGE UP (ref 1.6–2.6)
MCHC RBC-ENTMCNC: 32 PG — SIGNIFICANT CHANGE UP (ref 27–34)
MCHC RBC-ENTMCNC: 33.8 G/DL — SIGNIFICANT CHANGE UP (ref 32–36)
MCV RBC AUTO: 94.6 FL — SIGNIFICANT CHANGE UP (ref 80–100)
MONOCYTES # BLD AUTO: 0.82 K/UL — SIGNIFICANT CHANGE UP (ref 0–0.9)
MONOCYTES NFR BLD AUTO: 13.3 % — SIGNIFICANT CHANGE UP (ref 2–14)
MRSA PCR RESULT.: SIGNIFICANT CHANGE UP
NEUTROPHILS # BLD AUTO: 3.76 K/UL — SIGNIFICANT CHANGE UP (ref 1.8–7.4)
NEUTROPHILS NFR BLD AUTO: 60.9 % — SIGNIFICANT CHANGE UP (ref 43–77)
PHOSPHATE SERPL-MCNC: 6.7 MG/DL — HIGH (ref 2.4–4.7)
PLATELET # BLD AUTO: 160 K/UL — SIGNIFICANT CHANGE UP (ref 150–400)
POTASSIUM SERPL-MCNC: 4.1 MMOL/L — SIGNIFICANT CHANGE UP (ref 3.5–5.3)
POTASSIUM SERPL-SCNC: 4.1 MMOL/L — SIGNIFICANT CHANGE UP (ref 3.5–5.3)
PROT SERPL-MCNC: 5.8 G/DL — LOW (ref 6.6–8.7)
RBC # BLD: 3.16 M/UL — LOW (ref 3.8–5.2)
RBC # FLD: 13.6 % — SIGNIFICANT CHANGE UP (ref 10.3–14.5)
S AUREUS DNA NOSE QL NAA+PROBE: SIGNIFICANT CHANGE UP
SODIUM SERPL-SCNC: 136 MMOL/L — SIGNIFICANT CHANGE UP (ref 135–145)
WBC # BLD: 6.18 K/UL — SIGNIFICANT CHANGE UP (ref 3.8–10.5)
WBC # FLD AUTO: 6.18 K/UL — SIGNIFICANT CHANGE UP (ref 3.8–10.5)

## 2024-11-23 PROCEDURE — 99222 1ST HOSP IP/OBS MODERATE 55: CPT

## 2024-11-23 PROCEDURE — 93970 EXTREMITY STUDY: CPT | Mod: 26

## 2024-11-23 PROCEDURE — 93010 ELECTROCARDIOGRAM REPORT: CPT

## 2024-11-23 PROCEDURE — 99291 CRITICAL CARE FIRST HOUR: CPT | Mod: GC

## 2024-11-23 PROCEDURE — 93308 TTE F-UP OR LMTD: CPT | Mod: 26

## 2024-11-23 RX ORDER — HYDROCORTISONE ACETATE 25 MG/ML
50 VIAL (ML) INJECTION EVERY 8 HOURS
Refills: 0 | Status: DISCONTINUED | OUTPATIENT
Start: 2024-11-23 | End: 2024-11-23

## 2024-11-23 RX ORDER — MIDODRINE HYDROCHLORIDE 5 MG/1
10 TABLET ORAL EVERY 8 HOURS
Refills: 0 | Status: DISCONTINUED | OUTPATIENT
Start: 2024-11-23 | End: 2024-11-24

## 2024-11-23 RX ORDER — HEPARIN SODIUM,PORCINE 1000/ML
5000 VIAL (ML) INJECTION EVERY 8 HOURS
Refills: 0 | Status: DISCONTINUED | OUTPATIENT
Start: 2024-11-23 | End: 2024-11-29

## 2024-11-23 RX ORDER — SENNOSIDES 8.6 MG
2 TABLET ORAL AT BEDTIME
Refills: 0 | Status: DISCONTINUED | OUTPATIENT
Start: 2024-11-23 | End: 2024-11-29

## 2024-11-23 RX ORDER — VENLAFAXINE HYDROCHLORIDE 75 MG/1
75 CAPSULE, EXTENDED RELEASE ORAL DAILY
Refills: 0 | Status: DISCONTINUED | OUTPATIENT
Start: 2024-11-23 | End: 2024-11-29

## 2024-11-23 RX ORDER — POLYETHYLENE GLYCOL 3350 17 G/17G
17 POWDER, FOR SOLUTION ORAL ONCE
Refills: 0 | Status: COMPLETED | OUTPATIENT
Start: 2024-11-23 | End: 2024-11-23

## 2024-11-23 RX ORDER — ACETAMINOPHEN 500MG 500 MG/1
1000 TABLET, COATED ORAL ONCE
Refills: 0 | Status: COMPLETED | OUTPATIENT
Start: 2024-11-23 | End: 2024-11-23

## 2024-11-23 RX ORDER — PANTOPRAZOLE SODIUM 40 MG/1
40 TABLET, DELAYED RELEASE ORAL
Refills: 0 | Status: DISCONTINUED | OUTPATIENT
Start: 2024-11-23 | End: 2024-11-29

## 2024-11-23 RX ORDER — POLYETHYLENE GLYCOL 3350 17 G/17G
17 POWDER, FOR SOLUTION ORAL DAILY
Refills: 0 | Status: DISCONTINUED | OUTPATIENT
Start: 2024-11-24 | End: 2024-11-29

## 2024-11-23 RX ORDER — CEFEPIME 2 G/1
1000 INJECTION, POWDER, FOR SOLUTION INTRAVENOUS EVERY 24 HOURS
Refills: 0 | Status: DISCONTINUED | OUTPATIENT
Start: 2024-11-24 | End: 2024-11-25

## 2024-11-23 RX ORDER — HYDROCORTISONE ACETATE 25 MG/ML
50 VIAL (ML) INJECTION EVERY 6 HOURS
Refills: 0 | Status: DISCONTINUED | OUTPATIENT
Start: 2024-11-23 | End: 2024-11-24

## 2024-11-23 RX ORDER — HYDROCORTISONE ACETATE 25 MG/ML
100 VIAL (ML) INJECTION ONCE
Refills: 0 | Status: COMPLETED | OUTPATIENT
Start: 2024-11-23 | End: 2024-11-23

## 2024-11-23 RX ADMIN — MIDODRINE HYDROCHLORIDE 10 MILLIGRAM(S): 5 TABLET ORAL at 13:10

## 2024-11-23 RX ADMIN — POLYETHYLENE GLYCOL 3350 17 GRAM(S): 17 POWDER, FOR SOLUTION ORAL at 22:55

## 2024-11-23 RX ADMIN — CLOPIDOGREL 75 MILLIGRAM(S): 75 TABLET, FILM COATED ORAL at 13:08

## 2024-11-23 RX ADMIN — Medication 5000 UNIT(S): at 22:38

## 2024-11-23 RX ADMIN — VENLAFAXINE HYDROCHLORIDE 75 MILLIGRAM(S): 75 CAPSULE, EXTENDED RELEASE ORAL at 13:08

## 2024-11-23 RX ADMIN — Medication 10 MILLIGRAM(S): at 13:07

## 2024-11-23 RX ADMIN — Medication 100 MILLIGRAM(S): at 06:33

## 2024-11-23 RX ADMIN — ACETAMINOPHEN, DIPHENHYDRAMINE HCL, PHENYLEPHRINE HCL 5 MILLIGRAM(S): 325; 25; 5 TABLET ORAL at 22:39

## 2024-11-23 RX ADMIN — PANTOPRAZOLE SODIUM 40 MILLIGRAM(S): 40 TABLET, DELAYED RELEASE ORAL at 06:33

## 2024-11-23 RX ADMIN — Medication 50 MILLIGRAM(S): at 22:38

## 2024-11-23 RX ADMIN — ACETAMINOPHEN 500MG 400 MILLIGRAM(S): 500 TABLET, COATED ORAL at 22:55

## 2024-11-23 RX ADMIN — Medication 50 MILLIGRAM(S): at 17:23

## 2024-11-23 RX ADMIN — CHLORHEXIDINE GLUCONATE 1 APPLICATION(S): 1.2 RINSE ORAL at 06:34

## 2024-11-23 RX ADMIN — Medication 5000 UNIT(S): at 06:33

## 2024-11-23 RX ADMIN — Medication 5000 UNIT(S): at 13:10

## 2024-11-23 RX ADMIN — GABAPENTIN 100 MILLIGRAM(S): 300 CAPSULE ORAL at 06:33

## 2024-11-23 RX ADMIN — MIDODRINE HYDROCHLORIDE 10 MILLIGRAM(S): 5 TABLET ORAL at 06:33

## 2024-11-23 RX ADMIN — Medication 2 TABLET(S): at 22:39

## 2024-11-23 RX ADMIN — GABAPENTIN 100 MILLIGRAM(S): 300 CAPSULE ORAL at 17:24

## 2024-11-23 RX ADMIN — Medication 50 MILLIGRAM(S): at 13:06

## 2024-11-23 RX ADMIN — Medication 100 MICROGRAM(S): at 06:33

## 2024-11-23 RX ADMIN — ROSUVASTATIN CALCIUM 20 MILLIGRAM(S): 5 TABLET, FILM COATED ORAL at 22:39

## 2024-11-23 RX ADMIN — Medication 81 MILLIGRAM(S): at 13:07

## 2024-11-23 RX ADMIN — MIDODRINE HYDROCHLORIDE 10 MILLIGRAM(S): 5 TABLET ORAL at 22:38

## 2024-11-23 NOTE — PATIENT PROFILE ADULT - FALL HARM RISK - HARM RISK INTERVENTIONS

## 2024-11-23 NOTE — PROGRESS NOTE ADULT - ASSESSMENT
1) Septic Shock with UTI  - septic shock resolved, off levo gtt  - on po midodrine 10mg Q8hrs   - monitor vitals  - on cortef  - BloodCx/UCx sent - pending.   - c/w empiric Cefepime course and given x1 Vancomycin dose in MICU    2) CAD s/p ELIZABETH  - c/w ASA/plavix and statin    3) HLD  - on statin & Zetia    4) Hypothyroidism  - on synthroid    5) GERD  - on PPI    6) Fibromyalgia and neuropathy  - on Gabapentin    7) Depression  - on Effexor    8) DVT ppx: heparin    Pt likely to be dispo home once medically cleared.

## 2024-11-23 NOTE — PROGRESS NOTE ADULT - ATTENDING COMMENTS
69 F PMHx CAD, NSTEMI in 2011 s/p ELIZABETH to LAD, s/p ELIZABETH Distal LCx (4/30/2024), HTN, HLD, hypothyroid, rheumatoid arthritis, recent admission to MICU here in September for hypoxic respiratory failure, ARDS requiring intubation, Septic Shock secondary to LLL pneumonia with concerns for acute systolic heart failure, cardiogenic shock (repeat ECHO with improved EF), now presents with AMS x1-2 days, found to have bacteruria and requiring vasopressors, confused and hallucinating at home per son at bedside, admitted to MICU for presumed distributive shock from UTI and encephalopathy possibly from gabapentin use in setting of acute renal failure. We titrated her norepinephrine infusion for goal MAP > 65. She was at 0.05 this AM. Her UOP has increased significantly from yesterday and Cr has downtrended. She has a nonoliguric CLEMENTINA. Continue to titrate down vasopressors for goal MAP > 65. F/U UCx. Does not appear to be in cardiogenic shock.

## 2024-11-23 NOTE — PROGRESS NOTE ADULT - ASSESSMENT
69F PMHx CAD, NSTEMI in 2011 s/p ELIZABETH to LAD, s/p ELIZABETH Distal LCx (4/30/2024), HTN, HLD, hypothyroid, rheumatoid arthritis, recent admission to MICU here in September for hypoxic respiratory failure, ARDS requiring intubation, Septic Shock secondary to LLL pneumonia with concerns for acute systolic heart failure, cardiogenic shock (repeat ECHO with improved EF), now presents with AMS x1-2 days. Patient diagnosed with UTI in outpatient setting and finished Levaquin course, however started to become confused. Upon arrival patient noted to have UTI   .  NEURO/PSYCH:  - A/O x4  - Optimize sleep/wake cycle  - Delirium precautions   - CT head negative for acute intracranial pathology.   - Monitor mental status closely, avoid neurosuppresants.   - Serial neurologic assessments.     CARDIOVASCULAR:  - Maintain MAP >65 for adequate organ perfusion.   - off levo gtt  - Official TTE pending     PULM:  - Maintain Spo2 >92%  - Enforce ISP  - Wean o2 as tolerated     GI:  - Bowel Regimen: senna HS      HEME:  - DVT prophylaxis: HSQ    ENDO:  - Goal euglycemia     /RENAL:  - trend BUN/Cr, electrolytes, acid-base balance, monitor hourly UOP  - Maintain K >4, Phos >3, Mag > 2    ID:  - UA positive  - Afebrile.  - BloodCx/UCx sent - pending.   - c/w empiric Cefepime course and give x1 Vancomycin dose pending Cx results.   - ABX use and/or discontinuation based on discussion with ID in conjunction with clinical features, culture data, and judicious procalcitonin monitoring.    DISPO: transfer to medicine this afternoon    Plan discussed with MICU team/attending   69F PMHx CAD, NSTEMI in 2011 s/p ELIZABETH to LAD, s/p ELIZABETH Distal LCx (4/30/2024), HTN, HLD, hypothyroid, rheumatoid arthritis, recent admission to MICU here in September for hypoxic respiratory failure, ARDS requiring intubation, Septic Shock secondary to LLL pneumonia with concerns for acute systolic heart failure, cardiogenic shock (repeat ECHO with improved EF), now presents with AMS x1-2 days. Patient diagnosed with UTI in outpatient setting and finished Levaquin course, however started to become confused. Upon arrival patient noted to have UTI   .  NEURO/PSYCH:  - A/O x4  - Optimize sleep/wake cycle  - Delirium precautions   - CT head negative for acute intracranial pathology.   - Monitor mental status closely, avoid neurosuppresants.   - Serial neurologic assessments.     CARDIOVASCULAR:  - Maintain MAP >65 for adequate organ perfusion.   - off levo gtt  - Official TTE pending     PULM:  - Maintain Spo2 >92%  - Enforce ISP  - Wean o2 as tolerated     GI:  - Bowel Regimen: senna HS    - Protonix qd    HEME:  - DVT prophylaxis: HSQ    ENDO:  - Goal euglycemia     /RENAL:  - trend BUN/Cr, electrolytes, acid-base balance, monitor hourly UOP  - Maintain K >4, Phos >3, Mag > 2  - TOV today    ID:  - UA positive  - Afebrile.  - BloodCx/UCx sent - pending.   - c/w empiric Cefepime course and give x1 Vancomycin dose pending Cx results.   - ABX use and/or discontinuation based on discussion with ID in conjunction with clinical features, culture data, and judicious procalcitonin monitoring.    DISPO: transfer to medicine this afternoon    Plan discussed with MICU team/attending

## 2024-11-23 NOTE — PROGRESS NOTE ADULT - CRITICAL CARE ATTENDING COMMENT
Critical care time spent titrating IV vasoactive medications, interpreting blood gases and chest imaging.

## 2024-11-24 LAB
ANION GAP SERPL CALC-SCNC: 17 MMOL/L — SIGNIFICANT CHANGE UP (ref 5–17)
BUN SERPL-MCNC: 50 MG/DL — HIGH (ref 8–20)
CALCIUM SERPL-MCNC: 8.9 MG/DL — SIGNIFICANT CHANGE UP (ref 8.4–10.5)
CHLORIDE SERPL-SCNC: 107 MMOL/L — SIGNIFICANT CHANGE UP (ref 96–108)
CO2 SERPL-SCNC: 16 MMOL/L — LOW (ref 22–29)
CREAT SERPL-MCNC: 4.74 MG/DL — HIGH (ref 0.5–1.3)
EGFR: 9 ML/MIN/1.73M2 — LOW
GLUCOSE SERPL-MCNC: 163 MG/DL — HIGH (ref 70–99)
HCT VFR BLD CALC: 30.4 % — LOW (ref 34.5–45)
HGB BLD-MCNC: 10.3 G/DL — LOW (ref 11.5–15.5)
MCHC RBC-ENTMCNC: 31.7 PG — SIGNIFICANT CHANGE UP (ref 27–34)
MCHC RBC-ENTMCNC: 33.9 G/DL — SIGNIFICANT CHANGE UP (ref 32–36)
MCV RBC AUTO: 93.5 FL — SIGNIFICANT CHANGE UP (ref 80–100)
PLATELET # BLD AUTO: 193 K/UL — SIGNIFICANT CHANGE UP (ref 150–400)
POTASSIUM SERPL-MCNC: 3.8 MMOL/L — SIGNIFICANT CHANGE UP (ref 3.5–5.3)
POTASSIUM SERPL-SCNC: 3.8 MMOL/L — SIGNIFICANT CHANGE UP (ref 3.5–5.3)
RBC # BLD: 3.25 M/UL — LOW (ref 3.8–5.2)
RBC # FLD: 13.3 % — SIGNIFICANT CHANGE UP (ref 10.3–14.5)
SODIUM SERPL-SCNC: 140 MMOL/L — SIGNIFICANT CHANGE UP (ref 135–145)
WBC # BLD: 7.37 K/UL — SIGNIFICANT CHANGE UP (ref 3.8–10.5)
WBC # FLD AUTO: 7.37 K/UL — SIGNIFICANT CHANGE UP (ref 3.8–10.5)

## 2024-11-24 PROCEDURE — 99233 SBSQ HOSP IP/OBS HIGH 50: CPT

## 2024-11-24 RX ORDER — OXYCODONE HYDROCHLORIDE 30 MG/1
10 TABLET ORAL ONCE
Refills: 0 | Status: DISCONTINUED | OUTPATIENT
Start: 2024-11-24 | End: 2024-11-24

## 2024-11-24 RX ORDER — MIDODRINE HYDROCHLORIDE 5 MG/1
5 TABLET ORAL EVERY 8 HOURS
Refills: 0 | Status: DISCONTINUED | OUTPATIENT
Start: 2024-11-24 | End: 2024-11-25

## 2024-11-24 RX ORDER — OXYCODONE HYDROCHLORIDE 30 MG/1
10 TABLET ORAL EVERY 12 HOURS
Refills: 0 | Status: DISCONTINUED | OUTPATIENT
Start: 2024-11-24 | End: 2024-11-29

## 2024-11-24 RX ORDER — OXYCODONE HYDROCHLORIDE 30 MG/1
10 TABLET ORAL EVERY 8 HOURS
Refills: 0 | Status: DISCONTINUED | OUTPATIENT
Start: 2024-11-24 | End: 2024-11-24

## 2024-11-24 RX ORDER — HYDROCORTISONE ACETATE 25 MG/ML
50 VIAL (ML) INJECTION EVERY 8 HOURS
Refills: 0 | Status: DISCONTINUED | OUTPATIENT
Start: 2024-11-24 | End: 2024-11-25

## 2024-11-24 RX ADMIN — MIDODRINE HYDROCHLORIDE 5 MILLIGRAM(S): 5 TABLET ORAL at 13:26

## 2024-11-24 RX ADMIN — Medication 100 MICROGRAM(S): at 04:48

## 2024-11-24 RX ADMIN — ACETAMINOPHEN, DIPHENHYDRAMINE HCL, PHENYLEPHRINE HCL 5 MILLIGRAM(S): 325; 25; 5 TABLET ORAL at 21:00

## 2024-11-24 RX ADMIN — CEFEPIME 1000 MILLIGRAM(S): 2 INJECTION, POWDER, FOR SOLUTION INTRAVENOUS at 04:44

## 2024-11-24 RX ADMIN — Medication 10 MILLIGRAM(S): at 13:19

## 2024-11-24 RX ADMIN — CLOPIDOGREL 75 MILLIGRAM(S): 75 TABLET, FILM COATED ORAL at 13:19

## 2024-11-24 RX ADMIN — MIDODRINE HYDROCHLORIDE 10 MILLIGRAM(S): 5 TABLET ORAL at 04:47

## 2024-11-24 RX ADMIN — OXYCODONE HYDROCHLORIDE 10 MILLIGRAM(S): 30 TABLET ORAL at 21:01

## 2024-11-24 RX ADMIN — Medication 2 TABLET(S): at 21:01

## 2024-11-24 RX ADMIN — PANTOPRAZOLE SODIUM 40 MILLIGRAM(S): 40 TABLET, DELAYED RELEASE ORAL at 04:48

## 2024-11-24 RX ADMIN — ROSUVASTATIN CALCIUM 20 MILLIGRAM(S): 5 TABLET, FILM COATED ORAL at 20:59

## 2024-11-24 RX ADMIN — Medication 50 MILLIGRAM(S): at 05:10

## 2024-11-24 RX ADMIN — Medication 50 MILLIGRAM(S): at 13:20

## 2024-11-24 RX ADMIN — Medication 5000 UNIT(S): at 13:19

## 2024-11-24 RX ADMIN — VENLAFAXINE HYDROCHLORIDE 75 MILLIGRAM(S): 75 CAPSULE, EXTENDED RELEASE ORAL at 13:19

## 2024-11-24 RX ADMIN — GABAPENTIN 100 MILLIGRAM(S): 300 CAPSULE ORAL at 17:59

## 2024-11-24 RX ADMIN — GABAPENTIN 100 MILLIGRAM(S): 300 CAPSULE ORAL at 04:45

## 2024-11-24 RX ADMIN — OXYCODONE HYDROCHLORIDE 10 MILLIGRAM(S): 30 TABLET ORAL at 10:43

## 2024-11-24 RX ADMIN — Medication 5000 UNIT(S): at 21:00

## 2024-11-24 RX ADMIN — MIDODRINE HYDROCHLORIDE 5 MILLIGRAM(S): 5 TABLET ORAL at 21:01

## 2024-11-24 RX ADMIN — Medication 81 MILLIGRAM(S): at 13:19

## 2024-11-24 RX ADMIN — Medication 50 MILLIGRAM(S): at 21:00

## 2024-11-24 RX ADMIN — Medication 5000 UNIT(S): at 05:12

## 2024-11-24 RX ADMIN — CHLORHEXIDINE GLUCONATE 1 APPLICATION(S): 1.2 RINSE ORAL at 05:11

## 2024-11-24 NOTE — PROGRESS NOTE ADULT - ASSESSMENT
69F PMHx CAD, NSTEMI in 2011 s/p ELIZABETH to LAD, s/p ELIZABETH Distal LCx (4/30/2024), HTN, HLD, hypothyroid, rheumatoid arthritis, recent admission to MICU here in September for hypoxic respiratory failure, ARDS requiring intubation, Septic Shock secondary to LLL pneumonia with concerns for acute systolic heart failure, cardiogenic shock (repeat ECHO with improved EF) was admitted to MICU 11/22/24 for AMS x1-2 days. Patient diagnosed with UTI in outpatient setting and finished Levaquin course, however started to become confused.   In ER, CT head negative for acute intracranial pathology. CT C/A/P negative. Noted to be hypotensive s/p sepsis protocol IVF and started on Levophed Infusion & admitted to MICU. Pt now off pressors and downgraded to medical floor.    1) Septic Shock with UTI  - septic shock resolved, off levo gtt  - on po midodrine 10mg Q8hrs--> will titrate down as BP tolerates  - monitor vitals  - on cortef- Wean as tolerated by BP  - BloodCx/UCx sent - pending.   - Urine culture positive for enterococcus species.  - c/w empiric Cefepime course    2. CLEMENTINA, Likely due to renal hypoperfusion with subsequent ATN  - Baseline Creatinine 0.44~  - Peak Crea 5.87-- > downtrending  - Monitor BMP  - CT Abdo pelvis without acute pathology     3) CAD s/p ELIZABETH  - c/w ASA/plavix and statin    4) HLD  - on statin & Zetia    5) Hypothyroidism  - on synthroid    6) GERD  - on PPI    7) Fibromyalgia and neuropathy  - on Gabapentin    8) Depression  - on Effexor    9) DVT ppx: heparin    Dispo: Acute. Pending improvement in CLEMENTINA. FU UCX s/s. PT eval when more improved.

## 2024-11-24 NOTE — DIETITIAN INITIAL EVALUATION ADULT - ORAL INTAKE PTA/DIET HISTORY
Patient tolerating current diet well with good appetite/PO intake. Pt reports eating about 50% of breakfast this AM as the portion sizes were too big for her to finish. Pt states her appetite was also good PTA and she was eating 2 meals/day. Pt reports following a sugar-free and low salt diet at home. Pt provided with additional education on DASH/TLC diet and with good understanding. Pt with no c/o N/V/D at this time. Reports her last BM was x3 days ago - pt made MD and RN aware of the same. Bowel regimen in place. High fiber foods and the importance of hydration also discussed. Pt states her UBW is about 191 lbs, current weight 206.1 lbs. No edema noted, question accuracy. Will continue to monitor and follow up as needed. RD remains available.

## 2024-11-24 NOTE — DIETITIAN INITIAL EVALUATION ADULT - PERTINENT MEDS FT
MEDICATIONS  (STANDING):  cefepime  Injectable. 1000 milliGRAM(s) IV Push every 24 hours  clopidogrel Tablet 75 milliGRAM(s) Oral daily  dextrose 5%. 1000 milliLiter(s) (50 mL/Hr) IV Continuous <Continuous>  dextrose 50% Injectable 25 Gram(s) IV Push once  dextrose Oral Gel 15 Gram(s) Oral once  ezetimibe 10 milliGRAM(s) Oral daily  glucagon  Injectable 1 milliGRAM(s) IntraMuscular once  hydrocortisone sodium succinate Injectable 50 milliGRAM(s) IV Push every 8 hours  levothyroxine 100 MICROGram(s) Oral daily  midodrine 5 milliGRAM(s) Oral every 8 hours  oxyCODONE  ER Tablet 10 milliGRAM(s) Oral every 12 hours  pantoprazole    Tablet 40 milliGRAM(s) Oral before breakfast  senna 2 Tablet(s) Oral at bedtime  venlafaxine XR. 75 milliGRAM(s) Oral daily    MEDICATIONS  (PRN):  polyethylene glycol 3350 17 Gram(s) Oral daily PRN Constipation

## 2024-11-24 NOTE — DIETITIAN INITIAL EVALUATION ADULT - OTHER INFO
69F PMHx CAD, NSTEMI in 2011 s/p ELIZABETH to LAD, s/p ELIZABETH Distal LCx (4/30/2024), HTN, HLD, hypothyroid, rheumatoid arthritis, recent admission to MICU here in September for hypoxic respiratory failure, ARDS requiring intubation, Septic Shock secondary to LLL pneumonia with concerns for acute systolic heart failure, cardiogenic shock was admitted to MICU 11/22/24 for AMS x1-2 days. Noted to be hypotensive s/p sepsis protocol IVF and started on Levophed Infusion & admitted to MICU. Pt now off pressors and downgraded to medical floor.

## 2024-11-24 NOTE — DIETITIAN INITIAL EVALUATION ADULT - ADD RECOMMEND
1) Continue diet as tolerated.   2) Encourage po intake, monitor diet tolerance, and provide assistance at meals as needed.   3) Rx: MVI daily.   4) Monitor BG levels, correct prn as pt with hx of DM.   5) Obtain weekly weights to monitor trends.

## 2024-11-24 NOTE — DIETITIAN INITIAL EVALUATION ADULT - PERTINENT LABORATORY DATA
11-24 Na140 mmol/L Glu 163 mg/dL[H] K+ 3.8 mmol/L Cr  4.74 mg/dL[H] BUN 50.0 mg/dL[H] Phos n/a   Alb n/a   PAB n/a       A1C with Estimated Average Glucose Result: 5.4 % (11-23-24 @ 03:20)

## 2024-11-25 LAB
-  AMPICILLIN: SIGNIFICANT CHANGE UP
-  CIPROFLOXACIN: SIGNIFICANT CHANGE UP
-  DAPTOMYCIN: SIGNIFICANT CHANGE UP
-  LEVOFLOXACIN: SIGNIFICANT CHANGE UP
-  LINEZOLID: SIGNIFICANT CHANGE UP
-  NITROFURANTOIN: SIGNIFICANT CHANGE UP
-  TETRACYCLINE: SIGNIFICANT CHANGE UP
-  VANCOMYCIN: SIGNIFICANT CHANGE UP
ALBUMIN SERPL ELPH-MCNC: 3.9 G/DL — SIGNIFICANT CHANGE UP (ref 3.3–5.2)
ALP SERPL-CCNC: 71 U/L — SIGNIFICANT CHANGE UP (ref 40–120)
ALT FLD-CCNC: 6 U/L — SIGNIFICANT CHANGE UP
ANION GAP SERPL CALC-SCNC: 17 MMOL/L — SIGNIFICANT CHANGE UP (ref 5–17)
AST SERPL-CCNC: 15 U/L — SIGNIFICANT CHANGE UP
BILIRUB SERPL-MCNC: 0.2 MG/DL — LOW (ref 0.4–2)
BUN SERPL-MCNC: 50.5 MG/DL — HIGH (ref 8–20)
CALCIUM SERPL-MCNC: 8.8 MG/DL — SIGNIFICANT CHANGE UP (ref 8.4–10.5)
CHLORIDE SERPL-SCNC: 107 MMOL/L — SIGNIFICANT CHANGE UP (ref 96–108)
CO2 SERPL-SCNC: 16 MMOL/L — LOW (ref 22–29)
CREAT SERPL-MCNC: 4.13 MG/DL — HIGH (ref 0.5–1.3)
CULTURE RESULTS: ABNORMAL
EGFR: 11 ML/MIN/1.73M2 — LOW
GLUCOSE SERPL-MCNC: 142 MG/DL — HIGH (ref 70–99)
HCT VFR BLD CALC: 29.5 % — LOW (ref 34.5–45)
HGB BLD-MCNC: 10 G/DL — LOW (ref 11.5–15.5)
MAGNESIUM SERPL-MCNC: 1.9 MG/DL — SIGNIFICANT CHANGE UP (ref 1.6–2.6)
MCHC RBC-ENTMCNC: 31.9 PG — SIGNIFICANT CHANGE UP (ref 27–34)
MCHC RBC-ENTMCNC: 33.9 G/DL — SIGNIFICANT CHANGE UP (ref 32–36)
MCV RBC AUTO: 94.2 FL — SIGNIFICANT CHANGE UP (ref 80–100)
METHOD TYPE: SIGNIFICANT CHANGE UP
ORGANISM # SPEC MICROSCOPIC CNT: ABNORMAL
ORGANISM # SPEC MICROSCOPIC CNT: SIGNIFICANT CHANGE UP
PHOSPHATE SERPL-MCNC: 5.2 MG/DL — HIGH (ref 2.4–4.7)
PLATELET # BLD AUTO: 202 K/UL — SIGNIFICANT CHANGE UP (ref 150–400)
POTASSIUM SERPL-MCNC: 3.6 MMOL/L — SIGNIFICANT CHANGE UP (ref 3.5–5.3)
POTASSIUM SERPL-SCNC: 3.6 MMOL/L — SIGNIFICANT CHANGE UP (ref 3.5–5.3)
PROT SERPL-MCNC: 6.2 G/DL — LOW (ref 6.6–8.7)
RBC # BLD: 3.13 M/UL — LOW (ref 3.8–5.2)
RBC # FLD: 13.6 % — SIGNIFICANT CHANGE UP (ref 10.3–14.5)
SODIUM SERPL-SCNC: 140 MMOL/L — SIGNIFICANT CHANGE UP (ref 135–145)
SPECIMEN SOURCE: SIGNIFICANT CHANGE UP
WBC # BLD: 9.19 K/UL — SIGNIFICANT CHANGE UP (ref 3.8–10.5)
WBC # FLD AUTO: 9.19 K/UL — SIGNIFICANT CHANGE UP (ref 3.8–10.5)

## 2024-11-25 PROCEDURE — 99223 1ST HOSP IP/OBS HIGH 75: CPT

## 2024-11-25 PROCEDURE — 99233 SBSQ HOSP IP/OBS HIGH 50: CPT

## 2024-11-25 RX ORDER — HYDROCORTISONE ACETATE 25 MG/ML
50 VIAL (ML) INJECTION EVERY 12 HOURS
Refills: 0 | Status: DISCONTINUED | OUTPATIENT
Start: 2024-11-25 | End: 2024-11-27

## 2024-11-25 RX ORDER — LINEZOLID 600 MG/300ML
INJECTION, SOLUTION INTRAVENOUS
Refills: 0 | Status: DISCONTINUED | OUTPATIENT
Start: 2024-11-25 | End: 2024-11-29

## 2024-11-25 RX ORDER — LINEZOLID 600 MG/300ML
600 INJECTION, SOLUTION INTRAVENOUS ONCE
Refills: 0 | Status: COMPLETED | OUTPATIENT
Start: 2024-11-25 | End: 2024-11-25

## 2024-11-25 RX ORDER — AMPICILLIN AND SULBACTAM 1; .5 G/1; G/1
INJECTION, POWDER, FOR SOLUTION INTRAVENOUS
Refills: 0 | Status: DISCONTINUED | OUTPATIENT
Start: 2024-11-25 | End: 2024-11-25

## 2024-11-25 RX ORDER — ACETAMINOPHEN 500MG 500 MG/1
650 TABLET, COATED ORAL EVERY 6 HOURS
Refills: 0 | Status: DISCONTINUED | OUTPATIENT
Start: 2024-11-25 | End: 2024-11-29

## 2024-11-25 RX ORDER — AMPICILLIN AND SULBACTAM 1; .5 G/1; G/1
1.5 INJECTION, POWDER, FOR SOLUTION INTRAVENOUS ONCE
Refills: 0 | Status: DISCONTINUED | OUTPATIENT
Start: 2024-11-25 | End: 2024-11-25

## 2024-11-25 RX ORDER — LINEZOLID 600 MG/300ML
600 INJECTION, SOLUTION INTRAVENOUS EVERY 12 HOURS
Refills: 0 | Status: DISCONTINUED | OUTPATIENT
Start: 2024-11-26 | End: 2024-11-29

## 2024-11-25 RX ADMIN — Medication 10 MILLIGRAM(S): at 11:51

## 2024-11-25 RX ADMIN — PANTOPRAZOLE SODIUM 40 MILLIGRAM(S): 40 TABLET, DELAYED RELEASE ORAL at 04:56

## 2024-11-25 RX ADMIN — LINEZOLID 600 MILLIGRAM(S): 600 INJECTION, SOLUTION INTRAVENOUS at 21:22

## 2024-11-25 RX ADMIN — Medication 2 TABLET(S): at 21:21

## 2024-11-25 RX ADMIN — CEFEPIME 1000 MILLIGRAM(S): 2 INJECTION, POWDER, FOR SOLUTION INTRAVENOUS at 04:55

## 2024-11-25 RX ADMIN — CLOPIDOGREL 75 MILLIGRAM(S): 75 TABLET, FILM COATED ORAL at 11:51

## 2024-11-25 RX ADMIN — OXYCODONE HYDROCHLORIDE 10 MILLIGRAM(S): 30 TABLET ORAL at 04:57

## 2024-11-25 RX ADMIN — OXYCODONE HYDROCHLORIDE 10 MILLIGRAM(S): 30 TABLET ORAL at 17:53

## 2024-11-25 RX ADMIN — CHLORHEXIDINE GLUCONATE 1 APPLICATION(S): 1.2 RINSE ORAL at 05:03

## 2024-11-25 RX ADMIN — GABAPENTIN 100 MILLIGRAM(S): 300 CAPSULE ORAL at 04:57

## 2024-11-25 RX ADMIN — GABAPENTIN 100 MILLIGRAM(S): 300 CAPSULE ORAL at 17:53

## 2024-11-25 RX ADMIN — Medication 50 MILLIGRAM(S): at 17:54

## 2024-11-25 RX ADMIN — Medication 50 MILLIGRAM(S): at 04:58

## 2024-11-25 RX ADMIN — ACETAMINOPHEN 500MG 650 MILLIGRAM(S): 500 TABLET, COATED ORAL at 14:09

## 2024-11-25 RX ADMIN — Medication 5000 UNIT(S): at 21:22

## 2024-11-25 RX ADMIN — ACETAMINOPHEN, DIPHENHYDRAMINE HCL, PHENYLEPHRINE HCL 5 MILLIGRAM(S): 325; 25; 5 TABLET ORAL at 21:22

## 2024-11-25 RX ADMIN — VENLAFAXINE HYDROCHLORIDE 75 MILLIGRAM(S): 75 CAPSULE, EXTENDED RELEASE ORAL at 11:50

## 2024-11-25 RX ADMIN — ROSUVASTATIN CALCIUM 20 MILLIGRAM(S): 5 TABLET, FILM COATED ORAL at 21:21

## 2024-11-25 RX ADMIN — Medication 81 MILLIGRAM(S): at 11:51

## 2024-11-25 RX ADMIN — Medication 100 MICROGRAM(S): at 04:56

## 2024-11-25 RX ADMIN — MIDODRINE HYDROCHLORIDE 5 MILLIGRAM(S): 5 TABLET ORAL at 05:03

## 2024-11-25 NOTE — PROGRESS NOTE ADULT - ASSESSMENT
69F PMHx CAD, NSTEMI in 2011 s/p ELIZABETH to LAD, s/p ELIZABETH Distal LCx (4/30/2024), HTN, HLD, hypothyroid, rheumatoid arthritis, recent admission to MICU here in September for hypoxic respiratory failure, ARDS requiring intubation, Septic Shock secondary to LLL pneumonia with concerns for acute systolic heart failure, cardiogenic shock (repeat ECHO with improved EF) was admitted to MICU 11/22/24 for AMS x1-2 days. Patient diagnosed with UTI in outpatient setting and finished Levaquin course, however started to become confused.   In ER, CT head negative for acute intracranial pathology. CT C/A/P negative. Noted to be hypotensive s/p sepsis protocol IVF and started on Levophed Infusion & admitted to MICU. Pt now off pressors and downgraded to medical floor.    1) Septic Shock with UTI  - septic shock resolved, off levo gtt  - on po midodrine 10mg Q8hrs--> will titrate down as BP tolerates  - monitor vitals  - on cortef- Wean as tolerated by BP  - BloodCx/UCx sent - VRE  - ID consulted   - Urine culture positive for enterococcus species.  - c/w empiric Cefepime course    2. CLEMENTINA, Likely due to renal hypoperfusion with subsequent ATN  - Baseline Creatinine 0.44~  - Peak Crea 5.87-- > downtrending  - Monitor BMP  - CT Abdo pelvis without acute pathology     3) CAD s/p ELIZABETH  - c/w ASA/plavix and statin    4) HLD  - on statin & Zetia    5) Hypothyroidism  - on synthroid    6) GERD  - on PPI    7) Fibromyalgia and neuropathy  - on Gabapentin    8) Depression  - on Effexor    9) DVT ppx: heparin    Dispo: Acute. Pending improvement in CLEMENTINA. Urine culture with VRE. ID consulted

## 2024-11-25 NOTE — CONSULT NOTE ADULT - SUBJECTIVE AND OBJECTIVE BOX
INFECTIOUS DISEASES AND INTERNAL MEDICINE at [place]  =======================================================  Edmond Andrew MD  Diplomates American Board of Internal Medicine and Infectious Diseases  Telephone [phone]  Fax            [phone]  =======================================================    GOOD [id number]      HPI:  Patient is a 69y old  Female who presents with a chief complaint of     BRIEF HOSPITAL COURSE:   69F PMHx CAD, NSTEMI in 2011 s/p ELIZABETH to LAD, s/p ELIZABETH Distal LCx (4/30/2024), HTN, HLD, hypothyroid, rheumatoid arthritis, recent admission to MICU here in September for hypoxic respiratory failure, ARDS requiring intubation, Septic Shock secondary to LLL pneumonia with concerns for acute systolic heart failure, cardiogenic shock (repeat ECHO with improved EF), now presents with AMS x1-2 days. Patient diagnosed with UTI in outpatient setting and finished Levaquin course, however started to become confused.   In ER, CT head negative for acute intracranial pathology. CT C/A/P negative. Labs significant for BUN/SCr 62.8/5.87, pH 7.25, HCO3 16, UA positive.   Noted to be hypotensive s/p sepsis protocol IVF and started on Levophed Infusion. MICU consulted for further management.   AS ABOVE ADMITTED WITH AMS AND HYPOTENSION AND WAS  IN ICU  URINE CX WITH VRE   ASKED TO EVALUATE FROM ID STANDPOINT     PAST MEDICAL & SURGICAL HISTORY:  Hypothyroid  ADHD (attention deficit hyperactivity disorder)  HLD (hyperlipidemia)  Myocardial infarct  2012  Stented coronary artery  2012  Rheumatoid arthritis  Migraine  S/p bilateral carpal tunnel release  H/O cardiac catheterization  H/O laminectomy  H/O spinal fusion  S/P left knee arthroscopy  H/O total knee replacement, right    Review of Systems:  CONSTITUTIONAL: No fever, chills, or fatigue  EYES: No eye pain, visual disturbances, or discharge  ENMT:  No difficulty hearing, tinnitus, vertigo; No sinus or throat pain  NECK: No pain or stiffness  RESPIRATORY: No cough, wheezing, chills or hemoptysis; No shortness of breath  CARDIOVASCULAR: No chest pain, palpitations, dizziness, or leg swelling  GASTROINTESTINAL: No abdominal or epigastric pain. No nausea, vomiting, or hematemesis; No diarrhea or constipation. No melena or hematochezia.  GENITOURINARY: No dysuria, frequency, hematuria, or incontinence  NEUROLOGICAL: No headaches, memory loss, loss of strength, numbness, or tremors  SKIN: No itching, burning, rashes, or lesions   MUSCULOSKELETAL: No joint pain or swelling; No muscle, back, or extremity pain  PSYCHIATRIC: No depression, anxiety, mood swings, or difficulty sleeping             ANTIBIOTICS  ampicillin/sulbactam  IVPB      ampicillin/sulbactam  IVPB 1.5 Gram(s) IV Intermittent once      Allergies    penicillins (Hives)  azithromycin (Hives)  tetracycline (Hives)  Butazolactin, Prolaprin (Hives)  Cipro (Unknown)  Zyrtec (Hives)    Intolerances        SOCIAL HISTORY:     FAMILY HX   FAMILY HISTORY:      Vital Signs Last 24 Hrs  T(C): 36.3 (25 Nov 2024 17:13), Max: 36.6 (24 Nov 2024 21:08)  T(F): 97.4 (25 Nov 2024 17:13), Max: 97.9 (24 Nov 2024 21:08)  HR: 75 (25 Nov 2024 09:22) (66 - 75)  BP: 132/73 (25 Nov 2024 17:13) (102/62 - 132/73)  BP(mean): --  RR: 18 (25 Nov 2024 17:13) (18 - 18)  SpO2: 98% (25 Nov 2024 17:13) (97% - 98%)    Parameters below as of 25 Nov 2024 17:13  Patient On (Oxygen Delivery Method): room air      Drug Dosing Weight  Height (cm): 162.6 (22 Nov 2024 22:30)  Weight (kg): 90.6 (22 Nov 2024 22:30)  BMI (kg/m2): 34.3 (22 Nov 2024 22:30)  BSA (m2): 1.96 (22 Nov 2024 22:30)      REVIEW OF SYSTEMS:    CONSTITUTIONAL:  As per HPI.    HEENT:  Eyes:  No diplopia or blurred vision. ENT:  No earache, sore throat or runny nose.    CARDIOVASCULAR:  No pressure, squeezing, strangling, tightness, heaviness or aching about the chest, neck, axilla or epigastrium.    RESPIRATORY:  No cough, shortness of breath, PND or orthopnea.    GASTROINTESTINAL:  No nausea, vomiting or diarrhea.    GENITOURINARY:  No dysuria, frequency or urgency.    MUSCULOSKELETAL:  As per HPI.    SKIN:  No change in skin, hair or nails.    NEUROLOGIC:  No paresthesias, fasciculations, seizures or weakness.                  PHYSICAL EXAMINATION:    GENERAL: The patient is a _____in no apparent distress. ___     VITAL SIGNS: T(C): 36.3 (11-25-24 @ 17:13), Max: 36.6 (11-24-24 @ 21:08)  HR: 75 (11-25-24 @ 09:22) (66 - 75)  BP: 132/73 (11-25-24 @ 17:13) (102/62 - 132/73)  RR: 18 (11-25-24 @ 17:13) (18 - 18)  SpO2: 98% (11-25-24 @ 17:13) (97% - 98%)  Wt(kg): --    HEENT: Head is normocephalic and atraumatic.  ANICTERIC  NECK: Supple. No carotid bruits.  No lymphadenopathy or thyromegaly.    LUNGS:COARSE BREATH SOUNDS    HEART: Regular rate and rhythm without murmur.    ABDOMEN: Soft, nontender, and nondistended.  Positive bowel sounds.  No hepatosplenomegaly was noted. NO REBOUND NO GUARDING    EXTREMITIES: NO EDEMA NO ERYTHEMA    NEUROLOGIC: NON FOCAL      SKIN: No ulceration or induration present. NO RASH        BLOOD CULTURES  Culture Results:   >100,000 CFU/ml Enterococcus faecalis (vancomycin resistant) (11-22 @ 18:21)  Culture Results:   No growth at 48 Hours (11-22 @ 17:29)       URINE CX          LABS:                        10.0   9.19  )-----------( 202      ( 25 Nov 2024 04:13 )             29.5     11-25    140  |  107  |  50.5[H]  ----------------------------<  142[H]  3.6   |  16.0[L]  |  4.13[H]    Ca    8.8      25 Nov 2024 04:13  Phos  5.2     11-25  Mg     1.9     11-25    TPro  6.2[L]  /  Alb  3.9  /  TBili  0.2[L]  /  DBili  x   /  AST  15  /  ALT  6   /  AlkPhos  71  11-25      Urinalysis Basic - ( 25 Nov 2024 04:13 )    Color: x / Appearance: x / SG: x / pH: x  Gluc: 142 mg/dL / Ketone: x  / Bili: x / Urobili: x   Blood: x / Protein: x / Nitrite: x   Leuk Esterase: x / RBC: x / WBC x   Sq Epi: x / Non Sq Epi: x / Bacteria: x        RADIOLOGY & ADDITIONAL STUDIES:      ASSESSMENT/PLAN      69F PMHx CAD, NSTEMI in 2011 s/p ELIZABETH to LAD, s/p ELIZABETH Distal LCx (4/30/2024), HTN, HLD, hypothyroid, rheumatoid arthritis, recent admission to MICU here in September for hypoxic respiratory failure, ARDS requiring intubation, Septic Shock secondary to LLL pneumonia with concerns for acute systolic heart failure, cardiogenic shock (repeat ECHO with improved EF), now presents with AMS x1-2 days. Patient diagnosed with UTI in outpatient setting and finished Levaquin course, however started to become confused.   In ER, CT head negative for acute intracranial pathology. CT C/A/P negative. Labs significant for BUN/SCr 62.8/5.87, pH 7.25, HCO3 16, UA positive.   Noted to be hypotensive s/p sepsis protocol IVF and started on Levophed Infusion. MICU consulted for further management.   AS ABOVE ADMITTED WITH AMS AND HYPOTENSION AND WAS  IN ICU  URINE CX WITH VRE   PT HAS BEEN ON CEFEPIME  WRITTEN FOR UNASYN WILL ASK FURTHER ABOUT PCN ALLERGY   PT NON TOXIC  BLOOD CX NEGATIVE     PT DENIES  SX BUT VALENTIN S REPROT SOEM RECENT SWEATS  WILL FOLLLOW UP WITH FURTHER RECOMMENDATIONS           DIANE PEDROZA MD INFECTIOUS DISEASES AND INTERNAL MEDICINE at [place]  =======================================================  Edmond Andrew MD  Diplomates American Board of Internal Medicine and Infectious Diseases  Telephone [phone]  Fax            [phone]  =======================================================    GOOD [id number]      HPI:  Patient is a 69y old  Female who presents with a chief complaint of     BRIEF HOSPITAL COURSE:   69F PMHx CAD, NSTEMI in 2011 s/p ELIZABETH to LAD, s/p ELIZABETH Distal LCx (4/30/2024), HTN, HLD, hypothyroid, rheumatoid arthritis, recent admission to MICU here in September for hypoxic respiratory failure, ARDS requiring intubation, Septic Shock secondary to LLL pneumonia with concerns for acute systolic heart failure, cardiogenic shock (repeat ECHO with improved EF), now presents with AMS x1-2 days. Patient diagnosed with UTI in outpatient setting and finished Levaquin course, however started to become confused.   In ER, CT head negative for acute intracranial pathology. CT C/A/P negative. Labs significant for BUN/SCr 62.8/5.87, pH 7.25, HCO3 16, UA positive.   Noted to be hypotensive s/p sepsis protocol IVF and started on Levophed Infusion. MICU consulted for further management.   AS ABOVE ADMITTED WITH AMS AND HYPOTENSION AND WAS  IN ICU  URINE CX WITH VRE   ASKED TO EVALUATE FROM ID STANDPOINT     PAST MEDICAL & SURGICAL HISTORY:  Hypothyroid  ADHD (attention deficit hyperactivity disorder)  HLD (hyperlipidemia)  Myocardial infarct  2012  Stented coronary artery  2012  Rheumatoid arthritis  Migraine  S/p bilateral carpal tunnel release  H/O cardiac catheterization  H/O laminectomy  H/O spinal fusion  S/P left knee arthroscopy  H/O total knee replacement, right    Review of Systems:  CONSTITUTIONAL: No fever, chills, or fatigue  EYES: No eye pain, visual disturbances, or discharge  ENMT:  No difficulty hearing, tinnitus, vertigo; No sinus or throat pain  NECK: No pain or stiffness  RESPIRATORY: No cough, wheezing, chills or hemoptysis; No shortness of breath  CARDIOVASCULAR: No chest pain, palpitations, dizziness, or leg swelling  GASTROINTESTINAL: No abdominal or epigastric pain. No nausea, vomiting, or hematemesis; No diarrhea or constipation. No melena or hematochezia.  GENITOURINARY: No dysuria, frequency, hematuria, or incontinence  NEUROLOGICAL: No headaches, memory loss, loss of strength, numbness, or tremors  SKIN: No itching, burning, rashes, or lesions   MUSCULOSKELETAL: No joint pain or swelling; No muscle, back, or extremity pain  PSYCHIATRIC: No depression, anxiety, mood swings, or difficulty sleeping             ANTIBIOTICS  ampicillin/sulbactam  IVPB      ampicillin/sulbactam  IVPB 1.5 Gram(s) IV Intermittent once      Allergies    penicillins (Hives)  azithromycin (Hives)  tetracycline (Hives)  Butazolactin, Prolaprin (Hives)  Cipro (Unknown)  Zyrtec (Hives)    Intolerances        SOCIAL HISTORY:     FAMILY HX   FAMILY HISTORY:      Vital Signs Last 24 Hrs  T(C): 36.3 (25 Nov 2024 17:13), Max: 36.6 (24 Nov 2024 21:08)  T(F): 97.4 (25 Nov 2024 17:13), Max: 97.9 (24 Nov 2024 21:08)  HR: 75 (25 Nov 2024 09:22) (66 - 75)  BP: 132/73 (25 Nov 2024 17:13) (102/62 - 132/73)  BP(mean): --  RR: 18 (25 Nov 2024 17:13) (18 - 18)  SpO2: 98% (25 Nov 2024 17:13) (97% - 98%)    Parameters below as of 25 Nov 2024 17:13  Patient On (Oxygen Delivery Method): room air      Drug Dosing Weight  Height (cm): 162.6 (22 Nov 2024 22:30)  Weight (kg): 90.6 (22 Nov 2024 22:30)  BMI (kg/m2): 34.3 (22 Nov 2024 22:30)  BSA (m2): 1.96 (22 Nov 2024 22:30)      REVIEW OF SYSTEMS:    CONSTITUTIONAL:  As per HPI.    HEENT:  Eyes:  No diplopia or blurred vision. ENT:  No earache, sore throat or runny nose.    CARDIOVASCULAR:  No pressure, squeezing, strangling, tightness, heaviness or aching about the chest, neck, axilla or epigastrium.    RESPIRATORY:  No cough, shortness of breath, PND or orthopnea.    GASTROINTESTINAL:  No nausea, vomiting or diarrhea.    GENITOURINARY:  No dysuria, frequency or urgency.    MUSCULOSKELETAL:  As per HPI.    SKIN:  No change in skin, hair or nails.    NEUROLOGIC:  No paresthesias, fasciculations, seizures or weakness.                  PHYSICAL EXAMINATION:    GENERAL: The patient is a _____in no apparent distress. ___     VITAL SIGNS: T(C): 36.3 (11-25-24 @ 17:13), Max: 36.6 (11-24-24 @ 21:08)  HR: 75 (11-25-24 @ 09:22) (66 - 75)  BP: 132/73 (11-25-24 @ 17:13) (102/62 - 132/73)  RR: 18 (11-25-24 @ 17:13) (18 - 18)  SpO2: 98% (11-25-24 @ 17:13) (97% - 98%)  Wt(kg): --    HEENT: Head is normocephalic and atraumatic.  ANICTERIC  NECK: Supple. No carotid bruits.  No lymphadenopathy or thyromegaly.    LUNGS:COARSE BREATH SOUNDS    HEART: Regular rate and rhythm without murmur.    ABDOMEN: Soft, nontender, and nondistended.  Positive bowel sounds.  No hepatosplenomegaly was noted. NO REBOUND NO GUARDING    EXTREMITIES: NO EDEMA NO ERYTHEMA    NEUROLOGIC: NON FOCAL      SKIN: No ulceration or induration present. NO RASH        BLOOD CULTURES  Culture Results:   >100,000 CFU/ml Enterococcus faecalis (vancomycin resistant) (11-22 @ 18:21)  Culture Results:   No growth at 48 Hours (11-22 @ 17:29)       URINE CX          LABS:                        10.0   9.19  )-----------( 202      ( 25 Nov 2024 04:13 )             29.5     11-25    140  |  107  |  50.5[H]  ----------------------------<  142[H]  3.6   |  16.0[L]  |  4.13[H]    Ca    8.8      25 Nov 2024 04:13  Phos  5.2     11-25  Mg     1.9     11-25    TPro  6.2[L]  /  Alb  3.9  /  TBili  0.2[L]  /  DBili  x   /  AST  15  /  ALT  6   /  AlkPhos  71  11-25      Urinalysis Basic - ( 25 Nov 2024 04:13 )    Color: x / Appearance: x / SG: x / pH: x  Gluc: 142 mg/dL / Ketone: x  / Bili: x / Urobili: x   Blood: x / Protein: x / Nitrite: x   Leuk Esterase: x / RBC: x / WBC x   Sq Epi: x / Non Sq Epi: x / Bacteria: x        RADIOLOGY & ADDITIONAL STUDIES:      ASSESSMENT/PLAN      69F PMHx CAD, NSTEMI in 2011 s/p ELIZABETH to LAD, s/p ELIZABETH Distal LCx (4/30/2024), HTN, HLD, hypothyroid, rheumatoid arthritis, recent admission to MICU here in September for hypoxic respiratory failure, ARDS requiring intubation, Septic Shock secondary to LLL pneumonia with concerns for acute systolic heart failure, cardiogenic shock (repeat ECHO with improved EF), now presents with AMS x1-2 days. Patient diagnosed with UTI in outpatient setting and finished Levaquin course, however started to become confused.   In ER, CT head negative for acute intracranial pathology. CT C/A/P negative. Labs significant for BUN/SCr 62.8/5.87, pH 7.25, HCO3 16, UA positive.   Noted to be hypotensive s/p sepsis protocol IVF and started on Levophed Infusion. MICU consulted for further management.   AS ABOVE ADMITTED WITH AMS AND HYPOTENSION AND WAS  IN ICU  URINE CX WITH VRE   PT HAS BEEN ON CEFEPIME  WRITTEN FOR RUPERT YEE ASK FURTHER ABOUT PCN ALLERGY   SHE REPORTS IT WAS ORAL AMOX FOR A DENTAL PROCEDURE ABOUT 25 YEAR AGO  WILL RX ORAL ZYVOX FOR VRE  SPOKE TO PHARMACY   PT NON TOXIC  BLOOD CX NEGATIVE     PT DENIES  SX BUT DODS  REPORTS SOME RECENT SWEATS  WILL FOLLLOW UP WITH FURTHER RECOMMENDATIONS           DIANE PEDROZA MD

## 2024-11-26 LAB
ALBUMIN SERPL ELPH-MCNC: 3.7 G/DL — SIGNIFICANT CHANGE UP (ref 3.3–5.2)
ALP SERPL-CCNC: 64 U/L — SIGNIFICANT CHANGE UP (ref 40–120)
ALT FLD-CCNC: 6 U/L — SIGNIFICANT CHANGE UP
ANION GAP SERPL CALC-SCNC: 17 MMOL/L — SIGNIFICANT CHANGE UP (ref 5–17)
AST SERPL-CCNC: 15 U/L — SIGNIFICANT CHANGE UP
BILIRUB SERPL-MCNC: <0.2 MG/DL — LOW (ref 0.4–2)
BUN SERPL-MCNC: 44.5 MG/DL — HIGH (ref 8–20)
CALCIUM SERPL-MCNC: 8.6 MG/DL — SIGNIFICANT CHANGE UP (ref 8.4–10.5)
CHLORIDE SERPL-SCNC: 107 MMOL/L — SIGNIFICANT CHANGE UP (ref 96–108)
CO2 SERPL-SCNC: 17 MMOL/L — LOW (ref 22–29)
CREAT SERPL-MCNC: 3.44 MG/DL — HIGH (ref 0.5–1.3)
EGFR: 14 ML/MIN/1.73M2 — LOW
FERRITIN SERPL-MCNC: 248 NG/ML — SIGNIFICANT CHANGE UP (ref 13–330)
GLUCOSE SERPL-MCNC: 123 MG/DL — HIGH (ref 70–99)
HCT VFR BLD CALC: 27.8 % — LOW (ref 34.5–45)
HGB BLD-MCNC: 9.3 G/DL — LOW (ref 11.5–15.5)
IRON SATN MFR SERPL: 28 % — SIGNIFICANT CHANGE UP (ref 14–50)
IRON SATN MFR SERPL: 88 UG/DL — SIGNIFICANT CHANGE UP (ref 37–145)
MCHC RBC-ENTMCNC: 31.4 PG — SIGNIFICANT CHANGE UP (ref 27–34)
MCHC RBC-ENTMCNC: 33.5 G/DL — SIGNIFICANT CHANGE UP (ref 32–36)
MCV RBC AUTO: 93.9 FL — SIGNIFICANT CHANGE UP (ref 80–100)
PLATELET # BLD AUTO: 207 K/UL — SIGNIFICANT CHANGE UP (ref 150–400)
POTASSIUM SERPL-MCNC: 3.6 MMOL/L — SIGNIFICANT CHANGE UP (ref 3.5–5.3)
POTASSIUM SERPL-SCNC: 3.6 MMOL/L — SIGNIFICANT CHANGE UP (ref 3.5–5.3)
PROT SERPL-MCNC: 5.9 G/DL — LOW (ref 6.6–8.7)
RBC # BLD: 2.96 M/UL — LOW (ref 3.8–5.2)
RBC # FLD: 13.6 % — SIGNIFICANT CHANGE UP (ref 10.3–14.5)
SODIUM SERPL-SCNC: 141 MMOL/L — SIGNIFICANT CHANGE UP (ref 135–145)
TIBC SERPL-MCNC: 316 UG/DL — SIGNIFICANT CHANGE UP (ref 220–430)
TRANSFERRIN SERPL-MCNC: 221 MG/DL — SIGNIFICANT CHANGE UP (ref 192–382)
VIT B12 SERPL-MCNC: 499 PG/ML — SIGNIFICANT CHANGE UP (ref 232–1245)
WBC # BLD: 7.58 K/UL — SIGNIFICANT CHANGE UP (ref 3.8–10.5)
WBC # FLD AUTO: 7.58 K/UL — SIGNIFICANT CHANGE UP (ref 3.8–10.5)

## 2024-11-26 PROCEDURE — 99233 SBSQ HOSP IP/OBS HIGH 50: CPT

## 2024-11-26 PROCEDURE — 99223 1ST HOSP IP/OBS HIGH 75: CPT

## 2024-11-26 PROCEDURE — 76770 US EXAM ABDO BACK WALL COMP: CPT | Mod: 26

## 2024-11-26 RX ORDER — POTASSIUM CHLORIDE 600 MG/1
20 TABLET, EXTENDED RELEASE ORAL DAILY
Refills: 0 | Status: DISCONTINUED | OUTPATIENT
Start: 2024-11-26 | End: 2024-11-28

## 2024-11-26 RX ORDER — SODIUM BICARBONATE 84 MG/ML
0.07 INJECTION, SOLUTION INTRAVENOUS
Qty: 75 | Refills: 0 | Status: DISCONTINUED | OUTPATIENT
Start: 2024-11-26 | End: 2024-11-27

## 2024-11-26 RX ORDER — ACETAMINOPHEN, DIPHENHYDRAMINE HCL, PHENYLEPHRINE HCL 325; 25; 5 MG/1; MG/1; MG/1
5 TABLET ORAL ONCE
Refills: 0 | Status: COMPLETED | OUTPATIENT
Start: 2024-11-26 | End: 2024-11-26

## 2024-11-26 RX ORDER — ACETAMINOPHEN, DIPHENHYDRAMINE HCL, PHENYLEPHRINE HCL 325; 25; 5 MG/1; MG/1; MG/1
1 TABLET ORAL ONCE
Refills: 0 | Status: COMPLETED | OUTPATIENT
Start: 2024-11-26 | End: 2024-11-26

## 2024-11-26 RX ADMIN — PANTOPRAZOLE SODIUM 40 MILLIGRAM(S): 40 TABLET, DELAYED RELEASE ORAL at 05:21

## 2024-11-26 RX ADMIN — OXYCODONE HYDROCHLORIDE 10 MILLIGRAM(S): 30 TABLET ORAL at 05:21

## 2024-11-26 RX ADMIN — Medication 50 MILLIGRAM(S): at 05:21

## 2024-11-26 RX ADMIN — GABAPENTIN 100 MILLIGRAM(S): 300 CAPSULE ORAL at 05:21

## 2024-11-26 RX ADMIN — Medication 81 MILLIGRAM(S): at 11:34

## 2024-11-26 RX ADMIN — CHLORHEXIDINE GLUCONATE 1 APPLICATION(S): 1.2 RINSE ORAL at 05:23

## 2024-11-26 RX ADMIN — Medication 5000 UNIT(S): at 05:21

## 2024-11-26 RX ADMIN — ACETAMINOPHEN, DIPHENHYDRAMINE HCL, PHENYLEPHRINE HCL 1 MILLIGRAM(S): 325; 25; 5 TABLET ORAL at 01:25

## 2024-11-26 RX ADMIN — ACETAMINOPHEN, DIPHENHYDRAMINE HCL, PHENYLEPHRINE HCL 5 MILLIGRAM(S): 325; 25; 5 TABLET ORAL at 21:45

## 2024-11-26 RX ADMIN — LINEZOLID 600 MILLIGRAM(S): 600 INJECTION, SOLUTION INTRAVENOUS at 18:08

## 2024-11-26 RX ADMIN — LINEZOLID 600 MILLIGRAM(S): 600 INJECTION, SOLUTION INTRAVENOUS at 05:21

## 2024-11-26 RX ADMIN — ACETAMINOPHEN 500MG 650 MILLIGRAM(S): 500 TABLET, COATED ORAL at 02:04

## 2024-11-26 RX ADMIN — SODIUM BICARBONATE 80 MEQ/KG/HR: 84 INJECTION, SOLUTION INTRAVENOUS at 12:46

## 2024-11-26 RX ADMIN — Medication 100 MICROGRAM(S): at 05:22

## 2024-11-26 RX ADMIN — CLOPIDOGREL 75 MILLIGRAM(S): 75 TABLET, FILM COATED ORAL at 11:34

## 2024-11-26 RX ADMIN — OXYCODONE HYDROCHLORIDE 10 MILLIGRAM(S): 30 TABLET ORAL at 06:21

## 2024-11-26 RX ADMIN — OXYCODONE HYDROCHLORIDE 10 MILLIGRAM(S): 30 TABLET ORAL at 18:07

## 2024-11-26 RX ADMIN — Medication 2 TABLET(S): at 21:45

## 2024-11-26 RX ADMIN — POTASSIUM CHLORIDE 20 MILLIEQUIVALENT(S): 600 TABLET, EXTENDED RELEASE ORAL at 18:12

## 2024-11-26 RX ADMIN — GABAPENTIN 100 MILLIGRAM(S): 300 CAPSULE ORAL at 18:07

## 2024-11-26 RX ADMIN — VENLAFAXINE HYDROCHLORIDE 75 MILLIGRAM(S): 75 CAPSULE, EXTENDED RELEASE ORAL at 11:35

## 2024-11-26 RX ADMIN — Medication 10 MILLIGRAM(S): at 11:34

## 2024-11-26 RX ADMIN — Medication 5000 UNIT(S): at 21:45

## 2024-11-26 RX ADMIN — Medication 5000 UNIT(S): at 14:36

## 2024-11-26 RX ADMIN — ROSUVASTATIN CALCIUM 20 MILLIGRAM(S): 5 TABLET, FILM COATED ORAL at 21:45

## 2024-11-26 RX ADMIN — ACETAMINOPHEN 500MG 650 MILLIGRAM(S): 500 TABLET, COATED ORAL at 01:04

## 2024-11-26 RX ADMIN — Medication 50 MILLIGRAM(S): at 18:08

## 2024-11-26 NOTE — PROGRESS NOTE ADULT - ASSESSMENT
69F PMHx CAD, NSTEMI in 2011 s/p ELIZABETH to LAD, s/p ELIZABETH Distal LCx (4/30/2024), HTN, HLD, hypothyroid, rheumatoid arthritis, recent admission to MICU here in September for hypoxic respiratory failure, ARDS requiring intubation, Septic Shock secondary to LLL pneumonia with concerns for acute systolic heart failure, cardiogenic shock (repeat ECHO with improved EF) was admitted to MICU 11/22/24 for AMS x1-2 days. Patient diagnosed with UTI in outpatient setting and finished Levaquin course, however started to become confused.   In ER, CT head negative for acute intracranial pathology. CT C/A/P negative. Noted to be hypotensive s/p sepsis protocol IVF and started on Levophed Infusion & admitted to MICU. Pt now off pressors and downgraded to medical floor.      1-Septic Shock with UTI  - septic shock resolved,  s/p levo and midodrine   BP improved taper solucortef   cont zyvox for VRE UTI   BloodCx negative   - ID consult appreciated     2. CLEMENTINA   Likely due to renal hypoperfusion with subsequent ATN   3- Metabolic acidosis   Baseline Creatinine 0.44~in sep 2024   add IVF Na bicarbonate   renal sono   bladder scan   will call nephrology       4- CAD s/p ELIZABETH  - c/w ASA/plavix and statin    5) HLD  - on statin & Zetia    6) Hypothyroidism  - on synthroid  check TSH     7) GERD  - on PPI    8) Fibromyalgia and neuropathy  - on Gabapentin    9) Depression  - on Effexor    10-Anemia   will check iron studies and vit b12 , folate   hb stable     9) DVT ppx: heparin    Dispo: Acute. Pending improvement in CLEMENTINA. Urine culture with VRE. ID

## 2024-11-26 NOTE — CONSULT NOTE ADULT - ASSESSMENT
69 year old female with PMH of CAD ( s/P stents) HTN, hyperlipidemia, RA, Hypothyroid who had recent admission in sept. with respiratory failure due to LL PNA.  She presented to [facility] for confusion after she was treated with LEvaquin as OP for UTI and she was treated for septic shock with good response. On presentation she was  69 year old female with PMH of CAD ( s/P stents) HTN, hyperlipidemia, RA, Hypothyroid who had recent admission in sept. with respiratory failure due to LL PNA.  She presented to [facility] for confusion after she was treated with Levaquin as OP for UTI and she was treated for septic shock with good response. On presentation she was she has CLEMENTINA with serum creatinine of 5.87. Her renal function is improving and currently she has no urinary symptoms.  Her serum bicarbonate remains low. Nephrology consult for CLEMENTINA.      Impression:  CLEMENTINA: Due sepsis/ hemodynamic instability and possibly prerenal failure in the setting of UTI. Renal function improving  Metabolic Acidosis: Due to CLEMENTINA  Septic Shock: Due UTI  now recovered  UTI:   CAD:  HTN  Hypothyroidism      Recommendation:  Agree with IV hydration with sodium bicarbonate supplement , however may cause worsening hypokalemia.  Start potassium supplement 20 MEQ daily    On Antibiotic - currently Zyvox   Adjust dose based on eGFR   Monitor BMP daily   Avoid Nephrotoxins   Will continue to follow     Thank you

## 2024-11-26 NOTE — PROGRESS NOTE ADULT - ASSESSMENT
69F PMHx CAD, NSTEMI in 2011 s/p ELIZABETH to LAD, s/p ELIZABETH Distal LCx (4/30/2024), HTN, HLD, hypothyroid, rheumatoid arthritis, recent admission to MICU here in September for hypoxic respiratory failure, ARDS requiring intubation, Septic Shock secondary to LLL pneumonia with concerns for acute systolic heart failure, cardiogenic shock (repeat ECHO with improved EF), now presents with AMS x1-2 days. Patient diagnosed with UTI in outpatient setting and finished Levaquin course, however started to become confused.   In ER, CT head negative for acute intracranial pathology. CT C/A/P negative. Labs significant for BUN/SCr 62.8/5.87, pH 7.25, HCO3 16, UA positive.   Noted to be hypotensive s/p sepsis protocol IVF and started on Levophed Infusion. MICU consulted for further management.   AS ABOVE ADMITTED WITH AMS AND HYPOTENSION AND WAS  IN ICU  URINE CX WITH VRE   PT HAS BEEN ON CEFEPIME  WRITTEN FOR UNASYN    ASK FURTHER ABOUT PCN ALLERGY   SHE REPORTS IT WAS ORAL AMOX FOR A DENTAL PROCEDURE ABOUT 25 YEAR AGO   PLACED ON ORAL ZYVOX FOR VRE   PT IS ON EFFEXOR WILL MONITOR FOR  SEROTONIN SYNDROME  WILL FOLLOWUP

## 2024-11-26 NOTE — CONSULT NOTE ADULT - SUBJECTIVE AND OBJECTIVE BOX
[facility] DIVISION OF KIDNEY DISEASES AND HYPERTENSION -- INITIAL CONSULT NOTE  --------------------------------------------------------------------------------  HPI:      PAST HISTORY  --------------------------------------------------------------------------------  PAST MEDICAL & SURGICAL HISTORY:  Hypothyroid      ADHD (attention deficit hyperactivity disorder)      HLD (hyperlipidemia)      Myocardial infarct  2012      Stented coronary artery  2012      Rheumatoid arthritis      Migraine      S/p bilateral carpal tunnel release      H/O cardiac catheterization      H/O laminectomy      H/O spinal fusion      S/P left knee arthroscopy      H/O total knee replacement, right        FAMILY HISTORY:    PAST SOCIAL HISTORY:    ALLERGIES & MEDICATIONS  --------------------------------------------------------------------------------  Allergies    penicillins (Hives)  azithromycin (Hives)  tetracycline (Hives)  Butazolactin, Prolaprin (Hives)  Cipro (Unknown)  Zyrtec (Hives)    Intolerances      Standing Inpatient Medications  aspirin  chewable 81 milliGRAM(s) Oral daily  chlorhexidine 2% Cloths 1 Application(s) Topical <User Schedule>  clopidogrel Tablet 75 milliGRAM(s) Oral daily  dextrose 5%. 1000 milliLiter(s) IV Continuous <Continuous>  dextrose 5%. 1000 milliLiter(s) IV Continuous <Continuous>  dextrose 50% Injectable 25 Gram(s) IV Push once  dextrose 50% Injectable 12.5 Gram(s) IV Push once  dextrose 50% Injectable 25 Gram(s) IV Push once  dextrose Oral Gel 15 Gram(s) Oral once  ezetimibe 10 milliGRAM(s) Oral daily  gabapentin 100 milliGRAM(s) Oral every 12 hours  glucagon  Injectable 1 milliGRAM(s) IntraMuscular once  heparin   Injectable 5000 Unit(s) SubCutaneous every 8 hours  hydrocortisone sodium succinate Injectable 50 milliGRAM(s) IV Push every 12 hours  levothyroxine 100 MICROGram(s) Oral daily  linezolid    Tablet 600 milliGRAM(s) Oral every 12 hours  linezolid    Tablet      melatonin 5 milliGRAM(s) Oral at bedtime  oxyCODONE  ER Tablet 10 milliGRAM(s) Oral every 12 hours  pantoprazole    Tablet 40 milliGRAM(s) Oral before breakfast  rosuvastatin 20 milliGRAM(s) Oral at bedtime  senna 2 Tablet(s) Oral at bedtime  sodium bicarbonate  Infusion 0.066 mEq/kG/Hr IV Continuous <Continuous>  venlafaxine XR. 75 milliGRAM(s) Oral daily    PRN Inpatient Medications  acetaminophen     Tablet .. 650 milliGRAM(s) Oral every 6 hours PRN  polyethylene glycol 3350 17 Gram(s) Oral daily PRN      REVIEW OF SYSTEMS  --------------------------------------------------------------------------------  Gen: No weight changes, fatigue, fevers/chills, weakness  Skin: No rashes  Head/Eyes/Ears/Mouth: No headache; Normal hearing; Normal vision w/o blurriness; No sinus pain/discomfort, sore throat  Respiratory: No dyspnea, cough, wheezing, hemoptysis  CV: No chest pain, PND, orthopnea  GI: No abdominal pain, diarrhea, constipation, nausea, vomiting, melena, hematochezia  : No increased frequency, dysuria, hematuria, nocturia  MSK: No joint pain/swelling; no back pain; no edema  Neuro: No dizziness/lightheadedness, weakness, seizures, numbness, tingling  Heme: No easy bruising or bleeding      All other systems were reviewed and are negative, except as noted.    VITALS/PHYSICAL EXAM  --------------------------------------------------------------------------------  T(C): 36.3 (11-26-24 @ 08:51), Max: 36.5 (11-25-24 @ 20:00)  HR: 66 (11-26-24 @ 08:51) (66 - 75)  BP: 135/72 (11-26-24 @ 08:51) (121/65 - 137/76)  RR: 18 (11-26-24 @ 08:51) (18 - 18)  SpO2: 99% (11-26-24 @ 08:51) (97% - 99%)  Wt(kg): --        Physical Exam:  Gen: NAD, well-appearing  HEENT: PERRL, supple neck, clear oropharynx  Pulm: CTA B/L  CV: RRR, S1S2; no peripheral edema  Abd: +BS, soft, nontender/nondistended  Neuro: A and Ox3  MSK no deformities    LABS/STUDIES  --------------------------------------------------------------------------------              9.3    7.58  >-----------<  207      [11-26-24 @ 05:15]              27.8     141  |  107  |  44.5  ----------------------------<  123      [11-26-24 @ 05:15]  3.6   |  17.0  |  3.44        Ca     8.6     [11-26-24 @ 05:15]      Mg     1.9     [11-25-24 @ 04:13]      Phos  5.2     [11-25-24 @ 04:13]    TPro  5.9  /  Alb  3.7  /  TBili  <0.2  /  DBili  x   /  AST  15  /  ALT  6   /  AlkPhos  64  [11-26-24 @ 05:15]          Creatinine Trend:  SCr 3.44 [11-26 @ 05:15]  SCr 4.13 [11-25 @ 04:13]  SCr 4.74 [11-24 @ 05:18]  SCr 5.19 [11-23 @ 03:20]  SCr 5.87 [11-22 @ 17:29]    Urinalysis - [11-26-24 @ 05:15]      Color  / Appearance  / SG  / pH       Gluc 123 / Ketone   / Bili  / Urobili        Blood  / Protein  / Leuk Est  / Nitrite       RBC  / WBC  / Hyaline  / Gran  / Sq Epi  / Non Sq Epi  / Bacteria       Iron 88, TIBC 316, %sat 28      [11-26-24 @ 05:15]  Ferritin 248      [11-26-24 @ 05:15]  TSH 57.00      [09-26-24 @ 05:22]

## 2024-11-26 NOTE — CONSULT NOTE ADULT - SUBJECTIVE AND OBJECTIVE BOX
[facility] DIVISION OF KIDNEY DISEASES AND HYPERTENSION -- INITIAL CONSULT NOTE  --------------------------------------------------------------------------------  HPI:    69-year-old female with past medical history of coronary artery status post non-ST elevation MI in 2011 status post ELIZABETH to LAD, status post ELIZABETH to distal left circumflex on 4/30/2024, hypertension, hyperlipidemia, hypothyroidism, rheumatoid arthritis, was recently admitted to [facility] MICU in September 2024 with septic shock due to lower lobe pneumonia/ARDS for which she recovered at that time she had acute kidney injury however renal function recovered.  Now she is readmitted along [facility] for altered mental status for about 2 days she was diagnosed with UTI in outpatient setting and was treated with a course of Levaquin without any improvement and she became confused.  She was admitted to MICU and found to have acute and treated for septic protocol. She had CLEMENTINA on  presentation with creatine of 5.87,   Now renal function improved,  bicarb remains low.  She denies any significant urinary symptoms  Nephrology consultation for acute kidney injury.    PAST HISTORY  --------------------------------------------------------------------------------  PAST MEDICAL & SURGICAL HISTORY:  Hypothyroid      ADHD (attention deficit hyperactivity disorder)      HLD (hyperlipidemia)      Myocardial infarct  2012      Stented coronary artery  2012      Rheumatoid arthritis      Migraine      S/p bilateral carpal tunnel release      H/O cardiac catheterization      H/O laminectomy      H/O spinal fusion      S/P left knee arthroscopy      H/O total knee replacement, right        FAMILY HISTORY:    PAST SOCIAL HISTORY:    ALLERGIES & MEDICATIONS  --------------------------------------------------------------------------------  Allergies    penicillins (Hives)  azithromycin (Hives)  tetracycline (Hives)  Butazolactin, Prolaprin (Hives)  Cipro (Unknown)  Zyrtec (Hives)    Intolerances      Standing Inpatient Medications  aspirin  chewable 81 milliGRAM(s) Oral daily  chlorhexidine 2% Cloths 1 Application(s) Topical <User Schedule>  clopidogrel Tablet 75 milliGRAM(s) Oral daily  dextrose 5%. 1000 milliLiter(s) IV Continuous <Continuous>  dextrose 5%. 1000 milliLiter(s) IV Continuous <Continuous>  dextrose 50% Injectable 25 Gram(s) IV Push once  dextrose 50% Injectable 12.5 Gram(s) IV Push once  dextrose 50% Injectable 25 Gram(s) IV Push once  dextrose Oral Gel 15 Gram(s) Oral once  ezetimibe 10 milliGRAM(s) Oral daily  gabapentin 100 milliGRAM(s) Oral every 12 hours  glucagon  Injectable 1 milliGRAM(s) IntraMuscular once  heparin   Injectable 5000 Unit(s) SubCutaneous every 8 hours  hydrocortisone sodium succinate Injectable 50 milliGRAM(s) IV Push every 12 hours  levothyroxine 100 MICROGram(s) Oral daily  linezolid    Tablet 600 milliGRAM(s) Oral every 12 hours  linezolid    Tablet      melatonin 5 milliGRAM(s) Oral at bedtime  oxyCODONE  ER Tablet 10 milliGRAM(s) Oral every 12 hours  pantoprazole    Tablet 40 milliGRAM(s) Oral before breakfast  rosuvastatin 20 milliGRAM(s) Oral at bedtime  senna 2 Tablet(s) Oral at bedtime  sodium bicarbonate  Infusion 0.066 mEq/kG/Hr IV Continuous <Continuous>  venlafaxine XR. 75 milliGRAM(s) Oral daily    PRN Inpatient Medications  acetaminophen     Tablet .. 650 milliGRAM(s) Oral every 6 hours PRN  polyethylene glycol 3350 17 Gram(s) Oral daily PRN      REVIEW OF SYSTEMS  --------------------------------------------------------------------------------  Gen: No weight changes, fatigue, fevers/chills, weakness  Skin: No rashes  Head/Eyes/Ears/Mouth: No headache; Normal hearing; Normal vision w/o blurriness; No sinus pain/discomfort, sore throat  Respiratory: No dyspnea, cough, wheezing, hemoptysis  CV: No chest pain, or VALENTIN  GI: No abdominal pain, diarrhea, constipation, nausea, vomiting, melena, hematochezia  : No increased frequency, dysuria, hematuria, nocturia  MSK: No joint pain/swelling; no back pain; no edema  Neuro: No dizziness/lightheadedness, weakness, seizures, numbness, tingling    All other systems were reviewed and are negative, except as noted.    VITALS/PHYSICAL EXAM  --------------------------------------------------------------------------------  T(C): 36.3 (11-26-24 @ 08:51), Max: 36.5 (11-25-24 @ 20:00)  HR: 66 (11-26-24 @ 08:51) (66 - 75)  BP: 135/72 (11-26-24 @ 08:51) (121/65 - 137/76)  RR: 18 (11-26-24 @ 08:51) (18 - 18)  SpO2: 99% (11-26-24 @ 08:51) (97% - 99%)  Wt(kg): --        Physical Exam:  Gen: NAD, comfortable   HEENT: PERRL, supple neck  Pulm: CTA B/L  CV: RRR, S1S2; no peripheral edema  Abd: +BS, soft, nontender/nondistended  Neuro: A and Ox3  Ext: no edema       LABS/STUDIES  --------------------------------------------------------------------------------              9.3    7.58  >-----------<  207      [11-26-24 @ 05:15]              27.8     141  |  107  |  44.5  ----------------------------<  123      [11-26-24 @ 05:15]  3.6   |  17.0  |  3.44        Ca     8.6     [11-26-24 @ 05:15]      Mg     1.9     [11-25-24 @ 04:13]      Phos  5.2     [11-25-24 @ 04:13]    TPro  5.9  /  Alb  3.7  /  TBili  <0.2  /  DBili  x   /  AST  15  /  ALT  6   /  AlkPhos  64  [11-26-24 @ 05:15]          Creatinine Trend:  SCr 3.44 [11-26 @ 05:15]  SCr 4.13 [11-25 @ 04:13]  SCr 4.74 [11-24 @ 05:18]  SCr 5.19 [11-23 @ 03:20]  SCr 5.87 [11-22 @ 17:29]    Urinalysis - [11-26-24 @ 05:15]      Color  / Appearance  / SG  / pH       Gluc 123 / Ketone   / Bili  / Urobili        Blood  / Protein  / Leuk Est  / Nitrite       RBC  / WBC  / Hyaline  / Gran  / Sq Epi  / Non Sq Epi  / Bacteria       Iron 88, TIBC 316, %sat 28      [11-26-24 @ 05:15]  Ferritin 248      [11-26-24 @ 05:15]  TSH 57.00      [09-26-24 @ 05:22]         [facility] DIVISION OF KIDNEY DISEASES AND HYPERTENSION -- INITIAL CONSULT NOTE  --------------------------------------------------------------------------------  HPI:    69-year-old female with past medical history of coronary artery status post non-ST elevation MI in 2011 status post ELIZABETH to LAD, status post ELIZABETH to distal left circumflex on 4/30/2024, hypertension, hyperlipidemia, hypothyroidism, rheumatoid arthritis, was recently admitted to [facility] MICU in September 2024 with septic shock due to lower lobe pneumonia/ARDS for which she recovered at that time she had acute kidney injury however renal function recovered.  Now she is readmitted along [facility] for altered mental status for about 2 days she was diagnosed with UTI in outpatient setting and was treated with a course of Levaquin without any improvement and she became confused.  She was admitted to MICU and found to have acute and treated for septic protocol. She had CLEMENTINA on  presentation with creatine of 5.87,   Now renal function improved,  bicarb remains low.  She denies any significant urinary symptoms  Nephrology consultation for acute kidney injury.    PAST HISTORY  --------------------------------------------------------------------------------  PAST MEDICAL & SURGICAL HISTORY:  Hypothyroid      ADHD (attention deficit hyperactivity disorder)      HLD (hyperlipidemia)      Myocardial infarct  2012      Stented coronary artery  2012      Rheumatoid arthritis      Migraine      S/p bilateral carpal tunnel release      H/O cardiac catheterization      H/O laminectomy      H/O spinal fusion      S/P left knee arthroscopy      H/O total knee replacement, right        FAMILY HISTORY:    PAST SOCIAL HISTORY:    ALLERGIES & MEDICATIONS  --------------------------------------------------------------------------------  Allergies    penicillins (Hives)  azithromycin (Hives)  tetracycline (Hives)  Butazolactin, Prolaprin (Hives)  Cipro (Unknown)  Zyrtec (Hives)    Intolerances      MEDICATIONS  (STANDING):  aspirin  chewable 81 milliGRAM(s) Oral daily  chlorhexidine 2% Cloths 1 Application(s) Topical <User Schedule>  clopidogrel Tablet 75 milliGRAM(s) Oral daily  dextrose 5%. 1000 milliLiter(s) (100 mL/Hr) IV Continuous <Continuous>  dextrose 5%. 1000 milliLiter(s) (50 mL/Hr) IV Continuous <Continuous>  dextrose 50% Injectable 25 Gram(s) IV Push once  dextrose 50% Injectable 12.5 Gram(s) IV Push once  dextrose 50% Injectable 25 Gram(s) IV Push once  dextrose Oral Gel 15 Gram(s) Oral once  ezetimibe 10 milliGRAM(s) Oral daily  gabapentin 100 milliGRAM(s) Oral every 12 hours  glucagon  Injectable 1 milliGRAM(s) IntraMuscular once  heparin   Injectable 5000 Unit(s) SubCutaneous every 8 hours  hydrocortisone sodium succinate Injectable 50 milliGRAM(s) IV Push every 12 hours  levothyroxine 100 MICROGram(s) Oral daily  linezolid    Tablet 600 milliGRAM(s) Oral every 12 hours  linezolid    Tablet      melatonin 5 milliGRAM(s) Oral at bedtime  oxyCODONE  ER Tablet 10 milliGRAM(s) Oral every 12 hours  pantoprazole    Tablet 40 milliGRAM(s) Oral before breakfast  rosuvastatin 20 milliGRAM(s) Oral at bedtime  senna 2 Tablet(s) Oral at bedtime  sodium bicarbonate  Infusion 0.066 mEq/kG/Hr (80 mL/Hr) IV Continuous <Continuous>  venlafaxine XR. 75 milliGRAM(s) Oral daily      PRN Inpatient Medications  acetaminophen     Tablet .. 650 milliGRAM(s) Oral every 6 hours PRN  polyethylene glycol 3350 17 Gram(s) Oral daily PRN      REVIEW OF SYSTEMS  --------------------------------------------------------------------------------  Gen: No weight changes, fatigue, fevers/chills, weakness  Skin: No rashes  Head/Eyes/Ears/Mouth: No headache; Normal hearing; Normal vision w/o blurriness; No sinus pain/discomfort, sore throat  Respiratory: No dyspnea, cough, wheezing, hemoptysis  CV: No chest pain, or VALENTIN  GI: No abdominal pain, diarrhea, constipation, nausea, vomiting, melena, hematochezia  : No increased frequency, dysuria, hematuria, nocturia  MSK: No joint pain/swelling; no back pain; no edema  Neuro: No dizziness/lightheadedness, weakness, seizures, numbness, tingling    All other systems were reviewed and are negative, except as noted.    VITALS/PHYSICAL EXAM  --------------------------------------------------------------------------------  T(C): 36.3 (11-26-24 @ 08:51), Max: 36.5 (11-25-24 @ 20:00)  HR: 66 (11-26-24 @ 08:51) (66 - 75)  BP: 135/72 (11-26-24 @ 08:51) (121/65 - 137/76)  RR: 18 (11-26-24 @ 08:51) (18 - 18)  SpO2: 99% (11-26-24 @ 08:51) (97% - 99%)  Wt(kg): --        Physical Exam:  Gen: NAD, comfortable   HEENT: PERRL, supple neck  Pulm: CTA B/L  CV: RRR, S1S2; no peripheral edema  Abd: +BS, soft, nontender/nondistended  Neuro: A and Ox3  Ext: no edema       LABS/STUDIES  --------------------------------------------------------------------------------              9.3    7.58  >-----------<  207      [11-26-24 @ 05:15]              27.8     141  |  107  |  44.5  ----------------------------<  123      [11-26-24 @ 05:15]  3.6   |  17.0  |  3.44        Ca     8.6     [11-26-24 @ 05:15]      Mg     1.9     [11-25-24 @ 04:13]      Phos  5.2     [11-25-24 @ 04:13]    TPro  5.9  /  Alb  3.7  /  TBili  <0.2  /  DBili  x   /  AST  15  /  ALT  6   /  AlkPhos  64  [11-26-24 @ 05:15]          Creatinine Trend:  SCr 3.44 [11-26 @ 05:15]  SCr 4.13 [11-25 @ 04:13]  SCr 4.74 [11-24 @ 05:18]  SCr 5.19 [11-23 @ 03:20]  SCr 5.87 [11-22 @ 17:29]    Urinalysis - [11-26-24 @ 05:15]      Color  / Appearance  / SG  / pH       Gluc 123 / Ketone   / Bili  / Urobili        Blood  / Protein  / Leuk Est  / Nitrite       RBC  / WBC  / Hyaline  / Gran  / Sq Epi  / Non Sq Epi  / Bacteria       Iron 88, TIBC 316, %sat 28      [11-26-24 @ 05:15]  Ferritin 248      [11-26-24 @ 05:15]  TSH 57.00      [09-26-24 @ 05:22]

## 2024-11-27 LAB
ANION GAP SERPL CALC-SCNC: 12 MMOL/L — SIGNIFICANT CHANGE UP (ref 5–17)
BUN SERPL-MCNC: 31.9 MG/DL — HIGH (ref 8–20)
CALCIUM SERPL-MCNC: 8.4 MG/DL — SIGNIFICANT CHANGE UP (ref 8.4–10.5)
CHLORIDE SERPL-SCNC: 107 MMOL/L — SIGNIFICANT CHANGE UP (ref 96–108)
CO2 SERPL-SCNC: 22 MMOL/L — SIGNIFICANT CHANGE UP (ref 22–29)
CREAT SERPL-MCNC: 2.19 MG/DL — HIGH (ref 0.5–1.3)
EGFR: 24 ML/MIN/1.73M2 — LOW
GLUCOSE SERPL-MCNC: 130 MG/DL — HIGH (ref 70–99)
HCT VFR BLD CALC: 27.8 % — LOW (ref 34.5–45)
HGB BLD-MCNC: 9.6 G/DL — LOW (ref 11.5–15.5)
MCHC RBC-ENTMCNC: 31.8 PG — SIGNIFICANT CHANGE UP (ref 27–34)
MCHC RBC-ENTMCNC: 34.5 G/DL — SIGNIFICANT CHANGE UP (ref 32–36)
MCV RBC AUTO: 92.1 FL — SIGNIFICANT CHANGE UP (ref 80–100)
PLATELET # BLD AUTO: 228 K/UL — SIGNIFICANT CHANGE UP (ref 150–400)
POTASSIUM SERPL-MCNC: 3.7 MMOL/L — SIGNIFICANT CHANGE UP (ref 3.5–5.3)
POTASSIUM SERPL-SCNC: 3.7 MMOL/L — SIGNIFICANT CHANGE UP (ref 3.5–5.3)
RBC # BLD: 3.02 M/UL — LOW (ref 3.8–5.2)
RBC # FLD: 13.8 % — SIGNIFICANT CHANGE UP (ref 10.3–14.5)
SODIUM SERPL-SCNC: 141 MMOL/L — SIGNIFICANT CHANGE UP (ref 135–145)
WBC # BLD: 7.59 K/UL — SIGNIFICANT CHANGE UP (ref 3.8–10.5)
WBC # FLD AUTO: 7.59 K/UL — SIGNIFICANT CHANGE UP (ref 3.8–10.5)

## 2024-11-27 PROCEDURE — 99232 SBSQ HOSP IP/OBS MODERATE 35: CPT

## 2024-11-27 PROCEDURE — 99233 SBSQ HOSP IP/OBS HIGH 50: CPT

## 2024-11-27 RX ORDER — 0.9 % SODIUM CHLORIDE 0.9 %
1000 INTRAVENOUS SOLUTION INTRAVENOUS
Refills: 0 | Status: DISCONTINUED | OUTPATIENT
Start: 2024-11-27 | End: 2024-11-29

## 2024-11-27 RX ADMIN — LINEZOLID 600 MILLIGRAM(S): 600 INJECTION, SOLUTION INTRAVENOUS at 18:03

## 2024-11-27 RX ADMIN — OXYCODONE HYDROCHLORIDE 10 MILLIGRAM(S): 30 TABLET ORAL at 18:02

## 2024-11-27 RX ADMIN — LINEZOLID 600 MILLIGRAM(S): 600 INJECTION, SOLUTION INTRAVENOUS at 10:27

## 2024-11-27 RX ADMIN — VENLAFAXINE HYDROCHLORIDE 75 MILLIGRAM(S): 75 CAPSULE, EXTENDED RELEASE ORAL at 12:34

## 2024-11-27 RX ADMIN — ACETAMINOPHEN, DIPHENHYDRAMINE HCL, PHENYLEPHRINE HCL 5 MILLIGRAM(S): 325; 25; 5 TABLET ORAL at 22:29

## 2024-11-27 RX ADMIN — POTASSIUM CHLORIDE 20 MILLIEQUIVALENT(S): 600 TABLET, EXTENDED RELEASE ORAL at 12:33

## 2024-11-27 RX ADMIN — PANTOPRAZOLE SODIUM 40 MILLIGRAM(S): 40 TABLET, DELAYED RELEASE ORAL at 08:40

## 2024-11-27 RX ADMIN — Medication 5000 UNIT(S): at 13:46

## 2024-11-27 RX ADMIN — Medication 81 MILLIGRAM(S): at 12:33

## 2024-11-27 RX ADMIN — GABAPENTIN 100 MILLIGRAM(S): 300 CAPSULE ORAL at 10:27

## 2024-11-27 RX ADMIN — CHLORHEXIDINE GLUCONATE 1 APPLICATION(S): 1.2 RINSE ORAL at 06:57

## 2024-11-27 RX ADMIN — Medication 100 MICROGRAM(S): at 06:56

## 2024-11-27 RX ADMIN — Medication 5000 UNIT(S): at 06:25

## 2024-11-27 RX ADMIN — ACETAMINOPHEN 500MG 650 MILLIGRAM(S): 500 TABLET, COATED ORAL at 01:28

## 2024-11-27 RX ADMIN — Medication 10 MILLIGRAM(S): at 12:33

## 2024-11-27 RX ADMIN — Medication 75 MILLILITER(S): at 12:31

## 2024-11-27 RX ADMIN — Medication 5000 UNIT(S): at 22:29

## 2024-11-27 RX ADMIN — ACETAMINOPHEN, DIPHENHYDRAMINE HCL, PHENYLEPHRINE HCL 5 MILLIGRAM(S): 325; 25; 5 TABLET ORAL at 01:00

## 2024-11-27 RX ADMIN — ACETAMINOPHEN 500MG 650 MILLIGRAM(S): 500 TABLET, COATED ORAL at 01:48

## 2024-11-27 RX ADMIN — GABAPENTIN 100 MILLIGRAM(S): 300 CAPSULE ORAL at 18:02

## 2024-11-27 RX ADMIN — Medication 75 MILLILITER(S): at 22:36

## 2024-11-27 RX ADMIN — CLOPIDOGREL 75 MILLIGRAM(S): 75 TABLET, FILM COATED ORAL at 12:33

## 2024-11-27 RX ADMIN — ROSUVASTATIN CALCIUM 20 MILLIGRAM(S): 5 TABLET, FILM COATED ORAL at 22:30

## 2024-11-27 RX ADMIN — Medication 50 MILLIGRAM(S): at 06:24

## 2024-11-27 RX ADMIN — SODIUM BICARBONATE 80 MEQ/KG/HR: 84 INJECTION, SOLUTION INTRAVENOUS at 01:01

## 2024-11-27 NOTE — PROGRESS NOTE ADULT - ASSESSMENT
69 year old female with PMH of CAD ( S/P stents) HTN, hyperlipidemia, RA, Hypothyroid who had recent admission in sept. with respiratory failure due to LL PNA/ARDS requiring intubation CLEMENTINA that resolved, now presented to [facility] for confusion after she was treated with Levaquin as OP for UTI, treated for septic shock due to UTI. On presentation she was she had CLEMENTINA creatinine of 5.87. Her renal function is improving.    Nephrology consult for CLEMENTINA.      Impression:  CLEMENTINA: Due sepsis/ hemodynamic instability and possibly prerenal failure in the setting of UTI. Renal function improving  Metabolic Acidosis: Due to CLEMENTINA,, treated with IV bicarb and IVF, now improved  Septic Shock: Due UTI  now recovered  UTI:   CAD: S/P stent   HTN  Hypothyroidism      Recommendation:  Change IVF to LR at 75 ml/h  Stop potassium supplement   On Antibiotic - currently Zyvox   Monitor BMP daily   Will continue to follow     Thank you

## 2024-11-27 NOTE — PROGRESS NOTE ADULT - ASSESSMENT
69F PMHx CAD, NSTEMI in 2011 s/p ELIZABETH to LAD, s/p ELIZABETH Distal LCx (4/30/2024), HTN, HLD, hypothyroid, rheumatoid arthritis, recent admission to MICU here in September for hypoxic respiratory failure, ARDS requiring intubation, Septic Shock secondary to LLL pneumonia with concerns for acute systolic heart failure, cardiogenic shock (repeat ECHO with improved EF) was admitted to MICU 11/22/24 for AMS x1-2 days. Patient diagnosed with UTI in outpatient setting and finished Levaquin course, however started to become confused.   In ER, CT head negative for acute intracranial pathology. CT C/A/P negative. Noted to be hypotensive s/p sepsis protocol IVF and started on Levophed Infusion & admitted to MICU. Pt now off pressors and downgraded to medical floor.      1-Septic Shock with UTI  - septic shock resolved,  s/p levo and midodrine   s/p Hydrocortisone   cont zyvox for VRE UTI   BloodCx negative   - ID consult appreciated     2. CLEMENTINA > improving   Likely due to renal hypoperfusion with subsequent ATN   3- Metabolic acidosis> resolved   Baseline Creatinine 0.44~in sep 2024   -Bicarb IVF switched to LR 75cc/hr  -Renal USG normal     4- CAD s/p ELIZABETH  - c/w ASA/plavix and statin    5) HLD  - on statin & Zetia    6) Hypothyroidism  - on synthroid  check TSH     7) GERD  - on PPI    8) Fibromyalgia and neuropathy  - on Gabapentin    9) Depression  - on Effexor    10-Anemia   will check iron studies and vit b12 , folate>> normal   hb stable     9) DVT ppx: heparin    Dispo: Acute. Pending improvement in CLEMENTINA.

## 2024-11-27 NOTE — PROGRESS NOTE ADULT - ASSESSMENT
69F PMHx CAD, NSTEMI in 2011 s/p ELIZABETH to LAD, s/p ELIZABETH Distal LCx (4/30/2024), HTN, HLD, hypothyroid, rheumatoid arthritis, recent admission to MICU here in September for hypoxic respiratory failure, ARDS requiring intubation, Septic Shock secondary to LLL pneumonia with concerns for acute systolic heart failure, cardiogenic shock (repeat ECHO with improved EF), now presents with AMS x1-2 days. Patient diagnosed with UTI in outpatient setting and finished Levaquin course, however started to become confused.   In ER, CT head negative for acute intracranial pathology. CT C/A/P negative. Labs significant for BUN/SCr 62.8/5.87, pH 7.25, HCO3 16, UA positive.   Noted to be hypotensive s/p sepsis protocol IVF and started on Levophed Infusion. MICU consulted for further management.   AS ABOVE ADMITTED WITH AMS AND HYPOTENSION AND WAS  IN ICU  URINE CX WITH VRE   PT HAS BEEN ON CEFEPIME  PCN ALLERGY  IN PAST   CHANGED TO  ORAL ZYVOX FOR VRE   PT IS ON EFFEXOR WILL MONITOR FOR  SEROTONIN SYNDROME  OVERALL TOLERATING MEDS  PLAN 7 DAYS OF ZYVOX THROUGH 12/2  WILL FOLLOW UP     69F PMHx CAD, NSTEMI in 2011 s/p ELIZABETH to LAD, s/p ELIZABETH Distal LCx (4/30/2024), HTN, HLD, hypothyroid, rheumatoid arthritis, recent admission to MICU here in September for hypoxic respiratory failure, ARDS requiring intubation, Septic Shock secondary to LLL pneumonia with concerns for acute systolic heart failure, cardiogenic shock (repeat ECHO with improved EF), now presents with AMS x1-2 days. Patient diagnosed with UTI in outpatient setting and finished Levaquin course, however started to become confused.   In ER, CT head negative for acute intracranial pathology. CT C/A/P negative. Labs significant for BUN/SCr 62.8/5.87, pH 7.25, HCO3 16, UA positive.   Noted to be hypotensive s/p sepsis protocol IVF and started on Levophed Infusion. MICU consulted for further management.   AS ABOVE ADMITTED WITH AMS AND HYPOTENSION AND WAS  IN ICU  URINE CX WITH VRE   PT HAS BEEN ON CEFEPIME  PCN ALLERGY  IN PAST   CHANGED TO  ORAL ZYVOX FOR VRE   PT IS ON EFFEXOR WILL MONITOR FOR  SEROTONIN SYNDROME  OVERALL TOLERATING MEDS  PLAN 7 DAYS OF ZYVOX THROUGH 12/2  WILL FOLLOW UP AS NEEDED PLEASE CALL IF DAYANARA

## 2024-11-27 NOTE — PROGRESS NOTE ADULT - ASSESSMENT
69 year old female with PMH of CAD ( s/P stents) HTN, hyperlipidemia, RA, Hypothyroid who had recent admission in sept. with respiratory failure due to LL PNA.  She presented to [facility] for confusion after she was treated with Levaquin as OP for UTI and she was treated for septic shock with good response. On presentation she was she has CLEMENTINA with serum creatinine of 5.87. Her renal function is improving and currently she has no urinary symptoms.  Her serum bicarbonate remains low. Nephrology consult for CLEMENTINA.      Impression:  CLEMENTINA: Due sepsis/ hemodynamic instability and possibly prerenal failure in the setting of UTI. Renal function improving  Metabolic Acidosis: Due to CLEMENTINA  Septic Shock: Due UTI  now recovered  UTI:   CAD:  HTN  Hypothyroidism      Recommendation:  Agree with IV hydration with sodium bicarbonate supplement , however may cause worsening hypokalemia.  Start potassium supplement 20 MEQ daily    On Antibiotic - currently Zyvox   Adjust dose based on eGFR   Monitor BMP daily   Avoid Nephrotoxins   Will continue to follow     Thank you

## 2024-11-28 LAB
ANION GAP SERPL CALC-SCNC: 11 MMOL/L — SIGNIFICANT CHANGE UP (ref 5–17)
BUN SERPL-MCNC: 22.8 MG/DL — HIGH (ref 8–20)
CALCIUM SERPL-MCNC: 8 MG/DL — LOW (ref 8.4–10.5)
CHLORIDE SERPL-SCNC: 111 MMOL/L — HIGH (ref 96–108)
CO2 SERPL-SCNC: 22 MMOL/L — SIGNIFICANT CHANGE UP (ref 22–29)
CREAT SERPL-MCNC: 1.7 MG/DL — HIGH (ref 0.5–1.3)
CULTURE RESULTS: SIGNIFICANT CHANGE UP
EGFR: 32 ML/MIN/1.73M2 — LOW
GLUCOSE SERPL-MCNC: 93 MG/DL — SIGNIFICANT CHANGE UP (ref 70–99)
HCT VFR BLD CALC: 27.7 % — LOW (ref 34.5–45)
HGB BLD-MCNC: 9.2 G/DL — LOW (ref 11.5–15.5)
MCHC RBC-ENTMCNC: 31.5 PG — SIGNIFICANT CHANGE UP (ref 27–34)
MCHC RBC-ENTMCNC: 33.2 G/DL — SIGNIFICANT CHANGE UP (ref 32–36)
MCV RBC AUTO: 94.9 FL — SIGNIFICANT CHANGE UP (ref 80–100)
PLATELET # BLD AUTO: 220 K/UL — SIGNIFICANT CHANGE UP (ref 150–400)
POTASSIUM SERPL-MCNC: 3.4 MMOL/L — LOW (ref 3.5–5.3)
POTASSIUM SERPL-SCNC: 3.4 MMOL/L — LOW (ref 3.5–5.3)
RBC # BLD: 2.92 M/UL — LOW (ref 3.8–5.2)
RBC # FLD: 13.6 % — SIGNIFICANT CHANGE UP (ref 10.3–14.5)
SODIUM SERPL-SCNC: 144 MMOL/L — SIGNIFICANT CHANGE UP (ref 135–145)
SPECIMEN SOURCE: SIGNIFICANT CHANGE UP
WBC # BLD: 8.04 K/UL — SIGNIFICANT CHANGE UP (ref 3.8–10.5)
WBC # FLD AUTO: 8.04 K/UL — SIGNIFICANT CHANGE UP (ref 3.8–10.5)

## 2024-11-28 PROCEDURE — 99232 SBSQ HOSP IP/OBS MODERATE 35: CPT

## 2024-11-28 RX ORDER — OXYCODONE HYDROCHLORIDE 30 MG/1
10 TABLET ORAL ONCE
Refills: 0 | Status: DISCONTINUED | OUTPATIENT
Start: 2024-11-28 | End: 2024-11-28

## 2024-11-28 RX ORDER — POTASSIUM CHLORIDE 600 MG/1
40 TABLET, EXTENDED RELEASE ORAL ONCE
Refills: 0 | Status: COMPLETED | OUTPATIENT
Start: 2024-11-28 | End: 2024-11-28

## 2024-11-28 RX ADMIN — Medication 100 MICROGRAM(S): at 05:30

## 2024-11-28 RX ADMIN — OXYCODONE HYDROCHLORIDE 10 MILLIGRAM(S): 30 TABLET ORAL at 05:28

## 2024-11-28 RX ADMIN — Medication 10 MILLIGRAM(S): at 11:36

## 2024-11-28 RX ADMIN — OXYCODONE HYDROCHLORIDE 10 MILLIGRAM(S): 30 TABLET ORAL at 17:09

## 2024-11-28 RX ADMIN — OXYCODONE HYDROCHLORIDE 10 MILLIGRAM(S): 30 TABLET ORAL at 16:35

## 2024-11-28 RX ADMIN — VENLAFAXINE HYDROCHLORIDE 75 MILLIGRAM(S): 75 CAPSULE, EXTENDED RELEASE ORAL at 11:35

## 2024-11-28 RX ADMIN — Medication 5000 UNIT(S): at 05:28

## 2024-11-28 RX ADMIN — Medication 81 MILLIGRAM(S): at 11:36

## 2024-11-28 RX ADMIN — ROSUVASTATIN CALCIUM 20 MILLIGRAM(S): 5 TABLET, FILM COATED ORAL at 21:55

## 2024-11-28 RX ADMIN — GABAPENTIN 100 MILLIGRAM(S): 300 CAPSULE ORAL at 05:28

## 2024-11-28 RX ADMIN — POTASSIUM CHLORIDE 40 MILLIEQUIVALENT(S): 600 TABLET, EXTENDED RELEASE ORAL at 11:36

## 2024-11-28 RX ADMIN — Medication 75 MILLILITER(S): at 21:56

## 2024-11-28 RX ADMIN — LINEZOLID 600 MILLIGRAM(S): 600 INJECTION, SOLUTION INTRAVENOUS at 05:29

## 2024-11-28 RX ADMIN — CLOPIDOGREL 75 MILLIGRAM(S): 75 TABLET, FILM COATED ORAL at 11:36

## 2024-11-28 RX ADMIN — PANTOPRAZOLE SODIUM 40 MILLIGRAM(S): 40 TABLET, DELAYED RELEASE ORAL at 05:28

## 2024-11-28 RX ADMIN — GABAPENTIN 100 MILLIGRAM(S): 300 CAPSULE ORAL at 16:34

## 2024-11-28 RX ADMIN — ACETAMINOPHEN, DIPHENHYDRAMINE HCL, PHENYLEPHRINE HCL 5 MILLIGRAM(S): 325; 25; 5 TABLET ORAL at 21:55

## 2024-11-28 RX ADMIN — Medication 75 MILLILITER(S): at 05:30

## 2024-11-28 RX ADMIN — Medication 5000 UNIT(S): at 21:56

## 2024-11-28 RX ADMIN — OXYCODONE HYDROCHLORIDE 10 MILLIGRAM(S): 30 TABLET ORAL at 23:05

## 2024-11-28 RX ADMIN — LINEZOLID 600 MILLIGRAM(S): 600 INJECTION, SOLUTION INTRAVENOUS at 16:36

## 2024-11-28 RX ADMIN — Medication 5000 UNIT(S): at 11:37

## 2024-11-28 RX ADMIN — CHLORHEXIDINE GLUCONATE 1 APPLICATION(S): 1.2 RINSE ORAL at 05:28

## 2024-11-28 NOTE — PROGRESS NOTE ADULT - SUBJECTIVE AND OBJECTIVE BOX
[facility] DIVISION OF KIDNEY DISEASES AND HYPERTENSION -- INITIAL CONSULT NOTE  --------------------------------------------------------------------------------  Seen and examined at bedside and has no new complaint  She is feeling better, still tired        MEDICATIONS  (STANDING):  aspirin  chewable 81 milliGRAM(s) Oral daily  chlorhexidine 2% Cloths 1 Application(s) Topical <User Schedule>  clopidogrel Tablet 75 milliGRAM(s) Oral daily  dextrose 5%. 1000 milliLiter(s) (100 mL/Hr) IV Continuous <Continuous>  dextrose 5%. 1000 milliLiter(s) (50 mL/Hr) IV Continuous <Continuous>  dextrose 50% Injectable 25 Gram(s) IV Push once  dextrose 50% Injectable 12.5 Gram(s) IV Push once  dextrose 50% Injectable 25 Gram(s) IV Push once  dextrose Oral Gel 15 Gram(s) Oral once  ezetimibe 10 milliGRAM(s) Oral daily  gabapentin 100 milliGRAM(s) Oral every 12 hours  glucagon  Injectable 1 milliGRAM(s) IntraMuscular once  heparin   Injectable 5000 Unit(s) SubCutaneous every 8 hours  lactated ringers. 1000 milliLiter(s) (75 mL/Hr) IV Continuous <Continuous>  levothyroxine 100 MICROGram(s) Oral daily  linezolid    Tablet 600 milliGRAM(s) Oral every 12 hours  linezolid    Tablet      melatonin 5 milliGRAM(s) Oral at bedtime  oxyCODONE  ER Tablet 10 milliGRAM(s) Oral every 12 hours  pantoprazole    Tablet 40 milliGRAM(s) Oral before breakfast  potassium chloride    Tablet ER 20 milliEquivalent(s) Oral daily  rosuvastatin 20 milliGRAM(s) Oral at bedtime  senna 2 Tablet(s) Oral at bedtime  venlafaxine XR. 75 milliGRAM(s) Oral daily      PRN Inpatient Medications  acetaminophen     Tablet .. 650 milliGRAM(s) Oral every 6 hours PRN  polyethylene glycol 3350 17 Gram(s) Oral daily PRN      REVIEW OF SYSTEMS  --------------------------------------------------------------------------------  Gen: Denies any fever or chills  Respiratory: No dyspnea, cough, wheezing, hemoptysis  CV: No chest pain, or VALENTIN  GI: No abdominal pain, diarrhea, constipation, nausea, vomiting,  : No increased frequency, dysuria, hematuria, nocturia  MSK: No joint pain/swelling; no back pain; no edema  Neuro: No dizziness/lightheadedness        Vital Signs Last 24 Hrs  T(C): 36.4 (27 Nov 2024 13:00), Max: 36.6 (26 Nov 2024 18:00)  T(F): 97.6 (27 Nov 2024 13:00), Max: 97.9 (26 Nov 2024 18:00)  HR: 70 (27 Nov 2024 13:00) (55 - 70)  BP: 155/87 (27 Nov 2024 13:00) (107/54 - 157/80)  BP(mean): 110 (27 Nov 2024 13:00) (72 - 110)  RR: 18 (27 Nov 2024 13:00) (18 - 18)  SpO2: 97% (27 Nov 2024 13:00) (97% - 99%)    Parameters below as of 27 Nov 2024 13:00  Patient On (Oxygen Delivery Method): room air          Physical Exam:  Gen:  In NAD, comfortable   HEENT: PERRL, supple neck  Pulm: CTA B/L, na rales or rhonchi  CV: RRR, S1S2;reg  Abd: +BS, soft, nontender/nondistended  Neuro: A and Ox3  Ext: no edema       LABS/STUDIES  --------------------------------------------------------------------------------  11-27    141  |  107  |  31.9[H]  ----------------------------<  130[H]  3.7   |  22.0  |  2.19[H]    Ca    8.4      27 Nov 2024 09:21    TPro  5.9[L]  /  Alb  3.7  /  TBili  <0.2[L]  /  DBili  x   /  AST  15  /  ALT  6   /  AlkPhos  64  11-26                9.3    7.58  >-----------<  207      [11-26-24 @ 05:15]              27.8     141  |  107  |  44.5  ----------------------------<  123      [11-26-24 @ 05:15]  3.6   |  17.0  |  3.44        Ca     8.6     [11-26-24 @ 05:15]      Mg     1.9     [11-25-24 @ 04:13]      Phos  5.2     [11-25-24 @ 04:13]    TPro  5.9  /  Alb  3.7  /  TBili  <0.2  /  DBili  x   /  AST  15  /  ALT  6   /  AlkPhos  64  [11-26-24 @ 05:15]          Creatinine Trend:  SCr 3.44 [11-26 @ 05:15]  SCr 4.13 [11-25 @ 04:13]  SCr 4.74 [11-24 @ 05:18]  SCr 5.19 [11-23 @ 03:20]  SCr 5.87 [11-22 @ 17:29]    Urinalysis - [11-26-24 @ 05:15]      Color  / Appearance  / SG  / pH       Gluc 123 / Ketone   / Bili  / Urobili        Blood  / Protein  / Leuk Est  / Nitrite       RBC  / WBC  / Hyaline  / Gran  / Sq Epi  / Non Sq Epi  / Bacteria       Iron 88, TIBC 316, %sat 28      [11-26-24 @ 05:15]  Ferritin 248      [11-26-24 @ 05:15]  TSH 57.00      [09-26-24 @ 05:22]        
INFECTIOUS DISEASES AND INTERNAL MEDICINE at [place]  =======================================================  Edmond Andrew MD  Diplomates American Board of Internal Medicine and Infectious Diseases  Telephone [phone]  Fax            [phone]  =======================================================    GOOD BILL [id number]    Follow up:    Allergies:  penicillins (Hives)  azithromycin (Hives)  tetracycline (Hives)  Butazolactin, Prolaprin (Hives)  Cipro (Unknown)  Zyrtec (Hives)      Medications:  acetaminophen     Tablet .. 650 milliGRAM(s) Oral every 6 hours PRN  aspirin  chewable 81 milliGRAM(s) Oral daily  chlorhexidine 2% Cloths 1 Application(s) Topical <User Schedule>  clopidogrel Tablet 75 milliGRAM(s) Oral daily  dextrose 5%. 1000 milliLiter(s) IV Continuous <Continuous>  dextrose 5%. 1000 milliLiter(s) IV Continuous <Continuous>  dextrose 50% Injectable 25 Gram(s) IV Push once  dextrose 50% Injectable 25 Gram(s) IV Push once  dextrose 50% Injectable 12.5 Gram(s) IV Push once  dextrose Oral Gel 15 Gram(s) Oral once  ezetimibe 10 milliGRAM(s) Oral daily  gabapentin 100 milliGRAM(s) Oral every 12 hours  glucagon  Injectable 1 milliGRAM(s) IntraMuscular once  heparin   Injectable 5000 Unit(s) SubCutaneous every 8 hours  hydrocortisone sodium succinate Injectable 50 milliGRAM(s) IV Push every 12 hours  levothyroxine 100 MICROGram(s) Oral daily  linezolid    Tablet 600 milliGRAM(s) Oral every 12 hours  linezolid    Tablet      melatonin 5 milliGRAM(s) Oral at bedtime  oxyCODONE  ER Tablet 10 milliGRAM(s) Oral every 12 hours  pantoprazole    Tablet 40 milliGRAM(s) Oral before breakfast  polyethylene glycol 3350 17 Gram(s) Oral daily PRN  rosuvastatin 20 milliGRAM(s) Oral at bedtime  senna 2 Tablet(s) Oral at bedtime  sodium bicarbonate  Infusion 0.066 mEq/kG/Hr IV Continuous <Continuous>  venlafaxine XR. 75 milliGRAM(s) Oral daily    SOCIAL       FAMILY   FAMILY HISTORY:    REVIEW OF SYSTEMS:  CONSTITUTIONAL:  No Fever or chills  HEENT:   No diplopia or blurred vision.  No earache, sore throat or runny nose.  CARDIOVASCULAR:  No pressure, squeezing, strangling, tightness, heaviness or aching about the chest, neck, axilla or epigastrium.  RESPIRATORY:  No cough, shortness of breath, PND or orthopnea.  GASTROINTESTINAL:  No nausea, vomiting or diarrhea.  GENITOURINARY:  No dysuria, frequency or urgency. No Blood in urine  MUSCULOSKELETAL:   moves all joints  SKIN:  No change in skin, hair or nails.  NEUROLOGIC:  No paresthesias, fasciculations, seizures or weakness.  PSYCHIATRIC:  No disorder of thought or mood.  ENDOCRINE:  No heat or cold intolerance, polyuria or polydipsia.  HEMATOLOGICAL:  No easy bruising or bleeding.            Physical Exam:  ICU Vital Signs Last 24 Hrs  T(C): 36.3 (26 Nov 2024 08:51), Max: 36.5 (25 Nov 2024 20:00)  T(F): 97.4 (26 Nov 2024 08:51), Max: 97.7 (25 Nov 2024 20:00)  HR: 66 (26 Nov 2024 08:51) (66 - 75)  BP: 135/72 (26 Nov 2024 08:51) (121/65 - 137/76)  BP(mean): --  ABP: --  ABP(mean): --  RR: 18 (26 Nov 2024 08:51) (18 - 18)  SpO2: 99% (26 Nov 2024 08:51) (97% - 99%)    O2 Parameters below as of 26 Nov 2024 04:42  Patient On (Oxygen Delivery Method): room air          GEN: NAD,   HEENT: normocephalic and atraumatic. EOMI. CHUY.    NECK: Supple. No carotid bruits.  No lymphadenopathy or thyromegaly.  LUNGS: Clear to auscultation.  HEART: Regular rate and rhythm without murmur.  ABDOMEN: Soft, nontender, and nondistended.  Positive bowel sounds.    : No CVA tenderness  EXTREMITIES: Without any cyanosis, clubbing, rash, lesions or edema.  MSK: no joint swelling  NEUROLOGIC: Cranial nerves II through XII are grossly intact.  PSYCHIATRIC: Appropriate affect .  SKIN: No ulceration or induration present.        Labs:  Vitals:  ============  T(F): 97.4 (26 Nov 2024 08:51), Max: 97.7 (25 Nov 2024 20:00)  HR: 66 (26 Nov 2024 08:51)  BP: 135/72 (26 Nov 2024 08:51)  RR: 18 (26 Nov 2024 08:51)  SpO2: 99% (26 Nov 2024 08:51) (97% - 99%)  temp max in last 48H T(F): , Max: 98.1 (11-24-24 @ 16:40)    =======================================================  Current Antibiotics:  linezolid    Tablet 600 milliGRAM(s) Oral every 12 hours  linezolid    Tablet        Other medications:  aspirin  chewable 81 milliGRAM(s) Oral daily  chlorhexidine 2% Cloths 1 Application(s) Topical <User Schedule>  clopidogrel Tablet 75 milliGRAM(s) Oral daily  dextrose 5%. 1000 milliLiter(s) IV Continuous <Continuous>  dextrose 5%. 1000 milliLiter(s) IV Continuous <Continuous>  dextrose 50% Injectable 25 Gram(s) IV Push once  dextrose 50% Injectable 25 Gram(s) IV Push once  dextrose 50% Injectable 12.5 Gram(s) IV Push once  dextrose Oral Gel 15 Gram(s) Oral once  ezetimibe 10 milliGRAM(s) Oral daily  gabapentin 100 milliGRAM(s) Oral every 12 hours  glucagon  Injectable 1 milliGRAM(s) IntraMuscular once  heparin   Injectable 5000 Unit(s) SubCutaneous every 8 hours  hydrocortisone sodium succinate Injectable 50 milliGRAM(s) IV Push every 12 hours  levothyroxine 100 MICROGram(s) Oral daily  melatonin 5 milliGRAM(s) Oral at bedtime  oxyCODONE  ER Tablet 10 milliGRAM(s) Oral every 12 hours  pantoprazole    Tablet 40 milliGRAM(s) Oral before breakfast  rosuvastatin 20 milliGRAM(s) Oral at bedtime  senna 2 Tablet(s) Oral at bedtime  sodium bicarbonate  Infusion 0.066 mEq/kG/Hr IV Continuous <Continuous>  venlafaxine XR. 75 milliGRAM(s) Oral daily      =======================================================  Labs:                        9.3    7.58  )-----------( 207      ( 26 Nov 2024 05:15 )             27.8     11-26    141  |  107  |  44.5[H]  ----------------------------<  123[H]  3.6   |  17.0[L]  |  3.44[H]    Ca    8.6      26 Nov 2024 05:15  Phos  5.2     11-25  Mg     1.9     11-25    TPro  5.9[L]  /  Alb  3.7  /  TBili  <0.2[L]  /  DBili  x   /  AST  15  /  ALT  6   /  AlkPhos  64  11-26      Culture - Urine (collected 11-22-24 @ 18:21)  Source: Clean Catch Clean Catch (Midstream)  Final Report (11-25-24 @ 12:18):    >100,000 CFU/ml Enterococcus faecalis (vancomycin resistant)  Organism: Enterococcus faecalis (vancomycin resistant) (11-25-24 @ 12:18)  Organism: Enterococcus faecalis (vancomycin resistant) (11-25-24 @ 12:18)    Sensitivities:      Method Type: MISSY      -  Ampicillin: S <=2 Predicts results to ampicillin/sulbactam, amoxacillin-clavulanate and  piperacillin-tazobactam.      -  Ciprofloxacin: R >2      -  Daptomycin: S 1      -  Levofloxacin: R >4      -  Linezolid: S <=1      -  Nitrofurantoin: S <=32 Should not be used to treat pyelonephritis.      -  Tetracycline: R >8      -  Vancomycin: R >16    Culture - Blood (collected 11-22-24 @ 17:29)  Source: .Blood BLOOD  Preliminary Report (11-26-24 @ 02:01):    No growth at 72 Hours    Culture - Sputum (collected 09-19-24 @ 00:35)  Source: Trach Asp Tracheal Aspirate  Gram Stain (09-19-24 @ 14:48):    Rare polymorphonuclear leukocytes seen per low power field    No squamous epithelial cells seen per low power field    No organisms seen per oil power field  Final Report (09-20-24 @ 21:36):    Normal Respiratory Mary present    Culture - Blood (collected 09-18-24 @ 16:57)  Source: .Blood Blood  Final Report (09-23-24 @ 23:00):    No growth at 5 days    Culture - Legionella (collected 09-18-24 @ 16:57)  Source: .Legionella  Final Report (09-26-24 @ 15:31):    No Legionella species isolated    Culture - Urine (collected 09-18-24 @ 12:05)  Source: Clean Catch Clean Catch (Midstream)  Final Report (09-19-24 @ 12:05):    No growth    Culture - Blood (collected 09-18-24 @ 10:15)  Source: .Blood Blood-Peripheral  Gram Stain (09-19-24 @ 06:20):    Growth in aerobic bottle: Gram Negative Rods  Final Report (09-20-24 @ 15:17):    Growth in aerobic bottle: Pseudomonas aeruginosa    Direct identification is available within approximately 3-5    hours either by Blood Panel Multiplexed PCR or Direct    MALDI-TOF. Details: [email or web address]  Organism: Blood Culture PCR  Pseudomonas aeruginosa (09-20-24 @ 15:17)  Organism: Pseudomonas aeruginosa (09-20-24 @ 15:17)    Sensitivities:      Method Type: MISSY      -  Aztreonam: S 8      -  Cefepime: S <=2      -  Ceftazidime: S 4      -  Ciprofloxacin: S <=0.25      -  Imipenem: S 2      -  Levofloxacin: S <=0.5      -  Meropenem: S <=1      -  Piperacillin/Tazobactam: S <=8  Organism: Blood Culture PCR (09-20-24 @ 15:17)    Sensitivities:      Method Type: PCR      -  Pseudomonas aeruginosa: Detec      Creatinine: 3.44 mg/dL (11-26-24 @ 05:15)  Creatinine: 4.13 mg/dL (11-25-24 @ 04:13)  Creatinine: 4.74 mg/dL (11-24-24 @ 05:18)  Creatinine: 5.19 mg/dL (11-23-24 @ 03:20)  Creatinine: 5.87 mg/dL (11-22-24 @ 17:29)        Ferritin: 248 ng/mL (11-26-24 @ 05:15)      WBC Count: 7.58 K/uL (11-26-24 @ 05:15)  WBC Count: 9.19 K/uL (11-25-24 @ 04:13)  WBC Count: 7.37 K/uL (11-24-24 @ 05:18)  WBC Count: 6.18 K/uL (11-23-24 @ 03:20)  WBC Count: 9.25 K/uL (11-22-24 @ 17:29)        Alkaline Phosphatase: 64 U/L (11-26-24 @ 05:15)  Alkaline Phosphatase: 71 U/L (11-25-24 @ 04:13)  Alkaline Phosphatase: 76 U/L (11-23-24 @ 03:20)  Alkaline Phosphatase: 90 U/L (11-22-24 @ 17:29)  Alanine Aminotransferase (ALT/SGPT): 6 U/L (11-26-24 @ 05:15)  Alanine Aminotransferase (ALT/SGPT): 6 U/L (11-25-24 @ 04:13)  Alanine Aminotransferase (ALT/SGPT): 6 U/L (11-23-24 @ 03:20)  Alanine Aminotransferase (ALT/SGPT): 8 U/L (11-22-24 @ 17:29)  Aspartate Aminotransferase (AST/SGOT): 15 U/L (11-26-24 @ 05:15)  Aspartate Aminotransferase (AST/SGOT): 15 U/L (11-25-24 @ 04:13)  Aspartate Aminotransferase (AST/SGOT): 19 U/L (11-23-24 @ 03:20)  Aspartate Aminotransferase (AST/SGOT): 18 U/L (11-22-24 @ 17:29)  Bilirubin Total: <0.2 mg/dL (11-26-24 @ 05:15)  Bilirubin Total: 0.2 mg/dL (11-25-24 @ 04:13)  Bilirubin Total: 0.4 mg/dL (11-23-24 @ 03:20)  Bilirubin Total: 0.4 mg/dL (11-22-24 @ 17:29)  
Margarita Mcdonald MD (Available on [email or web address])  [facility] Division of Hospital Medicine    Chief Complaint:  Septic Shock    SUBJECTIVE / OVERNIGHT EVENTS:  Pt seen and examined at bedside. Denies any complaints. Endorses feels better compared to prior.     Patient denies chest pain, SOB, abd pain, N/V, fever, chills, dysuria or any other complaints. All remainder ROS negative.     INTERVAL EVENTS:  -Pt hemodynamically stable with no overnight events.   -On Linezolid oral for VRE UTI. ID consulted   -USG renal- no obstructive Uropathy   -Nephrology on board> on Bicarb based IVF Cr improving now switched to LR  -Solucortef discontinued bp in 150s     MEDICATIONS  (STANDING):  aspirin  chewable 81 milliGRAM(s) Oral daily  chlorhexidine 2% Cloths 1 Application(s) Topical <User Schedule>  clopidogrel Tablet 75 milliGRAM(s) Oral daily  dextrose 5%. 1000 milliLiter(s) (50 mL/Hr) IV Continuous <Continuous>  dextrose 5%. 1000 milliLiter(s) (100 mL/Hr) IV Continuous <Continuous>  dextrose 50% Injectable 25 Gram(s) IV Push once  dextrose 50% Injectable 12.5 Gram(s) IV Push once  dextrose 50% Injectable 25 Gram(s) IV Push once  dextrose Oral Gel 15 Gram(s) Oral once  ezetimibe 10 milliGRAM(s) Oral daily  gabapentin 100 milliGRAM(s) Oral every 12 hours  glucagon  Injectable 1 milliGRAM(s) IntraMuscular once  heparin   Injectable 5000 Unit(s) SubCutaneous every 8 hours  hydrocortisone sodium succinate Injectable 50 milliGRAM(s) IV Push every 12 hours  levothyroxine 100 MICROGram(s) Oral daily  linezolid    Tablet 600 milliGRAM(s) Oral every 12 hours  linezolid    Tablet      melatonin 5 milliGRAM(s) Oral at bedtime  oxyCODONE  ER Tablet 10 milliGRAM(s) Oral every 12 hours  pantoprazole    Tablet 40 milliGRAM(s) Oral before breakfast  potassium chloride    Tablet ER 20 milliEquivalent(s) Oral daily  rosuvastatin 20 milliGRAM(s) Oral at bedtime  senna 2 Tablet(s) Oral at bedtime  sodium bicarbonate  Infusion 0.066 mEq/kG/Hr (80 mL/Hr) IV Continuous <Continuous>  venlafaxine XR. 75 milliGRAM(s) Oral daily    MEDICATIONS  (PRN):  acetaminophen     Tablet .. 650 milliGRAM(s) Oral every 6 hours PRN Moderate Pain (4 - 6), Severe Pain (7 - 10)  polyethylene glycol 3350 17 Gram(s) Oral daily PRN Constipation      I&O's Summary      PHYSICAL EXAM:  Vital Signs Last 24 Hrs  T(C): 36.4 (27 Nov 2024 08:19), Max: 36.6 (26 Nov 2024 18:00)  T(F): 97.6 (27 Nov 2024 08:19), Max: 97.9 (26 Nov 2024 18:00)  HR: 62 (27 Nov 2024 08:19) (55 - 63)  BP: 155/81 (27 Nov 2024 08:19) (107/54 - 157/80)  BP(mean): 106 (27 Nov 2024 08:19) (72 - 106)  RR: 18 (27 Nov 2024 08:19) (18 - 18)  SpO2: 97% (27 Nov 2024 08:19) (97% - 99%)    Parameters below as of 27 Nov 2024 08:19  Patient On (Oxygen Delivery Method): room air        Pt lying in bed in no acute distress  Moist Mucus membranes, no JVD  S1/S2 regular, no murmurs  CTABL, good air entry  Abdomen soft, non-tender, BS+  No LE edema       LABS:                        9.6    7.59  )-----------( 228      ( 27 Nov 2024 09:21 )             27.8     11-27    141  |  107  |  31.9[H]  ----------------------------<  130[H]  3.7   |  22.0  |  2.19[H]    Ca    8.4      27 Nov 2024 09:21    TPro  5.9[L]  /  Alb  3.7  /  TBili  <0.2[L]  /  DBili  x   /  AST  15  /  ALT  6   /  AlkPhos  64  11-26          Urinalysis Basic - ( 27 Nov 2024 09:21 )    Color: x / Appearance: x / SG: x / pH: x  Gluc: 130 mg/dL / Ketone: x  / Bili: x / Urobili: x   Blood: x / Protein: x / Nitrite: x   Leuk Esterase: x / RBC: x / WBC x   Sq Epi: x / Non Sq Epi: x / Bacteria: x        CAPILLARY BLOOD GLUCOSE            RADIOLOGY & ADDITIONAL TESTS:  Results Reviewed:   Imaging Personally Reviewed:  Electrocardiogram Personally Reviewed:    
[facility] DIVISION OF KIDNEY DISEASES AND HYPERTENSION -- INITIAL CONSULT NOTE  --------------------------------------------------------------------------------  HPI:    69-year-old female with past medical history of coronary artery status post non-ST elevation MI in 2011 status post ELIZABETH to LAD, status post ELIZABETH to distal left circumflex on 4/30/2024, hypertension, hyperlipidemia, hypothyroidism, rheumatoid arthritis, was recently admitted to [facility] MICU in September 2024 with septic shock due to lower lobe pneumonia/ARDS for which she recovered at that time she had acute kidney injury however renal function recovered.  Now she is readmitted along [facility] for altered mental status for about 2 days she was diagnosed with UTI in outpatient setting and was treated with a course of Levaquin without any improvement and she became confused.  She was admitted to MICU and found to have acute and treated for septic protocol. She had CLEMENTINA on  presentation with creatine of 5.87,   Now renal function improved,  bicarb remains low.  She denies any significant urinary symptoms  Nephrology consultation for acute kidney injury.    PAST HISTORY  --------------------------------------------------------------------------------  PAST MEDICAL & SURGICAL HISTORY:  Hypothyroid      ADHD (attention deficit hyperactivity disorder)      HLD (hyperlipidemia)      Myocardial infarct  2012      Stented coronary artery  2012      Rheumatoid arthritis      Migraine      S/p bilateral carpal tunnel release      H/O cardiac catheterization      H/O laminectomy      H/O spinal fusion      S/P left knee arthroscopy      H/O total knee replacement, right        FAMILY HISTORY:    PAST SOCIAL HISTORY:    ALLERGIES & MEDICATIONS  --------------------------------------------------------------------------------  Allergies    penicillins (Hives)  azithromycin (Hives)  tetracycline (Hives)  Butazolactin, Prolaprin (Hives)  Cipro (Unknown)  Zyrtec (Hives)    Intolerances      MEDICATIONS  (STANDING):  aspirin  chewable 81 milliGRAM(s) Oral daily  chlorhexidine 2% Cloths 1 Application(s) Topical <User Schedule>  clopidogrel Tablet 75 milliGRAM(s) Oral daily  dextrose 5%. 1000 milliLiter(s) (100 mL/Hr) IV Continuous <Continuous>  dextrose 5%. 1000 milliLiter(s) (50 mL/Hr) IV Continuous <Continuous>  dextrose 50% Injectable 25 Gram(s) IV Push once  dextrose 50% Injectable 12.5 Gram(s) IV Push once  dextrose 50% Injectable 25 Gram(s) IV Push once  dextrose Oral Gel 15 Gram(s) Oral once  ezetimibe 10 milliGRAM(s) Oral daily  gabapentin 100 milliGRAM(s) Oral every 12 hours  glucagon  Injectable 1 milliGRAM(s) IntraMuscular once  heparin   Injectable 5000 Unit(s) SubCutaneous every 8 hours  hydrocortisone sodium succinate Injectable 50 milliGRAM(s) IV Push every 12 hours  levothyroxine 100 MICROGram(s) Oral daily  linezolid    Tablet 600 milliGRAM(s) Oral every 12 hours  linezolid    Tablet      melatonin 5 milliGRAM(s) Oral at bedtime  oxyCODONE  ER Tablet 10 milliGRAM(s) Oral every 12 hours  pantoprazole    Tablet 40 milliGRAM(s) Oral before breakfast  rosuvastatin 20 milliGRAM(s) Oral at bedtime  senna 2 Tablet(s) Oral at bedtime  sodium bicarbonate  Infusion 0.066 mEq/kG/Hr (80 mL/Hr) IV Continuous <Continuous>  venlafaxine XR. 75 milliGRAM(s) Oral daily      PRN Inpatient Medications  acetaminophen     Tablet .. 650 milliGRAM(s) Oral every 6 hours PRN  polyethylene glycol 3350 17 Gram(s) Oral daily PRN      REVIEW OF SYSTEMS  --------------------------------------------------------------------------------  Gen: No weight changes, fatigue, fevers/chills, weakness  Skin: No rashes  Head/Eyes/Ears/Mouth: No headache; Normal hearing; Normal vision w/o blurriness; No sinus pain/discomfort, sore throat  Respiratory: No dyspnea, cough, wheezing, hemoptysis  CV: No chest pain, or VALENTIN  GI: No abdominal pain, diarrhea, constipation, nausea, vomiting, melena, hematochezia  : No increased frequency, dysuria, hematuria, nocturia  MSK: No joint pain/swelling; no back pain; no edema  Neuro: No dizziness/lightheadedness, weakness, seizures, numbness, tingling    All other systems were reviewed and are negative, except as noted.    VITALS/PHYSICAL EXAM  --------------------------------------------------------------------------------  T(C): 36.3 (11-26-24 @ 08:51), Max: 36.5 (11-25-24 @ 20:00)  HR: 66 (11-26-24 @ 08:51) (66 - 75)  BP: 135/72 (11-26-24 @ 08:51) (121/65 - 137/76)  RR: 18 (11-26-24 @ 08:51) (18 - 18)  SpO2: 99% (11-26-24 @ 08:51) (97% - 99%)  Wt(kg): --        Physical Exam:  Gen: NAD, comfortable   HEENT: PERRL, supple neck  Pulm: CTA B/L  CV: RRR, S1S2; no peripheral edema  Abd: +BS, soft, nontender/nondistended  Neuro: A and Ox3  Ext: no edema       LABS/STUDIES  --------------------------------------------------------------------------------              9.3    7.58  >-----------<  207      [11-26-24 @ 05:15]              27.8     141  |  107  |  44.5  ----------------------------<  123      [11-26-24 @ 05:15]  3.6   |  17.0  |  3.44        Ca     8.6     [11-26-24 @ 05:15]      Mg     1.9     [11-25-24 @ 04:13]      Phos  5.2     [11-25-24 @ 04:13]    TPro  5.9  /  Alb  3.7  /  TBili  <0.2  /  DBili  x   /  AST  15  /  ALT  6   /  AlkPhos  64  [11-26-24 @ 05:15]          Creatinine Trend:  SCr 3.44 [11-26 @ 05:15]  SCr 4.13 [11-25 @ 04:13]  SCr 4.74 [11-24 @ 05:18]  SCr 5.19 [11-23 @ 03:20]  SCr 5.87 [11-22 @ 17:29]    Urinalysis - [11-26-24 @ 05:15]      Color  / Appearance  / SG  / pH       Gluc 123 / Ketone   / Bili  / Urobili        Blood  / Protein  / Leuk Est  / Nitrite       RBC  / WBC  / Hyaline  / Gran  / Sq Epi  / Non Sq Epi  / Bacteria       Iron 88, TIBC 316, %sat 28      [11-26-24 @ 05:15]  Ferritin 248      [11-26-24 @ 05:15]  TSH 57.00      [09-26-24 @ 05:22]        
Margarita Mcdonald MD (Available on [email or web address])  [facility] Division of Hospital Medicine    Chief Complaint:  CLEMENTINA, UTI    SUBJECTIVE / OVERNIGHT EVENTS:  Pt seen and examined at bedside. Denies any complaints.      Patient denies chest pain, SOB, abd pain, N/V, fever, chills, dysuria or any other complaints. All remainder ROS negative.     INTERVAL EVENTS:  -Pt hemodynamically stable with no overnight events.   -Cr improving, will cw IVF and dc tomorrow.     MEDICATIONS  (STANDING):  aspirin  chewable 81 milliGRAM(s) Oral daily  chlorhexidine 2% Cloths 1 Application(s) Topical <User Schedule>  clopidogrel Tablet 75 milliGRAM(s) Oral daily  dextrose 5%. 1000 milliLiter(s) (100 mL/Hr) IV Continuous <Continuous>  dextrose 5%. 1000 milliLiter(s) (50 mL/Hr) IV Continuous <Continuous>  dextrose 50% Injectable 25 Gram(s) IV Push once  dextrose 50% Injectable 12.5 Gram(s) IV Push once  dextrose 50% Injectable 25 Gram(s) IV Push once  dextrose Oral Gel 15 Gram(s) Oral once  ezetimibe 10 milliGRAM(s) Oral daily  gabapentin 100 milliGRAM(s) Oral every 12 hours  glucagon  Injectable 1 milliGRAM(s) IntraMuscular once  heparin   Injectable 5000 Unit(s) SubCutaneous every 8 hours  lactated ringers. 1000 milliLiter(s) (75 mL/Hr) IV Continuous <Continuous>  levothyroxine 100 MICROGram(s) Oral daily  linezolid    Tablet 600 milliGRAM(s) Oral every 12 hours  linezolid    Tablet      melatonin 5 milliGRAM(s) Oral at bedtime  oxyCODONE  ER Tablet 10 milliGRAM(s) Oral every 12 hours  pantoprazole    Tablet 40 milliGRAM(s) Oral before breakfast  potassium chloride   Powder 40 milliEquivalent(s) Oral once  rosuvastatin 20 milliGRAM(s) Oral at bedtime  senna 2 Tablet(s) Oral at bedtime  venlafaxine XR. 75 milliGRAM(s) Oral daily    MEDICATIONS  (PRN):  acetaminophen     Tablet .. 650 milliGRAM(s) Oral every 6 hours PRN Moderate Pain (4 - 6), Severe Pain (7 - 10)  polyethylene glycol 3350 17 Gram(s) Oral daily PRN Constipation      I&O's Summary    27 Nov 2024 07:01  -  28 Nov 2024 07:00  --------------------------------------------------------  IN: 1665 mL / OUT: 0 mL / NET: 1665 mL        PHYSICAL EXAM:  Vital Signs Last 24 Hrs  T(C): 36.8 (28 Nov 2024 10:34), Max: 36.9 (27 Nov 2024 16:28)  T(F): 98.3 (28 Nov 2024 10:34), Max: 98.4 (27 Nov 2024 16:28)  HR: 66 (28 Nov 2024 10:34) (64 - 74)  BP: 132/76 (28 Nov 2024 10:34) (118/65 - 155/87)  BP(mean): 110 (27 Nov 2024 13:00) (110 - 110)  RR: 19 (28 Nov 2024 10:34) (18 - 19)  SpO2: 100% (28 Nov 2024 10:34) (95% - 100%)    Parameters below as of 27 Nov 2024 20:11  Patient On (Oxygen Delivery Method): room air        Pt lying in bed in no acute distress  Moist Mucus membranes  S1/S2 regular, no murmurs  CTABL, good air entry  Abdomen soft, non-tender, BS+  No LE edema       LABS:                        9.2    8.04  )-----------( 220      ( 28 Nov 2024 04:50 )             27.7     11-28    144  |  111[H]  |  22.8[H]  ----------------------------<  93  3.4[L]   |  22.0  |  1.70[H]    Ca    8.0[L]      28 Nov 2024 04:50            Urinalysis Basic - ( 28 Nov 2024 04:50 )    Color: x / Appearance: x / SG: x / pH: x  Gluc: 93 mg/dL / Ketone: x  / Bili: x / Urobili: x   Blood: x / Protein: x / Nitrite: x   Leuk Esterase: x / RBC: x / WBC x   Sq Epi: x / Non Sq Epi: x / Bacteria: x        CAPILLARY BLOOD GLUCOSE            RADIOLOGY & ADDITIONAL TESTS:  Results Reviewed:   Imaging Personally Reviewed:  Electrocardiogram Personally Reviewed:    
Patient is a 69y old  Female who presents with a chief complaint of septic shock     HPI:  69F PMHx CAD, NSTEMI in 2011 s/p ELIZABETH to LAD, s/p ELIZABETH Distal LCx (4/30/2024), HTN, HLD, hypothyroid, rheumatoid arthritis, recent admission to MICU here in September for hypoxic respiratory failure, ARDS requiring intubation, Septic Shock secondary to LLL pneumonia with concerns for acute systolic heart failure, cardiogenic shock (repeat ECHO with improved EF) was admitted to MICU 11/22/24 for AMS x1-2 days. Patient diagnosed with UTI in outpatient setting and finished Levaquin course, however started to become confused.   In ER, CT head negative for acute intracranial pathology. CT C/A/P negative. Noted to be hypotensive s/p sepsis protocol IVF and started on Levophed Infusion & admitted to MICU. Pt now off pressors and downgraded to medical floor.    PAST MEDICAL & SURGICAL HISTORY:  Hypothyroid  ADHD (attention deficit hyperactivity disorder)  HLD (hyperlipidemia)  Myocardial infarct 2012  Stented coronary artery 2012  Rheumatoid arthritis  Migraine  S/p bilateral carpal tunnel release  H/O cardiac catheterization  H/O laminectomy  H/O spinal fusion  S/P left knee arthroscopy  H/O total knee replacement, right    Review of Systems:  CONSTITUTIONAL: No fever, chills, or fatigue  EYES: No eye pain, visual disturbances, or discharge  ENMT:  No difficulty hearing, tinnitus, vertigo; No sinus or throat pain  NECK: No pain or stiffness  RESPIRATORY: No cough, wheezing, chills or hemoptysis; No shortness of breath  CARDIOVASCULAR: No chest pain, palpitations, dizziness, or leg swelling  GASTROINTESTINAL: No abdominal or epigastric pain. No nausea, vomiting, or hematemesis; No diarrhea or constipation. No melena or hematochezia.  GENITOURINARY: No dysuria, frequency, hematuria, or incontinence  NEUROLOGICAL: No headaches, memory loss, loss of strength, numbness, or tremors  SKIN: No itching, burning, rashes, or lesions   MUSCULOSKELETAL: No joint pain or swelling; No muscle, back, or extremity pain  PSYCHIATRIC: No depression, anxiety, mood swings, or difficulty sleeping    Social History:  Tabacco - Denies  ETOH - 2-3 Drinks of Wine daily  Illicit drug abuse - denies    FAMILY HISTORY:  Denies family hx of CAD, HTN or DM    Allergies  penicillins (Hives)  azithromycin (Hives)  tetracycline (Hives)  Butazolactin, Prolaprin (Hives)  Cipro (Unknown)  Zyrtec (Hives)      MEDICATIONS  (STANDING):  aspirin  chewable 81 milliGRAM(s) Oral daily  cefepime  Injectable. 1000 milliGRAM(s) IV Push every 24 hours  chlorhexidine 2% Cloths 1 Application(s) Topical <User Schedule>  clopidogrel Tablet 75 milliGRAM(s) Oral daily  dextrose 5%. 1000 milliLiter(s) (100 mL/Hr) IV Continuous <Continuous>  dextrose 5%. 1000 milliLiter(s) (50 mL/Hr) IV Continuous <Continuous>  dextrose 50% Injectable 25 Gram(s) IV Push once  dextrose 50% Injectable 12.5 Gram(s) IV Push once  dextrose 50% Injectable 25 Gram(s) IV Push once  dextrose Oral Gel 15 Gram(s) Oral once  ezetimibe 10 milliGRAM(s) Oral daily  gabapentin 100 milliGRAM(s) Oral every 12 hours  glucagon  Injectable 1 milliGRAM(s) IntraMuscular once  heparin   Injectable 5000 Unit(s) SubCutaneous every 8 hours  hydrocortisone sodium succinate Injectable 50 milliGRAM(s) IV Push every 6 hours  levothyroxine 100 MICROGram(s) Oral daily  melatonin 5 milliGRAM(s) Oral at bedtime  midodrine 10 milliGRAM(s) Oral every 8 hours  pantoprazole    Tablet 40 milliGRAM(s) Oral before breakfast  rosuvastatin 20 milliGRAM(s) Oral at bedtime  senna 2 Tablet(s) Oral at bedtime  venlafaxine XR. 75 milliGRAM(s) Oral daily      Vital Signs Last 24 Hrs  T(C): 36.5 (23 Nov 2024 15:39), Max: 36.9 (23 Nov 2024 03:15)  T(F): 97.7 (23 Nov 2024 15:39), Max: 98.4 (23 Nov 2024 03:15)  HR: 66 (23 Nov 2024 18:00) (64 - 75)  BP: 111/64 (23 Nov 2024 18:00) (77/67 - 136/72)  BP(mean): 78 (23 Nov 2024 18:00) (62 - 109)  RR: 15 (23 Nov 2024 18:00) (12 - 24)  SpO2: 100% (23 Nov 2024 18:00) (92% - 100%)    Parameters below as of 23 Nov 2024 16:00  Patient On (Oxygen Delivery Method): room air    Physical Examination:  GENERAL: NAD, well-groomed, well-developed  HEAD:  Atraumatic, Normocephalic  EYES: EOMI, PERRLA, conjunctiva and sclera clear  NERVOUS SYSTEM:  Alert & Oriented X3, Good concentration; Motor Strength 5/5 B/L upper and lower extremities, sensory intact  CHEST/LUNG: CTA  b/l,  no rales, rhonchi, wheezing  HEART: Regular rate and rhythm; No murmurs  ABDOMEN: Soft, Nontender, Nondistended; Bowel sounds present  EXTREMITIES: No clubbing, cyanosis, or edema ,   LYMPH: No lymphadenopathy noted  SKIN: No rashes or lesions      LABS:                        10.1   6.18  )-----------( 160      ( 23 Nov 2024 03:20 )             29.9     11-23    136  |  104  |  57.4[H]  ----------------------------<  166[H]  4.1   |  17.0[L]  |  5.19[H]    Ca    8.4      23 Nov 2024 03:20  Phos  6.7     11-23  Mg     2.2     11-23    TPro  5.8[L]  /  Alb  3.6  /  TBili  0.4  /  DBili  x   /  AST  19  /  ALT  6   /  AlkPhos  76  11-23    PT/INR - ( 22 Nov 2024 17:29 )   PT: 11.0 sec;   INR: 0.97 ratio         PTT - ( 22 Nov 2024 17:29 )  PTT:28.9 sec  Urinalysis Basic - ( 23 Nov 2024 03:20 )    Color: x / Appearance: x / SG: x / pH: x  Gluc: 166 mg/dL / Ketone: x  / Bili: x / Urobili: x   Blood: x / Protein: x / Nitrite: x   Leuk Esterase: x / RBC: x / WBC x   Sq Epi: x / Non Sq Epi: x / Bacteria: x      RADIOLOGY:   < from: CT Chest No Cont (11.22.24 @ 19:04) >  ACC: [id number] EXAM:  CT ABDOMEN AND PELVIS   ORDERED BY: CELINA MARES   ACC: [id number] EXAM:  CT CHEST   ORDERED BY: CELINA MARES   PROCEDURE DATE:  11/22/2024    IMPRESSION: No acute abnormality.    < from: CT Head No Cont (11.22.24 @ 18:57) >  ACC: [id number] EXAM:  CT BRAIN   ORDERED BY: VISHAL HINKLE   PROCEDURE DATE:  11/22/2024    IMPRESSION:  Stable head CT. No acute intracranial hemorrhage,vasogenic edema or   extra-axial collection.      2Decho 11/23/24:  CONCLUSIONS:      1. Limited follow up study.   2. Left ventricular systolic function is normal with an ejection fraction of 53 % by Gallardo's method of disks with an ejection fraction visually estimated at 50 to 55 %.   3. Normal right ventricular cavity size and normal right ventricular systolic function.   4. Pulmonary artery systolic pressure could not be estimated.   5. No pericardial effusion seen.   6. Pericardial fat pad is seen anterior to the right ventricle.   7. Compared to the transthoracic echocardiogram performed on 9/24/2024, there have been no significant interval changes with prior LV EF 55-60%.          
HPI: 69F PMHx CAD, NSTEMI in 2011 s/p ELIZABETH to LAD, s/p ELIZABETH Distal LCx (4/30/2024), HTN, HLD, hypothyroid, rheumatoid arthritis, recent admission to MICU here in September for hypoxic respiratory failure, ARDS requiring intubation, Septic Shock secondary to LLL pneumonia with concerns for acute systolic heart failure, cardiogenic shock (repeat ECHO with improved EF), now presents with AMS x1-2 days. Patient diagnosed with UTI in outpatient setting and finished Levaquin course, however started to become confused.  In ER, CT head negative for acute intracranial pathology. CT C/A/P negative. Labs significant for BUN/SCr 62.8/5.87, pH 7.25, HCO3 16, UA positive.  Noted to be hypotensive s/p sepsis protocol IVF and started on Levophed Infusion. MICU consulted for further management.     INTERVAL/OVERNIGHT EVENTS: patient admitted to MICU    SUBJECTIVE: Patient seen and examined at bedside.     REVIEW OF SYSTEMS :  CONSTITUTIONAL:  No weakness, fevers or chills  NEUROLOGICAL:  No numbness or weakness  EYES/ENT:  No visual changes;  No vertigo or throat pain   NECK:  No pain or stiffness  RESPIRATORY:  No cough, wheezing, hemoptysis; No shortness of breath  CARDIOVASCULAR:  No chest pain or palpitations  GASTROINTESTINAL:  No abdominal or epigastric pain. No nausea, vomiting, or hematemesis; No diarrhea or constipation. No melena or hematochezia.  GENITOURINARY:  No dysuria, frequency or hematuria  MUSCULOSKELETAL:  FROM all extremities, normal strength, No calf tenderness  SKIN:  No itching, rashes    VITAL SIGNS:  ICU Vital Signs Last 24 Hrs  T(C): 36.7 (23 Nov 2024 09:45), Max: 36.9 (23 Nov 2024 03:15)  T(F): 98.1 (23 Nov 2024 09:45), Max: 98.4 (23 Nov 2024 03:15)  HR: 66 (23 Nov 2024 09:45) (66 - 91)  BP: 96/57 (23 Nov 2024 09:45) (68/39 - 137/72)  BP(mean): 70 (23 Nov 2024 09:45) (60 - 109)  ABP: --  ABP(mean): --  RR: 15 (23 Nov 2024 09:45) (12 - 24)  SpO2: 99% (23 Nov 2024 09:45) (94% - 100%)    O2 Parameters below as of 23 Nov 2024 08:00  Patient On (Oxygen Delivery Method): nasal cannula            Plateau pressure:   P/F ratio:     11-22 @ 07:01  -  11-23 @ 07:00  --------------------------------------------------------  IN: 80.7 mL / OUT: 1550 mL / NET: -1469.3 mL    11-23 @ 07:01  -  11-23 @ 10:23  --------------------------------------------------------  IN: 408.5 mL / OUT: 500 mL / NET: -91.5 mL      CAPILLARY BLOOD GLUCOSE      PHYSICAL EXAM:  GENERAL: lying in bed comfortably  NERVOUS SYSTEM:  A&Ox4, moving all extremities, no focal deficits   HEAD:  Atraumatic  EYES: PERRLA, conjunctiva and sclera clear  NECK: Supple, trachea midline, no JVD  HEART: Regular rate and rhythm, no murmurs, rubs, or gallops  LUNGS: Unlabored respirations.  Clear to auscultation bilaterally, no crackles, wheezing, or rhonchi  ABDOMEN: Soft, nontender, nondistended, +BS  EXTREMITIES: 2+ peripheral pulses bilaterally. No clubbing, cyanosis, or edema  SKIN: No rashes or lesions    MEDICATIONS:  MEDICATIONS  (STANDING):  aspirin  chewable 81 milliGRAM(s) Oral daily  cefepime  Injectable. 1000 milliGRAM(s) IV Push every 24 hours  chlorhexidine 2% Cloths 1 Application(s) Topical <User Schedule>  clopidogrel Tablet 75 milliGRAM(s) Oral daily  dextrose 5%. 1000 milliLiter(s) (100 mL/Hr) IV Continuous <Continuous>  dextrose 5%. 1000 milliLiter(s) (50 mL/Hr) IV Continuous <Continuous>  dextrose 50% Injectable 25 Gram(s) IV Push once  dextrose 50% Injectable 12.5 Gram(s) IV Push once  dextrose 50% Injectable 25 Gram(s) IV Push once  dextrose Oral Gel 15 Gram(s) Oral once  ezetimibe 10 milliGRAM(s) Oral daily  gabapentin 100 milliGRAM(s) Oral every 12 hours  glucagon  Injectable 1 milliGRAM(s) IntraMuscular once  heparin   Injectable 5000 Unit(s) SubCutaneous every 8 hours  hydrocortisone sodium succinate Injectable 50 milliGRAM(s) IV Push every 6 hours  levothyroxine 100 MICROGram(s) Oral daily  melatonin 5 milliGRAM(s) Oral at bedtime  midodrine 10 milliGRAM(s) Oral every 8 hours  norepinephrine Infusion 0.05 MICROgram(s)/kG/Min (8.04 mL/Hr) IV Continuous <Continuous>  pantoprazole    Tablet 40 milliGRAM(s) Oral before breakfast  rosuvastatin 20 milliGRAM(s) Oral at bedtime  venlafaxine XR. 75 milliGRAM(s) Oral daily    MEDICATIONS  (PRN):      ALLERGIES:  Allergies    penicillins (Hives)  azithromycin (Hives)  tetracycline (Hives)  Butazolactin, Prolaprin (Hives)  Cipro (Unknown)  Zyrtec (Hives)    Intolerances        LABS:                        10.1   6.18  )-----------( 160      ( 23 Nov 2024 03:20 )             29.9     11-23    136  |  104  |  57.4[H]  ----------------------------<  166[H]  4.1   |  17.0[L]  |  5.19[H]    Ca    8.4      23 Nov 2024 03:20  Phos  6.7     11-23  Mg     2.2     11-23    TPro  5.8[L]  /  Alb  3.6  /  TBili  0.4  /  DBili  x   /  AST  19  /  ALT  6   /  AlkPhos  76  11-23    PT/INR - ( 22 Nov 2024 17:29 )   PT: 11.0 sec;   INR: 0.97 ratio         PTT - ( 22 Nov 2024 17:29 )  PTT:28.9 sec  Urinalysis Basic - ( 23 Nov 2024 03:20 )    Color: x / Appearance: x / SG: x / pH: x  Gluc: 166 mg/dL / Ketone: x  / Bili: x / Urobili: x   Blood: x / Protein: x / Nitrite: x   Leuk Esterase: x / RBC: x / WBC x   Sq Epi: x / Non Sq Epi: x / Bacteria: x        RADIOLOGY & ADDITIONAL TESTS: Reviewed.
INFECTIOUS DISEASES AND INTERNAL MEDICINE at [place]  =======================================================  Edmond Andrew MD  Diplomates American Board of Internal Medicine and Infectious Diseases  Telephone [phone]  Fax            [phone]  =======================================================    GOOD BILL [id number]    Follow up: VRE    Allergies:  penicillins (Hives)  azithromycin (Hives)  tetracycline (Hives)  Butazolactin, Prolaprin (Hives)  Cipro (Unknown)  Zyrtec (Hives)      Medications:  acetaminophen     Tablet .. 650 milliGRAM(s) Oral every 6 hours PRN  aspirin  chewable 81 milliGRAM(s) Oral daily  chlorhexidine 2% Cloths 1 Application(s) Topical <User Schedule>  clopidogrel Tablet 75 milliGRAM(s) Oral daily  dextrose 5%. 1000 milliLiter(s) IV Continuous <Continuous>  dextrose 5%. 1000 milliLiter(s) IV Continuous <Continuous>  dextrose 50% Injectable 25 Gram(s) IV Push once  dextrose 50% Injectable 25 Gram(s) IV Push once  dextrose 50% Injectable 12.5 Gram(s) IV Push once  dextrose Oral Gel 15 Gram(s) Oral once  ezetimibe 10 milliGRAM(s) Oral daily  gabapentin 100 milliGRAM(s) Oral every 12 hours  glucagon  Injectable 1 milliGRAM(s) IntraMuscular once  heparin   Injectable 5000 Unit(s) SubCutaneous every 8 hours  hydrocortisone sodium succinate Injectable 50 milliGRAM(s) IV Push every 12 hours  levothyroxine 100 MICROGram(s) Oral daily  linezolid    Tablet 600 milliGRAM(s) Oral every 12 hours  linezolid    Tablet      melatonin 5 milliGRAM(s) Oral at bedtime  oxyCODONE  ER Tablet 10 milliGRAM(s) Oral every 12 hours  pantoprazole    Tablet 40 milliGRAM(s) Oral before breakfast  polyethylene glycol 3350 17 Gram(s) Oral daily PRN  rosuvastatin 20 milliGRAM(s) Oral at bedtime  senna 2 Tablet(s) Oral at bedtime  sodium bicarbonate  Infusion 0.066 mEq/kG/Hr IV Continuous <Continuous>  venlafaxine XR. 75 milliGRAM(s) Oral daily    SOCIAL       FAMILY   FAMILY HISTORY:    REVIEW OF SYSTEMS:  CONSTITUTIONAL:  No Fever or chills  HEENT:   No diplopia or blurred vision.  No earache, sore throat or runny nose.  CARDIOVASCULAR:  No pressure, squeezing, strangling, tightness, heaviness or aching about the chest, neck, axilla or epigastrium.  RESPIRATORY:  No cough, shortness of breath, PND or orthopnea.  GASTROINTESTINAL:  No nausea, vomiting or diarrhea.  GENITOURINARY:  No dysuria, frequency or urgency. No Blood in urine  MUSCULOSKELETAL:   moves all joints  SKIN:  No change in skin, hair or nails.  NEUROLOGIC:  No paresthesias, fasciculations, seizures or weakness.  PSYCHIATRIC:  No disorder of thought or mood.  ENDOCRINE:  No heat or cold intolerance, polyuria or polydipsia.  HEMATOLOGICAL:  No easy bruising or bleeding.            Physical Exam:   Vital Signs Last 24 Hrs  T(C): 36.4 (27 Nov 2024 04:46), Max: 36.6 (26 Nov 2024 18:00)  T(F): 97.5 (27 Nov 2024 04:46), Max: 97.9 (26 Nov 2024 18:00)  HR: 55 (27 Nov 2024 04:46) (55 - 66)  BP: 150/82 (27 Nov 2024 04:46) (107/54 - 157/80)  BP(mean): 105 (27 Nov 2024 04:46) (72 - 105)  RR: 18 (27 Nov 2024 04:46) (18 - 18)  SpO2: 99% (27 Nov 2024 04:46) (97% - 99%)    Parameters below as of 27 Nov 2024 04:46  Patient On (Oxygen Delivery Method): room air            GEN: NAD,   HEENT: normocephalic and atraumatic. EOMI. CHUY.    NECK: Supple. No carotid bruits.  No lymphadenopathy or thyromegaly.  LUNGS: Clear to auscultation.  HEART: Regular rate and rhythm without murmur.  ABDOMEN: Soft, nontender, and nondistended.  Positive bowel sounds.    : No CVA tenderness  EXTREMITIES: Without any cyanosis, clubbing, rash, lesions or edema.  MSK: no joint swelling  NEUROLOGIC: Cranial nerves II through XII are grossly intact.  PSYCHIATRIC: Appropriate affect .  SKIN: No ulceration or induration present.        Labs:  Vitals:  ============     =======================================================  Current Antibiotics:  linezolid    Tablet 600 milliGRAM(s) Oral every 12 hours  linezolid    Tablet        Other medications:  aspirin  chewable 81 milliGRAM(s) Oral daily  chlorhexidine 2% Cloths 1 Application(s) Topical <User Schedule>  clopidogrel Tablet 75 milliGRAM(s) Oral daily  dextrose 5%. 1000 milliLiter(s) IV Continuous <Continuous>  dextrose 5%. 1000 milliLiter(s) IV Continuous <Continuous>  dextrose 50% Injectable 25 Gram(s) IV Push once  dextrose 50% Injectable 25 Gram(s) IV Push once  dextrose 50% Injectable 12.5 Gram(s) IV Push once  dextrose Oral Gel 15 Gram(s) Oral once  ezetimibe 10 milliGRAM(s) Oral daily  gabapentin 100 milliGRAM(s) Oral every 12 hours  glucagon  Injectable 1 milliGRAM(s) IntraMuscular once  heparin   Injectable 5000 Unit(s) SubCutaneous every 8 hours  hydrocortisone sodium succinate Injectable 50 milliGRAM(s) IV Push every 12 hours  levothyroxine 100 MICROGram(s) Oral daily  melatonin 5 milliGRAM(s) Oral at bedtime  oxyCODONE  ER Tablet 10 milliGRAM(s) Oral every 12 hours  pantoprazole    Tablet 40 milliGRAM(s) Oral before breakfast  rosuvastatin 20 milliGRAM(s) Oral at bedtime  senna 2 Tablet(s) Oral at bedtime  sodium bicarbonate  Infusion 0.066 mEq/kG/Hr IV Continuous <Continuous>  venlafaxine XR. 75 milliGRAM(s) Oral daily      =======================================================  Labs:                                           9.3    7.58  )-----------( 207      ( 26 Nov 2024 05:15 )             27.8   11-26    141  |  107  |  44.5[H]  ----------------------------<  123[H]  3.6   |  17.0[L]  |  3.44[H]    Ca    8.6      26 Nov 2024 05:15    TPro  5.9[L]  /  Alb  3.7  /  TBili  <0.2[L]  /  DBili  x   /  AST  15  /  ALT  6   /  AlkPhos  64  11-26      Culture - Urine (collected 11-22-24 @ 18:21)  Source: Clean Catch Clean Catch (Midstream)  Final Report (11-25-24 @ 12:18):    >100,000 CFU/ml Enterococcus faecalis (vancomycin resistant)  Organism: Enterococcus faecalis (vancomycin resistant) (11-25-24 @ 12:18)  Organism: Enterococcus faecalis (vancomycin resistant) (11-25-24 @ 12:18)    Sensitivities:      Method Type: MISSY      -  Ampicillin: S <=2 Predicts results to ampicillin/sulbactam, amoxacillin-clavulanate and  piperacillin-tazobactam.      -  Ciprofloxacin: R >2      -  Daptomycin: S 1      -  Levofloxacin: R >4      -  Linezolid: S <=1      -  Nitrofurantoin: S <=32 Should not be used to treat pyelonephritis.      -  Tetracycline: R >8      -  Vancomycin: R >16    Culture - Blood (collected 11-22-24 @ 17:29)  Source: .Blood BLOOD  Preliminary Report (11-26-24 @ 02:01):    No growth at 72 Hours    Culture - Sputum (collected 09-19-24 @ 00:35)  Source: Trach Asp Tracheal Aspirate  Gram Stain (09-19-24 @ 14:48):    Rare polymorphonuclear leukocytes seen per low power field    No squamous epithelial cells seen per low power field    No organisms seen per oil power field  Final Report (09-20-24 @ 21:36):    Normal Respiratory Mary present    Culture - Blood (collected 09-18-24 @ 16:57)  Source: .Blood Blood  Final Report (09-23-24 @ 23:00):    No growth at 5 days    Culture - Legionella (collected 09-18-24 @ 16:57)  Source: .Legionella  Final Report (09-26-24 @ 15:31):    No Legionella species isolated    Culture - Urine (collected 09-18-24 @ 12:05)  Source: Clean Catch Clean Catch (Midstream)  Final Report (09-19-24 @ 12:05):    No growth    Culture - Blood (collected 09-18-24 @ 10:15)  Source: .Blood Blood-Peripheral  Gram Stain (09-19-24 @ 06:20):    Growth in aerobic bottle: Gram Negative Rods  Final Report (09-20-24 @ 15:17):    Growth in aerobic bottle: Pseudomonas aeruginosa    Direct identification is available within approximately 3-5    hours either by Blood Panel Multiplexed PCR or Direct    MALDI-TOF. Details: [email or web address]  Organism: Blood Culture PCR  Pseudomonas aeruginosa (09-20-24 @ 15:17)  Organism: Pseudomonas aeruginosa (09-20-24 @ 15:17)    Sensitivities:      Method Type: MISSY      -  Aztreonam: S 8      -  Cefepime: S <=2      -  Ceftazidime: S 4      -  Ciprofloxacin: S <=0.25      -  Imipenem: S 2      -  Levofloxacin: S <=0.5      -  Meropenem: S <=1      -  Piperacillin/Tazobactam: S <=8  Organism: Blood Culture PCR (09-20-24 @ 15:17)    Sensitivities:      Method Type: PCR      -  Pseudomonas aeruginosa: Detec      Creatinine: 3.44 mg/dL (11-26-24 @ 05:15)  Creatinine: 4.13 mg/dL (11-25-24 @ 04:13)  Creatinine: 4.74 mg/dL (11-24-24 @ 05:18)  Creatinine: 5.19 mg/dL (11-23-24 @ 03:20)  Creatinine: 5.87 mg/dL (11-22-24 @ 17:29)        Ferritin: 248 ng/mL (11-26-24 @ 05:15)      WBC Count: 7.58 K/uL (11-26-24 @ 05:15)  WBC Count: 9.19 K/uL (11-25-24 @ 04:13)  WBC Count: 7.37 K/uL (11-24-24 @ 05:18)  WBC Count: 6.18 K/uL (11-23-24 @ 03:20)  WBC Count: 9.25 K/uL (11-22-24 @ 17:29)        Alkaline Phosphatase: 64 U/L (11-26-24 @ 05:15)  Alkaline Phosphatase: 71 U/L (11-25-24 @ 04:13)  Alkaline Phosphatase: 76 U/L (11-23-24 @ 03:20)  Alkaline Phosphatase: 90 U/L (11-22-24 @ 17:29)  Alanine Aminotransferase (ALT/SGPT): 6 U/L (11-26-24 @ 05:15)  Alanine Aminotransferase (ALT/SGPT): 6 U/L (11-25-24 @ 04:13)  Alanine Aminotransferase (ALT/SGPT): 6 U/L (11-23-24 @ 03:20)  Alanine Aminotransferase (ALT/SGPT): 8 U/L (11-22-24 @ 17:29)  Aspartate Aminotransferase (AST/SGOT): 15 U/L (11-26-24 @ 05:15)  Aspartate Aminotransferase (AST/SGOT): 15 U/L (11-25-24 @ 04:13)  Aspartate Aminotransferase (AST/SGOT): 19 U/L (11-23-24 @ 03:20)  Aspartate Aminotransferase (AST/SGOT): 18 U/L (11-22-24 @ 17:29)  Bilirubin Total: <0.2 mg/dL (11-26-24 @ 05:15)  Bilirubin Total: 0.2 mg/dL (11-25-24 @ 04:13)  Bilirubin Total: 0.4 mg/dL (11-23-24 @ 03:20)  Bilirubin Total: 0.4 mg/dL (11-22-24 @ 17:29)  
Patient is a 69y old  Female who presents with a chief complaint of Sepsis     (24 Nov 2024 10:55)      SUBJECTIVE / OVERNIGHT EVENTS: Seen and examined. Feels ok. Endorses neck/back pain. Also has constipation. Son at bedside. Denies chest pain, SOB, abd pain, N/V, fever, chills.     MEDICATIONS  (STANDING):  aspirin  chewable 81 milliGRAM(s) Oral daily  cefepime  Injectable. 1000 milliGRAM(s) IV Push every 24 hours  chlorhexidine 2% Cloths 1 Application(s) Topical <User Schedule>  clopidogrel Tablet 75 milliGRAM(s) Oral daily  dextrose 5%. 1000 milliLiter(s) (100 mL/Hr) IV Continuous <Continuous>  dextrose 5%. 1000 milliLiter(s) (50 mL/Hr) IV Continuous <Continuous>  dextrose 50% Injectable 25 Gram(s) IV Push once  dextrose 50% Injectable 12.5 Gram(s) IV Push once  dextrose 50% Injectable 25 Gram(s) IV Push once  dextrose Oral Gel 15 Gram(s) Oral once  ezetimibe 10 milliGRAM(s) Oral daily  gabapentin 100 milliGRAM(s) Oral every 12 hours  glucagon  Injectable 1 milliGRAM(s) IntraMuscular once  heparin   Injectable 5000 Unit(s) SubCutaneous every 8 hours  hydrocortisone sodium succinate Injectable 50 milliGRAM(s) IV Push every 8 hours  levothyroxine 100 MICROGram(s) Oral daily  melatonin 5 milliGRAM(s) Oral at bedtime  midodrine 5 milliGRAM(s) Oral every 8 hours  oxyCODONE  ER Tablet 10 milliGRAM(s) Oral every 12 hours  pantoprazole    Tablet 40 milliGRAM(s) Oral before breakfast  rosuvastatin 20 milliGRAM(s) Oral at bedtime  senna 2 Tablet(s) Oral at bedtime  venlafaxine XR. 75 milliGRAM(s) Oral daily    MEDICATIONS  (PRN):  polyethylene glycol 3350 17 Gram(s) Oral daily PRN Constipation        I&O's Summary    23 Nov 2024 07:01  -  24 Nov 2024 07:00  --------------------------------------------------------  IN: 608.5 mL / OUT: 1550 mL / NET: -941.5 mL        PHYSICAL EXAM:  Vital Signs Last 24 Hrs  T(C): 36.6 (24 Nov 2024 08:05), Max: 36.7 (23 Nov 2024 22:45)  T(F): 97.9 (24 Nov 2024 08:05), Max: 98 (23 Nov 2024 22:45)  HR: 64 (24 Nov 2024 08:05) (64 - 84)  BP: 121/67 (24 Nov 2024 08:05) (77/67 - 149/86)  BP(mean): 78 (23 Nov 2024 18:00) (65 - 78)  RR: 18 (24 Nov 2024 08:05) (14 - 22)  SpO2: 97% (24 Nov 2024 08:05) (96% - 100%)    Parameters below as of 24 Nov 2024 04:18  Patient On (Oxygen Delivery Method): room air        Physical Examination:  GENERAL: NAD, well-groomed, well-developed  HEAD:  Atraumatic, Normocephalic  EYES: EOMI, PERRLA, conjunctiva and sclera clear  NERVOUS SYSTEM:  Alert & Oriented X3, Good concentration; Motor Strength 5/5 B/L upper and lower extremities, sensory intact  CHEST/LUNG: CTA  b/l,  no rales, rhonchi, wheezing  HEART: Regular rate and rhythm; No murmurs  ABDOMEN: Soft, Nontender, Nondistended; Bowel sounds present  EXTREMITIES: No clubbing, cyanosis, or edema   LYMPH: No lymphadenopathy noted  SKIN: No rashes or lesions    LABS:                        10.3   7.37  )-----------( 193      ( 24 Nov 2024 05:18 )             30.4     11-24    140  |  107  |  50.0[H]  ----------------------------<  163[H]  3.8   |  16.0[L]  |  4.74[H]    Ca    8.9      24 Nov 2024 05:18  Phos  6.7     11-23  Mg     2.2     11-23    TPro  5.8[L]  /  Alb  3.6  /  TBili  0.4  /  DBili  x   /  AST  19  /  ALT  6   /  AlkPhos  76  11-23    PT/INR - ( 22 Nov 2024 17:29 )   PT: 11.0 sec;   INR: 0.97 ratio         PTT - ( 22 Nov 2024 17:29 )  PTT:28.9 sec      Urinalysis Basic - ( 24 Nov 2024 05:18 )    Color: x / Appearance: x / SG: x / pH: x  Gluc: 163 mg/dL / Ketone: x  / Bili: x / Urobili: x   Blood: x / Protein: x / Nitrite: x   Leuk Esterase: x / RBC: x / WBC x   Sq Epi: x / Non Sq Epi: x / Bacteria: x        Culture - Urine (collected 22 Nov 2024 18:21)  Source: Clean Catch Clean Catch (Midstream)  Preliminary Report (24 Nov 2024 10:39):    >100,000 CFU/ml Enterococcus species    Culture - Blood (collected 22 Nov 2024 17:29)  Source: .Blood BLOOD  Preliminary Report (24 Nov 2024 02:02):    No growth at 24 hours      CAPILLARY BLOOD GLUCOSE            RADIOLOGY & ADDITIONAL TESTS:  Results Reviewed:   Imaging Personally Reviewed:  Electrocardiogram Personally Reviewed:      
Patient is a 69y old  Female who presents with a chief complaint of downgrade from MICU for septic shock (24 Nov 2024 13:21)      SUBJECTIVE / OVERNIGHT EVENTS: Seen and examined. Feels ok. Sitting on edge of bed without complaints. UTI noted with VRE. ID Consulted. Wean steroids and midodrine as tolerated by BP. Denies chest pain, SOB, abd pain, N/V, fever, chills.     MEDICATIONS  (STANDING):  aspirin  chewable 81 milliGRAM(s) Oral daily  chlorhexidine 2% Cloths 1 Application(s) Topical <User Schedule>  clopidogrel Tablet 75 milliGRAM(s) Oral daily  dextrose 5%. 1000 milliLiter(s) (100 mL/Hr) IV Continuous <Continuous>  dextrose 5%. 1000 milliLiter(s) (50 mL/Hr) IV Continuous <Continuous>  dextrose 50% Injectable 25 Gram(s) IV Push once  dextrose 50% Injectable 12.5 Gram(s) IV Push once  dextrose 50% Injectable 25 Gram(s) IV Push once  dextrose Oral Gel 15 Gram(s) Oral once  ezetimibe 10 milliGRAM(s) Oral daily  gabapentin 100 milliGRAM(s) Oral every 12 hours  glucagon  Injectable 1 milliGRAM(s) IntraMuscular once  heparin   Injectable 5000 Unit(s) SubCutaneous every 8 hours  hydrocortisone sodium succinate Injectable 50 milliGRAM(s) IV Push every 12 hours  levothyroxine 100 MICROGram(s) Oral daily  melatonin 5 milliGRAM(s) Oral at bedtime  oxyCODONE  ER Tablet 10 milliGRAM(s) Oral every 12 hours  pantoprazole    Tablet 40 milliGRAM(s) Oral before breakfast  rosuvastatin 20 milliGRAM(s) Oral at bedtime  senna 2 Tablet(s) Oral at bedtime  venlafaxine XR. 75 milliGRAM(s) Oral daily    MEDICATIONS  (PRN):  acetaminophen     Tablet .. 650 milliGRAM(s) Oral every 6 hours PRN Moderate Pain (4 - 6), Severe Pain (7 - 10)  polyethylene glycol 3350 17 Gram(s) Oral daily PRN Constipation        I&O's Summary      PHYSICAL EXAM:  Vital Signs Last 24 Hrs  T(C): 36.6 (25 Nov 2024 09:22), Max: 36.7 (24 Nov 2024 16:40)  T(F): 97.9 (25 Nov 2024 09:22), Max: 98.1 (24 Nov 2024 16:40)  HR: 75 (25 Nov 2024 09:22) (66 - 75)  BP: 108/61 (25 Nov 2024 09:22) (102/62 - 124/73)  BP(mean): --  RR: 18 (25 Nov 2024 09:22) (18 - 18)  SpO2: 97% (25 Nov 2024 09:22) (97% - 98%)    Parameters below as of 25 Nov 2024 05:09  Patient On (Oxygen Delivery Method): room air        Physical Examination:  GENERAL: NAD,  HEAD:  Atraumatic, Normocephalic  EYES: EOMI, PERRLA, conjunctiva and sclera clear  CHEST/LUNG: CTA  b/l,  no rales, rhonchi, wheezing  HEART: Regular rate and rhythm; No murmurs  ABDOMEN: Soft, Nontender, Nondistended; Bowel sounds present  EXTREMITIES: No clubbing, cyanosis, or edema   NERVOUS SYSTEM:  Alert & Oriented X3, Good concentration; Motor Strength 5/5 B/L upper and lower extremities.  LYMPH: No lymphadenopathy noted  SKIN: No rashes or lesions    LABS:                        10.0   9.19  )-----------( 202      ( 25 Nov 2024 04:13 )             29.5     11-25    140  |  107  |  50.5[H]  ----------------------------<  142[H]  3.6   |  16.0[L]  |  4.13[H]    Ca    8.8      25 Nov 2024 04:13  Phos  5.2     11-25  Mg     1.9     11-25    TPro  6.2[L]  /  Alb  3.9  /  TBili  0.2[L]  /  DBili  x   /  AST  15  /  ALT  6   /  AlkPhos  71  11-25          Urinalysis Basic - ( 25 Nov 2024 04:13 )    Color: x / Appearance: x / SG: x / pH: x  Gluc: 142 mg/dL / Ketone: x  / Bili: x / Urobili: x   Blood: x / Protein: x / Nitrite: x   Leuk Esterase: x / RBC: x / WBC x   Sq Epi: x / Non Sq Epi: x / Bacteria: x        Culture - Urine (collected 22 Nov 2024 18:21)  Source: Clean Catch Clean Catch (Midstream)  Final Report (25 Nov 2024 12:18):    >100,000 CFU/ml Enterococcus faecalis (vancomycin resistant)  Organism: Enterococcus faecalis (vancomycin resistant) (25 Nov 2024 12:18)  Organism: Enterococcus faecalis (vancomycin resistant) (25 Nov 2024 12:18)    Culture - Blood (collected 22 Nov 2024 17:29)  Source: .Blood BLOOD  Preliminary Report (25 Nov 2024 02:01):    No growth at 48 Hours      CAPILLARY BLOOD GLUCOSE            RADIOLOGY & ADDITIONAL TESTS:  Results Reviewed:   Imaging Personally Reviewed:  Electrocardiogram Personally Reviewed:      
Patient is a 69y old  Female who presents with a chief complaint of downgrade from MICU for septic shock (25 Nov 2024 14:20)    Chart reviewed   pt is admitted with sepsis , UTI, renal failure       ALLERGIES:  penicillins (Hives)  azithromycin (Hives)  tetracycline (Hives)  Butazolactin, Prolaprin (Hives)  Cipro (Unknown)  Zyrtec (Hives)    MEDICATIONS  (STANDING):  aspirin  chewable 81 milliGRAM(s) Oral daily  chlorhexidine 2% Cloths 1 Application(s) Topical <User Schedule>  clopidogrel Tablet 75 milliGRAM(s) Oral daily  dextrose 5%. 1000 milliLiter(s) (50 mL/Hr) IV Continuous <Continuous>  dextrose 5%. 1000 milliLiter(s) (100 mL/Hr) IV Continuous <Continuous>  dextrose 50% Injectable 25 Gram(s) IV Push once  dextrose 50% Injectable 25 Gram(s) IV Push once  dextrose 50% Injectable 12.5 Gram(s) IV Push once  dextrose Oral Gel 15 Gram(s) Oral once  ezetimibe 10 milliGRAM(s) Oral daily  gabapentin 100 milliGRAM(s) Oral every 12 hours  glucagon  Injectable 1 milliGRAM(s) IntraMuscular once  heparin   Injectable 5000 Unit(s) SubCutaneous every 8 hours  hydrocortisone sodium succinate Injectable 50 milliGRAM(s) IV Push every 12 hours  levothyroxine 100 MICROGram(s) Oral daily  linezolid    Tablet 600 milliGRAM(s) Oral every 12 hours  linezolid    Tablet      melatonin 5 milliGRAM(s) Oral at bedtime  oxyCODONE  ER Tablet 10 milliGRAM(s) Oral every 12 hours  pantoprazole    Tablet 40 milliGRAM(s) Oral before breakfast  rosuvastatin 20 milliGRAM(s) Oral at bedtime  senna 2 Tablet(s) Oral at bedtime  sodium bicarbonate  Infusion 0.066 mEq/kG/Hr (80 mL/Hr) IV Continuous <Continuous>  venlafaxine XR. 75 milliGRAM(s) Oral daily    MEDICATIONS  (PRN):  acetaminophen     Tablet .. 650 milliGRAM(s) Oral every 6 hours PRN Moderate Pain (4 - 6), Severe Pain (7 - 10)  polyethylene glycol 3350 17 Gram(s) Oral daily PRN Constipation    Vital Signs Last 24 Hrs  T(F): 97.5 (26 Nov 2024 04:42), Max: 97.9 (25 Nov 2024 09:22)  HR: 74 (26 Nov 2024 04:42) (74 - 75)  BP: 137/76 (26 Nov 2024 04:42) (108/61 - 137/76)  RR: 18 (26 Nov 2024 04:42) (18 - 18)  SpO2: 99% (26 Nov 2024 04:42) (97% - 99%)  I&O's Summary      PHYSICAL EXAM:  General:   ENT:   Neck:   Lungs:   Cardio: RRR, S1/S2, No murmur  Abdomen: Soft, Nontender, Nondistended; Bowel sounds present  Extremities: No calf tenderness, No pitting edema    LABS:                        9.3    7.58  )-----------( 207      ( 26 Nov 2024 05:15 )             27.8     11-26    141  |  107  |  44.5  ----------------------------<  123  3.6   |  17.0  |  3.44    Ca    8.6      26 Nov 2024 05:15  Phos  5.2     11-25  Mg     1.9     11-25    TPro  5.9  /  Alb  3.7  /  TBili  <0.2  /  DBili  x   /  AST  15  /  ALT  6   /  AlkPhos  64  11-26                                      Urinalysis Basic - ( 26 Nov 2024 05:15 )    Color: x / Appearance: x / SG: x / pH: x  Gluc: 123 mg/dL / Ketone: x  / Bili: x / Urobili: x   Blood: x / Protein: x / Nitrite: x   Leuk Esterase: x / RBC: x / WBC x   Sq Epi: x / Non Sq Epi: x / Bacteria: x        Culture - Urine (collected 22 Nov 2024 18:21)  Source: Clean Catch Clean Catch (Midstream)  Final Report (25 Nov 2024 12:18):    >100,000 CFU/ml Enterococcus faecalis (vancomycin resistant)  Organism: Enterococcus faecalis (vancomycin resistant) (25 Nov 2024 12:18)  Organism: Enterococcus faecalis (vancomycin resistant) (25 Nov 2024 12:18)      Method Type: MISSY      -  Ampicillin: S <=2 Predicts results to ampicillin/sulbactam, amoxacillin-clavulanate and  piperacillin-tazobactam.      -  Ciprofloxacin: R >2      -  Daptomycin: S 1      -  Levofloxacin: R >4      -  Linezolid: S <=1      -  Nitrofurantoin: S <=32 Should not be used to treat pyelonephritis.      -  Tetracycline: R >8      -  Vancomycin: R >16    Culture - Blood (collected 22 Nov 2024 17:29)  Source: .Blood BLOOD  Preliminary Report (26 Nov 2024 02:01):    No growth at 72 Hours        RADIOLOGY & ADDITIONAL TESTS:      
[facility] DIVISION OF KIDNEY DISEASES AND HYPERTENSION -- PROGRESS NOTE    24 hour events/subjective:  Seen today   Doing well   Renal function is improving  Making urine   Currently on antibiotics     MEDICATIONS    Standing Inpatient Medications  aspirin  chewable 81 milliGRAM(s) Oral daily  chlorhexidine 2% Cloths 1 Application(s) Topical <User Schedule>  clopidogrel Tablet 75 milliGRAM(s) Oral daily  dextrose 5%. 1000 milliLiter(s) IV Continuous <Continuous>  dextrose 5%. 1000 milliLiter(s) IV Continuous <Continuous>  dextrose 50% Injectable 25 Gram(s) IV Push once  dextrose 50% Injectable 12.5 Gram(s) IV Push once  dextrose 50% Injectable 25 Gram(s) IV Push once  dextrose Oral Gel 15 Gram(s) Oral once  ezetimibe 10 milliGRAM(s) Oral daily  gabapentin 100 milliGRAM(s) Oral every 12 hours  glucagon  Injectable 1 milliGRAM(s) IntraMuscular once  heparin   Injectable 5000 Unit(s) SubCutaneous every 8 hours  lactated ringers. 1000 milliLiter(s) IV Continuous <Continuous>  levothyroxine 100 MICROGram(s) Oral daily  linezolid    Tablet 600 milliGRAM(s) Oral every 12 hours  linezolid    Tablet      melatonin 5 milliGRAM(s) Oral at bedtime  oxyCODONE  ER Tablet 10 milliGRAM(s) Oral every 12 hours  pantoprazole    Tablet 40 milliGRAM(s) Oral before breakfast  rosuvastatin 20 milliGRAM(s) Oral at bedtime  senna 2 Tablet(s) Oral at bedtime  venlafaxine XR. 75 milliGRAM(s) Oral daily    PRN Inpatient Medications  acetaminophen     Tablet .. 650 milliGRAM(s) Oral every 6 hours PRN  polyethylene glycol 3350 17 Gram(s) Oral daily PRN      VITALS/PHYSICAL EXAM  --------------------------------------------------------------------------------  T(C): 36.8 (11-28-24 @ 10:34), Max: 36.9 (11-27-24 @ 16:28)  HR: 66 (11-28-24 @ 10:34) (64 - 74)  BP: 132/76 (11-28-24 @ 10:34) (118/65 - 142/76)  RR: 19 (11-28-24 @ 10:34) (18 - 19)  SpO2: 100% (11-28-24 @ 10:34) (95% - 100%)  Wt(kg): --        11-27-24 @ 07:01  -  11-28-24 @ 07:00  --------------------------------------------------------  IN: 1665 mL / OUT: 0 mL / NET: 1665 mL      Physical Exam:  Gen: NAD, well-appearing  HEENT: normal  Pulm: CTA B/L  CV: normal S1S2; no rub, no edema  Abd: soft, non-tender  MSK no deformities   Neuro: Alert and oriented x3     LABS/STUDIES  --------------------------------------------------------------------------------  144  |  111  |  22.8  ----------------------------<  93      [11-28-24 @ 04:50]  3.4   |  22.0  |  1.70        Ca     8.0     [11-28-24 @ 04:50]            Creatinine Trend:  SCr 1.70 [11-28 @ 04:50]  SCr 2.19 [11-27 @ 09:21]  SCr 3.44 [11-26 @ 05:15]  SCr 4.13 [11-25 @ 04:13]  SCr 4.74 [11-24 @ 05:18]

## 2024-11-28 NOTE — PROGRESS NOTE ADULT - REASON FOR ADMISSION
downgrade from MICU for septic shock
UTI
downgrade from MICU for septic shock
downgrade from MICU for septic shock

## 2024-11-28 NOTE — PROGRESS NOTE ADULT - TIME BILLING
Time spent reviewing the chart documentation, reviewing labs and imaging studies, evaluating the patient, discussing the plan of care with the consultants & medical team, and documenting.

## 2024-11-28 NOTE — PROGRESS NOTE ADULT - ASSESSMENT
69F PMHx CAD, NSTEMI in 2011 s/p ELIZABETH to LAD, s/p ELIZABETH Distal LCx (4/30/2024), HTN, HLD, hypothyroid, rheumatoid arthritis, recent admission to MICU here in September for hypoxic respiratory failure, ARDS requiring intubation, Septic Shock secondary to LLL pneumonia with concerns for acute systolic heart failure, cardiogenic shock (repeat ECHO with improved EF) was admitted to MICU 11/22/24 for AMS x1-2 days. Patient diagnosed with UTI in outpatient setting and finished Levaquin course, however started to become confused.   In ER, CT head negative for acute intracranial pathology. CT C/A/P negative. Noted to be hypotensive s/p sepsis protocol IVF and started on Levophed Infusion & admitted to MICU. Pt now off pressors and downgraded to medical floor.      1-Septic Shock with UTI  - septic shock resolved,  s/p levo and midodrine   s/p Hydrocortisone   cont zyvox for VRE UTI total of 7 days  BloodCx negative   - ID consult appreciated     2. CLEMENTINA > improving   Likely due to renal hypoperfusion with subsequent ATN   3- Metabolic acidosis> resolved   Baseline Creatinine 0.44~in sep 2024   -cw  LR 75cc/hr  -Renal USG normal     4- CAD s/p ELIZABETH  - c/w ASA/plavix and statin    5) HLD  - on statin & Zetia    6) Hypothyroidism  - on synthroid  check TSH     7) GERD  - on PPI    8) Fibromyalgia and neuropathy  - on Gabapentin    9) Depression  - on Effexor    10-Anemia   will check iron studies and vit b12 , folate>> normal   hb stable     9) DVT ppx: heparin    Dispo: Dc tomorrow. Pt requests to be discharged tomorrow as she doesn't have anyone at home to receive her today.

## 2024-11-28 NOTE — PROGRESS NOTE ADULT - PROVIDER SPECIALTY LIST ADULT
Internal Medicine
MICU
Nephrology
Nephrology
Hospitalist
Infectious Disease
Nephrology
Infectious Disease
Hospitalist
Hospitalist
Internal Medicine
Internal Medicine

## 2024-11-28 NOTE — PROGRESS NOTE ADULT - ASSESSMENT
69 year old female with PMH of CAD ( S/P stents) HTN, hyperlipidemia, RA, Hypothyroid who had recent admission in sept. with respiratory failure due to LL PNA/ARDS requiring intubation CLEMENTINA that resolved, now presented to [facility] for confusion after she was treated with Levaquin as OP for UTI, treated for septic shock due to UTI. On presentation she was she had CLMEENTINA creatinine of 5.87. Her renal function is improving.   Nephrology consult for CLEMENTINA.    Impression:  CLEMENTINA: Due sepsis/ hemodynamic instability and possibly prerenal failure in the setting of UTI. Renal function continues to imrpove  Metabolic Acidosis: Due to CLEMENTINA,, treated with IV bicarb and IVF, now improved  HypoK   Septic Shock: Due UTI  now resolved   UTI:   CAD: S/P stent   HTN  Hypothyroidism    Recommendation:  Stop IVF   Encourage po intake  Replete K   On Antibiotic - currently Zyvox   Monitor BMP daily   Will continue to follow   She will follow up with Nephrology as outpatient   Discussed with the patient

## 2024-11-29 ENCOUNTER — TRANSCRIPTION ENCOUNTER (OUTPATIENT)
Age: 69
End: 2024-11-29

## 2024-11-29 VITALS
RESPIRATION RATE: 19 BRPM | OXYGEN SATURATION: 95 % | DIASTOLIC BLOOD PRESSURE: 60 MMHG | HEART RATE: 76 BPM | SYSTOLIC BLOOD PRESSURE: 106 MMHG | TEMPERATURE: 98 F

## 2024-11-29 LAB
ANION GAP SERPL CALC-SCNC: 11 MMOL/L — SIGNIFICANT CHANGE UP (ref 5–17)
BUN SERPL-MCNC: 18.4 MG/DL — SIGNIFICANT CHANGE UP (ref 8–20)
CALCIUM SERPL-MCNC: 8.6 MG/DL — SIGNIFICANT CHANGE UP (ref 8.4–10.5)
CHLORIDE SERPL-SCNC: 110 MMOL/L — HIGH (ref 96–108)
CO2 SERPL-SCNC: 23 MMOL/L — SIGNIFICANT CHANGE UP (ref 22–29)
CREAT SERPL-MCNC: 1.42 MG/DL — HIGH (ref 0.5–1.3)
EGFR: 40 ML/MIN/1.73M2 — LOW
GLUCOSE SERPL-MCNC: 96 MG/DL — SIGNIFICANT CHANGE UP (ref 70–99)
POTASSIUM SERPL-MCNC: 3.7 MMOL/L — SIGNIFICANT CHANGE UP (ref 3.5–5.3)
POTASSIUM SERPL-SCNC: 3.7 MMOL/L — SIGNIFICANT CHANGE UP (ref 3.5–5.3)
SODIUM SERPL-SCNC: 144 MMOL/L — SIGNIFICANT CHANGE UP (ref 135–145)

## 2024-11-29 PROCEDURE — 83036 HEMOGLOBIN GLYCOSYLATED A1C: CPT

## 2024-11-29 PROCEDURE — 96375 TX/PRO/DX INJ NEW DRUG ADDON: CPT

## 2024-11-29 PROCEDURE — 99291 CRITICAL CARE FIRST HOUR: CPT

## 2024-11-29 PROCEDURE — 80053 COMPREHEN METABOLIC PANEL: CPT

## 2024-11-29 PROCEDURE — 83550 IRON BINDING TEST: CPT

## 2024-11-29 PROCEDURE — 87077 CULTURE AEROBIC IDENTIFY: CPT

## 2024-11-29 PROCEDURE — 80048 BASIC METABOLIC PNL TOTAL CA: CPT

## 2024-11-29 PROCEDURE — 82947 ASSAY GLUCOSE BLOOD QUANT: CPT

## 2024-11-29 PROCEDURE — 71045 X-RAY EXAM CHEST 1 VIEW: CPT

## 2024-11-29 PROCEDURE — 87641 MR-STAPH DNA AMP PROBE: CPT

## 2024-11-29 PROCEDURE — 84466 ASSAY OF TRANSFERRIN: CPT

## 2024-11-29 PROCEDURE — 76770 US EXAM ABDO BACK WALL COMP: CPT

## 2024-11-29 PROCEDURE — 85014 HEMATOCRIT: CPT

## 2024-11-29 PROCEDURE — 93005 ELECTROCARDIOGRAM TRACING: CPT

## 2024-11-29 PROCEDURE — C8924: CPT

## 2024-11-29 PROCEDURE — 84295 ASSAY OF SERUM SODIUM: CPT

## 2024-11-29 PROCEDURE — 83605 ASSAY OF LACTIC ACID: CPT

## 2024-11-29 PROCEDURE — 84100 ASSAY OF PHOSPHORUS: CPT

## 2024-11-29 PROCEDURE — 87640 STAPH A DNA AMP PROBE: CPT

## 2024-11-29 PROCEDURE — 85018 HEMOGLOBIN: CPT

## 2024-11-29 PROCEDURE — 82435 ASSAY OF BLOOD CHLORIDE: CPT

## 2024-11-29 PROCEDURE — 83540 ASSAY OF IRON: CPT

## 2024-11-29 PROCEDURE — 81001 URINALYSIS AUTO W/SCOPE: CPT

## 2024-11-29 PROCEDURE — 85730 THROMBOPLASTIN TIME PARTIAL: CPT

## 2024-11-29 PROCEDURE — 99239 HOSP IP/OBS DSCHRG MGMT >30: CPT

## 2024-11-29 PROCEDURE — 87040 BLOOD CULTURE FOR BACTERIA: CPT

## 2024-11-29 PROCEDURE — 83735 ASSAY OF MAGNESIUM: CPT

## 2024-11-29 PROCEDURE — 36415 COLL VENOUS BLD VENIPUNCTURE: CPT

## 2024-11-29 PROCEDURE — 93970 EXTREMITY STUDY: CPT

## 2024-11-29 PROCEDURE — 85025 COMPLETE CBC W/AUTO DIFF WBC: CPT

## 2024-11-29 PROCEDURE — 96361 HYDRATE IV INFUSION ADD-ON: CPT

## 2024-11-29 PROCEDURE — 84132 ASSAY OF SERUM POTASSIUM: CPT

## 2024-11-29 PROCEDURE — 85610 PROTHROMBIN TIME: CPT

## 2024-11-29 PROCEDURE — 82330 ASSAY OF CALCIUM: CPT

## 2024-11-29 PROCEDURE — 71250 CT THORAX DX C-: CPT | Mod: MC

## 2024-11-29 PROCEDURE — 87086 URINE CULTURE/COLONY COUNT: CPT

## 2024-11-29 PROCEDURE — 96365 THER/PROPH/DIAG IV INF INIT: CPT | Mod: XU

## 2024-11-29 PROCEDURE — 85027 COMPLETE CBC AUTOMATED: CPT

## 2024-11-29 PROCEDURE — 82803 BLOOD GASES ANY COMBINATION: CPT

## 2024-11-29 PROCEDURE — 70450 CT HEAD/BRAIN W/O DYE: CPT | Mod: MC

## 2024-11-29 PROCEDURE — 74176 CT ABD & PELVIS W/O CONTRAST: CPT | Mod: MC

## 2024-11-29 PROCEDURE — 87186 SC STD MICRODIL/AGAR DIL: CPT

## 2024-11-29 PROCEDURE — 82607 VITAMIN B-12: CPT

## 2024-11-29 PROCEDURE — 82728 ASSAY OF FERRITIN: CPT

## 2024-11-29 RX ORDER — GABAPENTIN 300 MG/1
1 CAPSULE ORAL
Qty: 60 | Refills: 0
Start: 2024-11-29 | End: 2024-12-28

## 2024-11-29 RX ORDER — POTASSIUM CHLORIDE 600 MG/1
20 TABLET, EXTENDED RELEASE ORAL ONCE
Refills: 0 | Status: DISCONTINUED | OUTPATIENT
Start: 2024-11-29 | End: 2024-11-29

## 2024-11-29 RX ORDER — LINEZOLID 600 MG/300ML
1 INJECTION, SOLUTION INTRAVENOUS
Qty: 8 | Refills: 0
Start: 2024-11-29 | End: 2024-12-02

## 2024-11-29 RX ADMIN — Medication 81 MILLIGRAM(S): at 11:30

## 2024-11-29 RX ADMIN — PANTOPRAZOLE SODIUM 40 MILLIGRAM(S): 40 TABLET, DELAYED RELEASE ORAL at 05:44

## 2024-11-29 RX ADMIN — GABAPENTIN 100 MILLIGRAM(S): 300 CAPSULE ORAL at 05:43

## 2024-11-29 RX ADMIN — CHLORHEXIDINE GLUCONATE 1 APPLICATION(S): 1.2 RINSE ORAL at 05:47

## 2024-11-29 RX ADMIN — Medication 10 MILLIGRAM(S): at 11:30

## 2024-11-29 RX ADMIN — OXYCODONE HYDROCHLORIDE 10 MILLIGRAM(S): 30 TABLET ORAL at 00:03

## 2024-11-29 RX ADMIN — LINEZOLID 600 MILLIGRAM(S): 600 INJECTION, SOLUTION INTRAVENOUS at 05:42

## 2024-11-29 RX ADMIN — Medication 100 MICROGRAM(S): at 05:43

## 2024-11-29 RX ADMIN — VENLAFAXINE HYDROCHLORIDE 75 MILLIGRAM(S): 75 CAPSULE, EXTENDED RELEASE ORAL at 11:30

## 2024-11-29 RX ADMIN — OXYCODONE HYDROCHLORIDE 10 MILLIGRAM(S): 30 TABLET ORAL at 05:42

## 2024-11-29 RX ADMIN — CLOPIDOGREL 75 MILLIGRAM(S): 75 TABLET, FILM COATED ORAL at 11:31

## 2024-11-29 NOTE — DISCHARGE NOTE PROVIDER - PROVIDER TOKENS
PROVIDER:[TOKEN:[[id number]],FOLLOWUP:[1-3 days]] PROVIDER:[TOKEN:[[id number]],FOLLOWUP:[1-3 days]],PROVIDER:[TOKEN:[[id number]],FOLLOWUP:[1 week]]

## 2024-11-29 NOTE — DISCHARGE NOTE NURSING/CASE MANAGEMENT/SOCIAL WORK - FINANCIAL ASSISTANCE
[facility] provides services at a reduced cost to those who are determined to be eligible through [facility]’s financial assistance program. Information regarding [facility]’s financial assistance program can be found by going to [email or web address] or by calling [phone].

## 2024-11-29 NOTE — DISCHARGE NOTE NURSING/CASE MANAGEMENT/SOCIAL WORK - PATIENT PORTAL LINK FT
You can access the FollowMyHealth Patient Portal offered by [facility] by registering at the following website: [email or web address]. By joining [facility]’s FollowMyHealth portal, you will also be able to view your health information using other applications (apps) compatible with our system.

## 2024-11-29 NOTE — DISCHARGE NOTE PROVIDER - NSDCFUADDINST_GEN_ALL_CORE_FT
Please seek medical attention if you develop new or worsening symptoms.  Please seek medical attention if you develop new or worsening symptoms.   Held Metformin bid, MyD7h-7.4% this admission. To be re-evaluated by pcp.   Held lasix oral.   Gabapentin 300mg q6h changed to 100mg bid. To be re-addressed by pcp as well  Pt to be discharged on Zyvox until 12/02

## 2024-11-29 NOTE — DISCHARGE NOTE PROVIDER - CARE PROVIDER_API CALL
Columba Muñoz  Family Medicine  [facility] [place] [address]  Phone: [phone]  Fax: [phone]  Follow Up Time: 1-3 days   Columba Muñoz  Family Medicine  [address] [place] [address]  Phone: [phone]  Fax: [phone]  Follow Up Time: 1-3 days    Chuck Wallace  Nephrology  [address], [place] [address]  Phone: [phone]  Fax: [phone]  Follow Up Time: 1 week

## 2024-11-29 NOTE — DISCHARGE NOTE PROVIDER - ATTENDING DISCHARGE PHYSICAL EXAMINATION:
T(C): 36.8 (11-29-24 @ 04:54), Max: 36.8 (11-29-24 @ 04:54)  HR: 61 (11-29-24 @ 04:54) (61 - 85)  BP: 106/60 (11-29-24 @ 04:54) (106/60 - 127/72)  RR: 18 (11-29-24 @ 04:54) (18 - 18)  SpO2: 98% (11-29-24 @ 04:54) (98% - 98%)    CONSTITUTIONAL: Well groomed, no apparent distress  EYES: PERRLA and symmetric, EOMI, No conjunctival or scleral injection, non-icteric  ENMT: Oral mucosa with moist membranes. Normal dentition; no pharyngeal injection or exudates             NECK: Supple, symmetric and without tracheal deviation   RESP: No respiratory distress, no use of accessory muscles; CTA b/l, no WRR  CV: RRR, +S1S2, no MRG; no JVD; no peripheral edema  GI: Soft, NT, ND, no rebound, no guarding; no palpable masses; no hepatosplenomegaly; no hernia palpated  LYMPH: No cervical LAD or tenderness; no axillary LAD or tenderness; no inguinal LAD or tenderness  MSK: Normal gait; No digital clubbing or cyanosis; examination of the (head/neck/spine/ribs/pelvis, RUE, LUE, RLE, LLE) without misalignment,            Normal ROM without pain, no spinal tenderness, normal muscle strength/tone  SKIN: No rashes or ulcers noted; no subcutaneous nodules or induration palpable  NEURO: CN II-XII intact; normal reflexes in upper and lower extremities, sensation intact in upper and lower extremities b/l to light touch   PSYCH: Appropriate insight/judgment; A+O x 3, mood and affect appropriate, recent/remote memory intact

## 2024-11-29 NOTE — DISCHARGE NOTE NURSING/CASE MANAGEMENT/SOCIAL WORK - NSDCFUADDAPPT_GEN_ALL_CORE_FT
APPTS ARE READY TO BE MADE: [X] YES    Best Family or Patient Contact (if needed):     Additional Information about above appointments (if needed):    1: Nephrology in 1 week  2:   3:     Other comments or requests:

## 2024-11-29 NOTE — DISCHARGE NOTE PROVIDER - CARE PROVIDERS DIRECT ADDRESSES
,[email or web address]

## 2024-11-29 NOTE — DISCHARGE NOTE PROVIDER - NSDCMRMEDTOKEN_GEN_ALL_CORE_FT
Ambien 10 mg oral tablet: 1 tab(s) orally once a day (at bedtime), As Needed  aspirin 81 mg oral delayed release tablet: 1 tab(s) orally once a day  carvedilol 12.5 mg oral tablet: 1 tab(s) orally every 12 hours  clopidogrel 75 mg oral tablet: 1 tab(s) orally once a day  Effexor  mg oral capsule, extended release: 1 cap(s) orally once a day  furosemide 40 mg oral tablet: 1 tab(s) orally once a day  levothyroxine 100 mcg (0.1 mg) oral tablet: 1 tab(s) orally once a day  losartan 100 mg oral tablet: 1 tab(s) orally once a day  MetFORMIN (Eqv-Fortamet) 500 mg oral tablet, extended release: 1 tab(s) orally 2 times a day  Neurontin 300 mg oral capsule: 1 cap(s) orally every 6 hours  oxyCODONE 10 mg oral tablet, extended release: 1 tab(s) orally every 8 hours as needed for pain q8-q12 prn filled 9/6/24  pantoprazole 40 mg oral delayed release tablet: 1 tab(s) orally once a day  rosuvastatin 20 mg oral tablet: 1 tab(s) orally once a day (at bedtime)  Zetia 10 mg oral tablet: 1 tab(s) orally once a day   Ambien 10 mg oral tablet: 1 tab(s) orally once a day (at bedtime), As Needed  aspirin 81 mg oral delayed release tablet: 1 tab(s) orally once a day  carvedilol 12.5 mg oral tablet: 1 tab(s) orally every 12 hours  clopidogrel 75 mg oral tablet: 1 tab(s) orally once a day  Effexor  mg oral capsule, extended release: 1 cap(s) orally once a day  levothyroxine 100 mcg (0.1 mg) oral tablet: 1 tab(s) orally once a day  linezolid 600 mg oral tablet: 1 tab(s) orally 2 times a day  losartan 100 mg oral tablet: 1 tab(s) orally once a day  Neurontin 100 mg oral capsule: 1 cap(s) orally every 12 hours  oxyCODONE 10 mg oral tablet, extended release: 1 tab(s) orally every 8 hours as needed for pain q8-q12 prn filled 9/6/24  pantoprazole 40 mg oral delayed release tablet: 1 tab(s) orally once a day  rosuvastatin 20 mg oral tablet: 1 tab(s) orally once a day (at bedtime)  Zetia 10 mg oral tablet: 1 tab(s) orally once a day

## 2024-11-29 NOTE — DISCHARGE NOTE PROVIDER - NSDCCPCAREPLAN_GEN_ALL_CORE_FT
PRINCIPAL DISCHARGE DIAGNOSIS  Diagnosis: Sepsis  Assessment and Plan of Treatment: - Urine culture was positive for bacterial growth, and you were started on IV antibiotics.   - Blood cultures returned negative for bacterial growth.   - Treated and improving with IV antibiotics and IV fluids.   - Follow up outpatient with your PCP for monitoring.      SECONDARY DISCHARGE DIAGNOSES  Diagnosis: Acute renal failure  Assessment and Plan of Treatment: - Improved, follow up outpatient with your PCP and Nephrology.    Diagnosis: Septic shock  Assessment and Plan of Treatment: - Treated as noted above.    Diagnosis: Acute UTI  Assessment and Plan of Treatment: - Treated as noted above. Follow up outpatient for monitoring.    Diagnosis: CAD (coronary artery disease)  Assessment and Plan of Treatment: - Continued on home medications.   - Please follow up outpatient with your PCP and Cardiologist, and follow a heart healthy, low sodium diet.    Diagnosis: Hypothyroid  Assessment and Plan of Treatment: - Continue medications as directed and follow up with your PCP.    Diagnosis: Chronic GERD  Assessment and Plan of Treatment: - Continue medications as directed and follow up with your PCP.

## 2024-11-29 NOTE — DISCHARGE NOTE PROVIDER - NSDCFUADDAPPT_GEN_ALL_CORE_FT
APPTS ARE READY TO BE MADE: [X] YES    Best Family or Patient Contact (if needed):     Additional Information about above appointments (if needed):    1: Nephrology in 1 week  2:   3:     Other comments or requests:    APPTS ARE READY TO BE MADE: [X] YES    Best Family or Patient Contact (if needed):     Additional Information about above appointments (if needed):    1: Nephrology in 1 week  2:   3:     Other comments or requests:   Provided patient with provider referral information, however patient prefers to schedule the appointments on their own. Nephro    Appointment was scheduled in [address] on 12/4 at 8am

## 2024-11-29 NOTE — DISCHARGE NOTE PROVIDER - NSDCFUSCHEDAPPT_GEN_ALL_CORE_FT
[facility]  [address] [facility] [address]  Scheduled Appointment: 12/02/2024    Marcela Hdz  [facility]  [address]MM[address]  Scheduled Appointment: 12/12/2024    Kadi Cohen  [facility]  [address] [facility]  Scheduled Appointment: 01/24/2025    Columba Muñoz  [facility]  [facility] [address]  Scheduled Appointment: 01/29/2025     Marcela Hdz  [facility]  [address]  Scheduled Appointment: 12/12/2024    Kadi Cohen  [facility]  [facility] [address]  Scheduled Appointment: 01/24/2025    Columba Muñoz  [facility]  [facility] [address] [facility]  Scheduled Appointment: 01/29/2025     Columba Muñoz  [facility]  [facility] [address]  Scheduled Appointment: 12/04/2024    Marcela Hdz  [facility]  [address]  Scheduled Appointment: 12/12/2024    Kadi Cohen  [facility]  R[address] [facility]  Scheduled Appointment: 01/24/2025    Columba Muñoz  [facility] [address]  Scheduled Appointment: 01/29/2025

## 2024-11-29 NOTE — DISCHARGE NOTE PROVIDER - HOSPITAL COURSE
69 year old female with a PMHx of CAD, NSTEMI in 2011 s/p ELIZABETH to LAD, s/p ELIZABETH Distal LCx (4/30/2024), HTN, HLD, hypothyroid, rheumatoid arthritis, recent MICU admission for ARDS requiring intubation, Septic Shock secondary to LLL pneumonia with concerns for acute systolic heart failure and cardiogenic shock, and UTI, who presented to [facility] ED with c/o increasing confusion. In the ED, CT head negative for acute intracranial pathology. CT C/A/P negative. Noted to be septic and hypotensive despite IVF resuscitation requiring pressor support and admission to MICU. UA positive, UCx with VRE. BCx negative. ID consulted, continued on Zyvox. Weaned off pressors and downgraded to medicine. Noted to have CLEMENTINA with metabolic acidosis, which improved with IVF. Otherwise continued on 69 year old female with a PMHx of CAD, NSTEMI in 2011 s/p ELIZABETH to LAD, s/p ELIZABETH Distal LCx (4/30/2024), HTN, HLD, hypothyroid, rheumatoid arthritis, recent MICU admission for ARDS requiring intubation, Septic Shock secondary to LLL pneumonia with concerns for acute systolic heart failure and cardiogenic shock, and UTI, who presented to [facility] ED with c/o increasing confusion. In the ED, CT head negative for acute intracranial pathology. CT C/A/P negative. Noted to be septic and hypotensive despite IVF resuscitation requiring pressor support and admission to MICU. UA positive, UCx with VRE. BCx negative. ID consulted, continued on Zyvox. Weaned off pressors and downgraded to medicine. Noted to have CLEMENTINA with metabolic acidosis, which improved with IVF. Otherwise continued on home medications. At this time, patient is medically stable for discharge with close outpatient follow up. 69 year old female with a PMHx of CAD, NSTEMI in 2011 s/p ELIZABETH to LAD, s/p ELIZABETH Distal LCx (4/30/2024), HTN, HLD, hypothyroid, rheumatoid arthritis, recent MICU admission for ARDS requiring intubation, Septic Shock secondary to LLL pneumonia with concerns for acute systolic heart failure and cardiogenic shock, and UTI, who presented to [facility] ED with c/o increasing confusion. In the ED, CT head negative for acute intracranial pathology. CT C/A/P negative. Noted to be septic and hypotensive despite IVF resuscitation requiring pressor support and admission to MICU. UA positive, UCx with VRE. BCx negative. ID consulted, continued on Zyvox. Weaned off pressors and downgraded to medicine. Noted to have CLEMENTINA with metabolic acidosis, which improved with IVF. At this time, patient is medically stable for discharge with close outpatient follow up.    Held Metformin bid, XjV6k-4.4% this admission. To be re-evaluated by pcp.   Held lasix oral.   Gabapentin 300mg q6h changed to 100mg bid. To be re-addressed by pcp as well  Pt to be discharged on Zyvox until 12/02

## 2024-12-02 ENCOUNTER — APPOINTMENT (OUTPATIENT)
Dept: RHEUMATOLOGY | Facility: CLINIC | Age: 69
End: 2024-12-02

## 2024-12-04 ENCOUNTER — APPOINTMENT (OUTPATIENT)
Dept: FAMILY MEDICINE | Facility: CLINIC | Age: 69
End: 2024-12-04
Payer: MEDICARE

## 2024-12-04 ENCOUNTER — LABORATORY RESULT (OUTPATIENT)
Age: 69
End: 2024-12-04

## 2024-12-04 VITALS
BODY MASS INDEX: 32.15 KG/M2 | WEIGHT: 193 LBS | DIASTOLIC BLOOD PRESSURE: 70 MMHG | HEART RATE: 84 BPM | SYSTOLIC BLOOD PRESSURE: 110 MMHG | HEIGHT: 65 IN | OXYGEN SATURATION: 97 %

## 2024-12-04 DIAGNOSIS — I10 ESSENTIAL (PRIMARY) HYPERTENSION: ICD-10-CM

## 2024-12-04 DIAGNOSIS — N39.0 URINARY TRACT INFECTION, SITE NOT SPECIFIED: ICD-10-CM

## 2024-12-04 DIAGNOSIS — R32 UNSPECIFIED URINARY INCONTINENCE: ICD-10-CM

## 2024-12-04 DIAGNOSIS — N17.9 ACUTE KIDNEY FAILURE, UNSPECIFIED: ICD-10-CM

## 2024-12-04 DIAGNOSIS — Z86.19 PERSONAL HISTORY OF OTHER INFECTIOUS AND PARASITIC DISEASES: ICD-10-CM

## 2024-12-04 PROCEDURE — G2211 COMPLEX E/M VISIT ADD ON: CPT

## 2024-12-04 PROCEDURE — 99214 OFFICE O/P EST MOD 30 MIN: CPT

## 2024-12-04 RX ORDER — CARVEDILOL 12.5 MG/1
12.5 TABLET, FILM COATED ORAL TWICE DAILY
Qty: 180 | Refills: 2 | Status: ACTIVE | COMMUNITY
Start: 2024-12-04 | End: 1900-01-01

## 2024-12-05 PROBLEM — N39.0 RECURRENT UTI: Status: ACTIVE | Noted: 2024-12-05

## 2024-12-05 PROBLEM — Z86.19 HISTORY OF SEPSIS: Status: RESOLVED | Noted: 2024-12-05 | Resolved: 2024-12-05

## 2024-12-05 PROBLEM — R32 URINARY LEAKAGE: Status: ACTIVE | Noted: 2024-12-05

## 2024-12-05 PROBLEM — N17.9 ACUTE KIDNEY INJURY: Status: ACTIVE | Noted: 2024-12-04

## 2024-12-05 LAB
APPEARANCE: ABNORMAL
BILIRUBIN URINE: NEGATIVE
BLOOD URINE: NEGATIVE
COLOR: YELLOW
GLUCOSE QUALITATIVE U: 500 MG/DL
KETONES URINE: NEGATIVE MG/DL
LEUKOCYTE ESTERASE URINE: NEGATIVE
NITRITE URINE: NEGATIVE
PH URINE: 5.5
PROTEIN URINE: 100 MG/DL
SPECIFIC GRAVITY URINE: 1.02
UROBILINOGEN URINE: 0.2 MG/DL

## 2024-12-05 RX ORDER — LEVOFLOXACIN 500 MG/1
500 TABLET, FILM COATED ORAL
Qty: 5 | Refills: 0 | Status: COMPLETED | COMMUNITY
Start: 2024-11-14

## 2024-12-05 RX ORDER — LINEZOLID 600 MG/1
600 TABLET, FILM COATED ORAL
Qty: 8 | Refills: 0 | Status: COMPLETED | COMMUNITY
Start: 2024-11-29

## 2024-12-31 ENCOUNTER — APPOINTMENT (OUTPATIENT)
Dept: PULMONOLOGY | Facility: CLINIC | Age: 69
End: 2024-12-31
Payer: MEDICARE

## 2024-12-31 VITALS
DIASTOLIC BLOOD PRESSURE: 86 MMHG | SYSTOLIC BLOOD PRESSURE: 142 MMHG | RESPIRATION RATE: 16 BRPM | OXYGEN SATURATION: 98 % | HEART RATE: 86 BPM

## 2024-12-31 VITALS — WEIGHT: 189 LBS | HEIGHT: 65 IN | BODY MASS INDEX: 31.49 KG/M2

## 2024-12-31 DIAGNOSIS — G47.33 OBSTRUCTIVE SLEEP APNEA (ADULT) (PEDIATRIC): ICD-10-CM

## 2024-12-31 DIAGNOSIS — Z87.01 PERSONAL HISTORY OF PNEUMONIA (RECURRENT): ICD-10-CM

## 2024-12-31 DIAGNOSIS — R93.89 ABNORMAL FINDINGS ON DIAGNOSTIC IMAGING OF OTHER SPECIFIED BODY STRUCTURES: ICD-10-CM

## 2024-12-31 PROCEDURE — 99205 OFFICE O/P NEW HI 60 MIN: CPT

## 2024-12-31 PROCEDURE — G2211 COMPLEX E/M VISIT ADD ON: CPT

## 2025-01-03 ENCOUNTER — APPOINTMENT (OUTPATIENT)
Dept: RHEUMATOLOGY | Facility: CLINIC | Age: 70
End: 2025-01-03
Payer: MEDICARE

## 2025-01-03 ENCOUNTER — NON-APPOINTMENT (OUTPATIENT)
Age: 70
End: 2025-01-03

## 2025-01-03 VITALS
OXYGEN SATURATION: 97 % | TEMPERATURE: 97.3 F | SYSTOLIC BLOOD PRESSURE: 130 MMHG | DIASTOLIC BLOOD PRESSURE: 90 MMHG | HEART RATE: 81 BPM | HEIGHT: 65 IN | BODY MASS INDEX: 31.49 KG/M2 | WEIGHT: 189 LBS

## 2025-01-03 DIAGNOSIS — M47.819 SPONDYLOSIS W/OUT MYELOPATHY OR RADICULOPATHY, SITE UNSPECIFIED: ICD-10-CM

## 2025-01-03 DIAGNOSIS — L40.50 ARTHROPATHIC PSORIASIS, UNSPECIFIED: ICD-10-CM

## 2025-01-03 PROCEDURE — 99214 OFFICE O/P EST MOD 30 MIN: CPT

## 2025-01-03 RX ORDER — GUSELKUMAB 100 MG/ML
100 INJECTION SUBCUTANEOUS
Qty: 1 | Refills: 0 | Status: ACTIVE | COMMUNITY
Start: 2025-01-03 | End: 1900-01-01

## 2025-01-04 ENCOUNTER — APPOINTMENT (OUTPATIENT)
Dept: CT IMAGING | Facility: CLINIC | Age: 70
End: 2025-01-04
Payer: MEDICARE

## 2025-01-04 ENCOUNTER — OUTPATIENT (OUTPATIENT)
Dept: OUTPATIENT SERVICES | Facility: HOSPITAL | Age: 70
LOS: 1 days | End: 2025-01-04

## 2025-01-04 DIAGNOSIS — Z98.890 OTHER SPECIFIED POSTPROCEDURAL STATES: Chronic | ICD-10-CM

## 2025-01-04 DIAGNOSIS — Z96.651 PRESENCE OF RIGHT ARTIFICIAL KNEE JOINT: Chronic | ICD-10-CM

## 2025-01-04 DIAGNOSIS — Z98.1 ARTHRODESIS STATUS: Chronic | ICD-10-CM

## 2025-01-04 DIAGNOSIS — Z00.8 ENCOUNTER FOR OTHER GENERAL EXAMINATION: ICD-10-CM

## 2025-01-04 PROCEDURE — 71250 CT THORAX DX C-: CPT | Mod: 26

## 2025-01-07 ENCOUNTER — NON-APPOINTMENT (OUTPATIENT)
Age: 70
End: 2025-01-07

## 2025-01-09 LAB
ANION GAP SERPL CALC-SCNC: 14 MMOL/L
BUN SERPL-MCNC: 20 MG/DL
CALCIUM SERPL-MCNC: 9.5 MG/DL
CHLORIDE SERPL-SCNC: 101 MMOL/L
CO2 SERPL-SCNC: 25 MMOL/L
CREAT SERPL-MCNC: 0.85 MG/DL
EGFR: 73 ML/MIN/1.73M2
GLUCOSE SERPL-MCNC: 107 MG/DL
POTASSIUM SERPL-SCNC: 4.6 MMOL/L
SODIUM SERPL-SCNC: 139 MMOL/L

## 2025-01-11 NOTE — REASON FOR VISIT
[facility] Hospital Medicine Discharge Summary    Admit Date: 1/8/2025  Discharge Date and Time: 1/11/202512:29 PM  Admitting Physician:  Team  Discharging Physician: Bull Suggs MD.  Primary Care Physician: Graham Nguyen NP  Consults: Hospital Medicine    Discharge Diagnoses:  COPD exacerbation  Acute on chronic hypoxemia   Rhino virus/enterovirus   Chronic steroid use, immunocompromise state  Hyperlipidemia   Peripheral vascular disease on Plavix   Hypertension   Lung cancer receiving XRT       Hospital Course:   Chief Complaint: Inpatient Follow-up sob     HPI:    78 year old F with a PMH of HDL, PAD currently on Plavix, HTN, COPD who wears 2 L nasal cannula p.r.n. at home, lung cancer receives radiation every 3 weeks with her last treatment being last Thursday and not currently receiving chemo, who presented to the ED with complaints of shortness of breath and productive cough. Patient explains that her shortness of breath began 1 week ago and has gotten progressively worse, and has been accompanied by a productive cough with sputum yellow in color.  Patient explains that she wears 2 L nasal cannula at home as needed and denies having to increase her oxygen use. Patient states that she was recently diagnosed with pneumonia and has completed her outpatient antibiotics, and has been taking prednisone daily.  Patient denies fever, denies chest pain, denies nausea vomiting, denies diarrhea, denies sick contacts, denies abdominal pain.  Patient currently resting comfortably on 2 L nasal cannula.        Initial ED vitals:  Temp 98.6°, HR 77, respirations 24, /77, O2 sat 93% on 4 L nasal cannula.  ED workup revealed WBC 17.66, Na 128, chloride 92, albumin 2.9, .1, chest x-ray read no acute cardiopulmonary process identified, EKG showed normal sinus rhythm at 82 beats per minute.  Patient was given albuterol nebulizer solution 10 mg, Decadron 8 mg IV in the ED, and  admitted to Hospital Medicine.    Patient was managed with supportive care    Currently on 2 L via mask which is her baseline.  Rhinovirus has resolved   Will send on prednisone taper and antibiotics to complete a 7 days course  PT/OT recommending low intensity therapy.  Case management already on board and setting up with home health if not already arranged.    Continue oral Levaquin  Patient was on Lasix as an outpatient.         Objective/physical exam:  General: Appears comfortable  HEENT: NC/AT  Neck:  No JVD  Chest: Expiratory rhonchi heard bilaterally, slight wheezing heard bilaterally  CVS: Regular rhythm. Normal S1/S2.  Abdomen: nondistended, normoactive BS, soft and non-tender.  MSK: No obvious deformity or joint swelling  Skin: Warm and dry  Neuro: AAOx3, no focal neurological deficit  Psych: Cooperative         Pt was seen and examined on the day of discharge  Vitals:  VITAL SIGNS: 24 HRS MIN & MAX LAST   Temp  Min: 97.4 °F (36.3 °C)  Max: 97.5 °F (36.4 °C) 97.4 °F (36.3 °C)   BP  Min: 115/59  Max: 175/72 (!) 156/77   Pulse  Min: 54  Max: 70  63   Resp  Min: 16  Max: 20 20   SpO2  Min: 90 %  Max: 96 % (!) 94 %       Procedures Performed: No admission procedures for hospital encounter.     Significant Diagnostic Studies: See Full reports for all details    Recent Labs   Lab 01/09/25  0458 01/10/25  0650 01/11/25  0422   WBC 12.61* 13.94* 14.31*   RBC 3.95* 3.74* 3.88*   HGB 10.5* 9.9* 10.3*   HCT 32.5* 31.1* 32.3*   MCV 82.3 83.2 83.2   MCH 26.6* 26.5* 26.5*   MCHC 32.3* 31.8* 31.9*   RDW 16.7 16.8 16.8    366 391   MPV 8.4 8.9 8.7       Recent Labs   Lab 01/08/25  1144 01/09/25  0458 01/10/25  0520 01/11/25  0430   * 129* 130* 132*   K 4.0 4.3 5.2* 4.6   CL 92* 91* 98 98   CO2 30 33* 24 28   BUN 10.2 12.5 14.7 18.8   CREATININE 0.75 0.72 0.68 0.78   CALCIUM 9.2 8.5 7.9* 8.3*   MG  --  2.00  --   --    ALBUMIN 2.9* 2.7*  --   --    ALKPHOS 67 59  --   --    ALT 14 12  --   --    AST 17 16  --    --    BILITOT 0.5 0.4  --   --         Microbiology Results (last 7 days)       Procedure Component Value Units Date/Time    Respiratory Culture [[id number]]  (Abnormal)  (Susceptibility) Collected: 01/08/25 1931    Order Status: Completed Specimen: Sputum, Expectorated Updated: 01/11/25 1144     Respiratory Culture Moderate Achromobacter species     Comment: with normal respiratory kirill        GRAM STAIN Quality 3+      Rare Gram positive cocci    Blood Culture [[id number]]  (Normal) Collected: 01/08/25 2301    Order Status: Completed Specimen: Blood Updated: 01/11/25 0200     Blood Culture No Growth At 48 Hours    Blood Culture [[id number]]  (Normal) Collected: 01/08/25 2301    Order Status: Completed Specimen: Blood Updated: 01/11/25 0200     Blood Culture No Growth At 48 Hours             CT Chest Without Contrast  Narrative: EXAMINATION:  CT CHEST WITHOUT CONTRAST    CLINICAL HISTORY:  Respiratory illness, nondiagnostic xray;    TECHNIQUE:  Helical acquisition through the chest performed without IV contrast. Three plane reconstructions made available for review.  mGycm. Automatic exposure control, adjustment of mA/kV or iterative reconstruction technique was used to reduce radiation.    COMPARISON:  28 November 2024    FINDINGS:  No newly enlarged thoracic lymph nodes.    Heart is not significantly enlarged.  There are dense coronary artery calcifications.    There is no pleural effusion.  Overall slightly improved aeration of the lungs but similar area of nodular consolidation right lower lobe image 59 series 12.  Stable consolidation medial left upper lobe extending towards the apex.  Improved area of consolidation in the right middle lobe.  There is mild bronchiectasis with bronchial wall thickening.  There is some bibasilar atelectasis.    There are no acute findings in the imaged upper abdomen.  No acute osseous findings.  Impression: Overall slightly improved aeration of the lungs compared to  November 2024 though there is a persistent area of nodular consolidation in the right lower lobe for which follow-up chest CT is recommended in 3 months.    Electronically signed by: Brody Silva  Date:    01/09/2025  Time:    09:56         Medication List        START taking these medications      benzonatate 100 MG capsule  Commonly known as: TESSALON  Take 2 capsules (200 mg total) by mouth 3 (three) times daily. for 10 days     levoFLOXacin 750 MG tablet  Commonly known as: LEVAQUIN  Take 1 tablet (750 mg total) by mouth once daily. for 5 days  Start taking on: January 12, 2025     predniSONE 10 MG tablet  Commonly known as: DELTASONE  Take 5 tablets (50 mg total) by mouth once daily for 3 days, THEN 3 tablets (30 mg total) once daily for 3 days, THEN 2 tablets (20 mg total) once daily for 3 days, THEN 1 tablet (10 mg total) once daily for 3 days, THEN 0.5 tablets (5 mg total) once daily for 3 days.  Start taking on: January 11, 2025            CONTINUE taking these medications      albuterol 90 mcg/actuation inhaler  Commonly known as: VENTOLIN HFA  Inhale 2 puffs into the lungs every 6 (six) hours as needed for Wheezing. Rescue     carvediloL 6.25 MG tablet  Commonly known as: COREG     clopidogreL 75 mg tablet  Commonly known as: PLAVIX     furosemide 20 MG tablet  Commonly known as: LASIX  Take 1 tablet (20 mg total) by mouth once daily.     levothyroxine 125 MCG tablet  Commonly known as: SYNTHROID     losartan 50 MG tablet  Commonly known as: COZAAR  Take 1 tablet (50 mg total) by mouth once daily.     magnesium oxide 400 mg (241.3 mg magnesium) tablet  Commonly known as: MAG-OX  Take 1 tablet (400 mg total) by mouth 2 (two) times daily.     nicotine 14 mg/24 hr  Commonly known as: NICODERM CQ  Place 1 patch onto the skin once daily.     simvastatin 40 MG tablet  Commonly known as: ZOCOR               Where to Get Your Medications        These [facility] to [facility],  [address] [place] [address]      Phone: [phone]   benzonatate 100 MG capsule  levoFLOXacin 750 MG tablet  predniSONE 10 MG tablet          Explained in detail to the patient about the discharge plan, medications, and follow-up visits. Pt understands and agrees with the treatment plan  Discharge Disposition: Home or Self Care   Discharged Condition: stable  Diet-   Dietary Orders (From admission, onward)       Start     Ordered    01/08/25 2140  Diet Adult Regular Standard Tray  (Diet/Nutrition - [facility])  Diet effective now        Question:  Tray type:  Answer:  Standard Tray    01/08/25 2142                   Medications Per DC med rec  Activities as tolerated   Follow-up Information       [facility] [place] [facility]    Specialties: Home Health Services, Home Therapy Services, Home Living Aide Services  Why: This is your home health provider. You can contact your provider with any questions or concerns.  Contact information:  [address] [place] [address]  [phone]             Graham Nguyen NP. Schedule an appointment as soon as possible for a visit in 2 week(s).    Specialty: Family Medicine  Contact information:  [address] [place] [address]/[facility] [address]  [phone]                           For further questions contact hospitalist office    Discharge time 33 minutes    For worsening symptoms, chest pain, shortness of breath, increased abdominal pain, high grade fever, stroke or stroke like symptoms, immediately go to the nearest Emergency Room or call 911 as soon as possible.      Bull Akins M.D on 1/11/2025. at 12:29 PM.         [FreeTextEntry2] : Right foot pain

## 2025-01-29 ENCOUNTER — APPOINTMENT (OUTPATIENT)
Dept: FAMILY MEDICINE | Facility: CLINIC | Age: 70
End: 2025-01-29

## 2025-01-29 ENCOUNTER — APPOINTMENT (OUTPATIENT)
Dept: INTERNAL MEDICINE | Facility: CLINIC | Age: 70
End: 2025-01-29
Payer: MEDICARE

## 2025-01-29 VITALS
HEART RATE: 74 BPM | DIASTOLIC BLOOD PRESSURE: 95 MMHG | WEIGHT: 189 LBS | SYSTOLIC BLOOD PRESSURE: 159 MMHG | BODY MASS INDEX: 31.49 KG/M2 | RESPIRATION RATE: 19 BRPM | HEIGHT: 65 IN

## 2025-01-29 DIAGNOSIS — M06.9 RHEUMATOID ARTHRITIS, UNSPECIFIED: ICD-10-CM

## 2025-01-29 DIAGNOSIS — Z09 ENCOUNTER FOR FOLLOW-UP EXAMINATION AFTER COMPLETED TREATMENT FOR CONDITIONS OTHER THAN MALIGNANT NEOPLASM: ICD-10-CM

## 2025-01-29 DIAGNOSIS — N39.0 URINARY TRACT INFECTION, SITE NOT SPECIFIED: ICD-10-CM

## 2025-01-29 DIAGNOSIS — Z16.21 STREPTOCOCCAL INFECTION, UNSPECIFIED SITE: ICD-10-CM

## 2025-01-29 DIAGNOSIS — A49.1 STREPTOCOCCAL INFECTION, UNSPECIFIED SITE: ICD-10-CM

## 2025-01-29 PROCEDURE — G2211 COMPLEX E/M VISIT ADD ON: CPT

## 2025-01-29 PROCEDURE — 99215 OFFICE O/P EST HI 40 MIN: CPT

## 2025-01-30 PROBLEM — A49.1 VRE (VANCOMYCIN-RESISTANT ENTEROCOCCI): Status: ACTIVE | Noted: 2025-01-30

## 2025-01-30 PROBLEM — Z09 HOSPITAL DISCHARGE FOLLOW-UP: Status: ACTIVE | Noted: 2025-01-30

## 2025-01-30 LAB
APPEARANCE: CLEAR
BACTERIA: NEGATIVE /HPF
BILIRUBIN URINE: NEGATIVE
BLOOD URINE: NEGATIVE
CAST: 2 /LPF
COLOR: YELLOW
EPITHELIAL CELLS: 2 /HPF
GLUCOSE QUALITATIVE U: NEGATIVE MG/DL
KETONES URINE: NEGATIVE MG/DL
LEUKOCYTE ESTERASE URINE: ABNORMAL
MICROSCOPIC-UA: NORMAL
NITRITE URINE: NEGATIVE
PH URINE: 5.5
PROTEIN URINE: 30 MG/DL
RED BLOOD CELLS URINE: 0 /HPF
SPECIFIC GRAVITY URINE: 1.02
UROBILINOGEN URINE: 0.2 MG/DL
WHITE BLOOD CELLS URINE: 2 /HPF

## 2025-02-01 LAB — BACTERIA UR CULT: NORMAL

## 2025-02-11 ENCOUNTER — APPOINTMENT (OUTPATIENT)
Dept: RADIOLOGY | Facility: CLINIC | Age: 70
End: 2025-02-11
Payer: MEDICARE

## 2025-02-11 ENCOUNTER — APPOINTMENT (OUTPATIENT)
Age: 70
End: 2025-02-11
Payer: MEDICARE

## 2025-02-11 ENCOUNTER — OUTPATIENT (OUTPATIENT)
Dept: OUTPATIENT SERVICES | Facility: HOSPITAL | Age: 70
LOS: 1 days | End: 2025-02-11
Payer: MEDICARE

## 2025-02-11 DIAGNOSIS — M47.812 SPONDYLOSIS WITHOUT MYELOPATHY OR RADICULOPATHY, CERVICAL REGION: ICD-10-CM

## 2025-02-11 DIAGNOSIS — Z98.1 ARTHRODESIS STATUS: Chronic | ICD-10-CM

## 2025-02-11 DIAGNOSIS — M25.571 PAIN IN RIGHT ANKLE AND JOINTS OF RIGHT FOOT: ICD-10-CM

## 2025-02-11 DIAGNOSIS — Z98.890 OTHER SPECIFIED POSTPROCEDURAL STATES: Chronic | ICD-10-CM

## 2025-02-11 DIAGNOSIS — Z96.651 PRESENCE OF RIGHT ARTIFICIAL KNEE JOINT: Chronic | ICD-10-CM

## 2025-02-11 DIAGNOSIS — M06.9 RHEUMATOID ARTHRITIS, UNSPECIFIED: ICD-10-CM

## 2025-02-11 DIAGNOSIS — M79.671 PAIN IN RIGHT FOOT: ICD-10-CM

## 2025-02-11 PROBLEM — M65.979 TENOSYNOVITIS OF FOOT: Status: ACTIVE | Noted: 2025-02-11

## 2025-02-11 PROCEDURE — 72052 X-RAY EXAM NECK SPINE 6/>VWS: CPT

## 2025-02-11 PROCEDURE — 72052 X-RAY EXAM NECK SPINE 6/>VWS: CPT | Mod: 26

## 2025-02-11 PROCEDURE — 76942 ECHO GUIDE FOR BIOPSY: CPT | Mod: 59

## 2025-02-11 PROCEDURE — 99214 OFFICE O/P EST MOD 30 MIN: CPT | Mod: 25

## 2025-02-11 PROCEDURE — 77080 DXA BONE DENSITY AXIAL: CPT

## 2025-02-11 PROCEDURE — 77080 DXA BONE DENSITY AXIAL: CPT | Mod: 26

## 2025-02-11 PROCEDURE — 73030 X-RAY EXAM OF SHOULDER: CPT

## 2025-02-11 PROCEDURE — 73030 X-RAY EXAM OF SHOULDER: CPT | Mod: 26,LT

## 2025-02-11 PROCEDURE — 20606 DRAIN/INJ JOINT/BURSA W/US: CPT | Mod: RT

## 2025-02-12 ENCOUNTER — LABORATORY RESULT (OUTPATIENT)
Age: 70
End: 2025-02-12

## 2025-02-13 ENCOUNTER — APPOINTMENT (OUTPATIENT)
Dept: FAMILY MEDICINE | Facility: CLINIC | Age: 70
End: 2025-02-13

## 2025-02-17 ENCOUNTER — APPOINTMENT (OUTPATIENT)
Age: 70
End: 2025-02-17
Payer: MEDICARE

## 2025-02-17 DIAGNOSIS — M24.571 CONTRACTURE, RIGHT ANKLE: ICD-10-CM

## 2025-02-17 PROCEDURE — 99213 OFFICE O/P EST LOW 20 MIN: CPT | Mod: 25

## 2025-02-17 PROCEDURE — 20606 DRAIN/INJ JOINT/BURSA W/US: CPT | Mod: RT

## 2025-02-17 PROCEDURE — 29581 APPL MULTLAYER CMPRN SYS LEG: CPT | Mod: RT,XU

## 2025-02-21 ENCOUNTER — APPOINTMENT (OUTPATIENT)
Dept: ULTRASOUND IMAGING | Facility: CLINIC | Age: 70
End: 2025-02-21

## 2025-02-24 ENCOUNTER — APPOINTMENT (OUTPATIENT)
Dept: PULMONOLOGY | Facility: CLINIC | Age: 70
End: 2025-02-24

## 2025-02-24 ENCOUNTER — APPOINTMENT (OUTPATIENT)
Age: 70
End: 2025-02-24
Payer: MEDICARE

## 2025-02-24 PROBLEM — R60.0 EDEMA, LEG: Status: ACTIVE | Noted: 2025-02-24

## 2025-02-24 PROCEDURE — 99213 OFFICE O/P EST LOW 20 MIN: CPT | Mod: 25

## 2025-02-24 PROCEDURE — 29580 STRAPPING UNNA BOOT: CPT | Mod: RT

## 2025-03-03 ENCOUNTER — APPOINTMENT (OUTPATIENT)
Dept: FAMILY MEDICINE | Facility: CLINIC | Age: 70
End: 2025-03-03
Payer: MEDICARE

## 2025-03-03 ENCOUNTER — APPOINTMENT (OUTPATIENT)
Age: 70
End: 2025-03-03

## 2025-03-03 VITALS
DIASTOLIC BLOOD PRESSURE: 88 MMHG | OXYGEN SATURATION: 94 % | SYSTOLIC BLOOD PRESSURE: 146 MMHG | HEIGHT: 65 IN | BODY MASS INDEX: 31.65 KG/M2 | HEART RATE: 103 BPM | WEIGHT: 190 LBS

## 2025-03-03 VITALS — DIASTOLIC BLOOD PRESSURE: 80 MMHG | SYSTOLIC BLOOD PRESSURE: 128 MMHG

## 2025-03-03 DIAGNOSIS — E03.9 HYPOTHYROIDISM, UNSPECIFIED: ICD-10-CM

## 2025-03-03 DIAGNOSIS — Z12.31 ENCOUNTER FOR SCREENING MAMMOGRAM FOR MALIGNANT NEOPLASM OF BREAST: ICD-10-CM

## 2025-03-03 PROCEDURE — G2211 COMPLEX E/M VISIT ADD ON: CPT

## 2025-03-03 PROCEDURE — 99214 OFFICE O/P EST MOD 30 MIN: CPT

## 2025-03-04 ENCOUNTER — NON-APPOINTMENT (OUTPATIENT)
Age: 70
End: 2025-03-04

## 2025-03-04 ENCOUNTER — APPOINTMENT (OUTPATIENT)
Dept: FAMILY MEDICINE | Facility: CLINIC | Age: 70
End: 2025-03-04

## 2025-03-05 ENCOUNTER — APPOINTMENT (OUTPATIENT)
Dept: ULTRASOUND IMAGING | Facility: CLINIC | Age: 70
End: 2025-03-05
Payer: MEDICARE

## 2025-03-05 ENCOUNTER — OUTPATIENT (OUTPATIENT)
Dept: OUTPATIENT SERVICES | Facility: HOSPITAL | Age: 70
LOS: 1 days | End: 2025-03-05

## 2025-03-05 DIAGNOSIS — Z98.890 OTHER SPECIFIED POSTPROCEDURAL STATES: Chronic | ICD-10-CM

## 2025-03-05 DIAGNOSIS — Z00.00 ENCOUNTER FOR GENERAL ADULT MEDICAL EXAMINATION WITHOUT ABNORMAL FINDINGS: ICD-10-CM

## 2025-03-05 DIAGNOSIS — Z96.651 PRESENCE OF RIGHT ARTIFICIAL KNEE JOINT: Chronic | ICD-10-CM

## 2025-03-05 DIAGNOSIS — Z98.1 ARTHRODESIS STATUS: Chronic | ICD-10-CM

## 2025-03-05 PROCEDURE — 76770 US EXAM ABDO BACK WALL COMP: CPT | Mod: 26

## 2025-03-06 ENCOUNTER — APPOINTMENT (OUTPATIENT)
Age: 70
End: 2025-03-06
Payer: MEDICARE

## 2025-03-06 DIAGNOSIS — R60.0 LOCALIZED EDEMA: ICD-10-CM

## 2025-03-06 DIAGNOSIS — E11.9 TYPE 2 DIABETES MELLITUS W/OUT COMPLICATIONS: ICD-10-CM

## 2025-03-06 DIAGNOSIS — M19.90 UNSPECIFIED OSTEOARTHRITIS, UNSPECIFIED SITE: ICD-10-CM

## 2025-03-06 DIAGNOSIS — M65.979 UNSPECIFIED SYNOVITIS AND TENOSYNOVITIS, UNSPECIFIED ANK/FT: ICD-10-CM

## 2025-03-06 PROCEDURE — 99213 OFFICE O/P EST LOW 20 MIN: CPT | Mod: 25

## 2025-03-06 PROCEDURE — 29580 STRAPPING UNNA BOOT: CPT | Mod: RT

## 2025-03-13 ENCOUNTER — APPOINTMENT (OUTPATIENT)
Age: 70
End: 2025-03-13

## 2025-03-18 ENCOUNTER — RX RENEWAL (OUTPATIENT)
Age: 70
End: 2025-03-18

## 2025-03-24 ENCOUNTER — APPOINTMENT (OUTPATIENT)
Age: 70
End: 2025-03-24

## 2025-03-24 ENCOUNTER — APPOINTMENT (OUTPATIENT)
Age: 70
End: 2025-03-24
Payer: MEDICARE

## 2025-03-24 DIAGNOSIS — R60.0 LOCALIZED EDEMA: ICD-10-CM

## 2025-03-24 DIAGNOSIS — M65.979 UNSPECIFIED SYNOVITIS AND TENOSYNOVITIS, UNSPECIFIED ANK/FT: ICD-10-CM

## 2025-03-24 PROCEDURE — 99213 OFFICE O/P EST LOW 20 MIN: CPT | Mod: 25

## 2025-03-24 PROCEDURE — 29581 APPL MULTLAYER CMPRN SYS LEG: CPT | Mod: RT

## 2025-03-31 ENCOUNTER — APPOINTMENT (OUTPATIENT)
Age: 70
End: 2025-03-31
Payer: MEDICARE

## 2025-03-31 DIAGNOSIS — M77.50 OTHER ENTHESOPATHY OF UNSPCFD FOOT AND ANKLE: ICD-10-CM

## 2025-03-31 DIAGNOSIS — E11.9 TYPE 2 DIABETES MELLITUS W/OUT COMPLICATIONS: ICD-10-CM

## 2025-03-31 PROCEDURE — 99213 OFFICE O/P EST LOW 20 MIN: CPT

## 2025-04-02 PROBLEM — F43.20 GRIEF REACTION: Status: RESOLVED | Noted: 2018-02-26 | Resolved: 2025-04-02

## 2025-04-21 ENCOUNTER — APPOINTMENT (OUTPATIENT)
Dept: RHEUMATOLOGY | Facility: CLINIC | Age: 70
End: 2025-04-21

## 2025-04-23 NOTE — PHYSICAL THERAPY INITIAL EVALUATION ADULT - PASSIVE RANGE OF MOTION EXAMINATION, REHAB EVAL
Patient ID:   Radha Carr  [id number]; 74 year old 1950    Admit date: 4/21/2025    Discharge date: 4/23/2025    PCP: Qi Ortiz MD     Consults:       Diagnosis:  Atypical chest pain  Echo normal    Patient Instructions:  Activity: activity as tolerated  Diet: resume prior diet  Wound Care:     Follow-up with:   Primary care physician 1 week    Disposition:  Home    Readmission Risk Factors:      Advanced Directives/Goals of Care:    Procedures/Diagnostics:  XR CHEST AP OR PA   Final Result   IMPRESSION: No evidence for active disease in the chest.      Electronically Signed by: Trell Garza MD   Signed on: 4/21/2025 8:44 PM   Created on Workstation ID: [id number]   Signed on Workstation ID: [id number]          Labs  Recent Labs   Lab 04/22/25  0537 04/21/25 1956   WBC 12.8* 12.6*   HGB 13.2 13.8   HCT 40.8 42.3    360   RBC 4.80 5.02   MCV 85.0 84.3   MCH 27.5 27.5   MCHC 32.4 32.6   SODIUM 143 143   CHLORIDE 106 105   BUN 16 19   CREATININE 1.00* 1.00*   CO2 28 29   POTASSIUM 4.1 4.9   BILIRUBIN  --  0.7   AST  --  44*   ALBUMIN  --  3.1*   ALKPT  --  234*       CMP  Recent Labs   Lab 04/22/25  0537 04/21/25 1956   SODIUM 143 143   CHLORIDE 106 105   BUN 16 19   GLUCOSE 86 90   POTASSIUM 4.1 4.9   CO2 28 29     Recent Labs   Lab 04/21/25 1956   BILIRUBIN 0.7   AST 44*   ALBUMIN 3.1*   ALKPT 234*   GPT 22       BMP  Recent Labs   Lab 04/22/25  0537 04/21/25 1956   SODIUM 143 143   CHLORIDE 106 105   BUN 16 19   GLUCOSE 86 90   POTASSIUM 4.1 4.9   CO2 28 29   CREATININE 1.00* 1.00*   CALCIUM 9.5 9.6       H&P:  Please refer to the admission H&P done  on 4/21/2025 for details of admission.     PROBLEMS ADDRESSED IN HOSPITAL and HOSPITAL COURSE:  74 year old female who presents with chest pain that has been going on for the past several weeks.  Note the patient has a history of interstitial lung disease patient.  Patient describes the pain as dull 3 out of 10 intensity mostly on the left side of  the chest wall comes and goes last for about few minutes.     When she arrived in the ED her initial EKG showed significant ST depressions in the anterior lateral leads raising concern for ischemia although troponin was negative.  Cardiology was consulted who recommended observation in the hospital.  Has repeat EKG shows improvement in the ST T wave changes.    Patient's echo was unremarkable, EKG does not show any further changes  Seen by cardiology  Having no more chest pain this could be atypical due to her idiopathic pulmonary fibrosis      PHYSICAL EXAM PRIOR TO DISCHARGE:   Visit Vitals  BP 95/55 (BP Location: LUE - Left upper extremity, Patient Position: Sitting)   Pulse 71   Temp 97.3 °F (36.3 °C) (Temporal)   Resp 18   Ht 5' 1\" (1.549 m)   Wt 50.4 kg (111 lb 1.6 oz)   SpO2 95%   BMI 20.99 kg/m²     In general awake and alert  Cardiovascular S2 with regular rate rhythm  Pulmonary clear to auscultation  Discharge Medications:     Summary of your Discharge Medications        Take these Medications        Details   acetaminophen 650 MG CR tablet  Commonly known as: TYLENOL   Take 650 mg by mouth every 8 hours as needed for Pain.     albuterol 108 (90 Base) MCG/ACT inhaler   Inhale 2 puffs into the lungs every 4 hours as needed for Shortness of Breath.     allopurinol 100 MG tablet  Commonly known as: ZYLOPRIM   Take 100 mg by mouth daily.     aspirin 81 MG EC tablet  Commonly known as: ECOTRIN   Take 81 mg by mouth daily.     atorvastatin 80 MG tablet  Commonly known as: LIPITOR   Take 1 tablet by mouth nightly.     bismuth subsalicylate 262 MG/15ML suspension  Commonly known as: PEPTO BISMOL   Take 15 mLs by mouth daily as needed for Diarrhea.     buPROPion  MG 24 hr tablet  Commonly known as: WELLBUTRIN XL   Take 300 mg by mouth daily.     diphenhydrAMINE 25 MG capsule  Commonly known as: BENADRYL   Take 25 mg by mouth every 6 hours as needed for Itching.     Eliquis 2.5 MG Tab   Generic drug:  apixaBAN  Take 2.5 mg by mouth in the morning and 2.5 mg in the evening.     Flovent  MCG/ACT inhaler   Generic drug: fluticasone propionate  Inhale 2 puffs into the lungs daily as needed.     hydrOXYzine 25 MG tablet  Commonly known as: ATARAX   Take 25 mg by mouth every 8 hours as needed.     Jardiance 10 MG tablet   Generic drug: empagliflozin  Take 1 tablet by mouth daily (before breakfast).     ketoconazole 2 % shampoo  Commonly known as: NIZORAL   Apply 1 Application topically 1 day a week.     magnesium oxide 400 (240 Mg) MG tablet  Commonly known as: MAG-OX   Take 1 tablet by mouth daily.     metoPROLOL succinate 25 MG 24 hr tablet  Commonly known as: TOPROL-XL   Take 25 mg by mouth daily.     ondansetron 4 MG disintegrating tablet  Commonly known as: Zofran ODT   Place 1 tablet onto the tongue every 8 hours as needed for Nausea.     Pirfenidone 267 MG Cap   Take 801 mg by mouth in the morning and 801 mg at noon and 801 mg in the evening.     potassium CHLORIDE 20 MEQ packet  Commonly known as: KLOR-CON   DISSOLVE ONE (1) PACKET  ML OF WATER AND DRINK BY MOUTH DAILY     spironolactone 25 MG tablet  Commonly known as: ALDACTONE   Take one-HALF tablet by mouth daily.     torsemide 20 MG tablet  Commonly known as: DEMADEX   Take 1 tablet by mouth daily.     triamcinolone 0.1 % cream  Commonly known as: ARISTOCORT   1 Application 2 times daily as needed.     ursodiol 500 MG tablet  Commonly known as: STEVE FORTE   Take 500 mg by mouth in the morning and 500 mg at noon and 500 mg in the evening. 0800, 1400, 200              Lab tests or Imaging Studies that need to be ordered or followed: None.     Discharge plan is d/w patient.     Moustapha Rockwell DO    4/23/2025     CC:   Qi Ortiz MD              bilateral upper extremity Passive ROM was WNL (within normal limits)/bilateral lower extremity Passive ROM was WNL

## 2025-04-28 ENCOUNTER — APPOINTMENT (OUTPATIENT)
Dept: FAMILY MEDICINE | Facility: CLINIC | Age: 70
End: 2025-04-28
Payer: MEDICARE

## 2025-04-28 VITALS
DIASTOLIC BLOOD PRESSURE: 87 MMHG | WEIGHT: 207 LBS | SYSTOLIC BLOOD PRESSURE: 140 MMHG | HEIGHT: 65 IN | BODY MASS INDEX: 34.49 KG/M2 | OXYGEN SATURATION: 97 % | HEART RATE: 102 BPM

## 2025-04-28 DIAGNOSIS — J30.9 ALLERGIC RHINITIS, UNSPECIFIED: ICD-10-CM

## 2025-04-28 DIAGNOSIS — E03.9 HYPOTHYROIDISM, UNSPECIFIED: ICD-10-CM

## 2025-04-28 DIAGNOSIS — R53.83 OTHER FATIGUE: ICD-10-CM

## 2025-04-28 PROCEDURE — G2211 COMPLEX E/M VISIT ADD ON: CPT

## 2025-04-28 PROCEDURE — 99214 OFFICE O/P EST MOD 30 MIN: CPT

## 2025-05-06 ENCOUNTER — LABORATORY RESULT (OUTPATIENT)
Age: 70
End: 2025-05-06

## 2025-05-06 LAB
APPEARANCE: CLEAR
BILIRUBIN URINE: NEGATIVE
BLOOD URINE: NEGATIVE
COLOR: YELLOW
GLUCOSE QUALITATIVE U: NEGATIVE MG/DL
KETONES URINE: NEGATIVE MG/DL
LEUKOCYTE ESTERASE URINE: ABNORMAL
NITRITE URINE: NEGATIVE
PH URINE: 5.5
PROTEIN URINE: NORMAL MG/DL
SPECIFIC GRAVITY URINE: 1.02
UROBILINOGEN URINE: 0.2 MG/DL

## 2025-05-07 LAB
ALBUMIN SERPL ELPH-MCNC: 4.4 G/DL
ALP BLD-CCNC: 101 U/L
ALT SERPL-CCNC: 20 U/L
ANION GAP SERPL CALC-SCNC: 15 MMOL/L
AST SERPL-CCNC: 27 U/L
BILIRUB SERPL-MCNC: 0.4 MG/DL
BUN SERPL-MCNC: 13 MG/DL
CALCIUM SERPL-MCNC: 9.9 MG/DL
CHLORIDE SERPL-SCNC: 100 MMOL/L
CO2 SERPL-SCNC: 24 MMOL/L
CREAT SERPL-MCNC: 0.87 MG/DL
EGFRCR SERPLBLD CKD-EPI 2021: 71 ML/MIN/1.73M2
ESTIMATED AVERAGE GLUCOSE: 134 MG/DL
GLUCOSE SERPL-MCNC: 147 MG/DL
HBA1C MFR BLD HPLC: 6.3 %
HCT VFR BLD CALC: 40.1 %
HGB BLD-MCNC: 13 G/DL
MCHC RBC-ENTMCNC: 32.4 G/DL
MCHC RBC-ENTMCNC: 34.5 PG
MCV RBC AUTO: 106.4 FL
PLATELET # BLD AUTO: 334 K/UL
POTASSIUM SERPL-SCNC: 4.6 MMOL/L
PROT SERPL-MCNC: 6.7 G/DL
RBC # BLD: 3.77 M/UL
RBC # FLD: 13.2 %
SODIUM SERPL-SCNC: 138 MMOL/L
TSH SERPL-ACNC: 124 UIU/ML
WBC # FLD AUTO: 6.48 K/UL

## 2025-05-08 ENCOUNTER — OUTPATIENT (OUTPATIENT)
Dept: OUTPATIENT SERVICES | Facility: HOSPITAL | Age: 70
LOS: 1 days | End: 2025-05-08

## 2025-05-08 DIAGNOSIS — Z98.890 OTHER SPECIFIED POSTPROCEDURAL STATES: Chronic | ICD-10-CM

## 2025-05-08 DIAGNOSIS — Z98.1 ARTHRODESIS STATUS: Chronic | ICD-10-CM

## 2025-05-08 DIAGNOSIS — G47.33 OBSTRUCTIVE SLEEP APNEA (ADULT) (PEDIATRIC): ICD-10-CM

## 2025-05-08 DIAGNOSIS — Z96.651 PRESENCE OF RIGHT ARTIFICIAL KNEE JOINT: Chronic | ICD-10-CM

## 2025-05-08 PROCEDURE — 95806 SLEEP STUDY UNATT&RESP EFFT: CPT | Mod: 26

## 2025-05-08 PROCEDURE — 95806 SLEEP STUDY UNATT&RESP EFFT: CPT

## 2025-05-21 ENCOUNTER — APPOINTMENT (OUTPATIENT)
Dept: DERMATOLOGY | Facility: CLINIC | Age: 70
End: 2025-05-21
Payer: MEDICARE

## 2025-05-21 PROCEDURE — 99214 OFFICE O/P EST MOD 30 MIN: CPT

## 2025-05-22 ENCOUNTER — APPOINTMENT (OUTPATIENT)
Dept: PULMONOLOGY | Facility: CLINIC | Age: 70
End: 2025-05-22
Payer: MEDICARE

## 2025-05-22 VITALS — HEIGHT: 63 IN | BODY MASS INDEX: 36.14 KG/M2 | WEIGHT: 204 LBS

## 2025-05-22 VITALS
HEART RATE: 81 BPM | DIASTOLIC BLOOD PRESSURE: 82 MMHG | RESPIRATION RATE: 16 BRPM | OXYGEN SATURATION: 98 % | SYSTOLIC BLOOD PRESSURE: 136 MMHG

## 2025-05-22 DIAGNOSIS — J80 ACUTE RESPIRATORY DISTRESS SYNDROME: ICD-10-CM

## 2025-05-22 PROCEDURE — 85018 HEMOGLOBIN: CPT | Mod: QW

## 2025-05-22 PROCEDURE — 99213 OFFICE O/P EST LOW 20 MIN: CPT | Mod: 25

## 2025-05-22 PROCEDURE — 94729 DIFFUSING CAPACITY: CPT

## 2025-05-22 PROCEDURE — 94727 GAS DIL/WSHOT DETER LNG VOL: CPT

## 2025-05-22 PROCEDURE — 94010 BREATHING CAPACITY TEST: CPT

## 2025-05-28 ENCOUNTER — APPOINTMENT (OUTPATIENT)
Dept: RHEUMATOLOGY | Facility: CLINIC | Age: 70
End: 2025-05-28

## 2025-05-30 ENCOUNTER — APPOINTMENT (OUTPATIENT)
Dept: RHEUMATOLOGY | Facility: CLINIC | Age: 70
End: 2025-05-30

## 2025-05-30 VITALS
HEIGHT: 63 IN | OXYGEN SATURATION: 98 % | TEMPERATURE: 97.5 F | HEART RATE: 96 BPM | SYSTOLIC BLOOD PRESSURE: 138 MMHG | DIASTOLIC BLOOD PRESSURE: 80 MMHG

## 2025-05-30 DIAGNOSIS — M06.9 RHEUMATOID ARTHRITIS, UNSPECIFIED: ICD-10-CM

## 2025-05-30 PROCEDURE — 99214 OFFICE O/P EST MOD 30 MIN: CPT

## 2025-05-30 RX ORDER — TOCILIZUMAB 180 MG/ML
162 INJECTION, SOLUTION SUBCUTANEOUS
Qty: 2 | Refills: 5 | Status: DISCONTINUED | COMMUNITY
Start: 2025-05-30 | End: 2025-05-30

## 2025-06-02 ENCOUNTER — APPOINTMENT (OUTPATIENT)
Dept: FAMILY MEDICINE | Facility: CLINIC | Age: 70
End: 2025-06-02

## 2025-06-02 RX ORDER — TOCILIZUMAB 180 MG/ML
162 INJECTION, SOLUTION SUBCUTANEOUS
Qty: 2 | Refills: 5 | Status: ACTIVE | COMMUNITY
Start: 2025-05-30 | End: 1900-01-01

## 2025-06-11 ENCOUNTER — APPOINTMENT (OUTPATIENT)
Dept: FAMILY MEDICINE | Facility: CLINIC | Age: 70
End: 2025-06-11
Payer: MEDICARE

## 2025-06-11 VITALS
BODY MASS INDEX: 35.79 KG/M2 | DIASTOLIC BLOOD PRESSURE: 83 MMHG | HEART RATE: 95 BPM | OXYGEN SATURATION: 98 % | WEIGHT: 202 LBS | HEIGHT: 63 IN | SYSTOLIC BLOOD PRESSURE: 125 MMHG

## 2025-06-11 PROCEDURE — 81003 URINALYSIS AUTO W/O SCOPE: CPT | Mod: QW

## 2025-06-11 PROCEDURE — 99213 OFFICE O/P EST LOW 20 MIN: CPT

## 2025-06-11 RX ORDER — CIPROFLOXACIN HYDROCHLORIDE 250 MG/1
250 TABLET, FILM COATED ORAL
Qty: 10 | Refills: 0 | Status: COMPLETED | COMMUNITY
Start: 2025-06-11 | End: 2025-06-16

## 2025-06-13 LAB — BACTERIA UR CULT: NORMAL

## 2025-07-22 LAB
T3FREE SERPL-MCNC: 2.57 PG/ML
T4 FREE SERPL-MCNC: 1.5 NG/DL
TSH SERPL-ACNC: 0.76 UIU/ML

## 2025-07-25 ENCOUNTER — APPOINTMENT (OUTPATIENT)
Dept: RHEUMATOLOGY | Facility: CLINIC | Age: 70
End: 2025-07-25

## 2025-07-28 ENCOUNTER — APPOINTMENT (OUTPATIENT)
Dept: FAMILY MEDICINE | Facility: CLINIC | Age: 70
End: 2025-07-28
Payer: MEDICARE

## 2025-07-28 VITALS
DIASTOLIC BLOOD PRESSURE: 66 MMHG | BODY MASS INDEX: 36.86 KG/M2 | HEART RATE: 80 BPM | OXYGEN SATURATION: 96 % | HEIGHT: 63 IN | WEIGHT: 208 LBS | SYSTOLIC BLOOD PRESSURE: 100 MMHG

## 2025-07-28 DIAGNOSIS — E03.9 HYPOTHYROIDISM, UNSPECIFIED: ICD-10-CM

## 2025-07-28 DIAGNOSIS — N17.9 ACUTE KIDNEY FAILURE, UNSPECIFIED: ICD-10-CM

## 2025-07-28 DIAGNOSIS — E11.9 TYPE 2 DIABETES MELLITUS W/OUT COMPLICATIONS: ICD-10-CM

## 2025-07-28 PROCEDURE — G2211 COMPLEX E/M VISIT ADD ON: CPT

## 2025-07-28 PROCEDURE — 99214 OFFICE O/P EST MOD 30 MIN: CPT

## 2025-07-29 ENCOUNTER — APPOINTMENT (OUTPATIENT)
Dept: RHEUMATOLOGY | Facility: CLINIC | Age: 70
End: 2025-07-29
Payer: MEDICARE

## 2025-07-29 VITALS
DIASTOLIC BLOOD PRESSURE: 85 MMHG | HEART RATE: 84 BPM | RESPIRATION RATE: 16 BRPM | TEMPERATURE: 97.1 F | OXYGEN SATURATION: 95 % | SYSTOLIC BLOOD PRESSURE: 146 MMHG

## 2025-07-29 VITALS
OXYGEN SATURATION: 98 % | DIASTOLIC BLOOD PRESSURE: 62 MMHG | RESPIRATION RATE: 18 BRPM | SYSTOLIC BLOOD PRESSURE: 101 MMHG | HEART RATE: 84 BPM

## 2025-07-29 PROCEDURE — 96413 CHEMO IV INFUSION 1 HR: CPT

## 2025-08-01 ENCOUNTER — APPOINTMENT (OUTPATIENT)
Dept: RHEUMATOLOGY | Facility: CLINIC | Age: 70
End: 2025-08-01
Payer: MEDICARE

## 2025-08-01 VITALS
TEMPERATURE: 97.3 F | BODY MASS INDEX: 36.86 KG/M2 | OXYGEN SATURATION: 96 % | RESPIRATION RATE: 17 BRPM | SYSTOLIC BLOOD PRESSURE: 120 MMHG | HEART RATE: 89 BPM | DIASTOLIC BLOOD PRESSURE: 84 MMHG | HEIGHT: 63 IN | WEIGHT: 208 LBS

## 2025-08-01 DIAGNOSIS — M06.9 RHEUMATOID ARTHRITIS, UNSPECIFIED: ICD-10-CM

## 2025-08-01 PROCEDURE — 99214 OFFICE O/P EST MOD 30 MIN: CPT | Mod: 25

## 2025-08-01 PROCEDURE — 96372 THER/PROPH/DIAG INJ SC/IM: CPT

## 2025-08-01 RX ORDER — METHYLPRED ACET/NACL,ISO-OS/PF 40 MG/ML
40 VIAL (ML) INJECTION
Refills: 0 | Status: COMPLETED | OUTPATIENT
Start: 2025-08-01

## 2025-08-01 RX ORDER — ROSUVASTATIN CALCIUM 40 MG/1
40 TABLET, FILM COATED ORAL
Refills: 0 | Status: ACTIVE | COMMUNITY

## 2025-08-01 RX ADMIN — METHYLPREDNISOLONE ACETATE 0 MG/ML: 40 INJECTION, SUSPENSION INTRA-ARTICULAR; INTRALESIONAL; INTRAMUSCULAR; SOFT TISSUE at 00:00

## 2025-08-07 ENCOUNTER — APPOINTMENT (OUTPATIENT)
Dept: PULMONOLOGY | Facility: CLINIC | Age: 70
End: 2025-08-07
Payer: MEDICARE

## 2025-08-07 VITALS
OXYGEN SATURATION: 97 % | BODY MASS INDEX: 36.14 KG/M2 | SYSTOLIC BLOOD PRESSURE: 126 MMHG | DIASTOLIC BLOOD PRESSURE: 82 MMHG | HEART RATE: 80 BPM | RESPIRATION RATE: 16 BRPM | WEIGHT: 204 LBS | HEIGHT: 63 IN

## 2025-08-07 DIAGNOSIS — E66.9 OBESITY, UNSPECIFIED: ICD-10-CM

## 2025-08-07 DIAGNOSIS — G47.33 OBSTRUCTIVE SLEEP APNEA (ADULT) (PEDIATRIC): ICD-10-CM

## 2025-08-07 PROCEDURE — G2211 COMPLEX E/M VISIT ADD ON: CPT

## 2025-08-07 PROCEDURE — 99215 OFFICE O/P EST HI 40 MIN: CPT

## 2025-08-15 DIAGNOSIS — Z00.00 ENCOUNTER FOR GENERAL ADULT MEDICAL EXAMINATION W/OUT ABNORMAL FINDINGS: ICD-10-CM

## 2025-08-15 RX ORDER — TOCILIZUMAB 20 MG/ML
400 INJECTION, SOLUTION, CONCENTRATE INTRAVENOUS
Qty: 1 | Refills: 0 | Status: ACTIVE | OUTPATIENT
Start: 2025-08-15 | End: 1900-01-01

## 2025-08-27 ENCOUNTER — APPOINTMENT (OUTPATIENT)
Dept: RHEUMATOLOGY | Facility: CLINIC | Age: 70
End: 2025-08-27
Payer: MEDICARE

## 2025-08-27 VITALS
RESPIRATION RATE: 16 BRPM | DIASTOLIC BLOOD PRESSURE: 71 MMHG | OXYGEN SATURATION: 96 % | HEART RATE: 90 BPM | TEMPERATURE: 97 F | SYSTOLIC BLOOD PRESSURE: 112 MMHG

## 2025-08-27 VITALS
DIASTOLIC BLOOD PRESSURE: 62 MMHG | RESPIRATION RATE: 17 BRPM | HEART RATE: 76 BPM | OXYGEN SATURATION: 96 % | SYSTOLIC BLOOD PRESSURE: 99 MMHG

## 2025-08-27 PROCEDURE — 36415 COLL VENOUS BLD VENIPUNCTURE: CPT

## 2025-08-27 PROCEDURE — 96413 CHEMO IV INFUSION 1 HR: CPT

## 2025-09-04 RX ORDER — COVID-19 VACCINE, MRNA 10 UG/.2ML
10 INJECTION, SUSPENSION INTRAMUSCULAR
Qty: 1 | Refills: 0 | Status: ACTIVE | COMMUNITY
Start: 2025-09-04 | End: 1900-01-01

## 2025-09-06 RX ORDER — COVID-19 VACCINE, MRNA 10 UG/.2ML
10 INJECTION, SUSPENSION INTRAMUSCULAR
Qty: 1 | Refills: 0 | Status: ACTIVE | COMMUNITY
Start: 2025-09-05 | End: 1900-01-01